# Patient Record
Sex: MALE | Race: WHITE | Employment: OTHER | URBAN - METROPOLITAN AREA
[De-identification: names, ages, dates, MRNs, and addresses within clinical notes are randomized per-mention and may not be internally consistent; named-entity substitution may affect disease eponyms.]

---

## 2017-09-29 ENCOUNTER — HOSPITAL ENCOUNTER (OUTPATIENT)
Age: 62
Discharge: HOME | End: 2017-09-29
Payer: MEDICARE

## 2017-09-29 LAB
T3 FREE: 3.29 PG/ML (ref 2.02–4.43)
THYROXINE, FREE: 1.84 NG/DL (ref 0.93–1.7)
TSH SERPL DL<=0.05 MIU/L-ACNC: 0.01 MIU/L (ref 0.3–5)

## 2017-09-29 PROCEDURE — 84481 FREE ASSAY (FT-3): CPT

## 2017-09-29 PROCEDURE — 84443 ASSAY THYROID STIM HORMONE: CPT

## 2017-09-29 PROCEDURE — 84439 ASSAY OF FREE THYROXINE: CPT

## 2017-09-29 PROCEDURE — 36415 COLL VENOUS BLD VENIPUNCTURE: CPT

## 2017-11-02 ENCOUNTER — HOSPITAL ENCOUNTER (OUTPATIENT)
Age: 62
Discharge: HOME | End: 2017-11-02
Payer: MEDICARE

## 2017-11-02 LAB
T3 FREE: 3.47 PG/ML (ref 2.02–4.43)
THYROXINE, FREE: 2.14 NG/DL (ref 0.93–1.7)
TSH SERPL DL<=0.05 MIU/L-ACNC: <0.01 MIU/L (ref 0.3–5)

## 2017-11-02 PROCEDURE — 84439 ASSAY OF FREE THYROXINE: CPT

## 2017-11-02 PROCEDURE — 36415 COLL VENOUS BLD VENIPUNCTURE: CPT

## 2017-11-02 PROCEDURE — 84443 ASSAY THYROID STIM HORMONE: CPT

## 2017-11-02 PROCEDURE — 84481 FREE ASSAY (FT-3): CPT

## 2018-02-12 ENCOUNTER — HOSPITAL ENCOUNTER (OUTPATIENT)
Age: 63
Discharge: HOME | End: 2018-02-12
Payer: MEDICARE

## 2018-02-12 LAB
T3 FREE: 5.21 PG/ML (ref 2.02–4.43)
THYROXINE, FREE: 3.19 NG/DL (ref 0.93–1.7)
TSH SERPL DL<=0.05 MIU/L-ACNC: <0.01 MIU/L (ref 0.3–5)

## 2018-02-12 PROCEDURE — 84481 FREE ASSAY (FT-3): CPT

## 2018-02-12 PROCEDURE — 36415 COLL VENOUS BLD VENIPUNCTURE: CPT

## 2018-02-12 PROCEDURE — 84443 ASSAY THYROID STIM HORMONE: CPT

## 2018-02-12 PROCEDURE — 84439 ASSAY OF FREE THYROXINE: CPT

## 2018-03-12 ENCOUNTER — HOSPITAL ENCOUNTER (OUTPATIENT)
Age: 63
Discharge: HOME | End: 2018-03-12
Payer: MEDICARE

## 2018-03-12 LAB
ANION GAP SERPL CALCULATED.3IONS-SCNC: 13 MMOL/L (ref 9–17)
BUN BLDV-MCNC: 15 MG/DL (ref 8–23)
BUN/CREAT BLD: ABNORMAL (ref 9–20)
CALCIUM SERPL-MCNC: 10.3 MG/DL (ref 8.6–10.4)
CHLORIDE BLD-SCNC: 96 MMOL/L (ref 98–107)
CO2: 27 MMOL/L (ref 20–31)
CREAT SERPL-MCNC: 0.86 MG/DL (ref 0.7–1.2)
GFR AFRICAN AMERICAN: >60 ML/MIN
GFR NON-AFRICAN AMERICAN: >60 ML/MIN
GFR SERPL CREATININE-BSD FRML MDRD: ABNORMAL ML/MIN/{1.73_M2}
GFR SERPL CREATININE-BSD FRML MDRD: ABNORMAL ML/MIN/{1.73_M2}
GLUCOSE BLD-MCNC: 147 MG/DL (ref 70–99)
MAGNESIUM: 1.7 MG/DL (ref 1.6–2.6)
POTASSIUM SERPL-SCNC: 4.3 MMOL/L (ref 3.7–5.3)
SODIUM BLD-SCNC: 136 MMOL/L (ref 135–144)
T3 FREE: 5.26 PG/ML (ref 2.02–4.43)
THYROXINE, FREE: 2.81 NG/DL (ref 0.93–1.7)
TSH SERPL DL<=0.05 MIU/L-ACNC: <0.01 MIU/L (ref 0.3–5)

## 2018-03-12 PROCEDURE — 80048 BASIC METABOLIC PNL TOTAL CA: CPT

## 2018-03-12 PROCEDURE — 84443 ASSAY THYROID STIM HORMONE: CPT

## 2018-03-12 PROCEDURE — 84439 ASSAY OF FREE THYROXINE: CPT

## 2018-03-12 PROCEDURE — 83735 ASSAY OF MAGNESIUM: CPT

## 2018-03-12 PROCEDURE — 36415 COLL VENOUS BLD VENIPUNCTURE: CPT

## 2018-03-12 PROCEDURE — 84481 FREE ASSAY (FT-3): CPT

## 2018-05-23 ENCOUNTER — HOSPITAL ENCOUNTER (OUTPATIENT)
Age: 63
Discharge: HOME | End: 2018-05-23
Payer: MEDICARE

## 2018-05-23 LAB
T3 FREE: 4.56 PG/ML (ref 2.02–4.43)
THYROXINE, FREE: 1.75 NG/DL (ref 0.93–1.7)
TSH SERPL DL<=0.05 MIU/L-ACNC: <0.01 MIU/L (ref 0.3–5)

## 2018-05-23 PROCEDURE — 36415 COLL VENOUS BLD VENIPUNCTURE: CPT

## 2018-05-23 PROCEDURE — 84443 ASSAY THYROID STIM HORMONE: CPT

## 2018-05-23 PROCEDURE — 84481 FREE ASSAY (FT-3): CPT

## 2018-05-23 PROCEDURE — 84439 ASSAY OF FREE THYROXINE: CPT

## 2018-07-02 ENCOUNTER — HOSPITAL ENCOUNTER (OUTPATIENT)
Age: 63
Discharge: HOME | End: 2018-07-02
Payer: MEDICARE

## 2018-07-02 LAB
T3 FREE: 12.49 PG/ML (ref 2.02–4.43)
THYROXINE, FREE: 3.93 NG/DL (ref 0.93–1.7)
TSH SERPL DL<=0.05 MIU/L-ACNC: <0.01 MIU/L (ref 0.3–5)

## 2018-07-02 PROCEDURE — 84443 ASSAY THYROID STIM HORMONE: CPT

## 2018-07-02 PROCEDURE — 84439 ASSAY OF FREE THYROXINE: CPT

## 2018-07-02 PROCEDURE — 36415 COLL VENOUS BLD VENIPUNCTURE: CPT

## 2018-07-02 PROCEDURE — 84481 FREE ASSAY (FT-3): CPT

## 2018-08-08 ENCOUNTER — HOSPITAL ENCOUNTER (OUTPATIENT)
Age: 63
Discharge: HOME | End: 2018-08-08
Payer: MEDICARE

## 2018-08-08 LAB
T3 FREE: 11.93 PG/ML (ref 2.02–4.43)
THYROXINE, FREE: 3.11 NG/DL (ref 0.93–1.7)
TSH SERPL DL<=0.05 MIU/L-ACNC: <0.01 MIU/L (ref 0.3–5)

## 2018-08-08 PROCEDURE — 84439 ASSAY OF FREE THYROXINE: CPT

## 2018-08-08 PROCEDURE — 36415 COLL VENOUS BLD VENIPUNCTURE: CPT

## 2018-08-08 PROCEDURE — 84443 ASSAY THYROID STIM HORMONE: CPT

## 2018-08-08 PROCEDURE — 84481 FREE ASSAY (FT-3): CPT

## 2018-12-13 ENCOUNTER — HOSPITAL ENCOUNTER (OUTPATIENT)
Age: 63
Discharge: HOME | End: 2018-12-13
Payer: MEDICARE

## 2018-12-13 LAB
THYROXINE, FREE: 1.1 NG/DL (ref 0.93–1.7)
TSH SERPL DL<=0.05 MIU/L-ACNC: 5.38 MIU/L (ref 0.3–5)

## 2018-12-13 PROCEDURE — 84439 ASSAY OF FREE THYROXINE: CPT

## 2018-12-13 PROCEDURE — 36415 COLL VENOUS BLD VENIPUNCTURE: CPT

## 2018-12-13 PROCEDURE — 84443 ASSAY THYROID STIM HORMONE: CPT

## 2018-12-17 ENCOUNTER — HOSPITAL ENCOUNTER (OUTPATIENT)
Age: 63
Discharge: HOME | End: 2018-12-17
Payer: MEDICARE

## 2018-12-17 LAB
ALBUMIN SERPL-MCNC: 4.2 G/DL (ref 3.5–5.2)
ALBUMIN/GLOBULIN RATIO: ABNORMAL (ref 1–2.5)
ALP BLD-CCNC: 106 U/L (ref 40–129)
ALT SERPL-CCNC: 16 U/L (ref 5–41)
ANION GAP SERPL CALCULATED.3IONS-SCNC: 13 MMOL/L (ref 9–17)
AST SERPL-CCNC: 15 U/L
BILIRUB SERPL-MCNC: 0.24 MG/DL (ref 0.3–1.2)
BUN BLDV-MCNC: 16 MG/DL (ref 8–23)
BUN/CREAT BLD: ABNORMAL (ref 9–20)
CALCIUM SERPL-MCNC: 9.5 MG/DL (ref 8.6–10.4)
CHLORIDE BLD-SCNC: 98 MMOL/L (ref 98–107)
CO2: 28 MMOL/L (ref 20–31)
CREAT SERPL-MCNC: 1.04 MG/DL (ref 0.7–1.2)
GFR AFRICAN AMERICAN: >60 ML/MIN
GFR NON-AFRICAN AMERICAN: >60 ML/MIN
GFR SERPL CREATININE-BSD FRML MDRD: ABNORMAL ML/MIN/{1.73_M2}
GFR SERPL CREATININE-BSD FRML MDRD: ABNORMAL ML/MIN/{1.73_M2}
GLUCOSE BLD-MCNC: 191 MG/DL (ref 70–99)
POTASSIUM SERPL-SCNC: 3.8 MMOL/L (ref 3.7–5.3)
SODIUM BLD-SCNC: 139 MMOL/L (ref 135–144)
TOTAL PROTEIN: 7.3 G/DL (ref 6.4–8.3)

## 2018-12-17 PROCEDURE — 36415 COLL VENOUS BLD VENIPUNCTURE: CPT

## 2018-12-17 PROCEDURE — 80053 COMPREHEN METABOLIC PANEL: CPT

## 2019-12-06 ENCOUNTER — HOSPITAL ENCOUNTER (INPATIENT)
Age: 64
LOS: 28 days | Discharge: NURSING, REHAB OR LONG-TERM CARE | DRG: 003 | End: 2020-01-03
Attending: EMERGENCY MEDICINE | Admitting: PSYCHIATRY & NEUROLOGY
Payer: MEDICARE

## 2019-12-06 ENCOUNTER — APPOINTMENT (OUTPATIENT)
Dept: CT IMAGING | Age: 64
End: 2019-12-06
Payer: MEDICARE

## 2019-12-06 ENCOUNTER — HOSPITAL ENCOUNTER (EMERGENCY)
Age: 64
Discharge: ANOTHER HOSPITAL | End: 2019-12-06
Attending: EMERGENCY MEDICINE
Payer: MEDICARE

## 2019-12-06 VITALS
OXYGEN SATURATION: 96 % | DIASTOLIC BLOOD PRESSURE: 97 MMHG | TEMPERATURE: 98.1 F | HEIGHT: 67 IN | RESPIRATION RATE: 16 BRPM | HEART RATE: 75 BPM | SYSTOLIC BLOOD PRESSURE: 160 MMHG | WEIGHT: 225 LBS | BODY MASS INDEX: 35.31 KG/M2

## 2019-12-06 DIAGNOSIS — I62.9 INTRACRANIAL BLEED (HCC): Primary | ICD-10-CM

## 2019-12-06 PROBLEM — I61.1 NONTRAUMATIC CORTICAL HEMORRHAGE OF LEFT CEREBRAL HEMISPHERE (HCC): Status: ACTIVE | Noted: 2019-12-06

## 2019-12-06 LAB
-: NORMAL
ABSOLUTE EOS #: 0.04 K/UL (ref 0–0.44)
ABSOLUTE EOS #: 0.1 K/UL (ref 0–0.4)
ABSOLUTE IMMATURE GRANULOCYTE: 0.09 K/UL (ref 0–0.3)
ABSOLUTE IMMATURE GRANULOCYTE: ABNORMAL K/UL (ref 0–0.3)
ABSOLUTE LYMPH #: 1.44 K/UL (ref 1.1–3.7)
ABSOLUTE LYMPH #: 1.6 K/UL (ref 1–4.8)
ABSOLUTE MONO #: 0.1 K/UL (ref 0.1–1.2)
ABSOLUTE MONO #: 0.4 K/UL (ref 0.1–1.3)
ALBUMIN SERPL-MCNC: 3.4 G/DL (ref 3.5–5.2)
ALBUMIN/GLOBULIN RATIO: 0.9 (ref 1–2.5)
ALLEN TEST: ABNORMAL
ALP BLD-CCNC: 83 U/L (ref 40–129)
ALT SERPL-CCNC: 13 U/L (ref 5–41)
ANION GAP SERPL CALCULATED.3IONS-SCNC: 12 MMOL/L (ref 9–17)
ANION GAP SERPL CALCULATED.3IONS-SCNC: 16 MMOL/L (ref 9–17)
ANION GAP: 4 MMOL/L (ref 7–16)
AST SERPL-CCNC: 13 U/L
BASOPHILS # BLD: 0 % (ref 0–2)
BASOPHILS # BLD: 1 % (ref 0–2)
BASOPHILS ABSOLUTE: 0.1 K/UL (ref 0–0.2)
BASOPHILS ABSOLUTE: <0.03 K/UL (ref 0–0.2)
BILIRUB SERPL-MCNC: 0.51 MG/DL (ref 0.3–1.2)
BILIRUBIN DIRECT: 0.19 MG/DL
BILIRUBIN, INDIRECT: 0.32 MG/DL (ref 0–1)
BUN BLDV-MCNC: 16 MG/DL (ref 8–23)
BUN BLDV-MCNC: 17 MG/DL (ref 8–23)
BUN/CREAT BLD: ABNORMAL (ref 9–20)
BUN/CREAT BLD: ABNORMAL (ref 9–20)
CALCIUM SERPL-MCNC: 10 MG/DL (ref 8.6–10.4)
CALCIUM SERPL-MCNC: 9.4 MG/DL (ref 8.6–10.4)
CHLORIDE BLD-SCNC: 101 MMOL/L (ref 98–107)
CHLORIDE BLD-SCNC: 97 MMOL/L (ref 98–107)
CO2: 20 MMOL/L (ref 20–31)
CO2: 28 MMOL/L (ref 20–31)
CREAT SERPL-MCNC: 1.01 MG/DL (ref 0.7–1.2)
CREAT SERPL-MCNC: 1.22 MG/DL (ref 0.7–1.2)
DIFFERENTIAL TYPE: ABNORMAL
DIFFERENTIAL TYPE: ABNORMAL
EOSINOPHILS RELATIVE PERCENT: 1 % (ref 1–4)
EOSINOPHILS RELATIVE PERCENT: 2 % (ref 0–4)
ESTIMATED AVERAGE GLUCOSE: 166 MG/DL
FIO2: ABNORMAL
GFR AFRICAN AMERICAN: >60 ML/MIN
GFR AFRICAN AMERICAN: >60 ML/MIN
GFR NON-AFRICAN AMERICAN: 57 ML/MIN
GFR NON-AFRICAN AMERICAN: >60 ML/MIN
GFR SERPL CREATININE-BSD FRML MDRD: >60 ML/MIN
GFR SERPL CREATININE-BSD FRML MDRD: >60 ML/MIN
GFR SERPL CREATININE-BSD FRML MDRD: ABNORMAL ML/MIN/{1.73_M2}
GFR SERPL CREATININE-BSD FRML MDRD: NORMAL ML/MIN/{1.73_M2}
GLOBULIN: ABNORMAL G/DL (ref 1.5–3.8)
GLUCOSE BLD-MCNC: 121 MG/DL (ref 75–110)
GLUCOSE BLD-MCNC: 149 MG/DL (ref 70–99)
GLUCOSE BLD-MCNC: 154 MG/DL (ref 75–110)
GLUCOSE BLD-MCNC: 205 MG/DL (ref 75–110)
GLUCOSE BLD-MCNC: 269 MG/DL (ref 74–100)
GLUCOSE BLD-MCNC: 292 MG/DL (ref 70–99)
HBA1C MFR BLD: 7.4 % (ref 4–6)
HCO3 VENOUS: 30.9 MMOL/L (ref 22–29)
HCT VFR BLD CALC: 49.1 % (ref 41–53)
HCT VFR BLD CALC: 49.9 % (ref 40.7–50.3)
HEMOGLOBIN: 16.8 G/DL (ref 13.5–17.5)
HEMOGLOBIN: 17.1 G/DL (ref 13–17)
IMMATURE GRANULOCYTES: 2 %
IMMATURE GRANULOCYTES: ABNORMAL %
INR BLD: 2
INR BLD: 2.9
LV EF: 48 %
LVEF MODALITY: NORMAL
LYMPHOCYTES # BLD: 25 % (ref 24–44)
LYMPHOCYTES # BLD: 26 % (ref 24–43)
MAGNESIUM: 1.9 MG/DL (ref 1.6–2.6)
MCH RBC QN AUTO: 31.3 PG (ref 25.2–33.5)
MCH RBC QN AUTO: 31.6 PG (ref 26–34)
MCHC RBC AUTO-ENTMCNC: 34.2 G/DL (ref 31–37)
MCHC RBC AUTO-ENTMCNC: 34.3 G/DL (ref 28.4–34.8)
MCV RBC AUTO: 91.2 FL (ref 82.6–102.9)
MCV RBC AUTO: 92.3 FL (ref 80–100)
MODE: ABNORMAL
MONOCYTES # BLD: 2 % (ref 3–12)
MONOCYTES # BLD: 7 % (ref 1–7)
MRSA, DNA, NASAL: NORMAL
NEGATIVE BASE EXCESS, VEN: ABNORMAL (ref 0–2)
NRBC AUTOMATED: 0 PER 100 WBC
NRBC AUTOMATED: ABNORMAL PER 100 WBC
O2 DEVICE/FLOW/%: ABNORMAL
O2 SAT, VEN: 88 % (ref 60–85)
PARTIAL THROMBOPLASTIN TIME: 38.1 SEC (ref 24–36)
PATIENT TEMP: ABNORMAL
PCO2, VEN: 52.2 MM HG (ref 41–51)
PDW BLD-RTO: 15.1 % (ref 11.8–14.4)
PDW BLD-RTO: 16.3 % (ref 11.5–14.9)
PH VENOUS: 7.38 (ref 7.32–7.43)
PLATELET # BLD: 146 K/UL (ref 138–453)
PLATELET # BLD: 168 K/UL (ref 150–450)
PLATELET ESTIMATE: ABNORMAL
PLATELET ESTIMATE: ABNORMAL
PMV BLD AUTO: 11.7 FL (ref 8.1–13.5)
PMV BLD AUTO: 8.1 FL (ref 6–12)
PO2, VEN: 57.5 MM HG (ref 30–50)
POC CHLORIDE: 102 MMOL/L (ref 98–107)
POC CREATININE: 1.06 MG/DL (ref 0.51–1.19)
POC CREATININE: 1.27 MG/DL (ref 0.51–1.19)
POC HEMATOCRIT: 53 % (ref 41–53)
POC HEMOGLOBIN: 17.9 G/DL (ref 13.5–17.5)
POC IONIZED CALCIUM: 1.1 MMOL/L (ref 1.15–1.33)
POC LACTIC ACID: 1.59 MMOL/L (ref 0.56–1.39)
POC PCO2 TEMP: ABNORMAL MM HG
POC PH TEMP: ABNORMAL
POC PO2 TEMP: ABNORMAL MM HG
POC POTASSIUM: 7.9 MMOL/L (ref 3.5–4.5)
POC SODIUM: 137 MMOL/L (ref 138–146)
POSITIVE BASE EXCESS, VEN: 4 (ref 0–3)
POTASSIUM SERPL-SCNC: 3.9 MMOL/L (ref 3.7–5.3)
POTASSIUM SERPL-SCNC: 4 MMOL/L (ref 3.7–5.3)
PROTHROMBIN TIME: 22.8 SEC (ref 11.8–14.6)
PROTHROMBIN TIME: 28.3 SEC (ref 9–12)
RBC # BLD: 5.33 M/UL (ref 4.5–5.9)
RBC # BLD: 5.47 M/UL (ref 4.21–5.77)
RBC # BLD: ABNORMAL 10*6/UL
RBC # BLD: ABNORMAL 10*6/UL
REASON FOR REJECTION: NORMAL
SAMPLE SITE: ABNORMAL
SEG NEUTROPHILS: 65 % (ref 36–66)
SEG NEUTROPHILS: 69 % (ref 36–65)
SEGMENTED NEUTROPHILS ABSOLUTE COUNT: 3.82 K/UL (ref 1.5–8.1)
SEGMENTED NEUTROPHILS ABSOLUTE COUNT: 4.3 K/UL (ref 1.3–9.1)
SODIUM BLD-SCNC: 137 MMOL/L (ref 135–144)
SODIUM BLD-SCNC: 137 MMOL/L (ref 135–144)
SPECIMEN DESCRIPTION: NORMAL
T3 TOTAL: 65 NG/DL (ref 80–200)
T4 TOTAL: 9.5 UG/DL (ref 4.5–12)
TOTAL CO2, VENOUS: 33 MMOL/L (ref 23–30)
TOTAL PROTEIN: 7 G/DL (ref 6.4–8.3)
TROPONIN INTERP: ABNORMAL
TROPONIN T: ABNORMAL NG/ML
TROPONIN, HIGH SENSITIVITY: 23 NG/L (ref 0–22)
TROPONIN, HIGH SENSITIVITY: 27 NG/L (ref 0–22)
TROPONIN, HIGH SENSITIVITY: 27 NG/L (ref 0–22)
TSH SERPL DL<=0.05 MIU/L-ACNC: 3.15 MIU/L (ref 0.3–5)
WBC # BLD: 5.5 K/UL (ref 3.5–11.3)
WBC # BLD: 6.5 K/UL (ref 3.5–11)
WBC # BLD: ABNORMAL 10*3/UL
WBC # BLD: ABNORMAL 10*3/UL
ZZ NTE CLEAN UP: ORDERED TEST: NORMAL
ZZ NTE WITH NAME CLEAN UP: SPECIMEN SOURCE: NORMAL

## 2019-12-06 PROCEDURE — 80048 BASIC METABOLIC PNL TOTAL CA: CPT

## 2019-12-06 PROCEDURE — 82565 ASSAY OF CREATININE: CPT

## 2019-12-06 PROCEDURE — 99223 1ST HOSP IP/OBS HIGH 75: CPT | Performed by: PSYCHIATRY & NEUROLOGY

## 2019-12-06 PROCEDURE — 83605 ASSAY OF LACTIC ACID: CPT

## 2019-12-06 PROCEDURE — 70450 CT HEAD/BRAIN W/O DYE: CPT

## 2019-12-06 PROCEDURE — 6370000000 HC RX 637 (ALT 250 FOR IP): Performed by: STUDENT IN AN ORGANIZED HEALTH CARE EDUCATION/TRAINING PROGRAM

## 2019-12-06 PROCEDURE — 6360000002 HC RX W HCPCS: Performed by: STUDENT IN AN ORGANIZED HEALTH CARE EDUCATION/TRAINING PROGRAM

## 2019-12-06 PROCEDURE — 82435 ASSAY OF BLOOD CHLORIDE: CPT

## 2019-12-06 PROCEDURE — 2500000003 HC RX 250 WO HCPCS

## 2019-12-06 PROCEDURE — 85025 COMPLETE CBC W/AUTO DIFF WBC: CPT

## 2019-12-06 PROCEDURE — 82803 BLOOD GASES ANY COMBINATION: CPT

## 2019-12-06 PROCEDURE — 93306 TTE W/DOPPLER COMPLETE: CPT

## 2019-12-06 PROCEDURE — 6360000002 HC RX W HCPCS

## 2019-12-06 PROCEDURE — 84436 ASSAY OF TOTAL THYROXINE: CPT

## 2019-12-06 PROCEDURE — 99285 EMERGENCY DEPT VISIT HI MDM: CPT

## 2019-12-06 PROCEDURE — 2580000003 HC RX 258: Performed by: PSYCHIATRY & NEUROLOGY

## 2019-12-06 PROCEDURE — 2580000003 HC RX 258: Performed by: EMERGENCY MEDICINE

## 2019-12-06 PROCEDURE — 94664 DEMO&/EVAL PT USE INHALER: CPT

## 2019-12-06 PROCEDURE — 93005 ELECTROCARDIOGRAM TRACING: CPT | Performed by: PSYCHIATRY & NEUROLOGY

## 2019-12-06 PROCEDURE — 85610 PROTHROMBIN TIME: CPT

## 2019-12-06 PROCEDURE — 84443 ASSAY THYROID STIM HORMONE: CPT

## 2019-12-06 PROCEDURE — 82947 ASSAY GLUCOSE BLOOD QUANT: CPT

## 2019-12-06 PROCEDURE — 2580000003 HC RX 258: Performed by: STUDENT IN AN ORGANIZED HEALTH CARE EDUCATION/TRAINING PROGRAM

## 2019-12-06 PROCEDURE — 36415 COLL VENOUS BLD VENIPUNCTURE: CPT

## 2019-12-06 PROCEDURE — 84484 ASSAY OF TROPONIN QUANT: CPT

## 2019-12-06 PROCEDURE — 94761 N-INVAS EAR/PLS OXIMETRY MLT: CPT

## 2019-12-06 PROCEDURE — C9248 INJ, CLEVIDIPINE BUTYRATE: HCPCS | Performed by: STUDENT IN AN ORGANIZED HEALTH CARE EDUCATION/TRAINING PROGRAM

## 2019-12-06 PROCEDURE — 84132 ASSAY OF SERUM POTASSIUM: CPT

## 2019-12-06 PROCEDURE — 93005 ELECTROCARDIOGRAM TRACING: CPT | Performed by: STUDENT IN AN ORGANIZED HEALTH CARE EDUCATION/TRAINING PROGRAM

## 2019-12-06 PROCEDURE — 83036 HEMOGLOBIN GLYCOSYLATED A1C: CPT

## 2019-12-06 PROCEDURE — 87641 MR-STAPH DNA AMP PROBE: CPT

## 2019-12-06 PROCEDURE — 85014 HEMATOCRIT: CPT

## 2019-12-06 PROCEDURE — 2000000003 HC NEURO ICU R&B

## 2019-12-06 PROCEDURE — 84480 ASSAY TRIIODOTHYRONINE (T3): CPT

## 2019-12-06 PROCEDURE — 84295 ASSAY OF SERUM SODIUM: CPT

## 2019-12-06 PROCEDURE — 83735 ASSAY OF MAGNESIUM: CPT

## 2019-12-06 PROCEDURE — 2500000003 HC RX 250 WO HCPCS: Performed by: EMERGENCY MEDICINE

## 2019-12-06 PROCEDURE — 94660 CPAP INITIATION&MGMT: CPT

## 2019-12-06 PROCEDURE — 94640 AIRWAY INHALATION TREATMENT: CPT

## 2019-12-06 PROCEDURE — 80076 HEPATIC FUNCTION PANEL: CPT

## 2019-12-06 PROCEDURE — 2700000000 HC OXYGEN THERAPY PER DAY

## 2019-12-06 PROCEDURE — 85730 THROMBOPLASTIN TIME PARTIAL: CPT

## 2019-12-06 PROCEDURE — 82330 ASSAY OF CALCIUM: CPT

## 2019-12-06 PROCEDURE — C9132 KCENTRA, PER I.U.: HCPCS | Performed by: STUDENT IN AN ORGANIZED HEALTH CARE EDUCATION/TRAINING PROGRAM

## 2019-12-06 RX ORDER — METOPROLOL TARTRATE 5 MG/5ML
5 INJECTION INTRAVENOUS EVERY 6 HOURS PRN
Status: DISCONTINUED | OUTPATIENT
Start: 2019-12-06 | End: 2019-12-23

## 2019-12-06 RX ORDER — FUROSEMIDE 10 MG/ML
20 INJECTION INTRAMUSCULAR; INTRAVENOUS DAILY
Status: DISCONTINUED | OUTPATIENT
Start: 2019-12-06 | End: 2019-12-06

## 2019-12-06 RX ORDER — SODIUM CHLORIDE 0.9 % (FLUSH) 0.9 %
10 SYRINGE (ML) INJECTION PRN
Status: DISCONTINUED | OUTPATIENT
Start: 2019-12-06 | End: 2019-12-06

## 2019-12-06 RX ORDER — LISINOPRIL 20 MG/1
40 TABLET ORAL DAILY
Status: DISCONTINUED | OUTPATIENT
Start: 2019-12-06 | End: 2019-12-06

## 2019-12-06 RX ORDER — METOPROLOL TARTRATE 50 MG/1
100 TABLET, FILM COATED ORAL 2 TIMES DAILY
Status: DISCONTINUED | OUTPATIENT
Start: 2019-12-06 | End: 2020-01-03 | Stop reason: HOSPADM

## 2019-12-06 RX ORDER — IPRATROPIUM BROMIDE AND ALBUTEROL SULFATE 2.5; .5 MG/3ML; MG/3ML
1 SOLUTION RESPIRATORY (INHALATION)
Status: DISCONTINUED | OUTPATIENT
Start: 2019-12-06 | End: 2019-12-07

## 2019-12-06 RX ORDER — LEVETIRACETAM 5 MG/ML
500 INJECTION INTRAVASCULAR EVERY 12 HOURS
Status: DISCONTINUED | OUTPATIENT
Start: 2019-12-07 | End: 2019-12-06

## 2019-12-06 RX ORDER — DEXTROSE MONOHYDRATE 25 G/50ML
12.5 INJECTION, SOLUTION INTRAVENOUS PRN
Status: DISCONTINUED | OUTPATIENT
Start: 2019-12-06 | End: 2020-01-03 | Stop reason: HOSPADM

## 2019-12-06 RX ORDER — BUMETANIDE 0.5 MG/1
0.5 TABLET ORAL DAILY
COMMUNITY

## 2019-12-06 RX ORDER — FUROSEMIDE 20 MG/1
20 TABLET ORAL DAILY
Status: DISCONTINUED | OUTPATIENT
Start: 2019-12-06 | End: 2019-12-06

## 2019-12-06 RX ORDER — LEVETIRACETAM 10 MG/ML
1000 INJECTION INTRAVASCULAR ONCE
Status: DISCONTINUED | OUTPATIENT
Start: 2019-12-06 | End: 2019-12-06

## 2019-12-06 RX ORDER — DILTIAZEM HYDROCHLORIDE 240 MG/1
240 CAPSULE, COATED, EXTENDED RELEASE ORAL DAILY
Status: DISCONTINUED | OUTPATIENT
Start: 2019-12-06 | End: 2019-12-06

## 2019-12-06 RX ORDER — ACETAMINOPHEN 650 MG/1
650 SUPPOSITORY RECTAL EVERY 4 HOURS PRN
Status: DISCONTINUED | OUTPATIENT
Start: 2019-12-06 | End: 2020-01-03 | Stop reason: HOSPADM

## 2019-12-06 RX ORDER — METHIMAZOLE 5 MG/1
10 TABLET ORAL EVERY EVENING
Status: DISCONTINUED | OUTPATIENT
Start: 2019-12-06 | End: 2020-01-03 | Stop reason: HOSPADM

## 2019-12-06 RX ORDER — SODIUM CHLORIDE 0.9 % (FLUSH) 0.9 %
10 SYRINGE (ML) INJECTION EVERY 12 HOURS SCHEDULED
Status: DISCONTINUED | OUTPATIENT
Start: 2019-12-06 | End: 2019-12-06

## 2019-12-06 RX ORDER — ACETAMINOPHEN 325 MG/1
650 TABLET ORAL EVERY 4 HOURS PRN
Status: DISCONTINUED | OUTPATIENT
Start: 2019-12-06 | End: 2019-12-08

## 2019-12-06 RX ORDER — DEXTROSE MONOHYDRATE 50 MG/ML
100 INJECTION, SOLUTION INTRAVENOUS PRN
Status: DISCONTINUED | OUTPATIENT
Start: 2019-12-06 | End: 2020-01-03 | Stop reason: HOSPADM

## 2019-12-06 RX ORDER — LEVETIRACETAM 5 MG/ML
500 INJECTION INTRAVASCULAR EVERY 12 HOURS
Status: DISCONTINUED | OUTPATIENT
Start: 2019-12-06 | End: 2019-12-06

## 2019-12-06 RX ORDER — FUROSEMIDE 10 MG/ML
20 INJECTION INTRAMUSCULAR; INTRAVENOUS 2 TIMES DAILY
Status: DISCONTINUED | OUTPATIENT
Start: 2019-12-06 | End: 2019-12-06

## 2019-12-06 RX ORDER — NICOTINE POLACRILEX 4 MG
15 LOZENGE BUCCAL PRN
Status: DISCONTINUED | OUTPATIENT
Start: 2019-12-06 | End: 2020-01-03 | Stop reason: HOSPADM

## 2019-12-06 RX ORDER — ONDANSETRON 2 MG/ML
4 INJECTION INTRAMUSCULAR; INTRAVENOUS EVERY 8 HOURS PRN
Status: DISCONTINUED | OUTPATIENT
Start: 2019-12-06 | End: 2019-12-06

## 2019-12-06 RX ORDER — DILTIAZEM HYDROCHLORIDE 60 MG/1
60 TABLET, FILM COATED ORAL EVERY 6 HOURS SCHEDULED
Status: DISCONTINUED | OUTPATIENT
Start: 2019-12-06 | End: 2019-12-16

## 2019-12-06 RX ORDER — METOPROLOL TARTRATE 50 MG/1
100 TABLET, FILM COATED ORAL 2 TIMES DAILY
Status: DISCONTINUED | OUTPATIENT
Start: 2019-12-06 | End: 2019-12-06

## 2019-12-06 RX ORDER — METHIMAZOLE 5 MG/1
20 TABLET ORAL DAILY
Status: DISCONTINUED | OUTPATIENT
Start: 2019-12-07 | End: 2020-01-03 | Stop reason: HOSPADM

## 2019-12-06 RX ORDER — SODIUM CHLORIDE 0.9 % (FLUSH) 0.9 %
10 SYRINGE (ML) INJECTION EVERY 12 HOURS SCHEDULED
Status: DISCONTINUED | OUTPATIENT
Start: 2019-12-06 | End: 2020-01-03 | Stop reason: HOSPADM

## 2019-12-06 RX ORDER — ONDANSETRON 2 MG/ML
4 INJECTION INTRAMUSCULAR; INTRAVENOUS EVERY 6 HOURS PRN
Status: DISCONTINUED | OUTPATIENT
Start: 2019-12-06 | End: 2020-01-03 | Stop reason: HOSPADM

## 2019-12-06 RX ORDER — ACETAMINOPHEN 325 MG/1
650 TABLET ORAL EVERY 4 HOURS PRN
Status: DISCONTINUED | OUTPATIENT
Start: 2019-12-06 | End: 2020-01-03 | Stop reason: HOSPADM

## 2019-12-06 RX ORDER — SODIUM CHLORIDE 0.9 % (FLUSH) 0.9 %
10 SYRINGE (ML) INJECTION PRN
Status: DISCONTINUED | OUTPATIENT
Start: 2019-12-06 | End: 2020-01-03 | Stop reason: HOSPADM

## 2019-12-06 RX ORDER — INSULIN GLARGINE 100 [IU]/ML
60 INJECTION, SOLUTION SUBCUTANEOUS NIGHTLY
Status: DISCONTINUED | OUTPATIENT
Start: 2019-12-06 | End: 2019-12-06

## 2019-12-06 RX ORDER — SODIUM CHLORIDE 9 MG/ML
INJECTION, SOLUTION INTRAVENOUS CONTINUOUS
Status: DISCONTINUED | OUTPATIENT
Start: 2019-12-06 | End: 2019-12-07

## 2019-12-06 RX ORDER — ATORVASTATIN CALCIUM 40 MG/1
40 TABLET, FILM COATED ORAL DAILY
Status: DISCONTINUED | OUTPATIENT
Start: 2019-12-06 | End: 2020-01-03 | Stop reason: HOSPADM

## 2019-12-06 RX ORDER — ONDANSETRON 2 MG/ML
4 INJECTION INTRAMUSCULAR; INTRAVENOUS EVERY 6 HOURS PRN
Status: DISCONTINUED | OUTPATIENT
Start: 2019-12-06 | End: 2019-12-06

## 2019-12-06 RX ORDER — HYDRALAZINE HYDROCHLORIDE 20 MG/ML
10 INJECTION INTRAMUSCULAR; INTRAVENOUS EVERY 6 HOURS PRN
Status: DISCONTINUED | OUTPATIENT
Start: 2019-12-06 | End: 2020-01-03 | Stop reason: HOSPADM

## 2019-12-06 RX ADMIN — SODIUM CHLORIDE, PRESERVATIVE FREE 10 ML: 5 INJECTION INTRAVENOUS at 20:55

## 2019-12-06 RX ADMIN — IPRATROPIUM BROMIDE AND ALBUTEROL SULFATE 1 AMPULE: .5; 3 SOLUTION RESPIRATORY (INHALATION) at 16:58

## 2019-12-06 RX ADMIN — SODIUM CHLORIDE 5 MG/HR: 9 INJECTION, SOLUTION INTRAVENOUS at 10:19

## 2019-12-06 RX ADMIN — SODIUM CHLORIDE: 9 INJECTION, SOLUTION INTRAVENOUS at 15:14

## 2019-12-06 RX ADMIN — INSULIN LISPRO 4 UNITS: 100 INJECTION, SOLUTION INTRAVENOUS; SUBCUTANEOUS at 14:19

## 2019-12-06 RX ADMIN — CLEVIPIDINE 12 MG/HR: 0.5 EMULSION INTRAVENOUS at 18:17

## 2019-12-06 RX ADMIN — CLEVIPIDINE 2 MG/HR: 0.5 EMULSION INTRAVENOUS at 12:41

## 2019-12-06 RX ADMIN — PHYTONADIONE 10 MG: 10 INJECTION, EMULSION INTRAMUSCULAR; INTRAVENOUS; SUBCUTANEOUS at 11:45

## 2019-12-06 RX ADMIN — IPRATROPIUM BROMIDE AND ALBUTEROL SULFATE 1 AMPULE: .5; 3 SOLUTION RESPIRATORY (INHALATION) at 19:40

## 2019-12-06 RX ADMIN — INSULIN LISPRO 1 UNITS: 100 INJECTION, SOLUTION INTRAVENOUS; SUBCUTANEOUS at 20:55

## 2019-12-06 RX ADMIN — PROTHROMBIN, COAGULATION FACTOR VII HUMAN, COAGULATION FACTOR IX HUMAN, COAGULATION FACTOR X HUMAN, PROTEIN C, PROTEIN S HUMAN, AND WATER 1500 UNITS: KIT at 11:20

## 2019-12-06 RX ADMIN — CALCIUM GLUCONATE 2 G: 98 INJECTION, SOLUTION INTRAVENOUS at 13:06

## 2019-12-06 ASSESSMENT — ENCOUNTER SYMPTOMS
COUGH: 0
CHEST TIGHTNESS: 0
TROUBLE SWALLOWING: 1
SHORTNESS OF BREATH: 0
VOMITING: 0
BACK PAIN: 0
ABDOMINAL PAIN: 0
VOICE CHANGE: 1
NAUSEA: 0
ABDOMINAL DISTENTION: 0
COLOR CHANGE: 0

## 2019-12-06 ASSESSMENT — PAIN SCALES - GENERAL: PAINLEVEL_OUTOF10: 0

## 2019-12-06 NOTE — ED PROVIDER NOTES
surgery; Lithotripsy; Cystoscopy; Ureteroscopy; pacemaker placement; eye surgery (Left); and Thyroidectomy. Social History     Socioeconomic History    Marital status:      Spouse name: Michelle Cruz    Number of children: 3    Years of education: Not on file    Highest education level: Not on file   Occupational History    Not on file   Social Needs    Financial resource strain: Not on file    Food insecurity:     Worry: Not on file     Inability: Not on file    Transportation needs:     Medical: Not on file     Non-medical: Not on file   Tobacco Use    Smoking status: Current Every Day Smoker     Packs/day: 1.00     Years: 47.00     Pack years: 47.00     Types: Cigarettes    Smokeless tobacco: Never Used   Substance and Sexual Activity    Alcohol use: No    Drug use: Not on file    Sexual activity: Not on file   Lifestyle    Physical activity:     Days per week: Not on file     Minutes per session: Not on file    Stress: Not on file   Relationships    Social connections:     Talks on phone: Not on file     Gets together: Not on file     Attends Anabaptist service: Not on file     Active member of club or organization: Not on file     Attends meetings of clubs or organizations: Not on file     Relationship status: Not on file    Intimate partner violence:     Fear of current or ex partner: Not on file     Emotionally abused: Not on file     Physically abused: Not on file     Forced sexual activity: Not on file   Other Topics Concern    Not on file   Social History Narrative    Not on file       No family history on file. Allergies:    Patient has no known allergies. Home Medications:  Prior to Admission medications    Medication Sig Start Date End Date Taking?  Authorizing Provider   bumetanide (BUMEX) 0.5 MG tablet Take 0.5 mg by mouth daily   Yes Historical Provider, MD   lisinopril (PRINIVIL;ZESTRIL) 40 MG tablet Take 1 tablet by mouth daily 7/14/15  Yes Frances Ellis MD isosorbide mononitrate (IMDUR) 120 MG CR tablet Take 1 tablet by mouth daily 7/15/15  Yes Edin Connell MD   diltiazem (CARDIZEM CD) 240 MG ER capsule Take 1 capsule by mouth daily 7/14/15  Yes Edin Connell MD   aspirin 81 MG tablet Take 81 mg by mouth daily. Yes Historical Provider, MD   atorvastatin (LIPITOR) 40 MG tablet Take 40 mg by mouth daily. Yes Historical Provider, MD   glyBURIDE (DIABETA) 5 MG tablet Take 5 mg by mouth 2 times daily (with meals). Yes Historical Provider, MD   insulin glargine (LANTUS) 100 UNIT/ML injection Inject 60 Units into the skin daily    Yes Historical Provider, MD   metformin (GLUCOPHAGE) 1000 MG tablet Take 1,000 mg by mouth 2 times daily (with meals). Yes Historical Provider, MD   metoprolol (LOPRESSOR) 100 MG tablet Take 100 mg by mouth 2 times daily. Yes Historical Provider, MD   nitroGLYCERIN (NITROSTAT) 0.4 MG SL tablet Place 0.4 mg under the tongue every 5 minutes as needed. Yes Historical Provider, MD   insulin lispro (HUMALOG) 100 UNIT/ML injection vial Inject 15 Units into the skin 3 times daily (with meals)  Patient taking differently: Inject 15 Units into the skin 3 times daily (with meals) Per wife pt takes a sliding scale dose not a fixed unit amount 7/14/15   Edin Connell MD   methimazole (TAPAZOLE) 10 MG tablet Take 10 mg by mouth every evening    Historical Provider, MD   Blood Pressure Monitor KIT CHECK BLOOD PRESSURE DAILY 4/6/15   Óscar Hsu MD   prasugrel (EFFIENT) 10 MG TABS Take 10 mg by mouth daily. Historical Provider, MD   methimazole (TAPAZOLE) 10 MG tablet   Take 20 mg by mouth daily     Historical Provider, MD       REVIEW OF SYSTEMS    (2-9 systems for level 4, 10 or more for level 5)    Review of Systems   Constitutional: Positive for activity change and fatigue. Negative for chills and fever. HENT: Positive for drooling, trouble swallowing and voice change. Negative for hearing loss.     Respiratory: Negative for cough, chest tightness and shortness of breath. Cardiovascular: Negative for chest pain and palpitations. Gastrointestinal: Negative for abdominal distention, abdominal pain, nausea and vomiting. Musculoskeletal: Negative for back pain. Skin: Negative for color change and pallor. Neurological: Positive for syncope, speech difficulty, weakness and headaches. Negative for dizziness and light-headedness. Psychiatric/Behavioral: Negative for confusion. The patient is not nervous/anxious. PHYSICAL EXAM   (up to 7 for level 4, 8 or more for level 5)    INITIAL VITALS:   ED Triage Vitals   BP Temp Temp Source Pulse Resp SpO2 Height Weight   12/06/19 1104 12/06/19 1111 12/06/19 1111 12/06/19 1104 12/06/19 1104 12/06/19 1104 12/06/19 1111 12/06/19 1111   (!) 159/85 98.1 °F (36.7 °C) Axillary 75 17 91 % 5' 10\" (1.778 m) 225 lb (102.1 kg)       Physical Exam  Vitals signs and nursing note reviewed. Constitutional:       General: He is in acute distress. Appearance: He is obese. HENT:      Head: Atraumatic. Eyes:      General: Visual field deficit present. Pupils: Pupils are equal, round, and reactive to light. Cardiovascular:      Rate and Rhythm: Normal rate and regular rhythm. Pulses:           Carotid pulses are 2+ on the right side and 2+ on the left side. Radial pulses are 2+ on the right side and 2+ on the left side. Dorsalis pedis pulses are 1+ on the right side and 2+ on the left side. Posterior tibial pulses are 2+ on the right side and 2+ on the left side. Heart sounds: Normal heart sounds. Pulmonary:      Effort: Pulmonary effort is normal. No respiratory distress. Breath sounds: Normal breath sounds. No decreased breath sounds. Abdominal:      General: Bowel sounds are normal. There is no distension. Tenderness: There is no tenderness. Musculoskeletal:      Right lower leg: No edema. Left lower leg: No edema.    Skin: General: Skin is warm and dry. Capillary Refill: Capillary refill takes less than 2 seconds. Neurological:      Mental Status: He is alert. Cranial Nerves: Dysarthria and facial asymmetry present. Sensory: Sensory deficit present. Motor: Weakness present. Coordination: Coordination abnormal.      Comments: Patient has weakness to his right upper extremity, right lower extremity. Strength in the lower extremity is 1/5. Strength to the left lower extremity is 5/5. Strength of the right upper extremity is 1/5. Strength to the left upper extremity is 5/5. Left-sided facial droop. Patient has a loss of peripheral field defect on the left. Patient is unable to move his left eye past midline. Psychiatric:         Behavior: Behavior is cooperative.          DIFFERENTIAL  DIAGNOSIS   PLAN (LABS / IMAGING / EKG):  Orders Placed This Encounter   Procedures    MRSA DNA Probe, Nasal    CT HEAD WO CONTRAST    XR CHEST PORTABLE    CTA HEAD NECK W CONTRAST    Protime-INR    Hemoglobin and hematocrit, blood    SODIUM (POC)    POTASSIUM (POC)    CHLORIDE (POC)    CALCIUM, IONIC (POC)    CBC Auto Differential    Troponin    SPECIMEN REJECTION    APTT    Basic metabolic panel    CBC Auto Differential    Hemoglobin A1c    PROTIME-INR    Troponin    Calcium, Ionized    Magnesium    PHOSPHORUS    TSH without Reflex    T4    T3    HEPATIC FUNCTION PANEL    BASIC METABOLIC PANEL    Diet NPO Effective Now    Vital signs per unit routine    Notify physician    Notify physician    HYPOGLYCEMIA TREATMENT: blood glucose less than 50 mg/dL and patient  ALERT and TOLERATING PO    HYPOGLYCEMIA TREATMENT: blood glucose less than 70 mg/dL and patient ALERT and TOLERATING PO    HYPOGLYCEMIA TREATMENT: blood glucose less than 70 mg/dL and patient NOT ALERT or NPO    Vital signs every hour    Notify physician    Strict Bedrest    NIH Stroke Scale (NIHSS)    Elevate hob    Vital signs    Telemetry monitoring    Notify Physician    Notify physician    Adv Diet as Antonieta (nurse communication)    Neuro checks    [place] coma scale    NIHSS    Elevate Head of Bed     Tobacco cessation education    Swallow screen by nursing before diet and oral medications started.  Stroke education    Place intermittent pneumatic compression device    Up as tolerated    Misc nursing order (specify)    Full Code    Inpatient consult to Case Management    Inpatient consult to Neurocritical care    Consult to Neurosurgery    Pharmacy to Dose: Other - See Comments: The pharmacist will review this patient's medication profile to evaluate IV medications and change all base solutions to 0.9% sodium chloride if possible based on compatibility and product availability. This. ..    Inpatient consult to Respiratory Care    OT eval and treat    PT evaluation and treat    Initiate Oxygen Therapy Protocol    Pulse oximetry, continuous    HHN Treatment    CPAP    Speech Language Pathology (SLP) eval and treat    Venous Blood Gas, POC    Creatinine W/GFR Point of Care    Lactic Acid, POC    POCT Glucose    Anion Gap (Calc) POC    POCT Glucose    POCT glucose    POC Glucose Fingerstick    POC Glucose Fingerstick    POC Glucose Fingerstick    EKG 12 Lead    Echocardiogram complete 2D with doppler with color    PATIENT STATUS (FROM ED OR OR/PROCEDURAL) Inpatient    PATIENT STATUS (FROM ED OR OR/PROCEDURAL) Inpatient    Seizure precautions       MEDICATIONS ORDERED:  Orders Placed This Encounter   Medications    prothrombin complex concentrate (human) (KCENTRA) infusion 1,500 Units     Please refer to package insert or embedded reference link for dosing instructions.  phytonadione (ADULT) (VITAMIN K) 10 mg in dextrose 5 % 100 mL IVPB    clevidipine (CLEVIPREX) infusion     Order Specific Question:   Please select a reason the therapeutic interchange was not accepted:      Answer:   Lack of Response to Alternative    DISCONTD: sodium chloride flush 0.9 % injection 10 mL    DISCONTD: sodium chloride flush 0.9 % injection 10 mL    DISCONTD: ondansetron (ZOFRAN) injection 4 mg    atorvastatin (LIPITOR) tablet 40 mg    DISCONTD: diltiazem (CARDIZEM CD) extended release capsule 240 mg    DISCONTD: insulin glargine (LANTUS) injection vial 60 Units    DISCONTD: metoprolol tartrate (LOPRESSOR) tablet 100 mg    DISCONTD: insulin lispro (HUMALOG) injection vial 0-6 Units    DISCONTD: insulin lispro (HUMALOG) injection vial 0-3 Units    glucose (GLUTOSE) 40 % oral gel 15 g    dextrose 50 % IV solution    glucagon (rDNA) injection 1 mg    dextrose 5 % solution    DISCONTD: sodium chloride flush 0.9 % injection 10 mL    DISCONTD: sodium chloride flush 0.9 % injection 10 mL    DISCONTD: ondansetron (ZOFRAN) injection 4 mg    OR Linked Order Group     acetaminophen (TYLENOL) tablet 650 mg     acetaminophen (TYLENOL) suppository 650 mg    calcium gluconate 2 g in dextrose 5 % 100 mL IVPB    sodium chloride flush 0.9 % injection 10 mL    sodium chloride flush 0.9 % injection 10 mL    acetaminophen (TYLENOL) tablet 650 mg    ondansetron (ZOFRAN) injection 4 mg    DISCONTD: levetiracetam (KEPPRA) 1000 mg/100 mL IVPB    DISCONTD: levetiracetam (KEPPRA) 500 mg/100 mL IVPB    insulin lispro (HUMALOG) injection vial 0-12 Units    insulin lispro (HUMALOG) injection vial 0-6 Units    ipratropium-albuterol (DUONEB) nebulizer solution 1 ampule    DISCONTD: furosemide (LASIX) tablet 20 mg    DISCONTD: lisinopril (PRINIVIL;ZESTRIL) tablet 40 mg    methIMAzole (TAPAZOLE) tablet 20 mg    methIMAzole (TAPAZOLE) tablet 10 mg    DISCONTD: levetiracetam (KEPPRA) 500 mg/100 mL IVPB    DISCONTD: levetiracetam (KEPPRA) 1000 mg/100 mL IVPB    DISCONTD: furosemide (LASIX) injection 20 mg    metoprolol (LOPRESSOR) injection 5 mg    DISCONTD: furosemide (LASIX) injection 20 mg    0.9 % sodium chloride 3.4 (*)     Albumin/Globulin Ratio 0.9 (*)     All other components within normal limits   BASIC METABOLIC PANEL - Abnormal; Notable for the following components:    Glucose 149 (*)     All other components within normal limits   VENOUS BLOOD GAS, POINT OF CARE - Abnormal; Notable for the following components:    pCO2, Zander 52.2 (*)     pO2, Zander 57.5 (*)     HCO3, Venous 30.9 (*)     Total CO2, Venous 33 (*)     Positive Base Excess, Zander 4 (*)     O2 Sat, Zander 88 (*)     All other components within normal limits   CREATININE W/GFR POINT OF CARE - Abnormal; Notable for the following components:    POC Creatinine 1.27 (*)     GFR Non- 57 (*)     All other components within normal limits   LACTIC ACID,POINT OF CARE - Abnormal; Notable for the following components:    POC Lactic Acid 1.59 (*)     All other components within normal limits   POCT GLUCOSE - Abnormal; Notable for the following components:    POC Glucose 269 (*)     All other components within normal limits   ANION GAP (CALC) POC - Abnormal; Notable for the following components:    Anion Gap 4 (*)     All other components within normal limits   POC GLUCOSE FINGERSTICK - Abnormal; Notable for the following components:    POC Glucose 205 (*)     All other components within normal limits   POC GLUCOSE FINGERSTICK - Abnormal; Notable for the following components:    POC Glucose 121 (*)     All other components within normal limits   POC GLUCOSE FINGERSTICK - Abnormal; Notable for the following components:    POC Glucose 154 (*)     All other components within normal limits   MRSA DNA PROBE, NASAL   CHLORIDE (POC)   SPECIMEN REJECTION   TSH WITHOUT REFLEX   T4   APTT   BASIC METABOLIC PANEL   CBC WITH AUTO DIFFERENTIAL   PROTIME-INR   CALCIUM, IONIZED   MAGNESIUM   PHOSPHORUS   POCT GLUCOSE   POCT GLUCOSE   POCT GLUCOSE   POCT GLUCOSE       RADIOLOGY:  Ct Head Wo Contrast    Result Date: 12/6/2019  EXAMINATION: CT OF THE HEAD WITHOUT CONTRAST, no acute change  T Waves: inversion in  III  Q Waves: I and aVl    Clinical Impression: Ventricularly paced rhythm    All EKG's are interpreted by the Emergency Department Physician whoeither signs or Co-signs this chart in the absence of a cardiologist.    Impression:  INITIAL NIH STROKE SCALE    Time Performed:  1106 AM    Administer stroke scale items in the order listed. Record performance in each category after each subscale exam. Do not go back and change scores. Follow directions provided for each exam technique. Scores should reflect what the patient does, not what the clinician thinks the patient can do. The clinician should record answers while administering the exam and work quickly. Except where indicated, the patient should not be coached (i.e., repeated requests to patient to make a special effort). 1a.  Level of consciousness:  0 - alert; keenly responsive  1b. Level of consciousness questions:  0 - answers both questions correctly  1c. Level of consciousness questions:  0 - performs both tasks correctly  2. Best Gaze:  1 - partial gaze palsy  3. Visual:  1 - partial hemianopia  4. Facial Palsy:  2 - partial paralysis (total or near total paralysis of the lower face)  5a. Motor left arm:  0 - no drift, limb holds 90 (or 45) degrees for full 10 seconds  5b. Motor right arm:  2 - some effort against gravity, limb cannot get to or maintain (if cued) 90 (or 45) degrees, drifts down to bed, but has some effort against gravity   6a. Motor left le - no drift; leg holds 30 degree position for full 5 seconds  6b. Motor right le - some effort against gravity; leg falls to bed by 5 seconds but has some effort against gravity  7. Limb Ataxia:  2 - present in two limbs  8. Sensory:  1 - mild to moderate sensory loss; patient feels pinprick is less sharp or is dull on the affected side; there is a loss of superficial pain with pinprick but patient is aware of being touched   9. Best Language:  1 - mild to moderate aphasia; some obvious loss of fluency or facility of comprehension without significant limitation on ideas expressed or form of expression. Reduction of speech and/or comprehension, however, makes conversation about provided materials difficult or impossible. For example, in conversation about provided materials, examiner can identify picture or naming card content from patient's response. 10.  Dysarthria:  2 - severe; patient speech is so slurred as to be unintelligible in the absence of or our of proportion to any dysphagia, or is mute/anarthric   11. Extinction and Inattention:  0 - no abnormality    TOTAL:  14    Patient presents as a transfer from [facility]. Evidence of intracranial hematoma. Patient is anticoagulated on Coumadin. Transported on Cardene drip. Stroke team present in room on patient's arrival.  We will plan to give Kcentra and vitamin K.  Stroke team spoke to their attending, Dr. Richie Marrero. Patient's INR is 2.0. Neuro attending states he would like the anticoagulation reversed with Kcentra and vitamin K. Would also like patient systolic blood pressure less than 140. Patient is currently on max drip, 15 mg/min of Cardene. Therefore we will switch to clevidipine. Will obtain repeat labs, CBC, BMP. Will not repeat INR at this time due to the fact that patient has not received any anticoagulation. We will also obtain EKG. Per stroke team, no need for repeat imaging at this time as original CT was obtained around 10 AM.  Will repeat CT in 6 hours past original CT accompanied by CTA of the head and neck. EMERGENCY DEPARTMENT COURSE:   Patient did well in the emergency room without any acute events. Point of care potassium read at 7.9. Initial  ms.   Due to the fact that patient does have a high point-of-care potassium, even though his BMP at [facility] runs potassium of 4.0, and low ionized calcium, we will give 2 g of calcium

## 2019-12-06 NOTE — CONSULTS
Neuroendovascular/stroke consult    Patient Name: Sj Owen  Patient : 1955  Room/Bed: [facility]/3624-86  Allergies: No Known Allergies  Problem List:   Patient Active Problem List   Diagnosis    Proteinuria    Atrial fibrillation ([facility]    Thyroid disease    Uncontrolled hypertension    CAD (coronary artery disease)    Diabetes mellitus type 2 in obese ([facility]    Hyperlipidemia    Hyperthyroidism    Other complications due to other cardiac device, implant, and graft    Renal cyst    Microalbuminuria    Diplopia    Headache    Hypertensive urgency    Intractable headache    Headache    Nontraumatic cortical hemorrhage of left cerebral hemisphere ([facility]    Acute intra-cranial hemorrhage (HCC)     CHIEF COMPLAINT     Acute Hemorrhagic CVA in the Left Basal Ganglia    HPI    History Obtained From: Patient and Electronic Medical Record    The patient is a 60 y.o. male with a past medical history of atrial fibrillation on coumadin at home and uncontrolled blood pressure presented to the ED at [facility] initially with stroke like symptoms. He had an episode of headache in the morning with weakness of the right side of the body along with syncopal episodes. He was last known fine at 6 AM. His wife called 911 this morning and he was taken to [facility]. His NIH score was initially 10 and CT head was concerning for acute intraparenchymal hematoma in the left basal ganglia. He was started on cardene gtt to lower his blood pressure into 130s. Case was discussed with physician at [facility] and decision was made to transfer him here. During our encounter in the [place], the patient had left sided facial droop with profound motor and sensory weakness of the right side of the body. He had dysarthria, decreased right lateral gaze. NIH was around 12. INR was 2. Case was discussed with Dr. Stephanie Segura. Decision was made to give patient K centra and Vitamin K. Goal of SBP < 140. Drug use: Not on file    Sexual activity: Not on file   Lifestyle    Physical activity:     Days per week: Not on file     Minutes per session: Not on file    Stress: Not on file   Relationships    Social connections:     Talks on phone: Not on file     Gets together: Not on file     Attends Gnosticism service: Not on file     Active member of club or organization: Not on file     Attends meetings of clubs or organizations: Not on file     Relationship status: Not on file    Intimate partner violence:     Fear of current or ex partner: Not on file     Emotionally abused: Not on file     Physically abused: Not on file     Forced sexual activity: Not on file   Other Topics Concern    Not on file   Social History Narrative    Not on file       Family History:   No family history on file. Allergies:    Patient has no known allergies. Medications Prior to Admission:    Medications Prior to Admission: bumetanide (BUMEX) 0.5 MG tablet, Take 0.5 mg by mouth daily  lisinopril (PRINIVIL;ZESTRIL) 40 MG tablet, Take 1 tablet by mouth daily  isosorbide mononitrate (IMDUR) 120 MG CR tablet, Take 1 tablet by mouth daily  diltiazem (CARDIZEM CD) 240 MG ER capsule, Take 1 capsule by mouth daily  aspirin 81 MG tablet, Take 81 mg by mouth daily. atorvastatin (LIPITOR) 40 MG tablet, Take 40 mg by mouth daily. glyBURIDE (DIABETA) 5 MG tablet, Take 5 mg by mouth 2 times daily (with meals). insulin glargine (LANTUS) 100 UNIT/ML injection, Inject 60 Units into the skin daily   metformin (GLUCOPHAGE) 1000 MG tablet, Take 1,000 mg by mouth 2 times daily (with meals). metoprolol (LOPRESSOR) 100 MG tablet, Take 100 mg by mouth 2 times daily. nitroGLYCERIN (NITROSTAT) 0.4 MG SL tablet, Place 0.4 mg under the tongue every 5 minutes as needed.   insulin lispro (HUMALOG) 100 UNIT/ML injection vial, Inject 15 Units into the skin 3 times daily (with meals) (Patient taking differently: Inject 15 Units into the skin 3 times daily (with day    REVIEW OF SYSTEMS     ROS difficult to obtain because of patient's clinical status. PHYSICAL EXAM:     BP (!) 144/89   Pulse 75   Temp 97.9 °F (36.6 °C) (Oral)   Resp 15   Ht 5' 7\" (1.702 m)   Wt 211 lb 13.8 oz (96.1 kg)   SpO2 93%   BMI 33.18 kg/m²     Estimated body mass index is 33.18 kg/m² as calculated from the following:    Height as of this encounter: 5' 7\" (1.702 m). Weight as of this encounter: 211 lb 13.8 oz (96.1 kg).  []<16 Severe malnutrition  []16-16.99 Moderate malnutrition  []17-18.49 Mild malnutrition  []18.5-24.9 Normal  []25-29.9 Overweight (not obese)  [x]30-34.9 Obese class 1 (Low Risk)  []35-39.9 Obese class 2 (Moderate Risk)  []?40 Obese class 3 (High Risk)    PHYSICAL EXAM:  CONSTITUTIONAL:  Awake and alert. Dysarthria. No aphasia   HEAD:  normocephalic, atraumatic    EYES:  Left eye ptosis.  Decreased right lateral gaze   ENT:  moist mucous membranes   LUNGS:  Equal air entry bilaterally   CARDIOVASCULAR:  normal s1 / s2   ABDOMEN:  Soft, no rigidity   NECK supple, symmetric, no midline tenderness to palpation    BACK without midline tenderness, step-offs or deformities    EXTREMITIES Normal ROM with no deformities   NEUROLOGIC:  Mental Status:  A & O x3,awake             Cranial Nerves:    cranial nerves II-XII are grossly intact    Motor Exam:    Drift:  absent  Tone:  abnormal - Decreased muscle tone on the right side of the body    Motor exam is 5 out of 5 left upper and left lower extremities  Motor exam is 3/5 in the right upper and lower extremities    Sensory:    Touch:    Right Upper Extremity:  abnormal   Left Upper Extremity:  normal  Right Lower Extremity:  abnormal   Left Lower Extremity:  normal    Deep Tendon Reflexes:    Right Bicep:  2+  Left Bicep:  2+  Right Knee:  2+  Left Knee:  2+    Plantar Response:  Right:  no response  Left:  downgoing    Clonus:  N/A  Monteiro's:  N/A    Coordination/Dysmetria:  Finger to Nose:   Right:  abnormal   Left:  normal Dysdiadochokinesia:  N/A    Gait: Not tested   SKIN No obvious ecchymosis, rashes, or lesions      LABS AND IMAGING:     RECENT LABS:  CBC with Differential:    Lab Results   Component Value Date    WBC 5.5 12/06/2019    RBC 5.47 12/06/2019    HGB 17.1 12/06/2019    HCT 49.9 12/06/2019     12/06/2019    MCV 91.2 12/06/2019    MCH 31.3 12/06/2019    MCHC 34.3 12/06/2019    RDW 15.1 12/06/2019    LYMPHOPCT 26 12/06/2019    MONOPCT 2 12/06/2019    BASOPCT 0 12/06/2019    MONOSABS 0.10 12/06/2019    LYMPHSABS 1.44 12/06/2019    EOSABS 0.04 12/06/2019    BASOSABS <0.03 12/06/2019    DIFFTYPE NOT REPORTED 12/06/2019     BMP:    Lab Results   Component Value Date     12/06/2019    K 4.0 12/06/2019    CL 97 12/06/2019    CO2 28 12/06/2019    BUN 17 12/06/2019    LABALBU 4.2 12/17/2018    CREATININE 1.27 12/06/2019    CREATININE 1.22 12/06/2019    CALCIUM 9.4 12/06/2019    GFRAA >60 12/06/2019    LABGLOM 57 12/06/2019    GLUCOSE 292 12/06/2019       RADIOLOGY:   Ct Head Wo Contrast    Result Date: 12/6/2019    1. Acute intraparenchymal hematoma centered in the left basal ganglia measures 2.1 x 2.3 x 2.1 cm. 2. Parenchymal volume loss and moderate chronic microvascular ischemic changes as well as old infarctions. Critical results were called by Dr. Paul Manley MD to [facility] Bobo on 12/6/2019 at 10:16.      Labs and Images reviewed with:  [] Yobani Ordaz MD      [] Domingo Yu MD  [] Renny Krishnan MD       [] Edyta Greer MD  [] Bert Selby MD  [] Shannon Roe MD  [] MD Dr. Jeff Narayanan by Dr. Vickey Cesar and Me    ASSESSMENT AND PLAN:       Principal Problem:    Nontraumatic cortical hemorrhage of left cerebral hemisphere [facility])  Active Problems:    Atrial fibrillation ([facility] [address]    Uncontrolled hypertension    CAD (coronary artery disease)    Diabetes mellitus type 2 in obese [facility])    Hyperlipidemia    Hyperthyroidism    Acute intra-cranial hemorrhage ([facility]  Resolved and evaluated the patient and images. I find the patient's history and physical exam are consistent with the Resident documentation. I agree with the care provided, treatment rendered, disposition and follow-up plan. 60year-old gentleman history of A. fib on Coumadin with elevated blood pressure evaluated at [facility] after last well 6 AM.  NIH of 10 with CT head concerning for left basal ganglia hemorrhage ICH score of 0. Patient was given Kcentra and vitamin K. He was admitted to the neuro critical care for further intervention. Neurosurgery consult. Blood pressure less than 150. Cta head, mri brain. Pt/ot, rehab eval    80 minutes with greater than 50% of time spent face to face, counseling, coordinating care, examining patient, reviewing images and labs personally, and speaking with team.      Marleen Parry MD  Office [phone].  Cell [phone]  Stroke, [facility]  [facility]

## 2019-12-06 NOTE — H&P
Worry: Not on file     Inability: Not on file    Transportation needs:     Medical: Not on file     Non-medical: Not on file   Tobacco Use    Smoking status: Current Every Day Smoker     Packs/day: 1.00   Substance and Sexual Activity    Alcohol use: No    Drug use: Not on file    Sexual activity: Not on file   Lifestyle    Physical activity:     Days per week: Not on file     Minutes per session: Not on file    Stress: Not on file   Relationships    Social connections:     Talks on phone: Not on file     Gets together: Not on file     Attends Mormon service: Not on file     Active member of club or organization: Not on file     Attends meetings of clubs or organizations: Not on file     Relationship status: Not on file    Intimate partner violence:     Fear of current or ex partner: Not on file     Emotionally abused: Not on file     Physically abused: Not on file     Forced sexual activity: Not on file   Other Topics Concern    Not on file   Social History Narrative    Not on file       Family History:   No family history on file. Allergies:    Patient has no known allergies. Medications Prior to Admission:    Not in a hospital admission.     Current Medications:  Current Facility-Administered Medications: clevidipine (CLEVIPREX) infusion, 2 mg/hr, Intravenous, Continuous  calcium gluconate 2 g in dextrose 5 % 100 mL IVPB, 2 g, Intravenous, Once    REVIEW OF SYSTEMS     CONSTITUTIONAL: negative for fatigue and malaise   EYES: negative for double vision and photophobia    HEENT: negative for tinnitus and sore throat   RESPIRATORY: negative for cough, shortness of breath   CARDIOVASCULAR: negative for chest pain, palpitations, or syncope   GASTROINTESTINAL: negative for abdominal pain, nausea, vomiting, diarrhea, or constipation    GENITOURINARY: negative for incontinence or retention    MUSCULOSKELETAL: negative for neck or back pain, negative for extremity pain   NEUROLOGICAL:  Positive for absent  Monteiro's:  absent    Coordination/Dysmetria:  Unable to assess    Gait: Unable to assess   SKIN No obvious ecchymosis, rashes, or lesions      NIH Stroke Scale Total (if not done complete detailed one below):    1a.  Level of consciousness:  0 - alert; keenly responsive  1b. Level of consciousness questions:  2 - answers neither question correctly  1c. Level of consciousness questions:  1 - performs one task correctly  2. Best Gaze:  0 - normal  3. Visual:  0 - no visual loss  4. Facial Palsy:  2 - partial paralysis (total or near total paralysis of the lower face)  5a. Motor left arm:  0 - no drift, limb holds 90 (or 45) degrees for full 10 seconds  5b. Motor right arm:  3 - no effort against gravity, limb falls  6a. Motor left le - no drift; leg holds 30 degree position for full 5 seconds  6b. Motor right leg:  3 - no effort against gravity; leg falls to bed immediately  7. Limb Ataxia:  0 - absent  8. Sensory:  0 - normal; no sensory loss  9. Best Language:  2 - severe aphasia; all communication is through fragmentary expression; great need for inference, questioning, and guessing by the listener. Range of information that can be exchanged is limited; listener carries burden of communication. Examiner cannot identify materials provided from patient response. 10.  Dysarthria:  2 - severe; patient speech is so slurred as to be unintelligible in the absence of or our of proportion to any dysphagia, or is mute/anarthric   11.   Extinction and Inattention:  0 - no abnormality    Total-15    LABS AND IMAGING:     RECENT LABS:  CBC with Differential:    Lab Results   Component Value Date    WBC 6.5 2019    RBC 5.33 2019    HGB 16.8 2019    HCT 49.1 2019     2019    MCV 92.3 2019    MCH 31.6 2019    MCHC 34.2 2019    RDW 16.3 2019    LYMPHOPCT 25 2019    MONOPCT 7 2019    BASOPCT 1 2019    MONOSABS 0.40 12/06/2019    LYMPHSABS 1.60 12/06/2019    EOSABS 0.10 12/06/2019    BASOSABS 0.10 12/06/2019    DIFFTYPE NOT REPORTED 12/06/2019     BMP:    Lab Results   Component Value Date     12/06/2019    K 4.0 12/06/2019    CL 97 12/06/2019    CO2 28 12/06/2019    BUN 17 12/06/2019    LABALBU 4.2 12/17/2018    CREATININE 1.27 12/06/2019    CREATININE 1.22 12/06/2019    CALCIUM 9.4 12/06/2019    GFRAA >60 12/06/2019    LABGLOM 57 12/06/2019    GLUCOSE 292 12/06/2019       RADIOLOGY:   Ct Head Wo Contrast    Result Date: 12/6/2019  EXAMINATION: CT OF THE HEAD WITHOUT CONTRAST,  12/6/2019 10:05 am TECHNIQUE: CT of the head was performed without the administration of intravenous contrast. Dose modulation, iterative reconstruction, and/or weight based adjustment of the mA/kV was utilized to reduce the radiation dose to as low as reasonably achievable. COMPARISON: Head CT 07/11/2015 HISTORY: ORDERING SYSTEM PROVIDED HISTORY: Right arm weakness. Right side facial droop. TECHNOLOGIST PROVIDED HISTORY: Right arm weakness. Right side facial droop. Reason for Exam: Patient stroke alert, last known well 6 am, right arm. Acuity: Unknown Type of Exam: Unknown FINDINGS: BRAIN/VENTRICLES: Acute intraparenchymal hemorrhage in the left basal ganglia measures 2.1 x 2.3 x 2.1 cm. Estimated ICH volume is 5 mL. Mild surrounding vasogenic edema. Diffuse parenchymal volume loss with enlargement of the ventricles and cerebral sulci. Periventricular and subcortical white matter low attenuation. Multiple old lacunar infarctions present within the cerebral hemispheres bilaterally. Old infarction in the right cerebellum. No mass effect or midline shift. No hydrocephalus. The basal cisterns are patent. Intracranial atherosclerotic disease is present. ORBITS: The visualized portion of the orbits demonstrate no acute abnormality. SINUSES: The visualized paranasal sinuses and mastoid air cells demonstrate no acute abnormality.  SOFT TISSUES/SKULL:  No acute abnormality of the visualized skull or soft tissues. 1. Acute intraparenchymal hematoma centered in the left basal ganglia measures 2.1 x 2.3 x 2.1 cm. 2. Parenchymal volume loss and moderate chronic microvascular ischemic changes as well as old infarctions. Critical results were called by Dr. Wendie Wong MD to [facility] on 12/6/2019 at 10:16. Labs and Images reviewed with:    [] Chente Heard MD    [x] Donald Marlow MD    --[] there are no new interval images to review. ASSESSMENT AND PLAN:       Patient Active Problem List   Diagnosis    Proteinuria    Atrial fibrillation ([facility]    Thyroid disease    Uncontrolled hypertension    CAD (coronary artery disease)    Diabetes mellitus type 2 in obese ([facility]    Hyperlipidemia    Hyperthyroidism    Other complications due to other cardiac device, implant, and graft    Renal cyst    Microalbuminuria    Diplopia    Headache    Hypertensive urgency    Intractable headache    Headache    Nontraumatic cortical hemorrhage of left cerebral hemisphere [facility])       ASSESSMENT:     This is a 60 y.o. male with past medical history of type 2 diabetes thyroid disease hypothyroidism, hypertension hyperlipidemia coronary artery disease status post stent A. fib on Pradaxa 150 mg twice daily, patulous basilar tip came as a transfer from [facility] with CT head showing left basal ganglia intraparenchymal hematoma. LWKN 0600, ICH score -0 NIHSS-15    Patient care will be discussed with attending, will reevaluate patient along with attending.      PLAN/MEDICAL DECISION MAKING:    NEUROLOGIC:  -CT head shows acute intraparenchymal hematoma in the left basal ganglia measuring 2.1x 2.3 x 2.1 cm, parenchymal volume loss and moderate chronic microvascular ischemic changes as well as old infarct  -Patient received 1500 units of Kcentra of as he is on Pradaxa for reversal of INR 2.9  -Follow-up CT head without contrast

## 2019-12-06 NOTE — H&P
Neuro ICU History & Physical    Patient Name: Ky Alcala  Patient : 1955  Room/Bed:   Allergies: No Known Allergies  Problem List:   Patient Active Problem List   Diagnosis    Proteinuria    Atrial fibrillation ([facility]    Thyroid disease    Uncontrolled hypertension    CAD (coronary artery disease)    Diabetes mellitus type 2 in obese ([address]    Hyperlipidemia    Hyperthyroidism    Other complications due to other cardiac device, implant, and graft    Renal cyst    Microalbuminuria    Diplopia    Headache    Hypertensive urgency    Intractable headache    Headache    Nontraumatic cortical hemorrhage of left cerebral hemisphere ([facility]     CHIEF COMPLAINT     Acute Hemorrhagic CVA in the Left Basal Ganglia    HPI    History Obtained From: Patient and Electronic Medical Record    The patient is a 60 y.o. male with a past medical history of atrial fibrillation on coumadin at home and uncontrolled blood pressure presented to the ED at [address] [place] [address] initially with stroke like symptoms. He had an episode of headache in the morning with weakness of the right side of the body along with syncopal episodes. He was last known fine at 6 AM. His wife called 911 this morning and he was taken to [address] [place] [address]. His NIH score was initially 10 and CT head was concerning for acute intraparenchymal hematoma in the left basal ganglia. He was started on cardene gtt to lower his blood pressure into 130s. Case was discussed with physician at [facility] and decision was made to transfer him here. During our encounter in the [place], the patient had left sided facial droop with profound motor and sensory weakness of the right side of the body. He had dysarthria, decreased right lateral gaze. NIH was around 12. INR was 2. Case was discussed with Dr. Queta Vickers. Decision was made to give patient K centra and Vitamin K. Goal of SBP < 140. The patient will be admitted to NICU.     Admitted Emotionally abused: Not on file     Physically abused: Not on file     Forced sexual activity: Not on file   Other Topics Concern    Not on file   Social History Narrative    Not on file       Family History:   No family history on file. Allergies:    Patient has no known allergies. Medications Prior to Admission:    Not in a hospital admission. Current Medications:  Current Facility-Administered Medications: prothrombin complex concentrate (human) (KCENTRA) infusion 1,500 Units, 1,500 Units, Intravenous, Once  phytonadione (ADULT) (VITAMIN K) 10 mg in dextrose 5 % 100 mL IVPB, 10 mg, Intravenous, Once  clevidipine (CLEVIPREX) infusion, 2 mg/hr, Intravenous, Continuous    REVIEW OF SYSTEMS     ROS difficult to obtain because of patient's clinical status. PHYSICAL EXAM:     BP (!) 155/92   Pulse 74   Temp 98.1 °F (36.7 °C) (Axillary)   Resp 16   Ht 5' 10\" (1.778 m)   Wt 225 lb (102.1 kg)   SpO2 96%   BMI 32.28 kg/m²     Estimated body mass index is 32.28 kg/m² as calculated from the following:    Height as of this encounter: 5' 10\" (1.778 m). Weight as of this encounter: 225 lb (102.1 kg).  []<16 Severe malnutrition  []16-16.99 Moderate malnutrition  []17-18.49 Mild malnutrition  []18.5-24.9 Normal  []25-29.9 Overweight (not obese)  [x]30-34.9 Obese class 1 (Low Risk)  []35-39.9 Obese class 2 (Moderate Risk)  []?40 Obese class 3 (High Risk)    PHYSICAL EXAM:  CONSTITUTIONAL:  Awake and alert. Dysarthria. No aphasia   HEAD:  normocephalic, atraumatic    EYES:  Left eye ptosis.  Decreased right lateral gaze   ENT:  moist mucous membranes   LUNGS:  Equal air entry bilaterally   CARDIOVASCULAR:  normal s1 / s2   ABDOMEN:  Soft, no rigidity   NECK supple, symmetric, no midline tenderness to palpation    BACK without midline tenderness, step-offs or deformities    EXTREMITIES Normal ROM with no deformities   NEUROLOGIC:  Mental Status:  A & O x3,awake             Cranial Nerves:    cranial nerves

## 2019-12-06 NOTE — PLAN OF CARE
Problem: Falls - Risk of:  Goal: Will remain free from falls  Description  Will remain free from falls  Outcome: Ongoing  Goal: Absence of physical injury  Description  Absence of physical injury  Outcome: Ongoing     Problem: Risk for Impaired Skin Integrity  Goal: Tissue integrity - skin and mucous membranes  Description  Structural intactness and normal physiological function of skin and  mucous membranes. Outcome: Ongoing     Problem: HEMODYNAMIC STATUS  Goal: Patient has stable vital signs and fluid balance  Outcome: Ongoing     Problem: ACTIVITY INTOLERANCE/IMPAIRED MOBILITY  Goal: Mobility/activity is maintained at optimum level for patient  Outcome: Ongoing     Problem: COMMUNICATION IMPAIRMENT  Goal: Ability to express needs and understand communication  Outcome: Ongoing     No falls or significant events during shift. All safety precautions followed and maintained. Wife and family updated on treatment plan and current events. SBP goal <140 on clevidipine to manage. Pt has a [employer] pacemaker/defibrillator to left chest wall. The device was checked by MRI department and IS NOT COMPATIBLE or safe for MRI scan, documented and MD informed. Pt failed BS swallow eval keep NPO at this time.

## 2019-12-06 NOTE — ED NOTES
Neurosurgery at [address] [place] [address], [address] [place] [address]  12/06/19 [id number]

## 2019-12-06 NOTE — CONSULTS
Department of Neurosurgery                                         Resident Consult Note      Reason for Consult:  L Thalamic Bleed  Requesting Physician:  Eloisa Garcia  Neurosurgeon: Verner Eke    History Obtained From:  spouse    CHIEF COMPLAINT:       Altered Mental Status    HISTORY OF PRESENT ILLNESS:       The patient is a 60 y.o. male who presents to [facility] from [facility] with imaging confirmed intraparenchymal hematoma centered in the left basal ganglia measuring 2.1 x 2.3 x 2.1 cm. Wife at bedside states that early this AM during breakfast the patient started to show signs of altered mental status. He was slurring his words as well as leaning to the side. Wife states he did see him acting normally prior to that. No traumas or falls. Wife states he has complained of a headache for several months and did have an MRI performed last week that failed to demonstrate any abnormality. Patient does have a history of hyperthyroidism requiring thyroidectomy. 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 Since that time, he has had double vision in his left eye. Patient has dysthesias to RUE/RLE. Unable to fully move right side of body.          PAST MEDICAL HISTORY :       Past Medical History:        Diagnosis Date    Atrial fibrillation ([address]     CAD (coronary artery disease)     H/O heart artery stent x 4    Hx of blood clots     8 clots removed from right leg    Hyperlipidemia     Hypertension     Hyperthyroidism     Kidney stones     MI, old     family states 3 MI history    Microalbuminuria     Other complications due to other cardiac device, implant, and graft     Proteinuria     Renal cyst     right side    Thyroid disease     Type II or unspecified type diabetes mellitus without mention of complication, not stated as uncontrolled        Past Surgical History:        Procedure Laterality Date    CYSTOSCOPY      EYE SURGERY Left     s/p Facility-Administered Medications: clevidipine (CLEVIPREX) infusion, 2 mg/hr, Intravenous, Continuous  atorvastatin (LIPITOR) tablet 40 mg, 40 mg, Oral, Daily  glucose (GLUTOSE) 40 % oral gel 15 g, 15 g, Oral, PRN  dextrose 50 % IV solution, 12.5 g, Intravenous, PRN  glucagon (rDNA) injection 1 mg, 1 mg, Intramuscular, PRN  dextrose 5 % solution, 100 mL/hr, Intravenous, PRN  acetaminophen (TYLENOL) tablet 650 mg, 650 mg, Oral, Q4H PRN **OR** acetaminophen (TYLENOL) suppository 650 mg, 650 mg, Rectal, Q4H PRN  sodium chloride flush 0.9 % injection 10 mL, 10 mL, Intravenous, 2 times per day  sodium chloride flush 0.9 % injection 10 mL, 10 mL, Intravenous, PRN  acetaminophen (TYLENOL) tablet 650 mg, 650 mg, Oral, Q4H PRN  ondansetron (ZOFRAN) injection 4 mg, 4 mg, Intravenous, Q6H PRN  insulin lispro (HUMALOG) injection vial 0-12 Units, 0-12 Units, Subcutaneous, TID WC  insulin lispro (HUMALOG) injection vial 0-6 Units, 0-6 Units, Subcutaneous, Nightly  ipratropium-albuterol (DUONEB) nebulizer solution 1 ampule, 1 ampule, Inhalation, Q4H WA  [START ON 12/7/2019] methIMAzole (TAPAZOLE) tablet 20 mg, 20 mg, Oral, Daily  methIMAzole (TAPAZOLE) tablet 10 mg, 10 mg, Oral, QPM  metoprolol (LOPRESSOR) injection 5 mg, 5 mg, Intravenous, Q6H PRN  0.9 % sodium chloride infusion, , Intravenous, Continuous  hydrALAZINE (APRESOLINE) injection 10 mg, 10 mg, Intravenous, Q6H PRN  metoprolol tartrate (LOPRESSOR) tablet 100 mg, 100 mg, Oral, BID  diltiazem (CARDIZEM) tablet 60 mg, 60 mg, Oral, 4 times per day    REVIEW OF SYSTEMS:       Difficult to obtain. Patient somnolent on exam.     PHYSICAL EXAM:       /84   Pulse 75   Temp 98.1 °F (36.7 °C) (Axillary)   Resp 14   Ht 5' 10\" (1.778 m)   Wt 225 lb (102.1 kg)   SpO2 93%   BMI 32.28 kg/m²     CONSTITUTIONAL:  Well developed, well nourished. Left sided facial droop.     HEAD:  normocephalic, atraumatic    EYES:   EOMI    ENT:  moist mucous membranes    NECK:  In C-collar, [facility])       60 y.o. male who presents with intraparenchymal hematoma centered in the left basal ganglia. Patient care discussed with attending    - CTLS recommendations: clear  - Hold all antiplatelets and anticoagulants  -Pain control/medical management per primary  -No NS intervention needed. -recommend medical management  -NS will sign off at this time. If neuro status changes please contract for future evaluation.        Nick Carlos DO     12/6/2019  2:19 PM

## 2019-12-06 NOTE — PROCEDURES
Pt has pacemaker, [employer], he cannot have an mri due to off label leads. Talked with [employer].no mri.

## 2019-12-07 ENCOUNTER — APPOINTMENT (OUTPATIENT)
Dept: GENERAL RADIOLOGY | Age: 64
DRG: 003 | End: 2019-12-07
Payer: MEDICARE

## 2019-12-07 ENCOUNTER — APPOINTMENT (OUTPATIENT)
Dept: CT IMAGING | Age: 64
DRG: 003 | End: 2019-12-07
Payer: MEDICARE

## 2019-12-07 LAB
-: ABNORMAL
-: NORMAL
ABSOLUTE EOS #: 0.08 K/UL (ref 0–0.44)
ABSOLUTE IMMATURE GRANULOCYTE: 0.05 K/UL (ref 0–0.3)
ABSOLUTE LYMPH #: 1.48 K/UL (ref 1.1–3.7)
ABSOLUTE MONO #: 0.47 K/UL (ref 0.1–1.2)
ALLEN TEST: ABNORMAL
AMORPHOUS: ABNORMAL
ANION GAP SERPL CALCULATED.3IONS-SCNC: 17 MMOL/L (ref 9–17)
ANION GAP SERPL CALCULATED.3IONS-SCNC: 20 MMOL/L (ref 9–17)
BACTERIA: ABNORMAL
BASOPHILS # BLD: 1 % (ref 0–2)
BASOPHILS ABSOLUTE: 0.05 K/UL (ref 0–0.2)
BILIRUBIN URINE: NEGATIVE
BNP INTERPRETATION: ABNORMAL
BUN BLDV-MCNC: 16 MG/DL (ref 8–23)
BUN BLDV-MCNC: 17 MG/DL (ref 8–23)
BUN/CREAT BLD: ABNORMAL (ref 9–20)
BUN/CREAT BLD: ABNORMAL (ref 9–20)
CALCIUM IONIZED: 1.21 MMOL/L (ref 1.13–1.33)
CALCIUM SERPL-MCNC: 9.1 MG/DL (ref 8.6–10.4)
CALCIUM SERPL-MCNC: 9.2 MG/DL (ref 8.6–10.4)
CASTS UA: ABNORMAL /LPF (ref 0–2)
CASTS UA: ABNORMAL /LPF (ref 0–2)
CHLORIDE BLD-SCNC: 102 MMOL/L (ref 98–107)
CHLORIDE BLD-SCNC: 104 MMOL/L (ref 98–107)
CO2: 19 MMOL/L (ref 20–31)
CO2: 19 MMOL/L (ref 20–31)
COLOR: YELLOW
COMMENT UA: ABNORMAL
CREAT SERPL-MCNC: 1.14 MG/DL (ref 0.7–1.2)
CREAT SERPL-MCNC: 1.2 MG/DL (ref 0.7–1.2)
CRYSTALS, UA: ABNORMAL /HPF
DIFFERENTIAL TYPE: ABNORMAL
EKG ATRIAL RATE: 113 BPM
EKG ATRIAL RATE: 122 BPM
EKG ATRIAL RATE: 77 BPM
EKG Q-T INTERVAL: 442 MS
EKG Q-T INTERVAL: 448 MS
EKG Q-T INTERVAL: 478 MS
EKG QRS DURATION: 134 MS
EKG QRS DURATION: 138 MS
EKG QRS DURATION: 144 MS
EKG QTC CALCULATION (BAZETT): 493 MS
EKG QTC CALCULATION (BAZETT): 500 MS
EKG QTC CALCULATION (BAZETT): 533 MS
EKG R AXIS: 151 DEGREES
EKG R AXIS: 151 DEGREES
EKG R AXIS: 175 DEGREES
EKG T AXIS: -111 DEGREES
EKG T AXIS: 54 DEGREES
EKG T AXIS: 58 DEGREES
EKG VENTRICULAR RATE: 75 BPM
EOSINOPHILS RELATIVE PERCENT: 1 % (ref 1–4)
EPITHELIAL CELLS UA: ABNORMAL /HPF (ref 0–5)
FIO2: 100
GFR AFRICAN AMERICAN: >60 ML/MIN
GFR AFRICAN AMERICAN: >60 ML/MIN
GFR NON-AFRICAN AMERICAN: >60 ML/MIN
GFR NON-AFRICAN AMERICAN: >60 ML/MIN
GFR SERPL CREATININE-BSD FRML MDRD: ABNORMAL ML/MIN/{1.73_M2}
GLUCOSE BLD-MCNC: 162 MG/DL (ref 75–110)
GLUCOSE BLD-MCNC: 175 MG/DL (ref 75–110)
GLUCOSE BLD-MCNC: 191 MG/DL (ref 75–110)
GLUCOSE BLD-MCNC: 208 MG/DL (ref 70–99)
GLUCOSE BLD-MCNC: 242 MG/DL (ref 75–110)
GLUCOSE BLD-MCNC: 273 MG/DL (ref 70–99)
GLUCOSE URINE: ABNORMAL
HCT VFR BLD CALC: 46.8 % (ref 40.7–50.3)
HCT VFR BLD CALC: 50.2 % (ref 40.7–50.3)
HEMOGLOBIN: 16 G/DL (ref 13–17)
HEMOGLOBIN: 17 G/DL (ref 13–17)
IMMATURE GRANULOCYTES: 1 %
INR BLD: 1.1
INR BLD: 1.1
KETONES, URINE: NEGATIVE
LEUKOCYTE ESTERASE, URINE: NEGATIVE
LYMPHOCYTES # BLD: 15 % (ref 24–43)
MAGNESIUM: 1.9 MG/DL (ref 1.6–2.6)
MCH RBC QN AUTO: 32.1 PG (ref 25.2–33.5)
MCH RBC QN AUTO: 32.3 PG (ref 25.2–33.5)
MCHC RBC AUTO-ENTMCNC: 33.9 G/DL (ref 28.4–34.8)
MCHC RBC AUTO-ENTMCNC: 34.2 G/DL (ref 28.4–34.8)
MCV RBC AUTO: 94 FL (ref 82.6–102.9)
MCV RBC AUTO: 95.3 FL (ref 82.6–102.9)
MODE: ABNORMAL
MONOCYTES # BLD: 5 % (ref 3–12)
MUCUS: ABNORMAL
NEGATIVE BASE EXCESS, ART: 1 (ref 0–2)
NITRITE, URINE: NEGATIVE
NRBC AUTOMATED: 0 PER 100 WBC
NRBC AUTOMATED: 0 PER 100 WBC
O2 DEVICE/FLOW/%: ABNORMAL
OTHER OBSERVATIONS UA: ABNORMAL
PARTIAL THROMBOPLASTIN TIME: 24.1 SEC (ref 20.5–30.5)
PARTIAL THROMBOPLASTIN TIME: 24.4 SEC (ref 20.5–30.5)
PATIENT TEMP: ABNORMAL
PDW BLD-RTO: 14.9 % (ref 11.8–14.4)
PDW BLD-RTO: 15 % (ref 11.8–14.4)
PH UA: 5 (ref 5–8)
PHOSPHORUS: 3.7 MG/DL (ref 2.5–4.5)
PLATELET # BLD: 180 K/UL (ref 138–453)
PLATELET # BLD: 201 K/UL (ref 138–453)
PLATELET ESTIMATE: ABNORMAL
PMV BLD AUTO: 10.5 FL (ref 8.1–13.5)
PMV BLD AUTO: 10.8 FL (ref 8.1–13.5)
POC HCO3: 28.6 MMOL/L (ref 21–28)
POC LACTIC ACID: 1.97 MMOL/L (ref 0.56–1.39)
POC O2 SATURATION: 100 % (ref 94–98)
POC PCO2 TEMP: ABNORMAL MM HG
POC PCO2: 66.6 MM HG (ref 35–48)
POC PH TEMP: ABNORMAL
POC PH: 7.24 (ref 7.35–7.45)
POC PO2 TEMP: ABNORMAL MM HG
POC PO2: 285.8 MM HG (ref 83–108)
POSITIVE BASE EXCESS, ART: ABNORMAL (ref 0–3)
POTASSIUM SERPL-SCNC: 4.4 MMOL/L (ref 3.7–5.3)
POTASSIUM SERPL-SCNC: 4.6 MMOL/L (ref 3.7–5.3)
PRO-BNP: 573 PG/ML
PROTEIN UA: ABNORMAL
PROTHROMBIN TIME: 11.1 SEC (ref 9–12)
PROTHROMBIN TIME: 11.4 SEC (ref 9–12)
RBC # BLD: 4.98 M/UL (ref 4.21–5.77)
RBC # BLD: 5.27 M/UL (ref 4.21–5.77)
RBC # BLD: ABNORMAL 10*6/UL
RBC UA: ABNORMAL /HPF (ref 0–2)
REASON FOR REJECTION: NORMAL
RENAL EPITHELIAL, UA: ABNORMAL /HPF
SAMPLE SITE: ABNORMAL
SEG NEUTROPHILS: 78 % (ref 36–65)
SEGMENTED NEUTROPHILS ABSOLUTE COUNT: 7.59 K/UL (ref 1.5–8.1)
SODIUM BLD-SCNC: 140 MMOL/L (ref 135–144)
SODIUM BLD-SCNC: 141 MMOL/L (ref 135–144)
SPECIFIC GRAVITY UA: 1.03 (ref 1–1.03)
TCO2 (CALC), ART: 31 MMOL/L (ref 22–29)
TRICHOMONAS: ABNORMAL
TROPONIN INTERP: ABNORMAL
TROPONIN INTERP: ABNORMAL
TROPONIN T: ABNORMAL NG/ML
TROPONIN T: ABNORMAL NG/ML
TROPONIN, HIGH SENSITIVITY: 34 NG/L (ref 0–22)
TROPONIN, HIGH SENSITIVITY: 35 NG/L (ref 0–22)
TURBIDITY: CLEAR
URINE HGB: ABNORMAL
UROBILINOGEN, URINE: NORMAL
WBC # BLD: 12 K/UL (ref 3.5–11.3)
WBC # BLD: 9.7 K/UL (ref 3.5–11.3)
WBC # BLD: ABNORMAL 10*3/UL
WBC UA: ABNORMAL /HPF (ref 0–5)
YEAST: ABNORMAL
ZZ NTE CLEAN UP: ORDERED TEST: NORMAL
ZZ NTE WITH NAME CLEAN UP: SPECIMEN SOURCE: NORMAL

## 2019-12-07 PROCEDURE — 6370000000 HC RX 637 (ALT 250 FOR IP): Performed by: STUDENT IN AN ORGANIZED HEALTH CARE EDUCATION/TRAINING PROGRAM

## 2019-12-07 PROCEDURE — 2000000003 HC NEURO ICU R&B

## 2019-12-07 PROCEDURE — 2580000003 HC RX 258: Performed by: PSYCHIATRY & NEUROLOGY

## 2019-12-07 PROCEDURE — 85730 THROMBOPLASTIN TIME PARTIAL: CPT

## 2019-12-07 PROCEDURE — 31500 INSERT EMERGENCY AIRWAY: CPT

## 2019-12-07 PROCEDURE — 36620 INSERTION CATHETER ARTERY: CPT

## 2019-12-07 PROCEDURE — 94660 CPAP INITIATION&MGMT: CPT

## 2019-12-07 PROCEDURE — 85610 PROTHROMBIN TIME: CPT

## 2019-12-07 PROCEDURE — 80048 BASIC METABOLIC PNL TOTAL CA: CPT

## 2019-12-07 PROCEDURE — 81001 URINALYSIS AUTO W/SCOPE: CPT

## 2019-12-07 PROCEDURE — 94770 HC ETCO2 MONITOR DAILY: CPT

## 2019-12-07 PROCEDURE — 2580000003 HC RX 258: Performed by: STUDENT IN AN ORGANIZED HEALTH CARE EDUCATION/TRAINING PROGRAM

## 2019-12-07 PROCEDURE — 6360000002 HC RX W HCPCS: Performed by: STUDENT IN AN ORGANIZED HEALTH CARE EDUCATION/TRAINING PROGRAM

## 2019-12-07 PROCEDURE — 94761 N-INVAS EAR/PLS OXIMETRY MLT: CPT

## 2019-12-07 PROCEDURE — 37799 UNLISTED PX VASCULAR SURGERY: CPT

## 2019-12-07 PROCEDURE — 74018 RADEX ABDOMEN 1 VIEW: CPT

## 2019-12-07 PROCEDURE — 71045 X-RAY EXAM CHEST 1 VIEW: CPT

## 2019-12-07 PROCEDURE — 84100 ASSAY OF PHOSPHORUS: CPT

## 2019-12-07 PROCEDURE — 93005 ELECTROCARDIOGRAM TRACING: CPT | Performed by: PSYCHIATRY & NEUROLOGY

## 2019-12-07 PROCEDURE — C9248 INJ, CLEVIDIPINE BUTYRATE: HCPCS | Performed by: STUDENT IN AN ORGANIZED HEALTH CARE EDUCATION/TRAINING PROGRAM

## 2019-12-07 PROCEDURE — 82803 BLOOD GASES ANY COMBINATION: CPT

## 2019-12-07 PROCEDURE — 94002 VENT MGMT INPAT INIT DAY: CPT

## 2019-12-07 PROCEDURE — 36415 COLL VENOUS BLD VENIPUNCTURE: CPT

## 2019-12-07 PROCEDURE — 2700000000 HC OXYGEN THERAPY PER DAY

## 2019-12-07 PROCEDURE — 70498 CT ANGIOGRAPHY NECK: CPT

## 2019-12-07 PROCEDURE — 92523 SPEECH SOUND LANG COMPREHEN: CPT

## 2019-12-07 PROCEDURE — 83880 ASSAY OF NATRIURETIC PEPTIDE: CPT

## 2019-12-07 PROCEDURE — 84484 ASSAY OF TROPONIN QUANT: CPT

## 2019-12-07 PROCEDURE — 70450 CT HEAD/BRAIN W/O DYE: CPT

## 2019-12-07 PROCEDURE — 92610 EVALUATE SWALLOWING FUNCTION: CPT

## 2019-12-07 PROCEDURE — 83735 ASSAY OF MAGNESIUM: CPT

## 2019-12-07 PROCEDURE — 94640 AIRWAY INHALATION TREATMENT: CPT

## 2019-12-07 PROCEDURE — 82947 ASSAY GLUCOSE BLOOD QUANT: CPT

## 2019-12-07 PROCEDURE — 85027 COMPLETE CBC AUTOMATED: CPT

## 2019-12-07 PROCEDURE — 85025 COMPLETE CBC W/AUTO DIFF WBC: CPT

## 2019-12-07 PROCEDURE — 6370000000 HC RX 637 (ALT 250 FOR IP): Performed by: PSYCHIATRY & NEUROLOGY

## 2019-12-07 PROCEDURE — 99223 1ST HOSP IP/OBS HIGH 75: CPT | Performed by: PSYCHIATRY & NEUROLOGY

## 2019-12-07 PROCEDURE — 82330 ASSAY OF CALCIUM: CPT

## 2019-12-07 PROCEDURE — 2500000003 HC RX 250 WO HCPCS: Performed by: STUDENT IN AN ORGANIZED HEALTH CARE EDUCATION/TRAINING PROGRAM

## 2019-12-07 PROCEDURE — 83605 ASSAY OF LACTIC ACID: CPT

## 2019-12-07 PROCEDURE — 6360000002 HC RX W HCPCS: Performed by: PSYCHIATRY & NEUROLOGY

## 2019-12-07 PROCEDURE — 5A1955Z RESPIRATORY VENTILATION, GREATER THAN 96 CONSECUTIVE HOURS: ICD-10-PCS | Performed by: PSYCHIATRY & NEUROLOGY

## 2019-12-07 PROCEDURE — C9113 INJ PANTOPRAZOLE SODIUM, VIA: HCPCS | Performed by: STUDENT IN AN ORGANIZED HEALTH CARE EDUCATION/TRAINING PROGRAM

## 2019-12-07 PROCEDURE — 6360000004 HC RX CONTRAST MEDICATION: Performed by: PSYCHIATRY & NEUROLOGY

## 2019-12-07 RX ORDER — SODIUM CHLORIDE 9 MG/ML
10 INJECTION INTRAVENOUS DAILY
Status: DISCONTINUED | OUTPATIENT
Start: 2019-12-07 | End: 2019-12-10

## 2019-12-07 RX ORDER — FENTANYL CITRATE 50 UG/ML
100 INJECTION, SOLUTION INTRAMUSCULAR; INTRAVENOUS ONCE
Status: COMPLETED | OUTPATIENT
Start: 2019-12-07 | End: 2019-12-07

## 2019-12-07 RX ORDER — PANTOPRAZOLE SODIUM 40 MG/10ML
40 INJECTION, POWDER, LYOPHILIZED, FOR SOLUTION INTRAVENOUS DAILY
Status: DISCONTINUED | OUTPATIENT
Start: 2019-12-07 | End: 2019-12-10

## 2019-12-07 RX ORDER — ISOSORBIDE DINITRATE 10 MG/1
10 TABLET ORAL 3 TIMES DAILY
Status: DISCONTINUED | OUTPATIENT
Start: 2019-12-07 | End: 2019-12-07

## 2019-12-07 RX ORDER — FUROSEMIDE 10 MG/ML
40 INJECTION INTRAMUSCULAR; INTRAVENOUS ONCE
Status: COMPLETED | OUTPATIENT
Start: 2019-12-07 | End: 2019-12-07

## 2019-12-07 RX ORDER — FUROSEMIDE 10 MG/ML
20 INJECTION INTRAMUSCULAR; INTRAVENOUS 2 TIMES DAILY
Status: DISCONTINUED | OUTPATIENT
Start: 2019-12-08 | End: 2019-12-09

## 2019-12-07 RX ORDER — LISINOPRIL 20 MG/1
40 TABLET ORAL DAILY
Status: DISCONTINUED | OUTPATIENT
Start: 2019-12-07 | End: 2020-01-03 | Stop reason: HOSPADM

## 2019-12-07 RX ORDER — FENTANYL CITRATE 50 UG/ML
100 INJECTION, SOLUTION INTRAMUSCULAR; INTRAVENOUS
Status: DISCONTINUED | OUTPATIENT
Start: 2019-12-07 | End: 2020-01-03 | Stop reason: HOSPADM

## 2019-12-07 RX ORDER — PROPOFOL 10 MG/ML
10 INJECTION, EMULSION INTRAVENOUS
Status: DISCONTINUED | OUTPATIENT
Start: 2019-12-07 | End: 2019-12-08

## 2019-12-07 RX ORDER — FUROSEMIDE 10 MG/ML
20 INJECTION INTRAMUSCULAR; INTRAVENOUS ONCE
Status: COMPLETED | OUTPATIENT
Start: 2019-12-07 | End: 2019-12-07

## 2019-12-07 RX ORDER — SIMETHICONE 20 MG/.3ML
40 EMULSION ORAL 4 TIMES DAILY
Status: DISCONTINUED | OUTPATIENT
Start: 2019-12-07 | End: 2020-01-03 | Stop reason: HOSPADM

## 2019-12-07 RX ORDER — IPRATROPIUM BROMIDE AND ALBUTEROL SULFATE 2.5; .5 MG/3ML; MG/3ML
1 SOLUTION RESPIRATORY (INHALATION) EVERY 4 HOURS PRN
Status: DISCONTINUED | OUTPATIENT
Start: 2019-12-07 | End: 2019-12-07

## 2019-12-07 RX ORDER — FUROSEMIDE 10 MG/ML
40 INJECTION INTRAMUSCULAR; INTRAVENOUS ONCE
Status: DISCONTINUED | OUTPATIENT
Start: 2019-12-07 | End: 2019-12-07

## 2019-12-07 RX ORDER — IPRATROPIUM BROMIDE AND ALBUTEROL SULFATE 2.5; .5 MG/3ML; MG/3ML
1 SOLUTION RESPIRATORY (INHALATION) 4 TIMES DAILY
Status: DISCONTINUED | OUTPATIENT
Start: 2019-12-07 | End: 2019-12-09

## 2019-12-07 RX ORDER — FUROSEMIDE 10 MG/ML
20 INJECTION INTRAMUSCULAR; INTRAVENOUS 2 TIMES DAILY
Status: DISCONTINUED | OUTPATIENT
Start: 2019-12-07 | End: 2019-12-07

## 2019-12-07 RX ORDER — ISOSORBIDE MONONITRATE 60 MG/1
120 TABLET, EXTENDED RELEASE ORAL DAILY
Status: DISCONTINUED | OUTPATIENT
Start: 2019-12-07 | End: 2019-12-07

## 2019-12-07 RX ADMIN — PANTOPRAZOLE SODIUM 40 MG: 40 INJECTION, POWDER, FOR SOLUTION INTRAVENOUS at 17:17

## 2019-12-07 RX ADMIN — DILTIAZEM HYDROCHLORIDE 60 MG: 60 TABLET, FILM COATED ORAL at 23:28

## 2019-12-07 RX ADMIN — CLEVIPIDINE 19 MG/HR: 0.5 EMULSION INTRAVENOUS at 08:05

## 2019-12-07 RX ADMIN — SIMETHICONE 40 MG: 20 SUSPENSION/ DROPS ORAL at 17:17

## 2019-12-07 RX ADMIN — CLEVIPIDINE 19 MG/HR: 0.5 EMULSION INTRAVENOUS at 10:50

## 2019-12-07 RX ADMIN — ISOSORBIDE DINITRATE 10 MG: 10 TABLET ORAL at 13:33

## 2019-12-07 RX ADMIN — SODIUM CHLORIDE, PRESERVATIVE FREE 10 ML: 5 INJECTION INTRAVENOUS at 21:09

## 2019-12-07 RX ADMIN — METHIMAZOLE 10 MG: 5 TABLET ORAL at 17:17

## 2019-12-07 RX ADMIN — PROPOFOL 40 MCG/KG/MIN: 10 INJECTION, EMULSION INTRAVENOUS at 17:17

## 2019-12-07 RX ADMIN — INSULIN LISPRO 4 UNITS: 100 INJECTION, SOLUTION INTRAVENOUS; SUBCUTANEOUS at 17:18

## 2019-12-07 RX ADMIN — SODIUM CHLORIDE, PRESERVATIVE FREE 10 ML: 5 INJECTION INTRAVENOUS at 08:11

## 2019-12-07 RX ADMIN — INSULIN LISPRO 2 UNITS: 100 INJECTION, SOLUTION INTRAVENOUS; SUBCUTANEOUS at 23:28

## 2019-12-07 RX ADMIN — IPRATROPIUM BROMIDE AND ALBUTEROL SULFATE 1 AMPULE: .5; 3 SOLUTION RESPIRATORY (INHALATION) at 08:30

## 2019-12-07 RX ADMIN — FUROSEMIDE 40 MG: 10 INJECTION, SOLUTION INTRAMUSCULAR; INTRAVENOUS at 13:16

## 2019-12-07 RX ADMIN — INSULIN LISPRO 2 UNITS: 100 INJECTION, SOLUTION INTRAVENOUS; SUBCUTANEOUS at 13:26

## 2019-12-07 RX ADMIN — PHYTONADIONE 10 MG: 10 INJECTION, EMULSION INTRAMUSCULAR; INTRAVENOUS; SUBCUTANEOUS at 08:06

## 2019-12-07 RX ADMIN — SIMETHICONE 40 MG: 20 SUSPENSION/ DROPS ORAL at 21:09

## 2019-12-07 RX ADMIN — FUROSEMIDE 20 MG: 10 INJECTION, SOLUTION INTRAMUSCULAR; INTRAVENOUS at 10:10

## 2019-12-07 RX ADMIN — IPRATROPIUM BROMIDE AND ALBUTEROL SULFATE 1 AMPULE: .5; 3 SOLUTION RESPIRATORY (INHALATION) at 20:44

## 2019-12-07 RX ADMIN — SODIUM CHLORIDE 5 MG/HR: 9 INJECTION, SOLUTION INTRAVENOUS at 14:33

## 2019-12-07 RX ADMIN — PROPOFOL 40 MCG/KG/MIN: 10 INJECTION, EMULSION INTRAVENOUS at 21:56

## 2019-12-07 RX ADMIN — INSULIN LISPRO 2 UNITS: 100 INJECTION, SOLUTION INTRAVENOUS; SUBCUTANEOUS at 08:07

## 2019-12-07 RX ADMIN — IPRATROPIUM BROMIDE AND ALBUTEROL SULFATE 1 AMPULE: .5; 3 SOLUTION RESPIRATORY (INHALATION) at 12:03

## 2019-12-07 RX ADMIN — FUROSEMIDE 40 MG: 10 INJECTION, SOLUTION INTRAMUSCULAR; INTRAVENOUS at 14:56

## 2019-12-07 RX ADMIN — FENTANYL CITRATE 100 MCG: 50 INJECTION, SOLUTION INTRAMUSCULAR; INTRAVENOUS at 14:55

## 2019-12-07 RX ADMIN — IOHEXOL 90 ML: 350 INJECTION, SOLUTION INTRAVENOUS at 11:51

## 2019-12-07 RX ADMIN — DILTIAZEM HYDROCHLORIDE 60 MG: 60 TABLET, FILM COATED ORAL at 17:17

## 2019-12-07 RX ADMIN — HYDRALAZINE HYDROCHLORIDE 10 MG: 20 INJECTION INTRAMUSCULAR; INTRAVENOUS at 23:13

## 2019-12-07 RX ADMIN — METOPROLOL TARTRATE 100 MG: 50 TABLET, FILM COATED ORAL at 21:09

## 2019-12-07 RX ADMIN — Medication 10 ML: at 17:17

## 2019-12-07 RX ADMIN — DILTIAZEM HYDROCHLORIDE 60 MG: 60 TABLET, FILM COATED ORAL at 13:33

## 2019-12-07 RX ADMIN — IPRATROPIUM BROMIDE AND ALBUTEROL SULFATE 1 AMPULE: .5; 3 SOLUTION RESPIRATORY (INHALATION) at 15:48

## 2019-12-07 RX ADMIN — PROPOFOL 20 MCG/KG/MIN: 10 INJECTION, EMULSION INTRAVENOUS at 14:00

## 2019-12-07 ASSESSMENT — PULMONARY FUNCTION TESTS
PIF_VALUE: 30
PIF_VALUE: 19
PIF_VALUE: 20
PIF_VALUE: 26
PIF_VALUE: 25
PIF_VALUE: 19
PIF_VALUE: 29
PIF_VALUE: 30
PIF_VALUE: 20
PIF_VALUE: 30
PIF_VALUE: 21
PIF_VALUE: 20
PIF_VALUE: 21
PIF_VALUE: 22
PIF_VALUE: 21
PIF_VALUE: 25
PIF_VALUE: 22
PIF_VALUE: 20
PIF_VALUE: 29
PIF_VALUE: 30
PIF_VALUE: 17
PIF_VALUE: 23
PIF_VALUE: 33
PIF_VALUE: 32
PIF_VALUE: 25
PIF_VALUE: 26
PIF_VALUE: 27
PIF_VALUE: 19
PIF_VALUE: 23
PIF_VALUE: 34
PIF_VALUE: 21

## 2019-12-07 ASSESSMENT — PAIN SCALES - GENERAL: PAINLEVEL_OUTOF10: 0

## 2019-12-07 NOTE — PROGRESS NOTES
12/07/19 1507   Vent Information   FiO2  70 %   Abg drawn post intubation. PaO2 is 285 on 100% FIO2. Decreased Fio2 to 70% PaCO2 is 66.  Will reassess a gas later this PM.

## 2019-12-07 NOTE — PROGRESS NOTES
Charting intubations and reintubations    ETT Size (e.g. 7.5) 7.5   ETT Placement (e.g. 23 cm at lips) 24  ETT secured with (commercial device name) [place]    Tube placement verified by:  Auscultation (yes/no) y  Positive color change on end tidal CO2 detector (yes/no) y  CXR (yes/no) y    Reason for intubation (jot a quick note/phrase e.g. Impending respiratory failure) Increasing distress with desaturation and increased resp rate.  Impending failure'    If difficult airway, was red tape placed (yes/no) n  If code situation, was rescue pod used? (yes/no) n    -- Notify charge therapist regarding all intubations, extubations, reintubations and self extubations    LAVERNE COTO  12:59 PM

## 2019-12-07 NOTE — SIGNIFICANT EVENT
Pt was taken for scheduled CT/CTA around 1120 AM. Pt was transported via bed with RN, monitors, and oxygen. Some wheezing noted prior to departure and RN spoke to RT about a breathing tx on return. Pt was laid flat for scans and O2 sat ranged from 89--91% but did not appear to be in any distress at that time. He was moved back to bed and HOB elevated to 45 degrees and returned to ICU for breathing tx. Around 26 RN was at [facility] and pt was restless, diaphoretic,  and slightly pari in color while on Bipap. RT was called back to [facility] along with Dr Alicia Robles. Decision was made to intubate. See MD and RT intubation notes for complete details. Pt intubated with 20 etomidate, 100 Abundio and 40 mcg Propofol. #7.5, 24 at teeth secured with tube zheng, positive color change and breath sounds heard. CXR confirmed placement. Pt then started on propofol and nicardipine gtt, labs sent, art line placed, and two new PIV started.

## 2019-12-07 NOTE — PROGRESS NOTES
Patient returned from CT scan with audible wheezing and crackles. Treatment given with little relief. RN aware.

## 2019-12-07 NOTE — PROGRESS NOTES
Called back to room for desaturation on bipap to 82%. Agitated and restless. Cool and clammy to touch. Decision made to intubate.

## 2019-12-07 NOTE — PROGRESS NOTES
Daily Progress Note  Neuro Critical Care    Patient Name: Letha Wallace  Patient : 1955  Room/Bed: 0523/0523-01  Code Status: Full  Allergies: No Known Allergies    CHIEF COMPLAINT:      Slurred speech left-sided facial droop unable to move right arm     INTERVAL HISTORY    Initial Presentation (Admitted ): The patient is a 60 y.o. male presented with past medical history of type 2 diabetes thyroid disease hypothyroidism, hypertension hyperlipidemia coronary artery disease status post stent A. fib on coumadin came as a transfer from [facility] with last well-known oh 6 AM this morning when wife was with him and the patient had coffee he then went to lie on bed and around 8 AM to take his medication sitting on the couch and the wife noticed that he was mumbling unable to move his right arm and had a left facial droop and she called 911. At [address] his blood pressure was 160/97 and blood glucose 297. His stroke scale was 10 and INR was 2 he had a CT head done which showed acute intraparenchymal hematoma in the left basal ganglia measuring 2.1x 2.3 x 2.1 cm, parenchymal volume loss and moderate chronic microvascular ischemic changes as well as old infarct. Plan will was to transfer him to [facility] and start him on [address] for reversal.  Started on Cardene drip with blood pressure goal of less than 140 and received 1500 units of Kcentra for INR reversal.     ICH score: 0    Last 24h:   TSH, T3, T4 normal, head CT showing worsening basal ganglia hemorrhage as well as some IVH involvement. CTA neg. Neurosurgery aware. Echocardiogram showing severe diastolic dysfunction as well as EF of 48%. Upon return from CT scan patient had bilateral crackles as well as pulmonary edema on x-ray. Lasix given and patient put on BiPAP. Worsening respiratory status with crackles in all lung fields. Patient begins to desaturate into the [de-identified]. Decision made to intubate. Art line placed.   Patient switch obese)  [x]30-34.9 Obese class 1 (Low Risk)  []35-39.9 Obese class 2 (Moderate Risk)  []?40 Obese class 3 (High Risk)    RECENT LABS:   Lab Results   Component Value Date    WBC 5.5 12/06/2019    HGB 17.1 (H) 12/06/2019    HCT 49.9 12/06/2019     12/06/2019    CHOL 194 07/11/2015    TRIG 363 (H) 07/11/2015    HDL 26 (L) 07/11/2015    ALT 13 12/06/2019    AST 13 12/06/2019     12/06/2019    K 3.9 12/06/2019     12/06/2019    CREATININE 1.01 12/06/2019    BUN 16 12/06/2019    CO2 20 12/06/2019    TSH 3.15 12/06/2019    INR 2.9 12/06/2019    LABA1C 7.4 (H) 12/06/2019     24 HOUR INTAKE/OUTPUT:    Intake/Output Summary (Last 24 hours) at 12/7/2019 0707  Last data filed at 12/7/2019 0551  Gross per 24 hour   Intake 1942 ml   Output 400 ml   Net 1542 ml       IMAGING:   CT HEAD WO CONTRAST    (Results Pending)   XR CHEST PORTABLE    (Results Pending)   CTA HEAD NECK W CONTRAST    (Results Pending)         Labs and Images reviewed with:  [] Dr. Mojgan Garrido. Jesus    [x] Dr. Jaret Hannah  [] Dr. Victoriano Cowart  [] There are no new interval images to review. PHYSICAL EXAM       CONSTITUTIONAL:  Well developed, well nourished, alert and oriented x 1, in no acute distress. GCS 15. Nontoxic. Severe dysarthria. HEAD:  normocephalic, atraumatic    EYES:  PERRLA, EOMI.   ENT:  moist mucous membranes   NECK:  supple, symmetric   LUNGS:  Equal air entry bilaterally   CARDIOVASCULAR:  normal s1 / s2, RRR, distal pulses intact   ABDOMEN:  Soft, no rigidity   NEUROLOGIC:  Mental Status: Alert and awake able to thumbs up on the left side, renal toe on the right lower extremity             Cranial Nerves:    cranial nerves II-XII are grossly intact except Left side facial droop     Motor Exam:    Drift:  present -right upper and lower extremity  Tone:  normal     Motor exam is 5/5 left upper and lower extremity 3/5 in right lower and upper extremity  Sensory:    Touch:     Withdraws to pain on right side  Normal on stopped  -Total of 80 mg Lasix IV given with good effect  -Monitor electrolytes and replaced as needed     GI/NUTRITION:  - Diet: N.p.o. until passed swallow evaluation  - GI Prophylaxis: Pepcid  - Bowel Regimen: MoM     ID/HEME:  - Tmax-36.7  - WBC increased from 5.5 to 12.0  - monitor For fevers  -H&H 17/50.2 platelet 602  -INR 2.9 patient received 1500 units of Kcentra x1  - Vit K 10 mg given today  -Repeat INR 1.1     ENDOCRINE:  - monitor blood glucose  -Medium dose insulin sliding scale  -Thyroid studies negative     OTHER:  - PT/OT eval      DVT PROPHYLAXIS:  - SCD sleeves - Thigh High   - ZORA stockings - Thigh High  - No chemoprophylaxis anticoagulation at this time-patient had left basal ganglia intraparenchymal hematoma    DISPOSITION:  [x] To remain ICU:   [] OK for out of ICU from Neuro Critical Care standpoint    We will continue to follow along. For any changes in exam or patient status please contact Neuro Critical Care.       Anshul Saez DO  Neuro Critical Care  Pager [phone]  12/7/2019     7:07 AM

## 2019-12-07 NOTE — PROGRESS NOTES
NONINVASIVE VENTILATION    PROVIDE OPTIMAL VENTILATION/ACCEPTABLE SPO2   IMPLEMENT NONINVASIVE VENTILATION PROTOCOL   MAINTAIN ACCEPTABLE SPO2   ASSESS SKIN INTEGRITY/BREAKDOWN SCORE   PATIENT EDUCATION AS NEEDED   BIPAP AS NEEDED    Patient wearing BIPAP, tolerating well.     Electronically signed by Adela Aceves RCP on 12/6/2019 at 11:32 PM

## 2019-12-07 NOTE — PLAN OF CARE
Problem: Risk for Impaired Skin Integrity  Goal: Tissue integrity - skin and mucous membranes  Description  Structural intactness and normal physiological function of skin and  mucous membranes. 12/7/2019 0546 by Josue Manuel RN  Outcome: Ongoing  Note:   Skin assessment complete. No breakdown noted. Interventions in place. See doc flowsheet for interventions initiated. Pt turned every two hours.  Will continue to monitor for additional needs and skin breakdown

## 2019-12-07 NOTE — PROGRESS NOTES
Speech Language Pathology  Facility/Department: [address]  Initial Speech/Language/Cognitive Assessment    NAME: Saumya Tavarez  :    MRN: [id number]     ADMISSION DATE: 2019     ADMITTING DIAGNOSIS: has Proteinuria; Atrial fibrillation ([facility] Thyroid disease; Uncontrolled hypertension; CAD (coronary artery disease); Diabetes mellitus type 2 in obese ([facility] Hyperlipidemia; Hyperthyroidism; Other complications due to other cardiac device, implant, and graft; Renal cyst; Microalbuminuria; Diplopia; Headache; Hypertensive urgency; Intractable headache; Headache; Nontraumatic cortical hemorrhage of left cerebral hemisphere [facility]); and Acute intra-cranial hemorrhage ([facility] on their problem list.     DATE ONSET: 19      Date of Eval: 2019   Evaluating Therapist: DANIEL Jiang       RECENT RESULTS  CT OF HEAD/MRI:  Initial CT of Brain  9  1. Acute intraparenchymal hematoma centered in the left basal ganglia   measures 2.1 x 2.3 x 2.1 cm.   2. Parenchymal volume loss and moderate chronic microvascular ischemic   changes as well as old infarctions [within bilateral cerebral hemispheres, right cerebellum]        Primary Complaint:  Kaela Rose is a 59 yo man admitted with stroke-like symptoms on 19.      According to medical records, pt has \"not been right for past several months\"; however on day of admission, he had a syncopal event and right sided weakness.      On admission to [facility], pt exhibited severe right lower face dropp, right UE paresis and weakness in right LE and dysarthria. IMPRESSIONS  1) Severe Dysarthria with 50% intelligibility at word level     (d/t issues with voice and articulation)    2) Aphasia characterized by:             Impairments in listening/auditory comprehension = 20% for answering simple yes/no questions; 0% for following 2 step commands            Impairments in spoken language characterized by = impaired repetition of words, anomia    3) Pt also noted to have some issues with alertness, sustained attention and active cooperation that may appear to exacerbate his communication impairments          RECOMMENDATIONS  1) ST during hospitalization and at next level of care    2) PMR Consultation               Pain:  Pain Assessment  Pain Assessment: 0-10  Pain Level: 0  Patient's Stated Pain Goal: No pain    Assessment:  Aphasia Diagnosis: +anomia/word finding impairment; + deficits in auditory comprehension; + deficits in initiation and active cooperation/responsiveness  Speech Diagnosis: Severe Dysarthria with reduced speech intelligilbity at the word level   Diagnosis: Dysarthria, Aphasia    Recommendations:  Requires SLP Intervention: Yes  Duration/Frequency of Treatment: 3-5x/week  D/C Recommendations: To be determined       Plan:   Goals:  Short-term Goals  Timeframe for Short-term Goals: 1-2 weeks  Goal 1: Improve auditory comprehension for followning 2 step directions to 60%     (baseline = 0%)  Goal 2: Improve auditory comprehension for answering simple/concrete yes/no questions to 60%     (baseline = 20%)  Goal 3: Improve verbal expressions for word finding over baseline levels     (baseline = convergent naming at 60%, responsive naming at 60%; divergent naming +2; confrontation naming at 50%)  Goal 4: Improve speech intelligibility for word production to 75%     (baseline = 50% at word level)  Long-term Goals  Timeframe for Long-term Goals: 1-2 weeks  Goal 1: Improve functional communciation for wants/needs     Patient/family involved in developing goals and treatment plan: no family seen during testing    Subjective:   Previous level of function and limitations: independent in ADLs;                     Objective:     Oral/Motor  Oral Motor: Exceptions to [facility]  Labial ROM: Reduced left; Reduced right  Labial Strength: Reduced  Lingual ROM: Reduced left  Lingual Symmetry: Abnormal symmetry left; Abnormal symmetry right  Lingual Strength: Reduced    Auditory Comprehension  Comprehension: Exceptions  Complex Questions: Severe  Two Step Basic Commands: Severe         Expression  Primary Mode of Expression: Verbal    Verbal Expression  Verbal Expression: Exceptions to functional limits  Initiation: Severe  Repetition: Severe  Automatic Speech: Severe  Confrontation: Severe  Divergent: Severe  Responsive: Severe  Conversation: Severe  Interfering Components: Attention         Motor Speech  Motor Speech: Exceptions to [facility]  Intelligibility: Word  Dysarthria : Severe         Prognosis:  Speech Therapy Prognosis  Prognosis: Fair  Prognosis Considerations: Age;Participation Level; Co-Morbidities;Previous Level of Function;Severity of Impairments  Individuals consulted  Consulted and agree with results and recommendations: Patient    Education:  Patient Education Response: Needs reinforcement;Demonstrated understanding           Therapy Time:   Individual Concurrent Group Co-treatment   Time In 0905         Time Out 0930         Minutes [address], [place]  12/7/2019 9:50 AM

## 2019-12-07 NOTE — PROGRESS NOTES
[address]  Occupational Therapy Not Seen Note     Patient not available for Occupational Therapy due to:     [] Testing:     [] Hemodialysis     [] Blood Transfusion in Progress     []Refusal by Patient:      [] Surgery/Procedure:     [x] Strict Bedrest     [] Sedation     [] Spine Precautions      [] Pt being transferred to palliative care at this time. Spoke with pt/family and OT services to be defered.     [] Pt independent with functional mobility and functional tasks.  Pt with no OT acute care needs at this time, will defer OT eval.     [x] Other- awaiting clarification of activity orders      Next Scheduled Treatment: 12/8/19     Signature: KENNA Murray/MICHELE

## 2019-12-07 NOTE — PROGRESS NOTES
Speech Language Pathology  [address]   CLINICAL BEDSIDE SWALLOW EVALUATION    NAME: Yonas Arthur  : 1955  MRN: [id number]    ADMISSION DATE: 2019     ADMITTING DIAGNOSIS: has Proteinuria; Atrial fibrillation ([facility] [address] Thyroid disease; Uncontrolled hypertension; CAD (coronary artery disease); Diabetes mellitus type 2 in obese ([address] Hyperlipidemia; Hyperthyroidism; Other complications due to other cardiac device, implant, and graft; Renal cyst; Microalbuminuria; Diplopia; Headache; Hypertensive urgency; Intractable headache; Headache; Nontraumatic cortical hemorrhage of left cerebral hemisphere [facility]); and Acute intra-cranial hemorrhage (HCC) on their problem list.       ONSET DATE: 19    Recent Chest Xray/CT of Chest:   19 XR Chest  Moderate cardiomegaly.  Moderate CHF. Date of Eval: 2019  Evaluating Therapist: Amanda Corral      Current Diet level:   NPO      Primary Complaint   Geni Webber is a 59 yo man admitted with stroke-like symptoms on 19. According to medical records, pt has \"not been right for past several months\"; however on day of admission, he had a syncopal event and right sided weakness. On admission to [facility], pt exhibited severe right lower face dropp, right UE paresis and weakness in right LE and dysarthria. Initial CT of Brain  9  1.  Acute intraparenchymal hematoma centered in the left basal ganglia   measures 2.1 x 2.3 x 2.1 cm.   2. Parenchymal volume loss and moderate chronic microvascular ischemic   changes as well as old infarctions [within bilateral cerebral hemispheres, right cerebellum]         ASSESSMENT  Pt given trial of pureed with +immediate s/s of coughing (very weak cough reflex)          IMPRESSIONS  Severe Oral-Pharyngeal Dysphagia with overt clinical s/s of aspiration    Severe Dysarthria with some noticeable communicative-cognitive impairments as well          RECOMMENDATIONS  1) NPO    2) Alternative means of nutrition/hydration and medication    3) Repeat clinical swallow assessment in 2-4 days    4) Assess speech/language/cognitioin              Pain:  Pain Assessment  Pain Assessment: 0-10  Pain Level: 0  Patient's Stated Pain Goal: No pain    Reason for Referral  Saumya Tavarez was referred for a bedside swallow evaluation to assess the efficiency of his swallow function, identify signs and symptoms of aspiration and make recommendations regarding safe dietary consistencies, effective compensatory strategies, and safe eating environment. Impression  Dysphagia Diagnosis: Suspected needs further assessment;Severe pharyngeal stage dysphagia  Dysphagia Outcome Severity Scale: Level 1: Severe dysphagia- NPO. Unable to tolerate any PO safely     Treatment Plan  Requires SLP Intervention: Yes  Duration/Frequency of Treatment: 3-5x/week          Recommended Diet and Intervention  Diet Solids Recommendation: NPO  Liquid Consistency Recommendation: NPO  Recommended Form of Meds: Via alternative means of nutrition  Recommendations: NPO  Therapeutic Interventions: Patient/Family education; Tongue base strengthening;Oral motor exercises    Treatment/Goals  Short-term Goals  Timeframe for Short-term Goals: 1 week  Goal 1: Pt will complete oral motor exercises and laryngeal strengthening exercies to improve swallow as evidenced by no clinical s/s of aspiration on SLP trials    Oral Motor Deficits  Oral/Motor  Oral Motor: Exceptions to [facility]  Labial ROM: Reduced left; Reduced right  Labial Strength: Reduced  Lingual ROM: Reduced left  Lingual Strength: Reduced    Oral Phase Dysfunction  Oral Phase  Oral Phase: Exceptions  Oral Phase Dysfunction  Suspected Premature Bolus Loss: Pudding  Lingual/Palatal Residue: Pudding  Oral Phase  Oral Phase - Comment: + immediate coughing (very weak cough) on pureed with oral stsis noted     Indicators of Pharyngeal Phase Dysfunction   Pharyngeal Phase  Pharyngeal Phase:

## 2019-12-07 NOTE — PROCEDURES
PROCEDURE NOTE - EMERGENCY INTUBATION    PATIENT NAME: Lorena Tidwell  MEDICAL RECORD NO. [id number]  DATE: 12/7/2019  ATTENDING PHYSICIAN: Dr. Justus Couch DIAGNOSIS:  Acute Respiratory Failure  POSTOPERATIVE DIAGNOSIS:  Same  PROCEDURE PERFORMED:  Emergency endotracheal intubation  PERFORMING PHYSICIAN: Shamar Garcias DO    MEDICATIONS: etomidate intravenously and rocuronium intravenously    DISCUSSION:  Lorena Tidwell is a 60y.o.-year-old male who requires intubation and ventilatory support due to Respiratory failure, impending respiratory failure, hypoxia and airway protection. The history and physical examination were reviewed and confirmed. CONSENT: Unable to be obtained due to the emergent nature of this procedure. PROCEDURE:  A timeout was initiated by the bedside nurse and was confirmed by those present. The patient was placed in the appropriate position. Cricoid pressure was not required. Intubation was performed with glidescope a 7.5 cuffed endotracheal tube. The cuff was then inflated and the tube was secured appropriately at a distance of 24 cm to the dental ridge. Initial confirmation of placement included bilateral breath sounds, an end tidal CO2 detector, absence of sounds over the stomach, tube fogging, adequate chest rise and adequate pulse oximetry reading. A chest x-ray to verify correct placement of the tube showed appropriate tube position. The patient tolerated the procedure well.      COMPLICATIONS:  None     Shamar Garicas DO  3:01 PM, 12/7/19

## 2019-12-07 NOTE — PROGRESS NOTES
Physical Therapy  DATE: 2019    NAME: Sj Owen  MRN: [id number]   : 1955    Patient not seen this date for Physical Therapy due to:  [] Blood transfusion in progress  [] Hemodialysis  []  Patient Declined  [] Spine Precautions   [x] Strict Bedrest: SBR order in place. RN notified and clarifying activity orders. PT will check back as time allows or 19. [] Surgery/ Procedure  [] Testing      [] Other        [] PT being discontinued at this time. Patient independent. No further needs. [] PT being discontinued at this time as the patient has been transferred to palliative care. No further needs.     Raeann Corey, PT

## 2019-12-08 ENCOUNTER — APPOINTMENT (OUTPATIENT)
Dept: GENERAL RADIOLOGY | Age: 64
DRG: 003 | End: 2019-12-08
Payer: MEDICARE

## 2019-12-08 ENCOUNTER — APPOINTMENT (OUTPATIENT)
Dept: CT IMAGING | Age: 64
DRG: 003 | End: 2019-12-08
Payer: MEDICARE

## 2019-12-08 LAB
-: NORMAL
ABSOLUTE EOS #: 0.08 K/UL (ref 0–0.44)
ABSOLUTE IMMATURE GRANULOCYTE: 0.03 K/UL (ref 0–0.3)
ABSOLUTE LYMPH #: 1.15 K/UL (ref 1.1–3.7)
ABSOLUTE MONO #: 0.55 K/UL (ref 0.1–1.2)
ALLEN TEST: ABNORMAL
ANION GAP SERPL CALCULATED.3IONS-SCNC: 13 MMOL/L (ref 9–17)
BASOPHILS # BLD: 0 % (ref 0–2)
BASOPHILS ABSOLUTE: 0.03 K/UL (ref 0–0.2)
BUN BLDV-MCNC: 16 MG/DL (ref 8–23)
BUN/CREAT BLD: ABNORMAL (ref 9–20)
CALCIUM IONIZED: 1.16 MMOL/L (ref 1.13–1.33)
CALCIUM SERPL-MCNC: 8.8 MG/DL (ref 8.6–10.4)
CHLORIDE BLD-SCNC: 103 MMOL/L (ref 98–107)
CO2: 23 MMOL/L (ref 20–31)
CREAT SERPL-MCNC: 1.17 MG/DL (ref 0.7–1.2)
DIFFERENTIAL TYPE: ABNORMAL
EOSINOPHILS RELATIVE PERCENT: 1 % (ref 1–4)
FIO2: 50
GFR AFRICAN AMERICAN: >60 ML/MIN
GFR NON-AFRICAN AMERICAN: >60 ML/MIN
GFR SERPL CREATININE-BSD FRML MDRD: ABNORMAL ML/MIN/{1.73_M2}
GFR SERPL CREATININE-BSD FRML MDRD: ABNORMAL ML/MIN/{1.73_M2}
GLUCOSE BLD-MCNC: 159 MG/DL (ref 75–110)
GLUCOSE BLD-MCNC: 175 MG/DL (ref 75–110)
GLUCOSE BLD-MCNC: 177 MG/DL (ref 75–110)
GLUCOSE BLD-MCNC: 187 MG/DL (ref 70–99)
GLUCOSE BLD-MCNC: 214 MG/DL (ref 75–110)
HCT VFR BLD CALC: 41.3 % (ref 40.7–50.3)
HEMOGLOBIN: 13.9 G/DL (ref 13–17)
IMMATURE GRANULOCYTES: 0 %
INR BLD: 1
LYMPHOCYTES # BLD: 15 % (ref 24–43)
MAGNESIUM: 1.8 MG/DL (ref 1.6–2.6)
MCH RBC QN AUTO: 31.4 PG (ref 25.2–33.5)
MCHC RBC AUTO-ENTMCNC: 33.7 G/DL (ref 28.4–34.8)
MCV RBC AUTO: 93.2 FL (ref 82.6–102.9)
MODE: ABNORMAL
MONOCYTES # BLD: 7 % (ref 3–12)
NEGATIVE BASE EXCESS, ART: ABNORMAL (ref 0–2)
NRBC AUTOMATED: 0 PER 100 WBC
O2 DEVICE/FLOW/%: ABNORMAL
PARTIAL THROMBOPLASTIN TIME: 24.7 SEC (ref 20.5–30.5)
PATIENT TEMP: ABNORMAL
PDW BLD-RTO: 15.1 % (ref 11.8–14.4)
PHOSPHORUS: 4 MG/DL (ref 2.5–4.5)
PLATELET # BLD: 146 K/UL (ref 138–453)
PLATELET ESTIMATE: ABNORMAL
PMV BLD AUTO: 11.9 FL (ref 8.1–13.5)
POC HCO3: 30.8 MMOL/L (ref 21–28)
POC LACTIC ACID: 0.42 MMOL/L (ref 0.56–1.39)
POC O2 SATURATION: 96 % (ref 94–98)
POC PCO2 TEMP: ABNORMAL MM HG
POC PCO2: 50.8 MM HG (ref 35–48)
POC PH TEMP: ABNORMAL
POC PH: 7.39 (ref 7.35–7.45)
POC PO2 TEMP: ABNORMAL MM HG
POC PO2: 87.2 MM HG (ref 83–108)
POSITIVE BASE EXCESS, ART: 4 (ref 0–3)
POTASSIUM SERPL-SCNC: 3.6 MMOL/L (ref 3.7–5.3)
PROTHROMBIN TIME: 10.9 SEC (ref 9–12)
RBC # BLD: 4.43 M/UL (ref 4.21–5.77)
RBC # BLD: ABNORMAL 10*6/UL
REASON FOR REJECTION: NORMAL
SAMPLE SITE: ABNORMAL
SEG NEUTROPHILS: 77 % (ref 36–65)
SEGMENTED NEUTROPHILS ABSOLUTE COUNT: 6.08 K/UL (ref 1.5–8.1)
SODIUM BLD-SCNC: 139 MMOL/L (ref 135–144)
TCO2 (CALC), ART: 32 MMOL/L (ref 22–29)
WBC # BLD: 7.9 K/UL (ref 3.5–11.3)
WBC # BLD: ABNORMAL 10*3/UL
ZZ NTE CLEAN UP: ORDERED TEST: NORMAL
ZZ NTE WITH NAME CLEAN UP: SPECIMEN SOURCE: NORMAL

## 2019-12-08 PROCEDURE — 80048 BASIC METABOLIC PNL TOTAL CA: CPT

## 2019-12-08 PROCEDURE — 2580000003 HC RX 258: Performed by: STUDENT IN AN ORGANIZED HEALTH CARE EDUCATION/TRAINING PROGRAM

## 2019-12-08 PROCEDURE — 6370000000 HC RX 637 (ALT 250 FOR IP): Performed by: STUDENT IN AN ORGANIZED HEALTH CARE EDUCATION/TRAINING PROGRAM

## 2019-12-08 PROCEDURE — 2500000003 HC RX 250 WO HCPCS: Performed by: STUDENT IN AN ORGANIZED HEALTH CARE EDUCATION/TRAINING PROGRAM

## 2019-12-08 PROCEDURE — 94640 AIRWAY INHALATION TREATMENT: CPT

## 2019-12-08 PROCEDURE — 83605 ASSAY OF LACTIC ACID: CPT

## 2019-12-08 PROCEDURE — 87070 CULTURE OTHR SPECIMN AEROBIC: CPT

## 2019-12-08 PROCEDURE — 2700000000 HC OXYGEN THERAPY PER DAY

## 2019-12-08 PROCEDURE — 85025 COMPLETE CBC W/AUTO DIFF WBC: CPT

## 2019-12-08 PROCEDURE — 94761 N-INVAS EAR/PLS OXIMETRY MLT: CPT

## 2019-12-08 PROCEDURE — 85610 PROTHROMBIN TIME: CPT

## 2019-12-08 PROCEDURE — 87077 CULTURE AEROBIC IDENTIFY: CPT

## 2019-12-08 PROCEDURE — 82947 ASSAY GLUCOSE BLOOD QUANT: CPT

## 2019-12-08 PROCEDURE — 6370000000 HC RX 637 (ALT 250 FOR IP): Performed by: PSYCHIATRY & NEUROLOGY

## 2019-12-08 PROCEDURE — 71045 X-RAY EXAM CHEST 1 VIEW: CPT

## 2019-12-08 PROCEDURE — 2580000003 HC RX 258: Performed by: PSYCHIATRY & NEUROLOGY

## 2019-12-08 PROCEDURE — 84100 ASSAY OF PHOSPHORUS: CPT

## 2019-12-08 PROCEDURE — C9113 INJ PANTOPRAZOLE SODIUM, VIA: HCPCS | Performed by: STUDENT IN AN ORGANIZED HEALTH CARE EDUCATION/TRAINING PROGRAM

## 2019-12-08 PROCEDURE — 6360000002 HC RX W HCPCS

## 2019-12-08 PROCEDURE — 94003 VENT MGMT INPAT SUBQ DAY: CPT

## 2019-12-08 PROCEDURE — 82330 ASSAY OF CALCIUM: CPT

## 2019-12-08 PROCEDURE — 83735 ASSAY OF MAGNESIUM: CPT

## 2019-12-08 PROCEDURE — 87205 SMEAR GRAM STAIN: CPT

## 2019-12-08 PROCEDURE — 87186 SC STD MICRODIL/AGAR DIL: CPT

## 2019-12-08 PROCEDURE — 85730 THROMBOPLASTIN TIME PARTIAL: CPT

## 2019-12-08 PROCEDURE — 6360000002 HC RX W HCPCS: Performed by: STUDENT IN AN ORGANIZED HEALTH CARE EDUCATION/TRAINING PROGRAM

## 2019-12-08 PROCEDURE — 94770 HC ETCO2 MONITOR DAILY: CPT

## 2019-12-08 PROCEDURE — 87185 SC STD ENZYME DETCJ PER NZM: CPT

## 2019-12-08 PROCEDURE — 2000000003 HC NEURO ICU R&B

## 2019-12-08 PROCEDURE — 6360000002 HC RX W HCPCS: Performed by: PSYCHIATRY & NEUROLOGY

## 2019-12-08 PROCEDURE — 99291 CRITICAL CARE FIRST HOUR: CPT | Performed by: PSYCHIATRY & NEUROLOGY

## 2019-12-08 PROCEDURE — 82803 BLOOD GASES ANY COMBINATION: CPT

## 2019-12-08 PROCEDURE — 37799 UNLISTED PX VASCULAR SURGERY: CPT

## 2019-12-08 PROCEDURE — 70450 CT HEAD/BRAIN W/O DYE: CPT

## 2019-12-08 RX ORDER — HEPARIN SODIUM 5000 [USP'U]/ML
5000 INJECTION, SOLUTION INTRAVENOUS; SUBCUTANEOUS EVERY 8 HOURS SCHEDULED
Status: DISCONTINUED | OUTPATIENT
Start: 2019-12-08 | End: 2019-12-10

## 2019-12-08 RX ORDER — PROPOFOL 10 MG/ML
INJECTION, EMULSION INTRAVENOUS
Status: COMPLETED
Start: 2019-12-08 | End: 2019-12-08

## 2019-12-08 RX ORDER — FUROSEMIDE 10 MG/ML
40 INJECTION INTRAMUSCULAR; INTRAVENOUS ONCE
Status: COMPLETED | OUTPATIENT
Start: 2019-12-08 | End: 2019-12-08

## 2019-12-08 RX ORDER — PROPOFOL 10 MG/ML
10 INJECTION, EMULSION INTRAVENOUS
Status: DISCONTINUED | OUTPATIENT
Start: 2019-12-08 | End: 2019-12-09

## 2019-12-08 RX ORDER — CHLORHEXIDINE GLUCONATE 0.12 MG/ML
15 RINSE ORAL 2 TIMES DAILY
Status: DISCONTINUED | OUTPATIENT
Start: 2019-12-08 | End: 2020-01-03 | Stop reason: HOSPADM

## 2019-12-08 RX ADMIN — DILTIAZEM HYDROCHLORIDE 60 MG: 60 TABLET, FILM COATED ORAL at 17:36

## 2019-12-08 RX ADMIN — HEPARIN SODIUM 5000 UNITS: 5000 INJECTION INTRAVENOUS; SUBCUTANEOUS at 20:22

## 2019-12-08 RX ADMIN — THIAMINE HYDROCHLORIDE 100 MG: 100 INJECTION, SOLUTION INTRAMUSCULAR; INTRAVENOUS at 10:54

## 2019-12-08 RX ADMIN — DEXMEDETOMIDINE HYDROCHLORIDE 0.5 MCG/KG/HR: 100 INJECTION, SOLUTION INTRAVENOUS at 20:42

## 2019-12-08 RX ADMIN — IPRATROPIUM BROMIDE AND ALBUTEROL SULFATE 1 AMPULE: .5; 3 SOLUTION RESPIRATORY (INHALATION) at 12:26

## 2019-12-08 RX ADMIN — LISINOPRIL 40 MG: 20 TABLET ORAL at 08:48

## 2019-12-08 RX ADMIN — INSULIN LISPRO 2 UNITS: 100 INJECTION, SOLUTION INTRAVENOUS; SUBCUTANEOUS at 05:43

## 2019-12-08 RX ADMIN — INSULIN LISPRO 2 UNITS: 100 INJECTION, SOLUTION INTRAVENOUS; SUBCUTANEOUS at 12:00

## 2019-12-08 RX ADMIN — SIMETHICONE 40 MG: 20 SUSPENSION/ DROPS ORAL at 08:47

## 2019-12-08 RX ADMIN — SIMETHICONE 40 MG: 20 SUSPENSION/ DROPS ORAL at 17:40

## 2019-12-08 RX ADMIN — SODIUM CHLORIDE, PRESERVATIVE FREE 10 ML: 5 INJECTION INTRAVENOUS at 20:40

## 2019-12-08 RX ADMIN — HEPARIN SODIUM 5000 UNITS: 5000 INJECTION INTRAVENOUS; SUBCUTANEOUS at 13:19

## 2019-12-08 RX ADMIN — FUROSEMIDE 40 MG: 10 INJECTION, SOLUTION INTRAMUSCULAR; INTRAVENOUS at 13:29

## 2019-12-08 RX ADMIN — IPRATROPIUM BROMIDE AND ALBUTEROL SULFATE 1 AMPULE: .5; 3 SOLUTION RESPIRATORY (INHALATION) at 16:01

## 2019-12-08 RX ADMIN — Medication 15 ML: at 10:54

## 2019-12-08 RX ADMIN — DESMOPRESSIN ACETATE 40 MG: 0.2 TABLET ORAL at 08:48

## 2019-12-08 RX ADMIN — PROPOFOL 40 MCG/KG/MIN: 10 INJECTION, EMULSION INTRAVENOUS at 10:28

## 2019-12-08 RX ADMIN — PHYTONADIONE 10 MG: 10 INJECTION, EMULSION INTRAMUSCULAR; INTRAVENOUS; SUBCUTANEOUS at 08:46

## 2019-12-08 RX ADMIN — DILTIAZEM HYDROCHLORIDE 60 MG: 60 TABLET, FILM COATED ORAL at 05:34

## 2019-12-08 RX ADMIN — INSULIN LISPRO 4 UNITS: 100 INJECTION, SOLUTION INTRAVENOUS; SUBCUTANEOUS at 17:36

## 2019-12-08 RX ADMIN — SIMETHICONE 40 MG: 20 SUSPENSION/ DROPS ORAL at 13:19

## 2019-12-08 RX ADMIN — FUROSEMIDE 20 MG: 10 INJECTION, SOLUTION INTRAMUSCULAR; INTRAVENOUS at 08:47

## 2019-12-08 RX ADMIN — DILTIAZEM HYDROCHLORIDE 60 MG: 60 TABLET, FILM COATED ORAL at 12:00

## 2019-12-08 RX ADMIN — INSULIN LISPRO 4 UNITS: 100 INJECTION, SOLUTION INTRAVENOUS; SUBCUTANEOUS at 23:58

## 2019-12-08 RX ADMIN — SODIUM CHLORIDE, PRESERVATIVE FREE 10 ML: 5 INJECTION INTRAVENOUS at 08:49

## 2019-12-08 RX ADMIN — METHIMAZOLE 10 MG: 5 TABLET ORAL at 17:36

## 2019-12-08 RX ADMIN — Medication 15 ML: at 20:40

## 2019-12-08 RX ADMIN — DEXMEDETOMIDINE HYDROCHLORIDE 0.2 MCG/KG/HR: 100 INJECTION, SOLUTION INTRAVENOUS at 10:31

## 2019-12-08 RX ADMIN — IPRATROPIUM BROMIDE AND ALBUTEROL SULFATE 1 AMPULE: .5; 3 SOLUTION RESPIRATORY (INHALATION) at 08:34

## 2019-12-08 RX ADMIN — Medication 10 ML: at 08:47

## 2019-12-08 RX ADMIN — METOPROLOL TARTRATE 100 MG: 50 TABLET, FILM COATED ORAL at 20:22

## 2019-12-08 RX ADMIN — IPRATROPIUM BROMIDE AND ALBUTEROL SULFATE 1 AMPULE: .5; 3 SOLUTION RESPIRATORY (INHALATION) at 19:57

## 2019-12-08 RX ADMIN — METHIMAZOLE 20 MG: 5 TABLET ORAL at 08:47

## 2019-12-08 RX ADMIN — PROPOFOL 40 MCG/KG/MIN: 10 INJECTION, EMULSION INTRAVENOUS at 01:46

## 2019-12-08 RX ADMIN — METOPROLOL TARTRATE 100 MG: 50 TABLET, FILM COATED ORAL at 08:48

## 2019-12-08 RX ADMIN — FUROSEMIDE 20 MG: 10 INJECTION, SOLUTION INTRAMUSCULAR; INTRAVENOUS at 17:36

## 2019-12-08 RX ADMIN — SIMETHICONE 40 MG: 20 SUSPENSION/ DROPS ORAL at 20:22

## 2019-12-08 RX ADMIN — PANTOPRAZOLE SODIUM 40 MG: 40 INJECTION, POWDER, FOR SOLUTION INTRAVENOUS at 08:47

## 2019-12-08 RX ADMIN — DILTIAZEM HYDROCHLORIDE 60 MG: 60 TABLET, FILM COATED ORAL at 23:58

## 2019-12-08 ASSESSMENT — PULMONARY FUNCTION TESTS
PIF_VALUE: 21
PIF_VALUE: 25
PIF_VALUE: 24
PIF_VALUE: 25
PIF_VALUE: 26
PIF_VALUE: 23
PIF_VALUE: 23
PIF_VALUE: 25
PIF_VALUE: 22
PIF_VALUE: 24
PIF_VALUE: 20
PIF_VALUE: 8
PIF_VALUE: 22
PIF_VALUE: 20
PIF_VALUE: 23
PIF_VALUE: 22
PIF_VALUE: 23
PIF_VALUE: 21
PIF_VALUE: 20
PIF_VALUE: 22
PIF_VALUE: 15
PIF_VALUE: 24
PIF_VALUE: 24
PIF_VALUE: 16
PIF_VALUE: 27
PIF_VALUE: 25
PIF_VALUE: 26
PIF_VALUE: 23
PIF_VALUE: 23
PIF_VALUE: 21
PIF_VALUE: 21
PIF_VALUE: 23
PIF_VALUE: 22

## 2019-12-08 NOTE — PROGRESS NOTES
Daily Progress Note  Neuro Critical Care    Patient Name: Aaliyah England  Patient : 1955  Room/Bed: 0523/0523-01  Code Status: full  Allergies: No Known Allergies    CHIEF COMPLAINT:      Slurred speech left-sided facial droop unable to move right arm     INTERVAL HISTORY    Initial Presentation  The patient is a 61 y. o. male presented with past medical history of type 2 diabetes thyroid disease hypothyroidism, hypertension hyperlipidemia coronary artery disease status post stent A. fib on coumadin came as a transfer from [facility] with last well-known oh 6 AM this morning when wife was with him and the patient had coffee he then went to lie on bed and around 8 AM to take his medication sitting on the couch and the wife noticed that he was mumbling unable to move his right arm and had a left facial droop and she called 911. At [address] his blood pressure was 160/97 and blood glucose 297.  His stroke scale was 10 and INR was 2 he had a CT head done which showed acute intraparenchymal hematoma in the left basal ganglia measuring 2.1x 2.3 x 2.1 cm, parenchymal volume loss and moderate chronic microvascular ischemic changes as well as old infarct.  Plan will was to transfer him to [facility] and start him on [address] for reversal.  Started on Cardene drip with blood pressure goal of less than 140 and received 1500 units of Kcentra for INR reversal.      ICH score: 0     12/8  TSH, T3, T4 normal, head CT showing worsening basal ganglia hemorrhage as well as some IVH involvement. CTA neg. Neurosurgery aware. Echocardiogram showing severe diastolic dysfunction as well as EF of 48%. Upon return from CT scan patient had bilateral crackles as well as pulmonary edema on x-ray. Lasix given and patient put on BiPAP. Worsening respiratory status with crackles in all lung fields. Patient begins to desaturate into the [de-identified]. Decision made to intubate. Art line placed.   Patient switch from Cleviprex to Min: 82 %  Max: 99 %  Patient Vitals for the past 12 hrs:   BP Temp Pulse Resp SpO2   12/08/19 0603 117/71 -- 75 19 98 %   12/08/19 0503 117/71 -- 75 16 98 %   12/08/19 0403 111/72 99 °F (37.2 °C) 75 17 98 %   12/08/19 0335 -- -- 75 19 98 %   12/08/19 0303 115/67 -- 75 17 97 %   12/08/19 0203 125/76 -- 75 16 96 %   12/08/19 0103 104/69 -- 75 18 98 %   12/08/19 0003 129/77 98.4 °F (36.9 °C) 75 19 97 %   12/07/19 2312 -- -- 75 16 99 %   12/07/19 2303 124/81 -- 75 17 99 %   12/07/19 2203 117/75 -- 75 17 98 %   12/07/19 2103 (!) 159/89 -- 75 19 98 %   12/07/19 2043 -- -- -- 17 99 %   12/07/19 2036 -- -- 75 19 98 %   12/07/19 2003 128/85 99.6 °F (37.6 °C) 75 19 98 %     Estimated body mass index is 33.18 kg/m² as calculated from the following:    Height as of this encounter: 5' 7\" (1.702 m). Weight as of this encounter: 211 lb 13.8 oz (96.1 kg).  []<16 Severe malnutrition  []16-16.99 Moderate malnutrition  []17-18.49 Mild malnutrition  [x]18.5-24.9 Normal  []25-29.9 Overweight (not obese)  []30-34.9 Obese class 1 (Low Risk)   []35-39.9 Obese class 2 (Moderate Risk)  []?40 Obese class 3 (High Risk)    RECENT LABS:   Lab Results   Component Value Date    WBC 7.9 12/08/2019    HGB 13.9 12/08/2019    HCT 41.3 12/08/2019     12/08/2019    CHOL 194 07/11/2015    TRIG 363 (H) 07/11/2015    HDL 26 (L) 07/11/2015    ALT 13 12/06/2019    AST 13 12/06/2019     12/07/2019    K 4.4 12/07/2019     12/07/2019    CREATININE 1.20 12/07/2019    BUN 17 12/07/2019    CO2 19 (L) 12/07/2019    TSH 3.15 12/06/2019    INR 1.0 12/08/2019    LABA1C 7.4 (H) 12/06/2019     24 HOUR INTAKE/OUTPUT:    Intake/Output Summary (Last 24 hours) at 12/8/2019 0705  Last data filed at 12/8/2019 0600  Gross per 24 hour   Intake 351 ml   Output 1745 ml   Net -1394 ml         Labs and Images reviewed with:  [] Dr. Neil Lee    [x] Dr. Tellez Holding  [] Dr. Brendon Barber  [] There are no new interval images to review.      PHYSICAL EXAM CONSTITUTIONAL:  Well developed, well nourished, intubated   HEAD:  normocephalic, atraumatic    EYES:  PERRLA, EOMI.   ENT:  moist mucous membranes   NECK:  supple, symmetric   LUNGS:  Equal air entry bilaterally   CARDIOVASCULAR:  normal s1 / s2, RRR, distal pulses intact   ABDOMEN:  Soft, no rigidity   NEUROLOGIC:  Mental Status: Intubated     Cranial Nerves:     Left side facial droop   PEERLA-     Motor Exam:    Drift:  unable to assess  Tone:  normal     Motor exam :withdraws to pain  Sensory:    Touch:    Withdraws to pain on right side  Normal on the left upper and lower extremity     Plantar Response:  Right:  downgoing  Left:  downgoing     Clonus:  absent  Monteiro's:  absent     Coordination/Dysmetria:  Unable to assess     Gait: Unable to assess            DRAINS:  [x] There are no drains for Neuro Critical Care to monitor at this time. ASSESSMENT AND PLAN:       This is a 61 y. o. male with past medical history of type 2 diabetes thyroid disease hypothyroidism, hypertension hyperlipidemia coronary artery disease status post stent A. fib on Warfarin, came as a transfer from [place] with CT head showing left basal ganglia intraparenchymal hematoma. LWKN 0600, ICH score -0 NIHSS-15     PLAN/MEDICAL DECISION MAKING:     NEUROLOGIC:  -CT head on admission shows acute intraparenchymal hematoma in the left basal ganglia measuring 2.1x 2.3 x 2.1 cm, parenchymal volume loss and moderate chronic microvascular ischemic changes as well as old infarct  -Patient received 1500 units of Kcentra of as he is on Warfarin for reversal of INR 2.9  -Repeat head CT showing basal ganglia bleed increased in size approximately 2 cm to 2.8. Some IVH involvement.   Repeat CT head today was stable one-time dose of vitamin K given today  -Keep systolic blood pressure less than 140 patient currently on Cardene drip  -On Precedex for sedation  -Patient on Keppra 500 mg twice daily for total of 7 days for seizure prevention  -Neuro checks per protocol       CARDIOVASCULAR:  - systolic blood pressure goal of less than 140-weaned off Cardene  -Patient on Lopressor 100 mg twice daily, Cardizem 60 mg every 6h, Lisinopril 40 qd   -Echocardiogram showing EF of 48% and severe diastolic dysfunction with normal valves. - Trops stable 23, 27, 27  - Continue telemetry monitoring      PULMONARY:  -Patient had worsening respiratory status and trialed on BiPAP with little effect Intubated for airway protection on 12/7  -Chest x-ray showing pulmonary edema-monitor output wanted patient to be -1 to 1.5 L, on Lasix 20 mg twice daily  -Oxygenation improved  -Follow-up ABGs daily     RENAL/FLUID/ELECTROLYTE:  - BUN 17 / Creatinine1.20  - IVF stopped  -Monitor electrolytes and replaced as needed     GI/NUTRITION:  - Diet: Tube feeds started-a diet  - GI Prophylaxis: Pepcid  - Bowel Regimen: MoM     ID/HEME:  - Tmax-36.7  - WBC increased from 5.5 to 12.0-7.9 today leukocytosis resolved  - monitor For fevers  -H&H 13.9/41. 3platelet 061 INR 1.0  -INR 2.9 patient received 1500 units of Kcentra x1  -10Milligrams vitamin K given today         ENDOCRINE:  - monitor blood glucose  -Medium dose insulin sliding scale  -Thyroid studies negative  -On thiamine 100 mg daily     OTHER:  - PT/OT eval      DVT PROPHYLAXIS:  - SCD sleeves - Thigh High   - ZORA stockings - Thigh High  -On heparin 5000 units subcu every 8      DISPOSITION:  [x] To remain ICU:  [] OK for out of ICU from Neuro Critical Care standpoint    We will continue to follow along. For any changes in exam or patient status please contact Neuro Critical Care.       Parvin Wyatt MD  Neuro Critical Care  Pager [phone]  12/8/2019     7:05 AM

## 2019-12-08 NOTE — PROGRESS NOTES
12/08/19 1300   Vent Information   PEEP/CPAP 7   Spontaneous Breathing Trial (SBT) RT Doc   SpO2 97 %   WEan Fio2 and peep as tolerated.

## 2019-12-08 NOTE — PLAN OF CARE
PROVIDE ADEQUATE OXYGENATION WITH ACCEPTABLE SP02/ABG'S    [x]  IDENTIFY APPROPRIATE OXYGEN THERAPY  [x]   MONITOR SP02/ABG'S AS NEEDED   [x]   PATIENT EDUCATION AS NEEDED    BRONCHOSPASM/BRONCHOCONSTRICTION     [x]         IMPROVE AERATION/BREATH SOUNDS  [x]   ADMINISTER BRONCHODILATOR THERAPY AS APPROPRIATE  [x]   ASSESS BREATH SOUNDS  [x]   IMPLEMENT AEROSOL/MDI PROTOCOL  [x]   PATIENT EDUCATION AS NEEDED    Ventilator Bronchodilator assessment     Breath sounds: rhonchi, cleared well with suctioning  Inspiratory Pressure: 20  Plateau Pressure: 19    Patient assessed at level 1 on vent. Home txs are 4 times day        []    Bronchodilator Assessment    BRONCHODILATOR ASSESSMENT SCORE  Score 0 (Home) 1 2 3 4   Breath Sounds   []  Chronic Ventilator: Patient at baseline [x]  Mild Wheezes/ Clear []  Intermittent wheezes with good air entry []  Bilateral/unilateral wheezing with diminished air entry []  Insp/Exp wheeze and/or poor aeration   Ventilator Pressures   []  Chronic Ventilator [x]  Insp. Pressure less than 25 cm H20 []  Insp. Pressure less than 25 cm H20 []  Insp. Pressure exceeds 25 cm H20 []  Insp.  Pressure exceeds 30 cm H20   Plateau Pressure []  NA   [x]  Plateau Pressure less than 4  []  Plateau Pressure less than or equal to 5 []  Plateau Pressure greater than or equal to 6 []  Plateau Pressure greater than or equal to [place]  8:33 AM

## 2019-12-09 ENCOUNTER — APPOINTMENT (OUTPATIENT)
Dept: GENERAL RADIOLOGY | Age: 64
DRG: 003 | End: 2019-12-09
Payer: MEDICARE

## 2019-12-09 LAB
-: ABNORMAL
ABSOLUTE EOS #: 0.07 K/UL (ref 0–0.44)
ABSOLUTE IMMATURE GRANULOCYTE: 0.05 K/UL (ref 0–0.3)
ABSOLUTE LYMPH #: 1.05 K/UL (ref 1.1–3.7)
ABSOLUTE MONO #: 0.67 K/UL (ref 0.1–1.2)
ALLEN TEST: ABNORMAL
AMORPHOUS: ABNORMAL
ANION GAP SERPL CALCULATED.3IONS-SCNC: 13 MMOL/L (ref 9–17)
BACTERIA: ABNORMAL
BASOPHILS # BLD: 1 % (ref 0–2)
BASOPHILS ABSOLUTE: 0.05 K/UL (ref 0–0.2)
BILIRUBIN URINE: NEGATIVE
BUN BLDV-MCNC: 18 MG/DL (ref 8–23)
BUN/CREAT BLD: ABNORMAL (ref 9–20)
CALCIUM IONIZED: 1.2 MMOL/L (ref 1.13–1.33)
CALCIUM SERPL-MCNC: 9.3 MG/DL (ref 8.6–10.4)
CASTS UA: ABNORMAL /LPF (ref 0–2)
CHLORIDE BLD-SCNC: 103 MMOL/L (ref 98–107)
CO2: 22 MMOL/L (ref 20–31)
COLOR: YELLOW
COMMENT UA: ABNORMAL
CREAT SERPL-MCNC: 1.27 MG/DL (ref 0.7–1.2)
CRYSTALS, UA: ABNORMAL /HPF
CRYSTALS, UA: ABNORMAL /HPF
DIFFERENTIAL TYPE: ABNORMAL
EKG ATRIAL RATE: 81 BPM
EKG Q-T INTERVAL: 452 MS
EKG QRS DURATION: 136 MS
EKG QTC CALCULATION (BAZETT): 504 MS
EKG R AXIS: 148 DEGREES
EKG T AXIS: 8 DEGREES
EKG VENTRICULAR RATE: 75 BPM
EOSINOPHILS RELATIVE PERCENT: 1 % (ref 1–4)
EPITHELIAL CELLS UA: ABNORMAL /HPF (ref 0–5)
FIO2: 40
GFR AFRICAN AMERICAN: >60 ML/MIN
GFR NON-AFRICAN AMERICAN: 57 ML/MIN
GFR SERPL CREATININE-BSD FRML MDRD: ABNORMAL ML/MIN/{1.73_M2}
GFR SERPL CREATININE-BSD FRML MDRD: ABNORMAL ML/MIN/{1.73_M2}
GLUCOSE BLD-MCNC: 209 MG/DL (ref 75–110)
GLUCOSE BLD-MCNC: 232 MG/DL (ref 70–99)
GLUCOSE BLD-MCNC: 237 MG/DL (ref 74–100)
GLUCOSE BLD-MCNC: 260 MG/DL (ref 75–110)
GLUCOSE BLD-MCNC: 279 MG/DL (ref 75–110)
GLUCOSE URINE: ABNORMAL
HCT VFR BLD CALC: 42.7 % (ref 40.7–50.3)
HEMOGLOBIN: 14.1 G/DL (ref 13–17)
IMMATURE GRANULOCYTES: 1 %
INR BLD: 0.9
KETONES, URINE: ABNORMAL
LEUKOCYTE ESTERASE, URINE: NEGATIVE
LYMPHOCYTES # BLD: 12 % (ref 24–43)
MAGNESIUM: 2 MG/DL (ref 1.6–2.6)
MCH RBC QN AUTO: 31.2 PG (ref 25.2–33.5)
MCHC RBC AUTO-ENTMCNC: 33 G/DL (ref 28.4–34.8)
MCV RBC AUTO: 94.5 FL (ref 82.6–102.9)
MODE: ABNORMAL
MONOCYTES # BLD: 7 % (ref 3–12)
MUCUS: ABNORMAL
NEGATIVE BASE EXCESS, ART: ABNORMAL (ref 0–2)
NITRITE, URINE: NEGATIVE
NRBC AUTOMATED: 0 PER 100 WBC
O2 DEVICE/FLOW/%: ABNORMAL
OTHER OBSERVATIONS UA: ABNORMAL
PARTIAL THROMBOPLASTIN TIME: 22.8 SEC (ref 20.5–30.5)
PATIENT TEMP: ABNORMAL
PDW BLD-RTO: 15.1 % (ref 11.8–14.4)
PH UA: 5.5 (ref 5–8)
PHOSPHORUS: 3.7 MG/DL (ref 2.5–4.5)
PLATELET # BLD: ABNORMAL K/UL (ref 138–453)
PLATELET ESTIMATE: ABNORMAL
PLATELET, FLUORESCENCE: 141 K/UL (ref 138–453)
PLATELET, IMMATURE FRACTION: 2.8 % (ref 1.1–10.3)
PMV BLD AUTO: ABNORMAL FL (ref 8.1–13.5)
POC HCO3: 27.5 MMOL/L (ref 21–28)
POC O2 SATURATION: 92 % (ref 94–98)
POC PCO2 TEMP: ABNORMAL MM HG
POC PCO2: 43.3 MM HG (ref 35–48)
POC PH TEMP: ABNORMAL
POC PH: 7.41 (ref 7.35–7.45)
POC PO2 TEMP: ABNORMAL MM HG
POC PO2: 62.4 MM HG (ref 83–108)
POSITIVE BASE EXCESS, ART: 2 (ref 0–3)
POTASSIUM SERPL-SCNC: 3.6 MMOL/L (ref 3.7–5.3)
PROTEIN UA: ABNORMAL
PROTHROMBIN TIME: 9.9 SEC (ref 9–12)
RBC # BLD: 4.52 M/UL (ref 4.21–5.77)
RBC # BLD: ABNORMAL 10*6/UL
RBC UA: ABNORMAL /HPF (ref 0–2)
RENAL EPITHELIAL, UA: ABNORMAL /HPF
SAMPLE SITE: ABNORMAL
SEG NEUTROPHILS: 79 % (ref 36–65)
SEGMENTED NEUTROPHILS ABSOLUTE COUNT: 7.25 K/UL (ref 1.5–8.1)
SODIUM BLD-SCNC: 138 MMOL/L (ref 135–144)
SPECIFIC GRAVITY UA: 1.03 (ref 1–1.03)
TCO2 (CALC), ART: 29 MMOL/L (ref 22–29)
TRICHOMONAS: ABNORMAL
TURBIDITY: CLEAR
URINE HGB: ABNORMAL
UROBILINOGEN, URINE: NORMAL
WBC # BLD: 9.1 K/UL (ref 3.5–11.3)
WBC # BLD: ABNORMAL 10*3/UL
WBC UA: ABNORMAL /HPF (ref 0–5)
YEAST: ABNORMAL

## 2019-12-09 PROCEDURE — 6360000002 HC RX W HCPCS: Performed by: STUDENT IN AN ORGANIZED HEALTH CARE EDUCATION/TRAINING PROGRAM

## 2019-12-09 PROCEDURE — 82947 ASSAY GLUCOSE BLOOD QUANT: CPT

## 2019-12-09 PROCEDURE — 2700000000 HC OXYGEN THERAPY PER DAY

## 2019-12-09 PROCEDURE — 6370000000 HC RX 637 (ALT 250 FOR IP): Performed by: STUDENT IN AN ORGANIZED HEALTH CARE EDUCATION/TRAINING PROGRAM

## 2019-12-09 PROCEDURE — 85025 COMPLETE CBC W/AUTO DIFF WBC: CPT

## 2019-12-09 PROCEDURE — 6370000000 HC RX 637 (ALT 250 FOR IP): Performed by: PSYCHIATRY & NEUROLOGY

## 2019-12-09 PROCEDURE — 94770 HC ETCO2 MONITOR DAILY: CPT

## 2019-12-09 PROCEDURE — 36415 COLL VENOUS BLD VENIPUNCTURE: CPT

## 2019-12-09 PROCEDURE — 99291 CRITICAL CARE FIRST HOUR: CPT | Performed by: PSYCHIATRY & NEUROLOGY

## 2019-12-09 PROCEDURE — 94640 AIRWAY INHALATION TREATMENT: CPT

## 2019-12-09 PROCEDURE — 2500000003 HC RX 250 WO HCPCS: Performed by: STUDENT IN AN ORGANIZED HEALTH CARE EDUCATION/TRAINING PROGRAM

## 2019-12-09 PROCEDURE — 93010 ELECTROCARDIOGRAM REPORT: CPT | Performed by: INTERNAL MEDICINE

## 2019-12-09 PROCEDURE — 2580000003 HC RX 258: Performed by: STUDENT IN AN ORGANIZED HEALTH CARE EDUCATION/TRAINING PROGRAM

## 2019-12-09 PROCEDURE — 2000000003 HC NEURO ICU R&B

## 2019-12-09 PROCEDURE — 85055 RETICULATED PLATELET ASSAY: CPT

## 2019-12-09 PROCEDURE — 84100 ASSAY OF PHOSPHORUS: CPT

## 2019-12-09 PROCEDURE — C9113 INJ PANTOPRAZOLE SODIUM, VIA: HCPCS | Performed by: STUDENT IN AN ORGANIZED HEALTH CARE EDUCATION/TRAINING PROGRAM

## 2019-12-09 PROCEDURE — 87040 BLOOD CULTURE FOR BACTERIA: CPT

## 2019-12-09 PROCEDURE — 82803 BLOOD GASES ANY COMBINATION: CPT

## 2019-12-09 PROCEDURE — 82330 ASSAY OF CALCIUM: CPT

## 2019-12-09 PROCEDURE — 80048 BASIC METABOLIC PNL TOTAL CA: CPT

## 2019-12-09 PROCEDURE — 94003 VENT MGMT INPAT SUBQ DAY: CPT

## 2019-12-09 PROCEDURE — 6360000002 HC RX W HCPCS: Performed by: PSYCHIATRY & NEUROLOGY

## 2019-12-09 PROCEDURE — 37799 UNLISTED PX VASCULAR SURGERY: CPT

## 2019-12-09 PROCEDURE — 71045 X-RAY EXAM CHEST 1 VIEW: CPT

## 2019-12-09 PROCEDURE — 85610 PROTHROMBIN TIME: CPT

## 2019-12-09 PROCEDURE — 81001 URINALYSIS AUTO W/SCOPE: CPT

## 2019-12-09 PROCEDURE — 85730 THROMBOPLASTIN TIME PARTIAL: CPT

## 2019-12-09 PROCEDURE — 83735 ASSAY OF MAGNESIUM: CPT

## 2019-12-09 RX ORDER — ALBUTEROL SULFATE 2.5 MG/3ML
2.5 SOLUTION RESPIRATORY (INHALATION) 4 TIMES DAILY
Status: DISCONTINUED | OUTPATIENT
Start: 2019-12-09 | End: 2019-12-14

## 2019-12-09 RX ORDER — POTASSIUM CHLORIDE 7.45 MG/ML
10 INJECTION INTRAVENOUS PRN
Status: DISCONTINUED | OUTPATIENT
Start: 2019-12-09 | End: 2020-01-03 | Stop reason: HOSPADM

## 2019-12-09 RX ORDER — ALBUTEROL SULFATE 2.5 MG/3ML
2.5 SOLUTION RESPIRATORY (INHALATION) EVERY 6 HOURS PRN
Status: DISCONTINUED | OUTPATIENT
Start: 2019-12-09 | End: 2019-12-09

## 2019-12-09 RX ORDER — AMLODIPINE BESYLATE 5 MG/1
5 TABLET ORAL DAILY
Status: DISCONTINUED | OUTPATIENT
Start: 2019-12-09 | End: 2019-12-15

## 2019-12-09 RX ORDER — BUMETANIDE 1 MG/1
0.5 TABLET ORAL DAILY
Status: DISCONTINUED | OUTPATIENT
Start: 2019-12-09 | End: 2019-12-16

## 2019-12-09 RX ORDER — THIAMINE MONONITRATE (VIT B1) 100 MG
100 TABLET ORAL DAILY
Status: DISCONTINUED | OUTPATIENT
Start: 2019-12-10 | End: 2020-01-03 | Stop reason: HOSPADM

## 2019-12-09 RX ADMIN — INSULIN LISPRO 4 UNITS: 100 INJECTION, SOLUTION INTRAVENOUS; SUBCUTANEOUS at 05:21

## 2019-12-09 RX ADMIN — HEPARIN SODIUM 5000 UNITS: 5000 INJECTION INTRAVENOUS; SUBCUTANEOUS at 20:08

## 2019-12-09 RX ADMIN — Medication 15 ML: at 20:10

## 2019-12-09 RX ADMIN — INSULIN LISPRO 6 UNITS: 100 INJECTION, SOLUTION INTRAVENOUS; SUBCUTANEOUS at 23:45

## 2019-12-09 RX ADMIN — FUROSEMIDE 20 MG: 10 INJECTION, SOLUTION INTRAMUSCULAR; INTRAVENOUS at 09:39

## 2019-12-09 RX ADMIN — INSULIN LISPRO 6 UNITS: 100 INJECTION, SOLUTION INTRAVENOUS; SUBCUTANEOUS at 12:48

## 2019-12-09 RX ADMIN — ALBUTEROL SULFATE 2.5 MG: 2.5 SOLUTION RESPIRATORY (INHALATION) at 17:43

## 2019-12-09 RX ADMIN — POTASSIUM CHLORIDE 10 MEQ: 7.46 INJECTION, SOLUTION INTRAVENOUS at 10:08

## 2019-12-09 RX ADMIN — SIMETHICONE 40 MG: 20 SUSPENSION/ DROPS ORAL at 12:48

## 2019-12-09 RX ADMIN — FENTANYL CITRATE 100 MCG: 50 INJECTION, SOLUTION INTRAMUSCULAR; INTRAVENOUS at 11:05

## 2019-12-09 RX ADMIN — DESMOPRESSIN ACETATE 40 MG: 0.2 TABLET ORAL at 09:39

## 2019-12-09 RX ADMIN — METOPROLOL TARTRATE 100 MG: 50 TABLET, FILM COATED ORAL at 09:39

## 2019-12-09 RX ADMIN — METHIMAZOLE 20 MG: 5 TABLET ORAL at 09:39

## 2019-12-09 RX ADMIN — Medication 10 ML: at 09:38

## 2019-12-09 RX ADMIN — DILTIAZEM HYDROCHLORIDE 60 MG: 60 TABLET, FILM COATED ORAL at 05:20

## 2019-12-09 RX ADMIN — HYDRALAZINE HYDROCHLORIDE 10 MG: 20 INJECTION INTRAMUSCULAR; INTRAVENOUS at 10:49

## 2019-12-09 RX ADMIN — METOPROLOL TARTRATE 100 MG: 50 TABLET, FILM COATED ORAL at 22:29

## 2019-12-09 RX ADMIN — DILTIAZEM HYDROCHLORIDE 60 MG: 60 TABLET, FILM COATED ORAL at 23:45

## 2019-12-09 RX ADMIN — Medication 15 ML: at 09:39

## 2019-12-09 RX ADMIN — DILTIAZEM HYDROCHLORIDE 60 MG: 60 TABLET, FILM COATED ORAL at 12:48

## 2019-12-09 RX ADMIN — DILTIAZEM HYDROCHLORIDE 60 MG: 60 TABLET, FILM COATED ORAL at 17:58

## 2019-12-09 RX ADMIN — ALBUTEROL SULFATE 2.5 MG: 2.5 SOLUTION RESPIRATORY (INHALATION) at 19:55

## 2019-12-09 RX ADMIN — ACETAMINOPHEN 650 MG: 325 TABLET ORAL at 20:53

## 2019-12-09 RX ADMIN — HEPARIN SODIUM 5000 UNITS: 5000 INJECTION INTRAVENOUS; SUBCUTANEOUS at 05:20

## 2019-12-09 RX ADMIN — SODIUM CHLORIDE, PRESERVATIVE FREE 10 ML: 5 INJECTION INTRAVENOUS at 20:09

## 2019-12-09 RX ADMIN — FENTANYL CITRATE 100 MCG: 50 INJECTION, SOLUTION INTRAMUSCULAR; INTRAVENOUS at 14:55

## 2019-12-09 RX ADMIN — ACETAMINOPHEN 650 MG: 325 TABLET ORAL at 16:33

## 2019-12-09 RX ADMIN — AMLODIPINE BESYLATE 5 MG: 5 TABLET ORAL at 12:48

## 2019-12-09 RX ADMIN — METHIMAZOLE 10 MG: 5 TABLET ORAL at 17:58

## 2019-12-09 RX ADMIN — SIMETHICONE 40 MG: 20 SUSPENSION/ DROPS ORAL at 17:58

## 2019-12-09 RX ADMIN — PANTOPRAZOLE SODIUM 40 MG: 40 INJECTION, POWDER, FOR SOLUTION INTRAVENOUS at 09:38

## 2019-12-09 RX ADMIN — SODIUM CHLORIDE, PRESERVATIVE FREE 10 ML: 5 INJECTION INTRAVENOUS at 09:47

## 2019-12-09 RX ADMIN — LISINOPRIL 40 MG: 20 TABLET ORAL at 09:39

## 2019-12-09 RX ADMIN — BUMETANIDE 0.5 MG: 1 TABLET ORAL at 12:48

## 2019-12-09 RX ADMIN — SIMETHICONE 40 MG: 20 SUSPENSION/ DROPS ORAL at 09:38

## 2019-12-09 RX ADMIN — DEXMEDETOMIDINE HYDROCHLORIDE 0.5 MCG/KG/HR: 100 INJECTION, SOLUTION INTRAVENOUS at 13:20

## 2019-12-09 RX ADMIN — INSULIN LISPRO 4 UNITS: 100 INJECTION, SOLUTION INTRAVENOUS; SUBCUTANEOUS at 17:59

## 2019-12-09 RX ADMIN — SIMETHICONE 40 MG: 20 SUSPENSION/ DROPS ORAL at 20:08

## 2019-12-09 RX ADMIN — THIAMINE HYDROCHLORIDE 100 MG: 100 INJECTION, SOLUTION INTRAMUSCULAR; INTRAVENOUS at 09:34

## 2019-12-09 RX ADMIN — HEPARIN SODIUM 5000 UNITS: 5000 INJECTION INTRAVENOUS; SUBCUTANEOUS at 14:55

## 2019-12-09 ASSESSMENT — PULMONARY FUNCTION TESTS
PIF_VALUE: 22
PIF_VALUE: 24
PIF_VALUE: 18
PIF_VALUE: 26
PIF_VALUE: 23
PIF_VALUE: 23
PIF_VALUE: 28
PIF_VALUE: 25
PIF_VALUE: 24
PIF_VALUE: 20
PIF_VALUE: 14
PIF_VALUE: 29
PIF_VALUE: 17
PIF_VALUE: 24
PIF_VALUE: 26
PIF_VALUE: 18
PIF_VALUE: 22
PIF_VALUE: 19
PIF_VALUE: 24

## 2019-12-09 NOTE — CARE COORDINATION
Spoke with wife, she would like referral to [facility], discussed possible higher level of care if he cannot wean off bipap, patient remains intubated.  Referral sent

## 2019-12-09 NOTE — PROGRESS NOTES
Nutrition Assessment (Enteral Nutrition)    Type and Reason for Visit: Initial    Nutrition Recommendations: Suggest modifying TF to semi elemental to better meet protein needs while at goal of 55 mL/hr. Recommendations discussed with RN. Nutrition Assessment: Pt intubated. TF started yesterday - diabetic formula running at 25 mL/hr. Tolerating current feeds per RN. Malnutrition Assessment:  · Malnutrition Status: Insufficient data  · Findings of the 6 clinical characteristics of malnutrition (Minimum of 2 out of 6 clinical characteristics is required to make the diagnosis of moderate or severe Protein Calorie Malnutrition based on AND/ASPEN Guidelines):  1. Energy Intake-Unable to assess,      2. Weight Loss-Unable to assess,    3. Fat Loss-No significant subcutaneous fat loss,    4. Muscle Loss-No significant muscle mass loss,    5. Fluid Accumulation-No significant fluid accumulation,    6.   Strength-Not measured    Nutrition Risk Level: High    Nutrition Needs:  · Estimated Daily Total Kcal: 8370-5775 kcals/day  · Estimated Daily Protein (g): 100-135 gm/day    Nutrition Diagnosis:   · Problem: Inadequate oral intake  · Etiology: related to Impaired respiratory function-inability to consume food     Signs and symptoms:  as evidenced by NPO status due to medical condition, Nutrition support - EN    Objective Information:  · Wound Type: None  · Current Nutrition Therapies:  · Oral Diet Orders: NPO   · Tube Feeding (TF) Orders:   · Feeding Route: Nasogastric  · Formula: Diabetic  · Rate (ml/hr):25 mL/hr    · Volume (ml/day): 600 mL/day  · Duration: Continuous  · Current TF & Flush Orders Provides: Glucerna (diabetic) at 25 mL/hr = 720 kcals, 36 gm protein per day  · Goal TF & Flush Orders Provides: Vital AF 1.2 (semi elemental) at 55 mL/hr = 1584 kcals, 99 gm protein  · Anthropometric Measures:  · Ht: 5' 7\" (170.2 cm)   · Current Body Wt: 211 lb 13.8 oz (96.1 kg)  · Weight Change: KELLY   · Ideal

## 2019-12-09 NOTE — PROGRESS NOTES
[address] [facility]  Speech Language Pathology    Date: 12/9/2019  Patient Name: Marlene Cohen  YOB: 1955   AGE: 60 y.o. MRN: [id number]        Patient Not Available for Speech Therapy     Due to:  [] Testing  [] Hemodialysis  [] Cancelled by RN  [] Surgery   [x] Intubation/Sedation/Pain Medication  [] Medical instability  [] Other:    Next scheduled treatment: as medically appropriate  Completed by:  Andres Fierro M.S. [id number] [facility]

## 2019-12-09 NOTE — PLAN OF CARE
Problem: Falls - Risk of:  Goal: Will remain free from falls  Description  Will remain free from falls  Outcome: Ongoing  Goal: Absence of physical injury  Description  Absence of physical injury  Outcome: Ongoing     Problem: Risk for Impaired Skin Integrity  Goal: Tissue integrity - skin and mucous membranes  Description  Structural intactness and normal physiological function of skin and  mucous membranes. Outcome: Ongoing     Problem: HEMODYNAMIC STATUS  Goal: Patient has stable vital signs and fluid balance  12/8/2019 1920 by Michel Tucker RN  Outcome: Ongoing  12/8/2019 0618 by Nikko Dutton RN  Outcome: Ongoing     Problem: ACTIVITY INTOLERANCE/IMPAIRED MOBILITY  Goal: Mobility/activity is maintained at optimum level for patient  Outcome: Ongoing     Problem: COMMUNICATION IMPAIRMENT  Goal: Ability to express needs and understand communication  Outcome: Ongoing     Problem: Neurological  Goal: Maximum potential motor/sensory/cognitive function  Outcome: Ongoing     Problem: Nutrition  Goal: Optimal nutrition therapy  Outcome: Ongoing     Problem: MECHANICAL VENTILATION  Goal: Mobility/activity is maintained at optimum level for patient  Outcome: Ongoing  Goal: Ability to express needs and understand communication  Outcome: Ongoing  Goal: Patient will maintain patent airway  Outcome: Ongoing  Goal: Oral health is maintained or improved  Outcome: Ongoing  Goal: ET tube will be managed safely  Outcome: Ongoing     Problem: Restraint Use - Nonviolent/Non-Self-Destructive Behavior:  Goal: Absence of restraint indications  Description  Absence of restraint indications  12/8/2019 1920 by Michel Tucker RN  Outcome: Ongoing  12/8/2019 0624 by Nikko Dutton RN  Outcome: Ongoing  Goal: Absence of restraint-related injury  Description  Absence of restraint-related injury  Outcome: Ongoing   No falls or events during shift. Vent settings weaned as tolerated.  Sedation weaned from propofol to precedex, pt has tolerated changes well. Family updated with changes and treatment plan for today. Continued need for left wrist restraint at this time.

## 2019-12-09 NOTE — PROGRESS NOTES
Daily Progress Note  Neuro Critical Care    Patient Name: Danny Dawson  Patient : 1955  Room/Bed: 0523/0523-01  Code Status: full  Allergies: No Known Allergies    CHIEF COMPLAINT:      Slurred speech left-sided facial droop unable to move right arm     INTERVAL HISTORY    Initial Presentation    The patient is a 61 y. o. male presented with past medical history of type 2 diabetes thyroid disease hypothyroidism, hypertension hyperlipidemia coronary artery disease status post stent A. fib on coumadin came as a transfer from [facility] with last well-known oh 6 AM this morning when wife was with him and the patient had coffee he then went to lie on bed and around 8 AM to take his medication sitting on the couch and the wife noticed that he was mumbling unable to move his right arm and had a left facial droop and she called 911. At [address] his blood pressure was 160/97 and blood glucose 297.  His stroke scale was 10 and INR was 2 he had a CT head done which showed acute intraparenchymal hematoma in the left basal ganglia measuring 2.1x 2.3 x 2.1 cm, parenchymal volume loss and moderate chronic microvascular ischemic changes as well as old infarct.  Plan will was to transfer him to [facility] and start him on [address] for reversal.  Started on Cardene drip with blood pressure goal of less than 140 and received 1500 units of Kcentra for INR reversal.      ICH score: 0     128  TSH, T3, T4 normal, head CT showing worsening basal ganglia hemorrhage as well as some IVH involvement.  CTA neg. Neurosurgery aware. Alayne Mention showing severe diastolic dysfunction as well as EF of 48%. Upon return from CT scan patient had bilateral crackles as well as pulmonary edema on x-ray.  Lasix given and patient put on BiPAP.  Worsening respiratory status with crackles in all lung fields.  Patient begins to desaturate into the 80s.   Decision made to intubate.  Art line placed.  Patient switch from Cleviprex to Cardene.  Sedation with propofol.  Fentanyl as needed pain q1 hours.  Additional 80 mg Lasix given.  Restarted on home antihypertensives.  Required elevated PEEP.  INR 1.1.  10 mg IV vitamin K given.   Repeat CT head this a.m. stable with no interval change in the left thalamic hematoma or intraventricular hemorrhage as well as degree of midline shift being unchanged. Patient received hydralazine 10 mg x1 overnight for blood pressure reading of 159/89. Patient afebrile no leukocytosis seen. Tube feeds started-diabetic diet, heparin 5000 subcu every 8 started for DVT prophylaxis              Last 24h:   Patient had T-max of 100.4, UA sent negative for infection. Sputum culture sent prior showed no growth. Patient off Cardene, on Precedex. Tolerating tube feed. Intubated, INR 0.9, no vitamin K given. Patient on Lasix 20mg twice daily with a goal of being in 1 to 1.5 L negative balance. Input 1241 L output 1465.       CURRENT MEDICATIONS:  SCHEDULED MEDICATIONS:   thiamine (VITAMIN B1) IVPB  100 mg Intravenous Q24H    heparin (porcine)  5,000 Units Subcutaneous 3 times per day    chlorhexidine  15 mL Mouth/Throat BID    lisinopril  40 mg Oral Daily    ipratropium-albuterol  1 ampule Inhalation 4x daily    simethicone  40 mg Per NG tube 4x Daily    furosemide  20 mg Intravenous BID    pantoprazole  40 mg Intravenous Daily    And    sodium chloride (PF)  10 mL Intravenous Daily    insulin lispro  0-12 Units Subcutaneous 4 times per day    atorvastatin  40 mg Oral Daily    sodium chloride flush  10 mL Intravenous 2 times per day    methIMAzole  20 mg Oral Daily    methIMAzole  10 mg Oral QPM    metoprolol tartrate  100 mg Oral BID    diltiazem  60 mg Oral 4 times per day     CONTINUOUS INFUSIONS:   dexmedetomidine (PRECEDEX) IV infusion 0.5 mcg/kg/hr (12/08/19 2042)    propofol Stopped (12/08/19 1535)    niCARdipine Stopped (12/07/19 1602)    dextrose       PRN MEDICATIONS:   Daniel 138 12/09/2019    K 3.6 (L) 12/09/2019     12/09/2019    CREATININE 1.27 (H) 12/09/2019    BUN 18 12/09/2019    CO2 22 12/09/2019    TSH 3.15 12/06/2019    INR 0.9 12/09/2019    LABA1C 7.4 (H) 12/06/2019     24 HOUR INTAKE/OUTPUT:    Intake/Output Summary (Last 24 hours) at 12/9/2019 0707  Last data filed at 12/9/2019 0600  Gross per 24 hour   Intake 1241 ml   Output 1465 ml   Net -224 ml           Labs and Images reviewed with:  [] Dr. Russell Lee    [] Dr. Mookie Teixeira  [x] Dr. Brandie Hernandez  [] There are no new interval images to review. PHYSICAL EXAM       developed, well nourished, intubated    HEAD:  normocephalic, atraumatic    EYES:  PERRLA, EOMI.   ENT:  moist mucous membranes   NECK:  supple, symmetric   LUNGS:  Equal air entry bilaterally   CARDIOVASCULAR:  normal s1 / s2, RRR, distal pulses intact   ABDOMEN:  Soft, no rigidity   NEUROLOGIC:  Mental Status: Intubated     Cranial Nerves:     Left side facial droop   PEERLA-     Motor Exam:    Drift:  unable to assess  Tone:  normal     Motor exam :withdraws to pain   Sensory:    Touch:    Slight Withdraws to pain on right side  Normal on the left upper and lower extremity     Plantar Response:  Right:  downgoing  Left:  downgoing     Clonus:  absent  Monteiro's:  absent     Coordination/Dysmetria:  Unable to assess     Gait: Unable to assess          DRAINS:  [x] There are no drains for Neuro Critical Care to monitor at this time. ASSESSMENT AND PLAN:       This is a 61 y. o. male with past medical history of type 2 diabetes thyroid disease hypothyroidism, hypertension hyperlipidemia coronary artery disease status post stent A. fib on Warfarin, came as a transfer from [facility] with CT head showing left basal ganglia intraparenchymal hematoma.   LWKN 0600, ICH score -0 NIHSS-15     PLAN/MEDICAL DECISION MAKING:     NEUROLOGIC:  -CT head on admission shows acute intraparenchymal hematoma in the left basal ganglia measuring 2.1x 2.3 x 2.1 cm, parenchymal volume loss and moderate chronic microvascular ischemic changes as well as old infarct  -Patient received 1500 units of Kcentra of as he is on Warfarin for reversal of INR 2.9  -Repeat head CT showing basal ganglia bleed increased in size approximately 2 cm to 2.8.  Some IVH involvement. Repeat CT head today was stable one-time dose of vitamin K given today  -Keep systolic blood pressure less than 160 patient currently on Cardene drip  -On Precedex/fentanyl for sedation  -Neuro checks per protocol        CARDIOVASCULAR:  - systolic blood pressure goal of less than 160-weaned off Cardene  -Patient on Lopressor 100 mg twice daily, Cardizem 60 mg every 6h, Lisinopril 40 qd , Norvasc 5 mg added today for blood pressure monitoring  -Echocardiogram showing EF of 48% and severe diastolic dysfunction with normal valves.   - Trops stable 23, 27, 27  - Continue telemetry monitoring      PULMONARY:  -Patient had worsening respiratory status and trialed on BiPAP with little effect Intubated for airway protection on 12/7  -Patient was given a weaning trial however did not tolerated will continue to monitor daily with weaning trial and assess patient   -Chest x-ray shows  -stable monitor output wanted patient to be -1 to 1.5 L,  -Lasix 20 mg twice daily d/c Bumex 0.5 mg daily started  -Oxygenation improved  -Follow-up ABGs daily     RENAL/FLUID/ELECTROLYTE:  - BUN 18 / Creatinine1.27  - IVF stopped  -Monitor electrolytes and replaced as needed     GI/NUTRITION:  - Diet: Tube feeds started-diabetic diet  - GI Prophylaxis: Pepcid  - Bowel Regimen: MoM     ID/HEME:  - Tmax-36.7  - WBC 9.1 today leukocytosis resolved  - monitor For fevers  -H&H 14.1/42.7 platelet 141 INR 0.9  -INR 2.9 on arrival patient received 1500 units of Kcentra x1  - sputum culture shows no growth           ENDOCRINE:  - monitor blood glucose  -Medium dose insulin sliding scale  -Thyroid studies negative  -On thiamine 100 mg daily     OTHER:  -

## 2019-12-09 NOTE — PLAN OF CARE
Jose Antonio Benz RCP  Outcome: Ongoing  Goal: Oral health is maintained or improved  12/9/2019 1825 by Razia Morales RN  Outcome: Ongoing  12/9/2019 1521 by Juan Diego Olson RCP  Outcome: Ongoing  Goal: ET tube will be managed safely  12/9/2019 1825 by Razia Morales RN  Outcome: Ongoing  12/9/2019 1521 by Juan Diego Olson RCP  Outcome: Ongoing     Problem: Restraint Use - Nonviolent/Non-Self-Destructive Behavior:  Goal: Absence of restraint indications  Description  Absence of restraint indications  Outcome: Ongoing  Goal: Absence of restraint-related injury  Description  Absence of restraint-related injury  12/9/2019 1825 by Razia Morales RN  Outcome: Ongoing  12/9/2019 0544 by Terrance Carlos RN  Outcome: Ongoing   No falls or acute events during shift. Pt trailed on Cpap today once for a very short period, less than an hour. Neuro exam unchanged with continued need for precedex drip. Vent settings adjusted as tolerated. Good UOP today, Dr Yefri Rose changed daily lasix to home Bumex. Continue strict I/O. Nutrition changed from glucerna to semi-elemental tube feeding with a goal rate of 55/hr. Minimal residuals noted throughout the shift.

## 2019-12-09 NOTE — PLAN OF CARE
Patients dignity and safety maintained throughout this shift. Bed in low position. Side rails up x 2. Bed alarm in place. Will continue to monitor.

## 2019-12-09 NOTE — PROGRESS NOTES
Physical Therapy  DATE: 2019    NAME: Sj Owen  MRN: [id number]   : 1955    Patient not seen this date for Physical Therapy due to:  [] Blood transfusion in progress  [] Hemodialysis  []  Patient Declined  [] Spine Precautions   [] Strict Bedrest  [] Surgery/ Procedure  [] Testing      [x] Other - intubated, restrained, PT to re-check 12/10        [] PT being discontinued at this time. Patient independent. No further needs. [] PT being discontinued at this time as the patient has been transferred to palliative care. No further needs. Sarah Deutsch, This treatment/evaluation completed by signing SPT. Signing PT agrees with treatment and documentation.

## 2019-12-09 NOTE — PLAN OF CARE
Ventilator Bronchodilator assessment    Breath sounds: diminished all lung fields  Inspiratory Pressure: 24  Plateau Pressure: 22    Patient assessed at level 1          [x]    Bronchodilator Assessment    BRONCHODILATOR ASSESSMENT SCORE  Score 0 (Home) 1 2 3 4   Breath Sounds   []  Chronic Ventilator: Patient at baseline [x]  Mild Wheezes/ Clear []  Intermittent wheezes with good air entry []  Bilateral/unilateral wheezing with diminished air entry []  Insp/Exp wheeze and/or poor aeration   Ventilator Pressures   []  Chronic Ventilator [x]  Insp. Pressure less than 25 cm H20 []  Insp. Pressure less than 25 cm H20 []  Insp. Pressure exceeds 25 cm H20 []  Insp.  Pressure exceeds 30 cm H20   Plateau Pressure []  NA   [x]  Plateau Pressure less than 4  []  Plateau Pressure less than or equal to 5 []  Plateau Pressure greater than or equal to 6 []  Plateau Pressure greater than or equal to 8       [employer]  3:22 PM

## 2019-12-10 ENCOUNTER — APPOINTMENT (OUTPATIENT)
Dept: GENERAL RADIOLOGY | Age: 64
DRG: 003 | End: 2019-12-10
Payer: MEDICARE

## 2019-12-10 LAB
ABSOLUTE EOS #: 0.07 K/UL (ref 0–0.44)
ABSOLUTE IMMATURE GRANULOCYTE: 0.05 K/UL (ref 0–0.3)
ABSOLUTE LYMPH #: 1.01 K/UL (ref 1.1–3.7)
ABSOLUTE MONO #: 0.83 K/UL (ref 0.1–1.2)
ALLEN TEST: ABNORMAL
ANION GAP SERPL CALCULATED.3IONS-SCNC: 12 MMOL/L (ref 9–17)
BASOPHILS # BLD: 0 % (ref 0–2)
BASOPHILS ABSOLUTE: 0.04 K/UL (ref 0–0.2)
BUN BLDV-MCNC: 23 MG/DL (ref 8–23)
BUN/CREAT BLD: ABNORMAL (ref 9–20)
CALCIUM IONIZED: 1.15 MMOL/L (ref 1.13–1.33)
CALCIUM SERPL-MCNC: 9.1 MG/DL (ref 8.6–10.4)
CHLORIDE BLD-SCNC: 104 MMOL/L (ref 98–107)
CO2: 24 MMOL/L (ref 20–31)
CREAT SERPL-MCNC: 1.22 MG/DL (ref 0.7–1.2)
DIFFERENTIAL TYPE: ABNORMAL
EOSINOPHILS RELATIVE PERCENT: 1 % (ref 1–4)
FIO2: 50
GFR AFRICAN AMERICAN: >60 ML/MIN
GFR NON-AFRICAN AMERICAN: >60 ML/MIN
GFR SERPL CREATININE-BSD FRML MDRD: ABNORMAL ML/MIN/{1.73_M2}
GFR SERPL CREATININE-BSD FRML MDRD: ABNORMAL ML/MIN/{1.73_M2}
GLUCOSE BLD-MCNC: 252 MG/DL (ref 75–110)
GLUCOSE BLD-MCNC: 260 MG/DL (ref 75–110)
GLUCOSE BLD-MCNC: 286 MG/DL (ref 74–100)
GLUCOSE BLD-MCNC: 291 MG/DL (ref 70–99)
GLUCOSE BLD-MCNC: 297 MG/DL (ref 75–110)
GLUCOSE BLD-MCNC: 300 MG/DL (ref 75–110)
HCT VFR BLD CALC: 40.7 % (ref 40.7–50.3)
HEMOGLOBIN: 13.7 G/DL (ref 13–17)
IMMATURE GRANULOCYTES: 1 %
INR BLD: 0.9
LYMPHOCYTES # BLD: 11 % (ref 24–43)
MAGNESIUM: 2 MG/DL (ref 1.6–2.6)
MCH RBC QN AUTO: 31.4 PG (ref 25.2–33.5)
MCHC RBC AUTO-ENTMCNC: 33.7 G/DL (ref 28.4–34.8)
MCV RBC AUTO: 93.1 FL (ref 82.6–102.9)
MODE: ABNORMAL
MONOCYTES # BLD: 9 % (ref 3–12)
NEGATIVE BASE EXCESS, ART: ABNORMAL (ref 0–2)
NRBC AUTOMATED: 0 PER 100 WBC
O2 DEVICE/FLOW/%: ABNORMAL
PARTIAL THROMBOPLASTIN TIME: 24.5 SEC (ref 20.5–30.5)
PATIENT TEMP: ABNORMAL
PDW BLD-RTO: 15 % (ref 11.8–14.4)
PHOSPHORUS: 3 MG/DL (ref 2.5–4.5)
PLATELET # BLD: ABNORMAL K/UL (ref 138–453)
PLATELET ESTIMATE: ABNORMAL
PLATELET, FLUORESCENCE: 132 K/UL (ref 138–453)
PLATELET, IMMATURE FRACTION: 3.8 % (ref 1.1–10.3)
PMV BLD AUTO: ABNORMAL FL (ref 8.1–13.5)
POC HCO3: 26.7 MMOL/L (ref 21–28)
POC O2 SATURATION: 92 % (ref 94–98)
POC PCO2 TEMP: ABNORMAL MM HG
POC PCO2: 38.3 MM HG (ref 35–48)
POC PH TEMP: ABNORMAL
POC PH: 7.45 (ref 7.35–7.45)
POC PO2 TEMP: ABNORMAL MM HG
POC PO2: 61.3 MM HG (ref 83–108)
POSITIVE BASE EXCESS, ART: 3 (ref 0–3)
POTASSIUM SERPL-SCNC: 4.1 MMOL/L (ref 3.7–5.3)
PROTHROMBIN TIME: 10.1 SEC (ref 9–12)
RBC # BLD: 4.37 M/UL (ref 4.21–5.77)
RBC # BLD: ABNORMAL 10*6/UL
SAMPLE SITE: ABNORMAL
SEG NEUTROPHILS: 78 % (ref 36–65)
SEGMENTED NEUTROPHILS ABSOLUTE COUNT: 6.9 K/UL (ref 1.5–8.1)
SODIUM BLD-SCNC: 140 MMOL/L (ref 135–144)
TCO2 (CALC), ART: 28 MMOL/L (ref 22–29)
WBC # BLD: 8.9 K/UL (ref 3.5–11.3)
WBC # BLD: ABNORMAL 10*3/UL

## 2019-12-10 PROCEDURE — 85025 COMPLETE CBC W/AUTO DIFF WBC: CPT

## 2019-12-10 PROCEDURE — 99291 CRITICAL CARE FIRST HOUR: CPT | Performed by: PSYCHIATRY & NEUROLOGY

## 2019-12-10 PROCEDURE — 85730 THROMBOPLASTIN TIME PARTIAL: CPT

## 2019-12-10 PROCEDURE — 82803 BLOOD GASES ANY COMBINATION: CPT

## 2019-12-10 PROCEDURE — 94770 HC ETCO2 MONITOR DAILY: CPT

## 2019-12-10 PROCEDURE — 6370000000 HC RX 637 (ALT 250 FOR IP): Performed by: PSYCHIATRY & NEUROLOGY

## 2019-12-10 PROCEDURE — 85610 PROTHROMBIN TIME: CPT

## 2019-12-10 PROCEDURE — 6360000002 HC RX W HCPCS: Performed by: STUDENT IN AN ORGANIZED HEALTH CARE EDUCATION/TRAINING PROGRAM

## 2019-12-10 PROCEDURE — 6370000000 HC RX 637 (ALT 250 FOR IP): Performed by: STUDENT IN AN ORGANIZED HEALTH CARE EDUCATION/TRAINING PROGRAM

## 2019-12-10 PROCEDURE — C9113 INJ PANTOPRAZOLE SODIUM, VIA: HCPCS | Performed by: STUDENT IN AN ORGANIZED HEALTH CARE EDUCATION/TRAINING PROGRAM

## 2019-12-10 PROCEDURE — 94003 VENT MGMT INPAT SUBQ DAY: CPT

## 2019-12-10 PROCEDURE — 2580000003 HC RX 258: Performed by: STUDENT IN AN ORGANIZED HEALTH CARE EDUCATION/TRAINING PROGRAM

## 2019-12-10 PROCEDURE — 94761 N-INVAS EAR/PLS OXIMETRY MLT: CPT

## 2019-12-10 PROCEDURE — 6360000002 HC RX W HCPCS: Performed by: PSYCHIATRY & NEUROLOGY

## 2019-12-10 PROCEDURE — 2000000003 HC NEURO ICU R&B

## 2019-12-10 PROCEDURE — 83735 ASSAY OF MAGNESIUM: CPT

## 2019-12-10 PROCEDURE — 36620 INSERTION CATHETER ARTERY: CPT

## 2019-12-10 PROCEDURE — 2700000000 HC OXYGEN THERAPY PER DAY

## 2019-12-10 PROCEDURE — 93970 EXTREMITY STUDY: CPT

## 2019-12-10 PROCEDURE — 94667 MNPJ CHEST WALL 1ST: CPT

## 2019-12-10 PROCEDURE — 80048 BASIC METABOLIC PNL TOTAL CA: CPT

## 2019-12-10 PROCEDURE — 94640 AIRWAY INHALATION TREATMENT: CPT

## 2019-12-10 PROCEDURE — 84100 ASSAY OF PHOSPHORUS: CPT

## 2019-12-10 PROCEDURE — 37799 UNLISTED PX VASCULAR SURGERY: CPT

## 2019-12-10 PROCEDURE — 82947 ASSAY GLUCOSE BLOOD QUANT: CPT

## 2019-12-10 PROCEDURE — 82330 ASSAY OF CALCIUM: CPT

## 2019-12-10 PROCEDURE — 71045 X-RAY EXAM CHEST 1 VIEW: CPT

## 2019-12-10 PROCEDURE — 2500000003 HC RX 250 WO HCPCS: Performed by: STUDENT IN AN ORGANIZED HEALTH CARE EDUCATION/TRAINING PROGRAM

## 2019-12-10 PROCEDURE — 85055 RETICULATED PLATELET ASSAY: CPT

## 2019-12-10 RX ORDER — GLYBURIDE 5 MG/1
5 TABLET ORAL 2 TIMES DAILY WITH MEALS
Status: DISCONTINUED | OUTPATIENT
Start: 2019-12-10 | End: 2020-01-03 | Stop reason: HOSPADM

## 2019-12-10 RX ORDER — INSULIN GLARGINE 100 [IU]/ML
60 INJECTION, SOLUTION SUBCUTANEOUS NIGHTLY
Status: DISCONTINUED | OUTPATIENT
Start: 2019-12-10 | End: 2019-12-12

## 2019-12-10 RX ORDER — MINERAL OIL, PETROLATUM 425; 568 MG/G; MG/G
OINTMENT OPHTHALMIC NIGHTLY PRN
Status: DISCONTINUED | OUTPATIENT
Start: 2019-12-10 | End: 2020-01-03 | Stop reason: HOSPADM

## 2019-12-10 RX ORDER — TETRAHYDROZOLINE HCL 0.05 %
1 DROPS OPHTHALMIC (EYE) 3 TIMES DAILY
Status: DISCONTINUED | OUTPATIENT
Start: 2019-12-10 | End: 2020-01-03 | Stop reason: HOSPADM

## 2019-12-10 RX ORDER — MODAFINIL 100 MG/1
200 TABLET ORAL DAILY
Status: DISCONTINUED | OUTPATIENT
Start: 2019-12-10 | End: 2019-12-24

## 2019-12-10 RX ADMIN — FENTANYL CITRATE 100 MCG: 50 INJECTION, SOLUTION INTRAMUSCULAR; INTRAVENOUS at 22:09

## 2019-12-10 RX ADMIN — SODIUM CHLORIDE, PRESERVATIVE FREE 10 ML: 5 INJECTION INTRAVENOUS at 08:39

## 2019-12-10 RX ADMIN — Medication 100 MG: at 08:38

## 2019-12-10 RX ADMIN — SODIUM CHLORIDE, PRESERVATIVE FREE 10 ML: 5 INJECTION INTRAVENOUS at 21:00

## 2019-12-10 RX ADMIN — GLYBURIDE 5 MG: 5 TABLET ORAL at 13:08

## 2019-12-10 RX ADMIN — ENOXAPARIN SODIUM 40 MG: 40 INJECTION SUBCUTANEOUS at 12:29

## 2019-12-10 RX ADMIN — LISINOPRIL 40 MG: 20 TABLET ORAL at 08:39

## 2019-12-10 RX ADMIN — DILTIAZEM HYDROCHLORIDE 60 MG: 60 TABLET, FILM COATED ORAL at 17:23

## 2019-12-10 RX ADMIN — MODAFINIL 200 MG: 100 TABLET ORAL at 13:12

## 2019-12-10 RX ADMIN — Medication 10 ML: at 08:44

## 2019-12-10 RX ADMIN — ALBUTEROL SULFATE 2.5 MG: 2.5 SOLUTION RESPIRATORY (INHALATION) at 15:07

## 2019-12-10 RX ADMIN — Medication 15 ML: at 08:39

## 2019-12-10 RX ADMIN — SIMETHICONE 40 MG: 20 SUSPENSION/ DROPS ORAL at 16:17

## 2019-12-10 RX ADMIN — INSULIN LISPRO 3 UNITS: 100 INJECTION, SOLUTION INTRAVENOUS; SUBCUTANEOUS at 20:13

## 2019-12-10 RX ADMIN — ALBUTEROL SULFATE 2.5 MG: 2.5 SOLUTION RESPIRATORY (INHALATION) at 12:00

## 2019-12-10 RX ADMIN — INSULIN LISPRO 8 UNITS: 100 INJECTION, SOLUTION INTRAVENOUS; SUBCUTANEOUS at 12:29

## 2019-12-10 RX ADMIN — SIMETHICONE 40 MG: 20 SUSPENSION/ DROPS ORAL at 20:13

## 2019-12-10 RX ADMIN — INSULIN LISPRO 6 UNITS: 100 INJECTION, SOLUTION INTRAVENOUS; SUBCUTANEOUS at 16:18

## 2019-12-10 RX ADMIN — AMLODIPINE BESYLATE 5 MG: 5 TABLET ORAL at 08:39

## 2019-12-10 RX ADMIN — BUMETANIDE 0.5 MG: 1 TABLET ORAL at 08:38

## 2019-12-10 RX ADMIN — ALBUTEROL SULFATE 2.5 MG: 2.5 SOLUTION RESPIRATORY (INHALATION) at 08:01

## 2019-12-10 RX ADMIN — DOCUSATE SODIUM 100 MG: 50 LIQUID ORAL at 12:29

## 2019-12-10 RX ADMIN — DILTIAZEM HYDROCHLORIDE 60 MG: 60 TABLET, FILM COATED ORAL at 05:27

## 2019-12-10 RX ADMIN — ALBUTEROL SULFATE 2.5 MG: 2.5 SOLUTION RESPIRATORY (INHALATION) at 19:59

## 2019-12-10 RX ADMIN — SIMETHICONE 40 MG: 20 SUSPENSION/ DROPS ORAL at 12:29

## 2019-12-10 RX ADMIN — METFORMIN HYDROCHLORIDE 1000 MG: 500 TABLET ORAL at 12:29

## 2019-12-10 RX ADMIN — GLYBURIDE 5 MG: 5 TABLET ORAL at 16:17

## 2019-12-10 RX ADMIN — METFORMIN HYDROCHLORIDE 1000 MG: 500 TABLET ORAL at 16:17

## 2019-12-10 RX ADMIN — METHIMAZOLE 20 MG: 5 TABLET ORAL at 08:39

## 2019-12-10 RX ADMIN — FENTANYL CITRATE 100 MCG: 50 INJECTION, SOLUTION INTRAMUSCULAR; INTRAVENOUS at 10:54

## 2019-12-10 RX ADMIN — METHIMAZOLE 10 MG: 5 TABLET ORAL at 17:23

## 2019-12-10 RX ADMIN — HYDRALAZINE HYDROCHLORIDE 10 MG: 20 INJECTION INTRAMUSCULAR; INTRAVENOUS at 04:21

## 2019-12-10 RX ADMIN — METOPROLOL TARTRATE 100 MG: 50 TABLET, FILM COATED ORAL at 08:38

## 2019-12-10 RX ADMIN — INSULIN LISPRO 6 UNITS: 100 INJECTION, SOLUTION INTRAVENOUS; SUBCUTANEOUS at 05:27

## 2019-12-10 RX ADMIN — Medication 1 DROP: at 20:13

## 2019-12-10 RX ADMIN — DEXMEDETOMIDINE HYDROCHLORIDE 0.5 MCG/KG/HR: 100 INJECTION, SOLUTION INTRAVENOUS at 13:07

## 2019-12-10 RX ADMIN — PANTOPRAZOLE SODIUM 40 MG: 40 INJECTION, POWDER, FOR SOLUTION INTRAVENOUS at 08:38

## 2019-12-10 RX ADMIN — INSULIN GLARGINE 60 UNITS: 100 INJECTION, SOLUTION SUBCUTANEOUS at 21:46

## 2019-12-10 RX ADMIN — Medication 1 DROP: at 13:08

## 2019-12-10 RX ADMIN — METOPROLOL TARTRATE 100 MG: 50 TABLET, FILM COATED ORAL at 20:13

## 2019-12-10 RX ADMIN — Medication 15 ML: at 20:13

## 2019-12-10 RX ADMIN — HEPARIN SODIUM 5000 UNITS: 5000 INJECTION INTRAVENOUS; SUBCUTANEOUS at 05:26

## 2019-12-10 RX ADMIN — SIMETHICONE 40 MG: 20 SUSPENSION/ DROPS ORAL at 08:38

## 2019-12-10 RX ADMIN — DILTIAZEM HYDROCHLORIDE 60 MG: 60 TABLET, FILM COATED ORAL at 12:28

## 2019-12-10 RX ADMIN — DESMOPRESSIN ACETATE 40 MG: 0.2 TABLET ORAL at 08:39

## 2019-12-10 RX ADMIN — DILTIAZEM HYDROCHLORIDE 60 MG: 60 TABLET, FILM COATED ORAL at 23:58

## 2019-12-10 ASSESSMENT — PULMONARY FUNCTION TESTS
PIF_VALUE: 25
PIF_VALUE: 21
PIF_VALUE: 19
PIF_VALUE: 25
PIF_VALUE: 15
PIF_VALUE: 23
PIF_VALUE: 24
PIF_VALUE: 18
PIF_VALUE: 23
PIF_VALUE: 15
PIF_VALUE: 15
PIF_VALUE: 19
PIF_VALUE: 30
PIF_VALUE: 23
PIF_VALUE: 15
PIF_VALUE: 24
PIF_VALUE: 21
PIF_VALUE: 23
PIF_VALUE: 15
PIF_VALUE: 20
PIF_VALUE: 25
PIF_VALUE: 21
PIF_VALUE: 27
PIF_VALUE: 19
PIF_VALUE: 25
PIF_VALUE: 29

## 2019-12-10 NOTE — PROGRESS NOTES
Daily Progress Note  Neuro Critical Care    Patient Name: Danny Dawson  Patient : 1955  Room/Bed: 0523/0523-01  Code Status: full  Allergies: No Known Allergies    CHIEF COMPLAINT:      Slurred speech left-sided facial droop unable to move right arm     INTERVAL HISTORY    Initial Presentation  The patient is a 61 y. o. male presented with past medical history of type 2 diabetes thyroid disease hypothyroidism, hypertension hyperlipidemia coronary artery disease status post stent A. fib on coumadin came as a transfer from [facility] with last well-known oh 6 AM this morning when wife was with him and the patient had coffee he then went to lie on bed and around 8 AM to take his medication sitting on the couch and the wife noticed that he was mumbling unable to move his right arm and had a left facial droop and she called 911. At [facility] his blood pressure was 160/97 and blood glucose 297.  His stroke scale was 10 and INR was 2 he had a CT head done which showed acute intraparenchymal hematoma in the left basal ganglia measuring 2.1x 2.3 x 2.1 cm, parenchymal volume loss and moderate chronic microvascular ischemic changes as well as old infarct.  Plan will was to transfer him to [facility] and start him on [address] for reversal.  Started on Cardene drip with blood pressure goal of less than 140 and received 1500 units of Kcentra for INR reversal.      ICH score: 0     12/8  TSH, T3, T4 normal, head CT showing worsening basal ganglia hemorrhage as well as some IVH involvement.  CTA neg. Neurosurgery aware. Alayne Mention showing severe diastolic dysfunction as well as EF of 48%. Upon return from CT scan patient had bilateral crackles as well as pulmonary edema on x-ray.  Lasix given and patient put on BiPAP.  Worsening respiratory status with crackles in all lung fields.  Patient begins to desaturate into the 80s.   Decision made to intubate.  Art line placed.  Patient switch from Cleviprex to Cardene.  Sedation with propofol.  Fentanyl as needed pain q1 hours.  Additional 80 mg Lasix given.  Restarted on home antihypertensives.  Required elevated PEEP.  INR 1.1.  10 mg IV vitamin K given.   Repeat CT head this a.m. stable with no interval change in the left thalamic hematoma or intraventricular hemorrhage as well as degree of midline shift being unchanged.  Patient received hydralazine 10 mg x1 overnight for blood pressure reading of 159/89.  Patient afebrile no leukocytosis seen.  Tube feeds started-diabetic diet, heparin 5000 subcu every 8 started for DVT prophylaxis            12/9:  Patient had T-max of 100.4, UA sent negative for infection. Sputum culture sent prior showed no growth. Patient off Cardene, on Precedex. Tolerating tube feed. Intubated, INR 0.9, no vitamin K given. Patient on Lasix 20mg twice daily with a goal of being in 1 to 1.5 L negative balance. Input 1241 L output 1465.                Last 24h:   No acute events overnight. UA negative for any growth blood cultures negative sputum cultures negative. Tolerated  only 40 minutes of CPAP, glipizide and metformin thousand milligrams started, pressure better controlled today.   No bowel movement since admission patient placed on Colace    CURRENT MEDICATIONS:  SCHEDULED MEDICATIONS:   modafinil  200 mg Per NG tube Daily    tetrahydrozoline  1 drop Left Eye TID    metFORMIN  1,000 mg Per NG tube BID     insulin lispro  0-12 Units Subcutaneous TID     insulin lispro  0-6 Units Subcutaneous Nightly    insulin glargine  60 Units Subcutaneous Nightly    enoxaparin  40 mg Subcutaneous Daily    docusate  100 mg Per NG tube Daily    glyBURIDE  5 mg Per NG tube BID     vitamin B-1  100 mg Oral Daily    amLODIPine  5 mg Per NG tube Daily    albuterol  2.5 mg Nebulization 4x daily    bumetanide  0.5 mg Oral Daily    chlorhexidine  15 mL Mouth/Throat BID    lisinopril  40 mg Oral Daily    simethicone  40 mg Per NG tube to pain on right side  Normal on the left upper and lower extremity     Plantar Response:  Right:  downgoing  Left:  downgoing     Clonus:  absent  Monteiro's:  absent     Coordination/Dysmetria:  Unable to assess     Gait: Unable to assess          Neuro Critical Care to monitor at this time. ASSESSMENT AND PLAN:       This is a 61 y. o. male with past medical history of type 2 diabetes thyroid disease hypothyroidism, hypertension hyperlipidemia coronary artery disease status post stent A. fib on Warfarin, came as a transfer from [facility] with CT head showing left basal ganglia intraparenchymal hematoma. LWKN 0600, ICH score -0 NIHSS-15     PLAN/MEDICAL DECISION MAKING:     NEUROLOGIC:  -CT head on admission shows acute intraparenchymal hematoma in the left basal ganglia measuring 2.1x 2.3 x 2.1 cm, parenchymal volume loss and moderate chronic microvascular ischemic changes as well as old infarct  -Patient received 1500 units of Kcentra of as he is on Warfarin for reversal of INR 2.9  -Repeat head CT showing basal ganglia bleed increased in size approximately 2 cm to 2.8.  Some IVH involvement.  Repeat CT head today was stable one-time dose of vitamin K given today  -Keep systolic blood pressure less than 160 -weaned offCardene drip  -On Precedex/fentanyl for sedation  -Neuro checks per protocol        CARDIOVASCULAR:  - systolic blood pressure goal of less than 160-weaned off Cardene  -Patient on Lopressor 100 mg twice daily, Cardizem 60 mg every 6h, Lisinopril 40 qd , Norvasc 5 mg added today for blood pressure monitoring  -Echocardiogram showing EF of 48% and severe diastolic dysfunction with normal valves.   - Trops stable 23, 27, 27  - Continue telemetry monitoring      PULMONARY:  -Patient had worsening respiratory status and trialed on BiPAP with little effect Intubated for airway protection on 12/7  -Patient was given a weaning trial however did not tolerated will continue to monitor daily with

## 2019-12-10 NOTE — PROGRESS NOTES
Physical Therapy  DATE: 12/10/2019    NAME: Marlene Cohen  MRN: [id number]   : 1955    Patient not seen this date for Physical Therapy due to:  [] Blood transfusion in progress  [] Hemodialysis  []  Patient Declined  [] Spine Precautions   [] Strict Bedrest  [] Surgery/ Procedure  [] Testing      [x] Other - Pt intubated , PT to check on         [] PT being discontinued at this time. Patient independent. No further needs. [] PT being discontinued at this time as the patient has been transferred to palliative care. No further needs. Yari Ashley, This treatment/evaluation completed by signing SPT. Signing PT agrees with treatment and documentation.

## 2019-12-10 NOTE — PLAN OF CARE
Problem: Falls - Risk of:  Goal: Will remain free from falls  Description  Will remain free from falls  Outcome: Ongoing  Goal: Absence of physical injury  Description  Absence of physical injury  Outcome: Ongoing     Problem: Risk for Impaired Skin Integrity  Goal: Tissue integrity - skin and mucous membranes  Description  Structural intactness and normal physiological function of skin and  mucous membranes.   Outcome: Ongoing     Problem: HEMODYNAMIC STATUS  Goal: Patient has stable vital signs and fluid balance  Outcome: Ongoing     Problem: ACTIVITY INTOLERANCE/IMPAIRED MOBILITY  Goal: Mobility/activity is maintained at optimum level for patient  12/10/2019 1859 by Marina Restrepo RN  Outcome: Ongoing  12/10/2019 0804 by Catherine Cortes RCP  Outcome: Ongoing     Problem: COMMUNICATION IMPAIRMENT  Goal: Ability to express needs and understand communication  12/10/2019 1859 by Marina Restrepo RN  Outcome: Ongoing  12/10/2019 0804 by Catherine Cortes RCP  Outcome: Ongoing     Problem: Neurological  Goal: Maximum potential motor/sensory/cognitive function  Outcome: Ongoing     Problem: Nutrition  Goal: Optimal nutrition therapy  Outcome: Ongoing     Problem: MECHANICAL VENTILATION  Goal: Mobility/activity is maintained at optimum level for patient  12/10/2019 1859 by Marina Restrepo RN  Outcome: Ongoing  12/10/2019 0804 by Catherine Cortes RCP  Outcome: Ongoing  Goal: Ability to express needs and understand communication  12/10/2019 1859 by Marina Restrepo RN  Outcome: Ongoing  12/10/2019 0804 by Catherine Cortes RCP  Outcome: Ongoing  Goal: Patient will maintain patent airway  12/10/2019 1859 by Marina Restrepo RN  Outcome: Ongoing  12/10/2019 0804 by Catherine Cortes RCP  Outcome: Ongoing  Goal: Oral health is maintained or improved  12/10/2019 1859 by Marina Restrepo RN  Outcome: Ongoing  12/10/2019 0804 by Catherine Cortes RCP  Outcome: Ongoing  Goal: ET tube will be managed safely  12/10/2019 1859 by Marina Restrepo RN  Outcome:

## 2019-12-11 ENCOUNTER — APPOINTMENT (OUTPATIENT)
Dept: CT IMAGING | Age: 64
DRG: 003 | End: 2019-12-11
Payer: MEDICARE

## 2019-12-11 ENCOUNTER — APPOINTMENT (OUTPATIENT)
Dept: GENERAL RADIOLOGY | Age: 64
DRG: 003 | End: 2019-12-11
Payer: MEDICARE

## 2019-12-11 LAB
ABSOLUTE EOS #: 0.04 K/UL (ref 0–0.44)
ABSOLUTE IMMATURE GRANULOCYTE: 0.09 K/UL (ref 0–0.3)
ABSOLUTE LYMPH #: 1.1 K/UL (ref 1.1–3.7)
ABSOLUTE MONO #: 0.88 K/UL (ref 0.1–1.2)
ALLEN TEST: ABNORMAL
ANION GAP SERPL CALCULATED.3IONS-SCNC: 13 MMOL/L (ref 9–17)
BASOPHILS # BLD: 0 % (ref 0–2)
BASOPHILS ABSOLUTE: 0.04 K/UL (ref 0–0.2)
BUN BLDV-MCNC: 32 MG/DL (ref 8–23)
BUN/CREAT BLD: ABNORMAL (ref 9–20)
CALCIUM SERPL-MCNC: 9 MG/DL (ref 8.6–10.4)
CHLORIDE BLD-SCNC: 105 MMOL/L (ref 98–107)
CO2: 23 MMOL/L (ref 20–31)
CREAT SERPL-MCNC: 1.53 MG/DL (ref 0.7–1.2)
CULTURE: ABNORMAL
DIFFERENTIAL TYPE: ABNORMAL
DIRECT EXAM: ABNORMAL
EOSINOPHILS RELATIVE PERCENT: 0 % (ref 1–4)
FIO2: 50
GFR AFRICAN AMERICAN: 56 ML/MIN
GFR NON-AFRICAN AMERICAN: 46 ML/MIN
GFR SERPL CREATININE-BSD FRML MDRD: ABNORMAL ML/MIN/{1.73_M2}
GFR SERPL CREATININE-BSD FRML MDRD: ABNORMAL ML/MIN/{1.73_M2}
GLUCOSE BLD-MCNC: 214 MG/DL (ref 75–110)
GLUCOSE BLD-MCNC: 338 MG/DL (ref 75–110)
GLUCOSE BLD-MCNC: 364 MG/DL (ref 70–99)
GLUCOSE BLD-MCNC: 371 MG/DL (ref 75–110)
HCT VFR BLD CALC: 39.8 % (ref 40.7–50.3)
HEMOGLOBIN: 13.3 G/DL (ref 13–17)
IMMATURE GRANULOCYTES: 1 %
LYMPHOCYTES # BLD: 11 % (ref 24–43)
Lab: ABNORMAL
MCH RBC QN AUTO: 32 PG (ref 25.2–33.5)
MCHC RBC AUTO-ENTMCNC: 33.4 G/DL (ref 28.4–34.8)
MCV RBC AUTO: 95.9 FL (ref 82.6–102.9)
MODE: ABNORMAL
MONOCYTES # BLD: 9 % (ref 3–12)
NEGATIVE BASE EXCESS, ART: ABNORMAL (ref 0–2)
NRBC AUTOMATED: 0 PER 100 WBC
O2 DEVICE/FLOW/%: ABNORMAL
PATIENT TEMP: 38.8
PDW BLD-RTO: 15.2 % (ref 11.8–14.4)
PLATELET # BLD: ABNORMAL K/UL (ref 138–453)
PLATELET ESTIMATE: ABNORMAL
PLATELET, FLUORESCENCE: 142 K/UL (ref 138–453)
PLATELET, IMMATURE FRACTION: 4.1 % (ref 1.1–10.3)
PMV BLD AUTO: ABNORMAL FL (ref 8.1–13.5)
POC HCO3: 27.6 MMOL/L (ref 21–28)
POC O2 SATURATION: 91 % (ref 94–98)
POC PCO2 TEMP: 42 MM HG
POC PCO2: 39.1 MM HG (ref 35–48)
POC PH TEMP: 7.43
POC PH: 7.46 (ref 7.35–7.45)
POC PO2 TEMP: 65 MM HG
POC PO2: 57.1 MM HG (ref 83–108)
POSITIVE BASE EXCESS, ART: 4 (ref 0–3)
POTASSIUM SERPL-SCNC: 3.8 MMOL/L (ref 3.7–5.3)
RBC # BLD: 4.15 M/UL (ref 4.21–5.77)
RBC # BLD: ABNORMAL 10*6/UL
SAMPLE SITE: ABNORMAL
SEG NEUTROPHILS: 79 % (ref 36–65)
SEGMENTED NEUTROPHILS ABSOLUTE COUNT: 8.24 K/UL (ref 1.5–8.1)
SODIUM BLD-SCNC: 141 MMOL/L (ref 135–144)
SPECIMEN DESCRIPTION: ABNORMAL
TCO2 (CALC), ART: 29 MMOL/L (ref 22–29)
WBC # BLD: 10.4 K/UL (ref 3.5–11.3)
WBC # BLD: ABNORMAL 10*3/UL

## 2019-12-11 PROCEDURE — 85025 COMPLETE CBC W/AUTO DIFF WBC: CPT

## 2019-12-11 PROCEDURE — 6360000002 HC RX W HCPCS: Performed by: STUDENT IN AN ORGANIZED HEALTH CARE EDUCATION/TRAINING PROGRAM

## 2019-12-11 PROCEDURE — 94668 MNPJ CHEST WALL SBSQ: CPT

## 2019-12-11 PROCEDURE — 6370000000 HC RX 637 (ALT 250 FOR IP): Performed by: PSYCHIATRY & NEUROLOGY

## 2019-12-11 PROCEDURE — 82803 BLOOD GASES ANY COMBINATION: CPT

## 2019-12-11 PROCEDURE — 80048 BASIC METABOLIC PNL TOTAL CA: CPT

## 2019-12-11 PROCEDURE — 94640 AIRWAY INHALATION TREATMENT: CPT

## 2019-12-11 PROCEDURE — 6370000000 HC RX 637 (ALT 250 FOR IP): Performed by: STUDENT IN AN ORGANIZED HEALTH CARE EDUCATION/TRAINING PROGRAM

## 2019-12-11 PROCEDURE — 2580000003 HC RX 258: Performed by: STUDENT IN AN ORGANIZED HEALTH CARE EDUCATION/TRAINING PROGRAM

## 2019-12-11 PROCEDURE — 70450 CT HEAD/BRAIN W/O DYE: CPT

## 2019-12-11 PROCEDURE — 94003 VENT MGMT INPAT SUBQ DAY: CPT

## 2019-12-11 PROCEDURE — 94770 HC ETCO2 MONITOR DAILY: CPT

## 2019-12-11 PROCEDURE — 37799 UNLISTED PX VASCULAR SURGERY: CPT

## 2019-12-11 PROCEDURE — 2500000003 HC RX 250 WO HCPCS: Performed by: STUDENT IN AN ORGANIZED HEALTH CARE EDUCATION/TRAINING PROGRAM

## 2019-12-11 PROCEDURE — 99291 CRITICAL CARE FIRST HOUR: CPT | Performed by: PSYCHIATRY & NEUROLOGY

## 2019-12-11 PROCEDURE — 2000000003 HC NEURO ICU R&B

## 2019-12-11 PROCEDURE — 85055 RETICULATED PLATELET ASSAY: CPT

## 2019-12-11 PROCEDURE — 2500000003 HC RX 250 WO HCPCS: Performed by: NURSE PRACTITIONER

## 2019-12-11 PROCEDURE — 2700000000 HC OXYGEN THERAPY PER DAY

## 2019-12-11 PROCEDURE — 94761 N-INVAS EAR/PLS OXIMETRY MLT: CPT

## 2019-12-11 PROCEDURE — 71045 X-RAY EXAM CHEST 1 VIEW: CPT

## 2019-12-11 PROCEDURE — 82947 ASSAY GLUCOSE BLOOD QUANT: CPT

## 2019-12-11 PROCEDURE — 6370000000 HC RX 637 (ALT 250 FOR IP): Performed by: NURSE PRACTITIONER

## 2019-12-11 RX ORDER — SENNA AND DOCUSATE SODIUM 50; 8.6 MG/1; MG/1
2 TABLET, FILM COATED ORAL DAILY
Status: DISCONTINUED | OUTPATIENT
Start: 2019-12-11 | End: 2020-01-03 | Stop reason: HOSPADM

## 2019-12-11 RX ADMIN — ENOXAPARIN SODIUM 40 MG: 40 INJECTION SUBCUTANEOUS at 10:31

## 2019-12-11 RX ADMIN — ALBUTEROL SULFATE 2.5 MG: 2.5 SOLUTION RESPIRATORY (INHALATION) at 19:55

## 2019-12-11 RX ADMIN — METOPROLOL TARTRATE 100 MG: 50 TABLET, FILM COATED ORAL at 20:55

## 2019-12-11 RX ADMIN — METHIMAZOLE 10 MG: 5 TABLET ORAL at 17:12

## 2019-12-11 RX ADMIN — SIMETHICONE 40 MG: 20 SUSPENSION/ DROPS ORAL at 20:55

## 2019-12-11 RX ADMIN — METHIMAZOLE 20 MG: 5 TABLET ORAL at 09:38

## 2019-12-11 RX ADMIN — AMLODIPINE BESYLATE 5 MG: 5 TABLET ORAL at 09:36

## 2019-12-11 RX ADMIN — DEXMEDETOMIDINE HYDROCHLORIDE 0.5 MCG/KG/HR: 100 INJECTION, SOLUTION INTRAVENOUS at 07:57

## 2019-12-11 RX ADMIN — Medication 500 MG: at 05:52

## 2019-12-11 RX ADMIN — SIMETHICONE 40 MG: 20 SUSPENSION/ DROPS ORAL at 17:13

## 2019-12-11 RX ADMIN — INSULIN LISPRO 10 UNITS: 100 INJECTION, SOLUTION INTRAVENOUS; SUBCUTANEOUS at 09:37

## 2019-12-11 RX ADMIN — BUMETANIDE 0.5 MG: 1 TABLET ORAL at 09:36

## 2019-12-11 RX ADMIN — MODAFINIL 200 MG: 100 TABLET ORAL at 09:40

## 2019-12-11 RX ADMIN — SODIUM CHLORIDE, PRESERVATIVE FREE 10 ML: 5 INJECTION INTRAVENOUS at 09:42

## 2019-12-11 RX ADMIN — DEXMEDETOMIDINE HYDROCHLORIDE 0.5 MCG/KG/HR: 100 INJECTION, SOLUTION INTRAVENOUS at 22:27

## 2019-12-11 RX ADMIN — Medication 1 DROP: at 22:28

## 2019-12-11 RX ADMIN — FAMOTIDINE 20 MG: 10 INJECTION, SOLUTION INTRAVENOUS at 20:55

## 2019-12-11 RX ADMIN — GLYBURIDE 5 MG: 5 TABLET ORAL at 09:37

## 2019-12-11 RX ADMIN — INSULIN GLARGINE 60 UNITS: 100 INJECTION, SOLUTION SUBCUTANEOUS at 20:55

## 2019-12-11 RX ADMIN — GLYBURIDE 5 MG: 5 TABLET ORAL at 17:12

## 2019-12-11 RX ADMIN — ALBUTEROL SULFATE 2.5 MG: 2.5 SOLUTION RESPIRATORY (INHALATION) at 11:07

## 2019-12-11 RX ADMIN — Medication 15 ML: at 09:36

## 2019-12-11 RX ADMIN — METOPROLOL TARTRATE 100 MG: 50 TABLET, FILM COATED ORAL at 09:37

## 2019-12-11 RX ADMIN — METFORMIN HYDROCHLORIDE 1000 MG: 500 TABLET ORAL at 17:12

## 2019-12-11 RX ADMIN — DILTIAZEM HYDROCHLORIDE 60 MG: 60 TABLET, FILM COATED ORAL at 17:12

## 2019-12-11 RX ADMIN — MAGNESIUM CITRATE 296 ML: 1.75 LIQUID ORAL at 09:42

## 2019-12-11 RX ADMIN — INSULIN LISPRO 12 UNITS: 100 INJECTION, SOLUTION INTRAVENOUS; SUBCUTANEOUS at 11:34

## 2019-12-11 RX ADMIN — Medication 1 DROP: at 09:40

## 2019-12-11 RX ADMIN — Medication 15 ML: at 21:03

## 2019-12-11 RX ADMIN — INSULIN LISPRO 6 UNITS: 100 INJECTION, SOLUTION INTRAVENOUS; SUBCUTANEOUS at 17:48

## 2019-12-11 RX ADMIN — CEFTRIAXONE SODIUM 1 G: 1 INJECTION, POWDER, FOR SOLUTION INTRAMUSCULAR; INTRAVENOUS at 04:43

## 2019-12-11 RX ADMIN — SIMETHICONE 40 MG: 20 SUSPENSION/ DROPS ORAL at 09:38

## 2019-12-11 RX ADMIN — ACETAMINOPHEN 650 MG: 325 TABLET ORAL at 10:06

## 2019-12-11 RX ADMIN — Medication 1 DROP: at 13:29

## 2019-12-11 RX ADMIN — Medication 100 MG: at 10:31

## 2019-12-11 RX ADMIN — DILTIAZEM HYDROCHLORIDE 60 MG: 60 TABLET, FILM COATED ORAL at 11:34

## 2019-12-11 RX ADMIN — ALBUTEROL SULFATE 2.5 MG: 2.5 SOLUTION RESPIRATORY (INHALATION) at 16:43

## 2019-12-11 RX ADMIN — ALBUTEROL SULFATE 2.5 MG: 2.5 SOLUTION RESPIRATORY (INHALATION) at 08:23

## 2019-12-11 RX ADMIN — FAMOTIDINE 20 MG: 10 INJECTION, SOLUTION INTRAVENOUS at 11:34

## 2019-12-11 RX ADMIN — SIMETHICONE 40 MG: 20 SUSPENSION/ DROPS ORAL at 13:29

## 2019-12-11 RX ADMIN — ACETAMINOPHEN 650 MG: 325 TABLET ORAL at 04:18

## 2019-12-11 RX ADMIN — DOCUSATE SODIUM 100 MG: 50 LIQUID ORAL at 09:36

## 2019-12-11 RX ADMIN — LISINOPRIL 40 MG: 20 TABLET ORAL at 09:37

## 2019-12-11 RX ADMIN — DILTIAZEM HYDROCHLORIDE 60 MG: 60 TABLET, FILM COATED ORAL at 05:52

## 2019-12-11 RX ADMIN — METFORMIN HYDROCHLORIDE 1000 MG: 500 TABLET ORAL at 09:37

## 2019-12-11 RX ADMIN — SENNOSIDES AND DOCUSATE SODIUM 2 TABLET: 8.6; 5 TABLET ORAL at 11:34

## 2019-12-11 RX ADMIN — DESMOPRESSIN ACETATE 40 MG: 0.2 TABLET ORAL at 09:36

## 2019-12-11 ASSESSMENT — PULMONARY FUNCTION TESTS
PIF_VALUE: 26
PIF_VALUE: 22
PIF_VALUE: 28
PIF_VALUE: 18
PIF_VALUE: 26
PIF_VALUE: 28
PIF_VALUE: 25
PIF_VALUE: 22
PIF_VALUE: 28
PIF_VALUE: 26
PIF_VALUE: 22
PIF_VALUE: 26
PIF_VALUE: 29
PIF_VALUE: 26
PIF_VALUE: 25
PIF_VALUE: 23
PIF_VALUE: 25

## 2019-12-11 NOTE — PLAN OF CARE
BRONCHOSPASM/BRONCHOCONSTRICTION     [x]         IMPROVE AERATION/BREATH SOUNDS  [x]   ADMINISTER BRONCHODILATOR THERAPY AS APPROPRIATE  [x]   ASSESS BREATH SOUNDS  [x]   IMPLEMENT AEROSOL/MDI PROTOCOL  [x]   PATIENT EDUCATION AS NEEDED            Problem: MECHANICAL VENTILATION  Goal: Mobility/activity is maintained at optimum level for patient  12/11/2019 0848 by Mahi Lozada RCP  Outcome: Ongoing  12/10/2019 1859 by Manuel Atwood RN  Outcome: Ongoing  Goal: Ability to express needs and understand communication  12/11/2019 0848 by Mahi Lozada RCP  Outcome: Ongoing  12/10/2019 1859 by Manuel Atwood RN  Outcome: Ongoing  Goal: Patient will maintain patent airway  12/11/2019 0848 by Mahi Lozada RCP  Outcome: Ongoing  12/10/2019 1859 by Manuel Atwood RN  Outcome: Ongoing  Goal: Oral health is maintained or improved  12/11/2019 0848 by Mahi Lozada RCP  Outcome: Ongoing  12/10/2019 1859 by Manuel Atwood RN  Outcome: Ongoing  Goal: ET tube will be managed safely  12/11/2019 0848 by Mahi Lozada RCP  Outcome: Ongoing  12/10/2019 1859 by Manuel Atwood RN  Outcome: Ongoing     Problem: OXYGENATION/RESPIRATORY FUNCTION  Goal: Patient will maintain patent airway  12/11/2019 0848 by Mahi Lozada RCP  Outcome: Ongoing  12/10/2019 1859 by Manuel Atwood RN  Outcome: Ongoing  Goal: Patient will achieve/maintain normal respiratory rate/effort  Description  Respiratory rate and effort will be within normal limits for the patient  12/11/2019 0848 by Mahi Lozada RCP  Outcome: Ongoing  12/10/2019 1859 by Manuel Atwood RN  Outcome: Ongoing

## 2019-12-11 NOTE — PROGRESS NOTES
Nutrition Assessment (Enteral Nutrition)    Type and Reason for Visit: Reassess    Nutrition Recommendations: TF recommendations: Suggest continuing semi elemental formula as it more adequately meets estimated protein needs and contains almost equivalent carbohydrates (151 gm/day in diabetic formula vs. 146 gm in semi elemental formula). Nutrition Assessment: Pt tolerating semi elemental TF at 55 mL/hr. Order changed back to diabetic formula this morning. Malnutrition Assessment:  · Malnutrition Status: Insufficient data  · Context:    · Findings of the 6 clinical characteristics of malnutrition (Minimum of 2 out of 6 clinical characteristics is required to make the diagnosis of moderate or severe Protein Calorie Malnutrition based on AND/ASPEN Guidelines):  1. Energy Intake-Unable to assess,      2. Weight Loss-Unable to assess,    3. Fat Loss-No significant subcutaneous fat loss,    4. Muscle Loss-No significant muscle mass loss,    5. Fluid Accumulation-No significant fluid accumulation,    6.   Strength-Not measured    Nutrition Risk Level: High    Nutrition Needs:  · Estimated Daily Total Kcal: 4938-2140 kcals/day  · Estimated Daily Protein (g): 100-135 gm/day    Nutrition Diagnosis:   · Problem: Inadequate oral intake  · Etiology: related to Impaired respiratory function-inability to consume food     Signs and symptoms:  as evidenced by NPO status due to medical condition, Nutrition support - EN    Objective Information:  · Wound Type: None  · Current Nutrition Therapies:  · Oral Diet Orders: NPO   · Tube Feeding (TF) Orders:   · Feeding Route: Nasogastric  · Formula: Diabetic  · Rate (ml/hr):25 mL/hr    · Volume (ml/day): 600 mL/day  · Duration: Continuous  · Current TF & Flush Orders Provides: Vital AF 1.2 (semi elemental) at 55 mL/hr = 1584 kcals, 99 gm protein (146 gm CHO per day)  · Goal TF & Flush Orders Provides: Glucerna (diabetic) at 55 mL/hr = 1584 kcals, 79 gm protein (151 gm CHO per day)  · Anthropometric Measures:  · Ht: 5' 7\" (170.2 cm)   · Current Body Wt: 211 lb 13.8 oz (96.1 kg)  · Weight Change: KELLY   · Ideal Body Wt: 148 lb (67.1 kg), % Ideal Body 143%  · BMI Classification: BMI 30.0 - 34.9 Obese Class I    Nutrition Interventions:   Continue current Tube Feeding  Continued Inpatient Monitoring, Education Not Indicated    Nutrition Evaluation:   · Evaluation: Goal achieved   · Goals: Meet % of estimated nutrient needs.    · Monitoring: TF Intake, TF Tolerance, Pertinent Labs, Weight      Electronically signed by Chris Clemente MS, RD, LD on 12/11/19 at 12:07 PM    Contact Number: [phone]

## 2019-12-11 NOTE — PROGRESS NOTES
Daily Progress Note  Neuro Critical Care    Patient Name: Antonia Nino  Patient : 1955  Room/Bed: 0523/0523-01  Code Status: full  Allergies: No Known Allergies    CHIEF COMPLAINT:      Slurred speech left-sided facial droop unable to move right arm          INTERVAL HISTORY    Initial Presentation     The patient is a 61 y. o. male presented with past medical history of type 2 diabetes thyroid disease hypothyroidism, hypertension hyperlipidemia coronary artery disease status post stent A. fib on coumadin came as a transfer from [facility] with last well-known oh 6 AM this morning when wife was with him and the patient had coffee he then went to lie on bed and around 8 AM to take his medication sitting on the couch and the wife noticed that he was mumbling unable to move his right arm and had a left facial droop and she called 911. At [facility] his blood pressure was 160/97 and blood glucose 297.  His stroke scale was 10 and INR was 2 he had a CT head done which showed acute intraparenchymal hematoma in the left basal ganglia measuring 2.1x 2.3 x 2.1 cm, parenchymal volume loss and moderate chronic microvascular ischemic changes as well as old infarct.  Plan will was to transfer him to [facility] and start him on [address] for reversal.  Started on Cardene drip with blood pressure goal of less than 140 and received 1500 units of Kcentra for INR reversal.      ICH score: 0     12/8  TSH, T3, T4 normal, head CT showing worsening basal ganglia hemorrhage as well as some IVH involvement.  CTA neg. Neurosurgery aware. [place] showing severe diastolic dysfunction as well as EF of 48%. Upon return from CT scan patient had bilateral crackles as well as pulmonary edema on x-ray.  Lasix given and patient put on BiPAP.  Worsening respiratory status with crackles in all lung fields.  Patient begins to desaturate into the 80s.   Decision made to intubate.  Art line placed.  Patient switch from Cleviprex to Cardene.  Sedation with propofol.  Fentanyl as needed pain q1 hours.  Additional 80 mg Lasix given.  Restarted on home antihypertensives.  Required elevated PEEP.  INR 1.1.  10 mg IV vitamin K given.   Repeat CT head this a.m. stable with no interval change in the left thalamic hematoma or intraventricular hemorrhage as well as degree of midline shift being unchanged.  Patient received hydralazine 10 mg x1 overnight for blood pressure reading of 159/89.  Patient afebrile no leukocytosis seen.  Tube feeds started-diabetic diet, heparin 5000 subcu every 8 started for DVT prophylaxis            12/9:  Patient had T-max of 100.4, UA sent negative for infection.  Sputum culture sent prior showed no growth.  Patient off Cardene, on Precedex.  Tolerating tube feed.  Intubated, INR 0.9, no vitamin K given.  Patient on Lasix 20mg twice daily with a goal of being in 1 to 1.5 L negative balance.  Input 1241 L output 1465.           12/10:  No acute events overnight. UA negative for any growth blood cultures negative sputum cultures negative. Tolerated  only 40 minutes of CPAP, glipizide and metformin thousand milligrams started, pressure better controlled today. No bowel movement since admission patient placed on Colace      Last 24h:   Sputum positive for strep pneumonia started on ceftriaxone and azithomycin temp of 101.7 cxr showed worsenig  of infiltrate on right lung base.mag citrate given since no bowel movement since admission.       CURRENT MEDICATIONS:  SCHEDULED MEDICATIONS:   azithromycin  500 mg Intravenous Q24H    cefTRIAXone (ROCEPHIN) IV  1 g Intravenous Q24H    magnesium citrate  296 mL Per NG tube Once    modafinil  200 mg Per NG tube Daily    tetrahydrozoline  1 drop Left Eye TID    metFORMIN  1,000 mg Per NG tube BID WC    insulin lispro  0-12 Units Subcutaneous TID WC    insulin lispro  0-6 Units Subcutaneous Nightly    insulin glargine  60 Units Subcutaneous Nightly    enoxaparin  40 mg tolerated will continue to monitor daily with weaning trial and assess patient   -Chest x-ray shows  -stable monitor output wanted patient to be -1 to 1.5 L,  -On Bumex 0.5 mg daily   -Follow-up ABGs daily     RENAL/FLUID/ELECTROLYTE:  - BUN 32 / Creatinine1.53  - IVF stopped  -Monitor electrolytes and replaced as needed     GI/NUTRITION:  - Diet: Tube feeds started-diabetic diet  - GI Prophylaxis: Pepcid  - Bowel Regimen: MoM   -no bowel movement since adm-mg citrate given  ID/HEME:  - Tmax-101.7  - TWF 75.4 UYVCF   - monitor For fevers  -H&H 13.3/39.2 platelet 142   -INR 1.4 RM arrival patient received 1500 units of Kcentra x1  - sputum culture shows strep pneumonia-on ceftriaxone and azithromycin- blood culture and UA negative           ENDOCRINE:  - monitor blood glucose  -Placed on high dose insulin sliding scale-  on glipizide 5 mg twice daily and metformin thousand mg  -Thyroid studies negative  -On thiamine 100 mg daily     OTHER:  - PT/OT eval      DVT PROPHYLAXIS:  - SCD sleeves - Thigh High   - ZORA stockings - Thigh High  -On Lovenox 40mg subq        DISPOSITION:  [x] To remain ICU:   [] OK for out of ICU from Neuro Critical Care standpoint    We will continue to follow along. For any changes in exam or patient status please contact Neuro Critical Care.       Moisés Mack MD  Neuro Critical Care  Pager [phone]  12/11/2019     9:19 AM

## 2019-12-11 NOTE — CONSULTS
General Surgery:  Consult Note        PATIENT NAME: Ramos Du   YOB: 1955    ADMISSION DATE: 12/6/2019 10:59 AM     Admitting Provider: Dr. Moose Chavez Physician: Dr. Dave Ahumada DATE: 12/11/2019    Chief Complaint:  Basal ganglia stroke  Consult Regarding:  Trach/PEG    HISTORY OF PRESENT ILLNESS:  The patient is a 60 y.o. male who was admitted on 12/6/19 for basal ganglia stroke. Patient was originally maintaining his airway, however, patient on HOD#1 started to have heavy secretions and was intubated for concern for airway protection. Patient has since developed pneumonia and has been febrile. He was started on antibiotics today and due to concerns that he will be unable to extubate, general surgery was consulted for tracheostomy and PEG tube placement. On evaluation of the patient, on sedation patient is able to follow simple commands except for right upper extremity. His oxygen requirement is decreasing. No obvious abdominal scars noted.       Past Medical History:        Diagnosis Date    Atrial fibrillation ([address]     CAD (coronary artery disease)     H/O heart artery stent x 4    Hx of blood clots     8 clots removed from right leg    Hyperlipidemia     Hypertension     Hyperthyroidism     Kidney stones     MI, old     family states 3 MI history    Microalbuminuria     Other complications due to other cardiac device, implant, and graft     Proteinuria     Renal cyst     right side    Thyroid disease     Type II or unspecified type diabetes mellitus without mention of complication, not stated as uncontrolled        Past Surgical History:        Procedure Laterality Date    CYSTOSCOPY      EYE SURGERY Left     s/p thyroidectomy double vision rec'd radiation 1yr ago    KIDNEY SURGERY      LITHOTRIPSY      PACEMAKER PLACEMENT      [id number], THIS SYSTEM IS MRI CONDITIONAL HOWEVER THE LEADS ARE FROM [facility] AND THAT MAKES THIS SYSTEM NOT MRI CONDITIONAL AND PATIENT CANNOT HAVE AN MRI.  THYROIDECTOMY      URETEROSCOPY         Medications Prior to Admission:   Medications Prior to Admission: bumetanide (BUMEX) 0.5 MG tablet, Take 0.5 mg by mouth daily  lisinopril (PRINIVIL;ZESTRIL) 40 MG tablet, Take 1 tablet by mouth daily  isosorbide mononitrate (IMDUR) 120 MG CR tablet, Take 1 tablet by mouth daily  diltiazem (CARDIZEM CD) 240 MG ER capsule, Take 1 capsule by mouth daily  aspirin 81 MG tablet, Take 81 mg by mouth daily. atorvastatin (LIPITOR) 40 MG tablet, Take 40 mg by mouth daily. glyBURIDE (DIABETA) 5 MG tablet, Take 5 mg by mouth 2 times daily (with meals). insulin glargine (LANTUS) 100 UNIT/ML injection, Inject 60 Units into the skin daily   metformin (GLUCOPHAGE) 1000 MG tablet, Take 1,000 mg by mouth 2 times daily (with meals). metoprolol (LOPRESSOR) 100 MG tablet, Take 100 mg by mouth 2 times daily. nitroGLYCERIN (NITROSTAT) 0.4 MG SL tablet, Place 0.4 mg under the tongue every 5 minutes as needed. insulin lispro (HUMALOG) 100 UNIT/ML injection vial, Inject 15 Units into the skin 3 times daily (with meals) (Patient taking differently: Inject 15 Units into the skin 3 times daily (with meals) Per wife pt takes a sliding scale dose not a fixed unit amount)  methimazole (TAPAZOLE) 10 MG tablet, Take 10 mg by mouth every evening  [DISCONTINUED] furosemide (LASIX) 20 MG tablet, Take 20 mg by mouth daily  Blood Pressure Monitor KIT, CHECK BLOOD PRESSURE DAILY  prasugrel (EFFIENT) 10 MG TABS, Take 10 mg by mouth daily. methimazole (TAPAZOLE) 10 MG tablet,  Take 20 mg by mouth daily     Allergies:  Patient has no known allergies.     Social History:   Social History     Socioeconomic History    Marital status:      Spouse name: Ollie Hennessy    Number of children: 3    Years of education: Not on file    Highest education level: Not on file   Occupational History    Not on file   Social Needs    24 hours) at 12/11/2019 1834  Last data filed at 12/11/2019 1800  Gross per 24 hour   Intake 2012 ml   Output 1290 ml   Net 722 ml       CONSTITUTIONAL: Intubated and sedated, is following simple commands. HEENT: Normocephalic/atraumatic, without obvious abnormality. NECK:  Supple, symmetrical, trachea midline   CARDIOVASCULAR: Regular rate and rhythm   LUNGS: Rhonchi heard bilaterally  ABDOMEN: Soft, nondistended, nontender to palpation. No obvious abdominal scars noted. MUSCULOSKELETAL: Muscle strength intact in all extremities bilaterally. NEUROLOGIC: CN II- XII intact. Gross motor intact without focal weakness. SKIN: No cyanosis, rashes, or edema noted.    Orientation: Alert and following simple commands      CBC with Differential:    Lab Results   Component Value Date    WBC 10.4 12/11/2019    RBC 4.15 12/11/2019    HGB 13.3 12/11/2019    HCT 39.8 12/11/2019    PLT See Reflexed IPF Result 12/11/2019    MCV 95.9 12/11/2019    MCH 32.0 12/11/2019    MCHC 33.4 12/11/2019    RDW 15.2 12/11/2019    LYMPHOPCT 11 12/11/2019    MONOPCT 9 12/11/2019    BASOPCT 0 12/11/2019    MONOSABS 0.88 12/11/2019    LYMPHSABS 1.10 12/11/2019    EOSABS 0.04 12/11/2019    BASOSABS 0.04 12/11/2019    DIFFTYPE NOT REPORTED 12/11/2019     BMP:    Lab Results   Component Value Date     12/11/2019    K 3.8 12/11/2019     12/11/2019    CO2 23 12/11/2019    BUN 32 12/11/2019    LABALBU 3.4 12/06/2019    CREATININE 1.53 12/11/2019    CALCIUM 9.0 12/11/2019    GFRAA 56 12/11/2019    LABGLOM 46 12/11/2019    GLUCOSE 364 12/11/2019       Pertinent Radiology:         ASSESSMENT:  Active Hospital Problems    Diagnosis Date Noted    Ventilator dependence ([facility] [Z99.11]     Acute on chronic combined systolic and diastolic congestive heart failure (HCC) [I50.43]     Intraparenchymal hematoma of brain ([facility] [S06.360A]     Nontraumatic cortical hemorrhage of left cerebral hemisphere ([address] [I61.1] 12/06/2019    Acute intra-cranial hemorrhage ([facility] [I62.9] 12/06/2019    Atrial fibrillation (HCC) [I48.91]     CAD (coronary artery disease) [I25.10]     Hyperlipidemia [E78.5]     Uncontrolled hypertension [I10]     Hyperthyroidism [E05.90]     Diabetes mellitus type 2 in obese ([facility] [E11.69, E66.9]          Plan:  1. Due to decreasing oxygen requirement since intubation, and recent development of pneumonia with start of antibiotics today, do not recommend tracheostomy and PEG tube at this point in time. Patient may ultimately need tracheostomy and feeding tube, however since intubations only been 5 days would recommend further pneumonia treatment and see if patient progresses. It was noted that patient tolerated CPAP for 40 minutes. If patient continues to be ventilation dependent, will discuss tracheostomy and feeding tube. 2. Recommend to aggressively treat pneumonia  3. Medical management per primary team.  Will follow along.       Electronically signed by Patience Cotto DO  on 12/11/2019 at 6:34 PM

## 2019-12-12 ENCOUNTER — ANESTHESIA EVENT (OUTPATIENT)
Dept: SURGICAL ICU | Age: 64
End: 2019-12-12

## 2019-12-12 ENCOUNTER — APPOINTMENT (OUTPATIENT)
Dept: GENERAL RADIOLOGY | Age: 64
DRG: 003 | End: 2019-12-12
Payer: MEDICARE

## 2019-12-12 LAB
ABSOLUTE EOS #: 0.1 K/UL (ref 0–0.44)
ABSOLUTE IMMATURE GRANULOCYTE: 0.03 K/UL (ref 0–0.3)
ABSOLUTE LYMPH #: 1.01 K/UL (ref 1.1–3.7)
ABSOLUTE MONO #: 0.67 K/UL (ref 0.1–1.2)
ALLEN TEST: POSITIVE
ANION GAP SERPL CALCULATED.3IONS-SCNC: 11 MMOL/L (ref 9–17)
BASOPHILS # BLD: 1 % (ref 0–2)
BASOPHILS ABSOLUTE: 0.06 K/UL (ref 0–0.2)
BUN BLDV-MCNC: 34 MG/DL (ref 8–23)
BUN/CREAT BLD: ABNORMAL (ref 9–20)
CALCIUM SERPL-MCNC: 9.2 MG/DL (ref 8.6–10.4)
CHLORIDE BLD-SCNC: 106 MMOL/L (ref 98–107)
CO2: 25 MMOL/L (ref 20–31)
CREAT SERPL-MCNC: 1.43 MG/DL (ref 0.7–1.2)
DIFFERENTIAL TYPE: ABNORMAL
EOSINOPHILS RELATIVE PERCENT: 1 % (ref 1–4)
FIO2: 45
GFR AFRICAN AMERICAN: >60 ML/MIN
GFR NON-AFRICAN AMERICAN: 50 ML/MIN
GFR SERPL CREATININE-BSD FRML MDRD: ABNORMAL ML/MIN/{1.73_M2}
GFR SERPL CREATININE-BSD FRML MDRD: ABNORMAL ML/MIN/{1.73_M2}
GLUCOSE BLD-MCNC: 189 MG/DL (ref 75–110)
GLUCOSE BLD-MCNC: 193 MG/DL (ref 75–110)
GLUCOSE BLD-MCNC: 208 MG/DL (ref 75–110)
GLUCOSE BLD-MCNC: 246 MG/DL (ref 75–110)
GLUCOSE BLD-MCNC: 269 MG/DL (ref 70–99)
HCT VFR BLD CALC: 39.1 % (ref 40.7–50.3)
HEMOGLOBIN: 12.4 G/DL (ref 13–17)
IMMATURE GRANULOCYTES: 0 %
LYMPHOCYTES # BLD: 12 % (ref 24–43)
MCH RBC QN AUTO: 31.9 PG (ref 25.2–33.5)
MCHC RBC AUTO-ENTMCNC: 31.7 G/DL (ref 28.4–34.8)
MCV RBC AUTO: 100.5 FL (ref 82.6–102.9)
MODE: ABNORMAL
MONOCYTES # BLD: 8 % (ref 3–12)
NEGATIVE BASE EXCESS, ART: ABNORMAL (ref 0–2)
NRBC AUTOMATED: 0 PER 100 WBC
O2 DEVICE/FLOW/%: ABNORMAL
PATIENT TEMP: 37.8
PDW BLD-RTO: 15.2 % (ref 11.8–14.4)
PLATELET # BLD: 140 K/UL (ref 138–453)
PLATELET ESTIMATE: ABNORMAL
PMV BLD AUTO: 11.1 FL (ref 8.1–13.5)
POC HCO3: 29.6 MMOL/L (ref 21–28)
POC O2 SATURATION: 95 % (ref 94–98)
POC PCO2 TEMP: 46 MM HG
POC PCO2: 44.3 MM HG (ref 35–48)
POC PH TEMP: 7.42
POC PH: 7.43 (ref 7.35–7.45)
POC PO2 TEMP: 76 MM HG
POC PO2: 71.8 MM HG (ref 83–108)
POSITIVE BASE EXCESS, ART: 4 (ref 0–3)
POTASSIUM SERPL-SCNC: 4.3 MMOL/L (ref 3.7–5.3)
RBC # BLD: 3.89 M/UL (ref 4.21–5.77)
RBC # BLD: ABNORMAL 10*6/UL
SAMPLE SITE: ABNORMAL
SEG NEUTROPHILS: 78 % (ref 36–65)
SEGMENTED NEUTROPHILS ABSOLUTE COUNT: 6.31 K/UL (ref 1.5–8.1)
SODIUM BLD-SCNC: 142 MMOL/L (ref 135–144)
TCO2 (CALC), ART: 31 MMOL/L (ref 22–29)
WBC # BLD: 8.2 K/UL (ref 3.5–11.3)
WBC # BLD: ABNORMAL 10*3/UL

## 2019-12-12 PROCEDURE — 82803 BLOOD GASES ANY COMBINATION: CPT

## 2019-12-12 PROCEDURE — 80048 BASIC METABOLIC PNL TOTAL CA: CPT

## 2019-12-12 PROCEDURE — 2500000003 HC RX 250 WO HCPCS: Performed by: STUDENT IN AN ORGANIZED HEALTH CARE EDUCATION/TRAINING PROGRAM

## 2019-12-12 PROCEDURE — 2000000003 HC NEURO ICU R&B

## 2019-12-12 PROCEDURE — 2500000003 HC RX 250 WO HCPCS: Performed by: NURSE PRACTITIONER

## 2019-12-12 PROCEDURE — 94003 VENT MGMT INPAT SUBQ DAY: CPT

## 2019-12-12 PROCEDURE — 85025 COMPLETE CBC W/AUTO DIFF WBC: CPT

## 2019-12-12 PROCEDURE — 6370000000 HC RX 637 (ALT 250 FOR IP): Performed by: PSYCHIATRY & NEUROLOGY

## 2019-12-12 PROCEDURE — 36415 COLL VENOUS BLD VENIPUNCTURE: CPT

## 2019-12-12 PROCEDURE — 94640 AIRWAY INHALATION TREATMENT: CPT

## 2019-12-12 PROCEDURE — 82947 ASSAY GLUCOSE BLOOD QUANT: CPT

## 2019-12-12 PROCEDURE — 99291 CRITICAL CARE FIRST HOUR: CPT | Performed by: PSYCHIATRY & NEUROLOGY

## 2019-12-12 PROCEDURE — 71045 X-RAY EXAM CHEST 1 VIEW: CPT

## 2019-12-12 PROCEDURE — 6360000002 HC RX W HCPCS: Performed by: STUDENT IN AN ORGANIZED HEALTH CARE EDUCATION/TRAINING PROGRAM

## 2019-12-12 PROCEDURE — 2580000003 HC RX 258: Performed by: STUDENT IN AN ORGANIZED HEALTH CARE EDUCATION/TRAINING PROGRAM

## 2019-12-12 PROCEDURE — 2700000000 HC OXYGEN THERAPY PER DAY

## 2019-12-12 PROCEDURE — 6370000000 HC RX 637 (ALT 250 FOR IP): Performed by: STUDENT IN AN ORGANIZED HEALTH CARE EDUCATION/TRAINING PROGRAM

## 2019-12-12 PROCEDURE — 94770 HC ETCO2 MONITOR DAILY: CPT

## 2019-12-12 PROCEDURE — 94668 MNPJ CHEST WALL SBSQ: CPT

## 2019-12-12 PROCEDURE — 36600 WITHDRAWAL OF ARTERIAL BLOOD: CPT

## 2019-12-12 PROCEDURE — 94761 N-INVAS EAR/PLS OXIMETRY MLT: CPT

## 2019-12-12 PROCEDURE — 76937 US GUIDE VASCULAR ACCESS: CPT

## 2019-12-12 RX ORDER — INSULIN GLARGINE 100 [IU]/ML
35 INJECTION, SOLUTION SUBCUTANEOUS 2 TIMES DAILY
Status: DISCONTINUED | OUTPATIENT
Start: 2019-12-12 | End: 2019-12-20

## 2019-12-12 RX ADMIN — Medication 15 ML: at 09:20

## 2019-12-12 RX ADMIN — SIMETHICONE 40 MG: 20 SUSPENSION/ DROPS ORAL at 17:35

## 2019-12-12 RX ADMIN — METHIMAZOLE 10 MG: 5 TABLET ORAL at 17:34

## 2019-12-12 RX ADMIN — ALBUTEROL SULFATE 2.5 MG: 2.5 SOLUTION RESPIRATORY (INHALATION) at 19:49

## 2019-12-12 RX ADMIN — Medication 500 MG: at 05:17

## 2019-12-12 RX ADMIN — Medication 1 DROP: at 20:54

## 2019-12-12 RX ADMIN — INSULIN LISPRO 3 UNITS: 100 INJECTION, SOLUTION INTRAVENOUS; SUBCUTANEOUS at 00:27

## 2019-12-12 RX ADMIN — DESMOPRESSIN ACETATE 40 MG: 0.2 TABLET ORAL at 09:19

## 2019-12-12 RX ADMIN — ALBUTEROL SULFATE 2.5 MG: 2.5 SOLUTION RESPIRATORY (INHALATION) at 12:00

## 2019-12-12 RX ADMIN — INSULIN LISPRO 3 UNITS: 100 INJECTION, SOLUTION INTRAVENOUS; SUBCUTANEOUS at 17:35

## 2019-12-12 RX ADMIN — DILTIAZEM HYDROCHLORIDE 60 MG: 60 TABLET, FILM COATED ORAL at 17:34

## 2019-12-12 RX ADMIN — METHIMAZOLE 20 MG: 5 TABLET ORAL at 09:20

## 2019-12-12 RX ADMIN — DILTIAZEM HYDROCHLORIDE 60 MG: 60 TABLET, FILM COATED ORAL at 00:27

## 2019-12-12 RX ADMIN — DEXMEDETOMIDINE HYDROCHLORIDE 0.5 MCG/KG/HR: 100 INJECTION, SOLUTION INTRAVENOUS at 15:33

## 2019-12-12 RX ADMIN — BUMETANIDE 0.5 MG: 1 TABLET ORAL at 09:19

## 2019-12-12 RX ADMIN — FAMOTIDINE 20 MG: 10 INJECTION, SOLUTION INTRAVENOUS at 09:19

## 2019-12-12 RX ADMIN — DILTIAZEM HYDROCHLORIDE 60 MG: 60 TABLET, FILM COATED ORAL at 12:52

## 2019-12-12 RX ADMIN — METFORMIN HYDROCHLORIDE 1000 MG: 500 TABLET ORAL at 17:34

## 2019-12-12 RX ADMIN — SIMETHICONE 40 MG: 20 SUSPENSION/ DROPS ORAL at 20:54

## 2019-12-12 RX ADMIN — METFORMIN HYDROCHLORIDE 1000 MG: 500 TABLET ORAL at 09:19

## 2019-12-12 RX ADMIN — INSULIN LISPRO 6 UNITS: 100 INJECTION, SOLUTION INTRAVENOUS; SUBCUTANEOUS at 12:52

## 2019-12-12 RX ADMIN — AMLODIPINE BESYLATE 5 MG: 5 TABLET ORAL at 09:19

## 2019-12-12 RX ADMIN — LISINOPRIL 40 MG: 20 TABLET ORAL at 09:19

## 2019-12-12 RX ADMIN — ENOXAPARIN SODIUM 40 MG: 40 INJECTION SUBCUTANEOUS at 09:19

## 2019-12-12 RX ADMIN — GLYBURIDE 5 MG: 5 TABLET ORAL at 17:34

## 2019-12-12 RX ADMIN — INSULIN GLARGINE 35 UNITS: 100 INJECTION, SOLUTION SUBCUTANEOUS at 12:52

## 2019-12-12 RX ADMIN — INSULIN GLARGINE 35 UNITS: 100 INJECTION, SOLUTION SUBCUTANEOUS at 20:53

## 2019-12-12 RX ADMIN — METOPROLOL TARTRATE 100 MG: 50 TABLET, FILM COATED ORAL at 20:53

## 2019-12-12 RX ADMIN — METOPROLOL TARTRATE 100 MG: 50 TABLET, FILM COATED ORAL at 09:20

## 2019-12-12 RX ADMIN — GLYBURIDE 5 MG: 5 TABLET ORAL at 09:19

## 2019-12-12 RX ADMIN — DILTIAZEM HYDROCHLORIDE 60 MG: 60 TABLET, FILM COATED ORAL at 05:48

## 2019-12-12 RX ADMIN — INSULIN LISPRO 6 UNITS: 100 INJECTION, SOLUTION INTRAVENOUS; SUBCUTANEOUS at 05:49

## 2019-12-12 RX ADMIN — MODAFINIL 200 MG: 100 TABLET ORAL at 09:20

## 2019-12-12 RX ADMIN — SIMETHICONE 40 MG: 20 SUSPENSION/ DROPS ORAL at 09:20

## 2019-12-12 RX ADMIN — ALBUTEROL SULFATE 2.5 MG: 2.5 SOLUTION RESPIRATORY (INHALATION) at 08:12

## 2019-12-12 RX ADMIN — FAMOTIDINE 20 MG: 10 INJECTION, SOLUTION INTRAVENOUS at 20:54

## 2019-12-12 RX ADMIN — Medication 1 DROP: at 15:00

## 2019-12-12 RX ADMIN — Medication 1 DROP: at 09:20

## 2019-12-12 RX ADMIN — CEFTRIAXONE SODIUM 1 G: 1 INJECTION, POWDER, FOR SOLUTION INTRAMUSCULAR; INTRAVENOUS at 04:37

## 2019-12-12 RX ADMIN — SIMETHICONE 40 MG: 20 SUSPENSION/ DROPS ORAL at 12:53

## 2019-12-12 RX ADMIN — Medication 15 ML: at 20:54

## 2019-12-12 RX ADMIN — Medication 100 MG: at 09:20

## 2019-12-12 ASSESSMENT — PULMONARY FUNCTION TESTS
PIF_VALUE: 24
PIF_VALUE: 25
PIF_VALUE: 15
PIF_VALUE: 23
PIF_VALUE: 22
PIF_VALUE: 24
PIF_VALUE: 21
PIF_VALUE: 26
PIF_VALUE: 24
PIF_VALUE: 19
PIF_VALUE: 23
PIF_VALUE: 22
PIF_VALUE: 25
PIF_VALUE: 24
PIF_VALUE: 22
PIF_VALUE: 23
PIF_VALUE: 22
PIF_VALUE: 26

## 2019-12-12 ASSESSMENT — PAIN SCALES - GENERAL: PAINLEVEL_OUTOF10: 0

## 2019-12-12 NOTE — PROGRESS NOTES
Physical Therapy  DATE: 2019    NAME: Zak Mayorga  MRN: [id number]   : 1955    Patient not seen this date for Physical Therapy due to:  [] Blood transfusion in progress  [] Hemodialysis  []  Patient Declined  [] Spine Precautions   [] Strict Bedrest  [] Surgery/ Procedure  [] Testing      [x] Other - Pt intubated and restrained on this date. PT to check back on         [] PT being discontinued at this time. Patient independent. No further needs. [] PT being discontinued at this time as the patient has been transferred to palliative care. No further needs. Debi Matson, Be treatment/evaluation completed by signing SPT. Signing PT agrees with treatment and documentation.

## 2019-12-12 NOTE — FLOWSHEET NOTE
Visited pt because of procedure scheduled for 12/13. Wife had just left and pt is not able to hold a conversation as he is intubated and due to medical issues.  will refer day shift  to see pt and wife in the morning, if possible. Chaplains will remain available to offer spiritual and emotional support as needed.        12/12/19 5893   Encounter Summary   Services provided to: Patient   Referral/Consult From: [facility]   (12/12/19)   Complexity of Encounter Low   Length of Encounter 15 minutes   Routine   Type Pre-procedure   Assessment Unable to respond   Intervention Sustaining presence/ Ministry of presence   Outcome Did not respond
available for further spiritual and emotional support as requested by the patient or family.        Electronically signed by Jamie Rousseau, on 12/6/2019 at 12:36 PM.  [facility]  [phone]

## 2019-12-12 NOTE — PLAN OF CARE
Problem: HEMODYNAMIC STATUS  Goal: Patient has stable vital signs and fluid balance  Outcome: Ongoing     Problem: MECHANICAL VENTILATION  Goal: Oral health is maintained or improved  Outcome: Ongoing     Problem: MECHANICAL VENTILATION  Goal: ET tube will be managed safely  Outcome: Ongoing     Problem: Restraint Use - Nonviolent/Non-Self-Destructive Behavior:  Goal: Absence of restraint indications  Description  Absence of restraint indications  Outcome: Ongoing     Pascale Head RN

## 2019-12-12 NOTE — PROGRESS NOTES
General Surgery:  Daily Progress Note                   PATIENT NAME: Marlene Cohen     TODAY'S DATE: 12/12/2019, 6:28 AM  CC: Basal ganglia infarct, pneumonia    SUBJECTIVE:     Pt seen and examined at bedside. No acute events overnight, patient not following commands, vital signs stable, patient mechanically intubated, patient febrile overnight T-max 38.4 centigrade overnight. OBJECTIVE:   VITALS:  BP (!) 146/78   Pulse 75   Temp 98.4 °F (36.9 °C) (Axillary)   Resp 21   Ht 5' 7\" (1.702 m)   Wt 211 lb 13.8 oz (96.1 kg)   SpO2 97%   BMI 33.18 kg/m²      INTAKE/OUTPUT:      Intake/Output Summary (Last 24 hours) at 12/12/2019 9095  Last data filed at 12/12/2019 0534  Gross per 24 hour   Intake 2028 ml   Output 1015 ml   Net 1013 ml       PHYSICAL EXAM:  General Appearance: Not responsive to physical stimulus, intubated, mechanically ventilated  HEENT:  Normocephalic, atraumatic, mucus membranes dry  NGT in place  Heart: Heart regular rate and rhythm  Lungs: Intubated, mechanically ventilated, coarse lung sounds bilaterally  Abdomen: Soft, obese, tympanitic  Incision: N/A  Extremities: No cyanosis, pitting edema, rashes noted. Skin: Skin color, texture, turgor normal. No rashes or lesions.     IV Access: PIV      Data:  CBC with Differential:    Lab Results   Component Value Date    WBC 8.2 12/12/2019    RBC 3.89 12/12/2019    HGB 12.4 12/12/2019    HCT 39.1 12/12/2019     12/12/2019    .5 12/12/2019    MCH 31.9 12/12/2019    MCHC 31.7 12/12/2019    RDW 15.2 12/12/2019    LYMPHOPCT 12 12/12/2019    MONOPCT 8 12/12/2019    BASOPCT 1 12/12/2019    MONOSABS 0.67 12/12/2019    LYMPHSABS 1.01 12/12/2019    EOSABS 0.10 12/12/2019    BASOSABS 0.06 12/12/2019    DIFFTYPE NOT REPORTED 12/12/2019     CMP:    Lab Results   Component Value Date     12/11/2019    K 3.8 12/11/2019     12/11/2019    CO2 23 12/11/2019    BUN 32 12/11/2019    CREATININE 1.53 12/11/2019    GFRAA 56 12/11/2019 LABGLOM 46 12/11/2019    GLUCOSE 364 12/11/2019    PROT 7.0 12/06/2019    LABALBU 3.4 12/06/2019    CALCIUM 9.0 12/11/2019    BILITOT 0.51 12/06/2019    ALKPHOS 83 12/06/2019    AST 13 12/06/2019    ALT 13 12/06/2019       Radiology Review:  Ct Head Wo Contrast    Result Date: 12/11/2019  Stable intraparenchymal hemorrhage accounting for technical differences. 0.3 cm left-right midline shift. Decrease in intraventricular blood products. Xr Chest Portable    Result Date: 12/11/2019  Mild interval worsening of infiltrate at the right lung base. Xr Chest Portable    Result Date: 12/10/2019  Support tubes and lines as above. Advancement of the endotracheal tube by 2-3 cm suggested for optimal positioning. Cardiomegaly. Improving right lower lobe airspace disease. ASSESSMENT:  Active Hospital Problems    Diagnosis Date Noted    Ventilator dependence [facility]) [Z99.11]     Acute on chronic combined systolic and diastolic congestive heart failure (HCC) [I50.43]     Intraparenchymal hematoma of brain ([facility] [S06.360A]     Nontraumatic cortical hemorrhage of left cerebral hemisphere ([facility] [I61.1] 12/06/2019    Acute intra-cranial hemorrhage (HCC) [I62.9] 12/06/2019    Atrial fibrillation (HCC) [I48.91]     CAD (coronary artery disease) [I25.10]     Hyperlipidemia [E78.5]     Uncontrolled hypertension [I10]     Hyperthyroidism [E05.90]     Diabetes mellitus type 2 in obese ([facility] [E11.69, E66.9]        60 y.o. male with basal ganglia infarct    1. Basal ganglia infarct  2. Pneumonia    Plan:  1. Patient still mechanically ventilated at this time, per RN thick secretions continue to be present in the ET tube, recommend continue antibiotics Rocephin and azithromycin  2.  Recommend continue aggressive treatment of pneumonia and optimization of the patient's comorbidities and respiratory status, should the patient continues to be ventilator dependent in the upcoming days we will discuss with family about need

## 2019-12-12 NOTE — PROGRESS NOTES
12/12/19 0824   Vent Information   Vent Type Servo i   Vent Mode CPAP   Pressure Support 6 cmH20   FiO2  40 %   PEEP/CPAP 8     Wean started pt tolerating well

## 2019-12-12 NOTE — PROGRESS NOTES
12/12/19 1035   Vent Information   Vent Type Servo i   Vent Mode PRVC   Vt Ordered 530 mL   Rate Set 14 bmp   Pressure Support 6 cmH20   FiO2  40 %   Sensitivity 5   PEEP/CPAP 8   I Time/ I Time % 0.9 s    Pt weaned from 8:24 to 10:35, placed back on rate due to decreased tidal volumes

## 2019-12-13 ENCOUNTER — APPOINTMENT (OUTPATIENT)
Dept: GENERAL RADIOLOGY | Age: 64
DRG: 003 | End: 2019-12-13
Payer: MEDICARE

## 2019-12-13 ENCOUNTER — ANESTHESIA (OUTPATIENT)
Dept: SURGICAL ICU | Age: 64
End: 2019-12-13

## 2019-12-13 LAB
ABSOLUTE EOS #: 0.19 K/UL (ref 0–0.44)
ABSOLUTE IMMATURE GRANULOCYTE: 0.05 K/UL (ref 0–0.3)
ABSOLUTE LYMPH #: 1.22 K/UL (ref 1.1–3.7)
ABSOLUTE MONO #: 0.73 K/UL (ref 0.1–1.2)
ALLEN TEST: POSITIVE
ANION GAP SERPL CALCULATED.3IONS-SCNC: 12 MMOL/L (ref 9–17)
BASOPHILS # BLD: 1 % (ref 0–2)
BASOPHILS ABSOLUTE: 0.07 K/UL (ref 0–0.2)
BUN BLDV-MCNC: 38 MG/DL (ref 8–23)
BUN/CREAT BLD: ABNORMAL (ref 9–20)
CALCIUM SERPL-MCNC: 9.5 MG/DL (ref 8.6–10.4)
CHLORIDE BLD-SCNC: 106 MMOL/L (ref 98–107)
CO2: 26 MMOL/L (ref 20–31)
CREAT SERPL-MCNC: 1.32 MG/DL (ref 0.7–1.2)
DIFFERENTIAL TYPE: ABNORMAL
EOSINOPHILS RELATIVE PERCENT: 2 % (ref 1–4)
FIO2: 40
GFR AFRICAN AMERICAN: >60 ML/MIN
GFR NON-AFRICAN AMERICAN: 55 ML/MIN
GFR SERPL CREATININE-BSD FRML MDRD: ABNORMAL ML/MIN/{1.73_M2}
GFR SERPL CREATININE-BSD FRML MDRD: ABNORMAL ML/MIN/{1.73_M2}
GLUCOSE BLD-MCNC: 119 MG/DL (ref 75–110)
GLUCOSE BLD-MCNC: 130 MG/DL (ref 75–110)
GLUCOSE BLD-MCNC: 161 MG/DL (ref 70–99)
GLUCOSE BLD-MCNC: 163 MG/DL (ref 74–100)
GLUCOSE BLD-MCNC: 184 MG/DL (ref 75–110)
GLUCOSE BLD-MCNC: 71 MG/DL (ref 75–110)
GLUCOSE BLD-MCNC: 96 MG/DL (ref 75–110)
HCT VFR BLD CALC: 39.4 % (ref 40.7–50.3)
HEMOGLOBIN: 12.4 G/DL (ref 13–17)
IMMATURE GRANULOCYTES: 1 %
LYMPHOCYTES # BLD: 15 % (ref 24–43)
MCH RBC QN AUTO: 31.4 PG (ref 25.2–33.5)
MCHC RBC AUTO-ENTMCNC: 31.5 G/DL (ref 28.4–34.8)
MCV RBC AUTO: 99.7 FL (ref 82.6–102.9)
MODE: ABNORMAL
MONOCYTES # BLD: 9 % (ref 3–12)
NEGATIVE BASE EXCESS, ART: ABNORMAL (ref 0–2)
NRBC AUTOMATED: 0 PER 100 WBC
O2 DEVICE/FLOW/%: ABNORMAL
PATIENT TEMP: ABNORMAL
PDW BLD-RTO: 15 % (ref 11.8–14.4)
PLATELET # BLD: 154 K/UL (ref 138–453)
PLATELET ESTIMATE: ABNORMAL
PMV BLD AUTO: 11.3 FL (ref 8.1–13.5)
POC HCO3: 30.5 MMOL/L (ref 21–28)
POC O2 SATURATION: 96 % (ref 94–98)
POC PCO2 TEMP: ABNORMAL MM HG
POC PCO2: 43.1 MM HG (ref 35–48)
POC PH TEMP: ABNORMAL
POC PH: 7.46 (ref 7.35–7.45)
POC PO2 TEMP: ABNORMAL MM HG
POC PO2: 75 MM HG (ref 83–108)
POSITIVE BASE EXCESS, ART: 6 (ref 0–3)
POTASSIUM SERPL-SCNC: 4.6 MMOL/L (ref 3.7–5.3)
RBC # BLD: 3.95 M/UL (ref 4.21–5.77)
RBC # BLD: ABNORMAL 10*6/UL
SAMPLE SITE: ABNORMAL
SEG NEUTROPHILS: 72 % (ref 36–65)
SEGMENTED NEUTROPHILS ABSOLUTE COUNT: 6.06 K/UL (ref 1.5–8.1)
SODIUM BLD-SCNC: 144 MMOL/L (ref 135–144)
TCO2 (CALC), ART: 32 MMOL/L (ref 22–29)
WBC # BLD: 8.3 K/UL (ref 3.5–11.3)
WBC # BLD: ABNORMAL 10*3/UL

## 2019-12-13 PROCEDURE — 6370000000 HC RX 637 (ALT 250 FOR IP): Performed by: NURSE PRACTITIONER

## 2019-12-13 PROCEDURE — 71045 X-RAY EXAM CHEST 1 VIEW: CPT

## 2019-12-13 PROCEDURE — 85025 COMPLETE CBC W/AUTO DIFF WBC: CPT

## 2019-12-13 PROCEDURE — 2500000003 HC RX 250 WO HCPCS: Performed by: NURSE PRACTITIONER

## 2019-12-13 PROCEDURE — 6370000000 HC RX 637 (ALT 250 FOR IP): Performed by: STUDENT IN AN ORGANIZED HEALTH CARE EDUCATION/TRAINING PROGRAM

## 2019-12-13 PROCEDURE — 6370000000 HC RX 637 (ALT 250 FOR IP): Performed by: PSYCHIATRY & NEUROLOGY

## 2019-12-13 PROCEDURE — 2580000003 HC RX 258: Performed by: STUDENT IN AN ORGANIZED HEALTH CARE EDUCATION/TRAINING PROGRAM

## 2019-12-13 PROCEDURE — 6360000002 HC RX W HCPCS: Performed by: STUDENT IN AN ORGANIZED HEALTH CARE EDUCATION/TRAINING PROGRAM

## 2019-12-13 PROCEDURE — 94640 AIRWAY INHALATION TREATMENT: CPT

## 2019-12-13 PROCEDURE — 82803 BLOOD GASES ANY COMBINATION: CPT

## 2019-12-13 PROCEDURE — 6360000002 HC RX W HCPCS: Performed by: PSYCHIATRY & NEUROLOGY

## 2019-12-13 PROCEDURE — 80048 BASIC METABOLIC PNL TOTAL CA: CPT

## 2019-12-13 PROCEDURE — 36415 COLL VENOUS BLD VENIPUNCTURE: CPT

## 2019-12-13 PROCEDURE — 2700000000 HC OXYGEN THERAPY PER DAY

## 2019-12-13 PROCEDURE — 82947 ASSAY GLUCOSE BLOOD QUANT: CPT

## 2019-12-13 PROCEDURE — 99291 CRITICAL CARE FIRST HOUR: CPT | Performed by: PSYCHIATRY & NEUROLOGY

## 2019-12-13 PROCEDURE — 94761 N-INVAS EAR/PLS OXIMETRY MLT: CPT

## 2019-12-13 PROCEDURE — 2000000003 HC NEURO ICU R&B

## 2019-12-13 PROCEDURE — 2500000003 HC RX 250 WO HCPCS: Performed by: STUDENT IN AN ORGANIZED HEALTH CARE EDUCATION/TRAINING PROGRAM

## 2019-12-13 PROCEDURE — 94770 HC ETCO2 MONITOR DAILY: CPT

## 2019-12-13 PROCEDURE — 94003 VENT MGMT INPAT SUBQ DAY: CPT

## 2019-12-13 PROCEDURE — 2580000003 HC RX 258: Performed by: PSYCHIATRY & NEUROLOGY

## 2019-12-13 PROCEDURE — 36600 WITHDRAWAL OF ARTERIAL BLOOD: CPT

## 2019-12-13 RX ADMIN — DEXMEDETOMIDINE HYDROCHLORIDE 0.5 MCG/KG/HR: 100 INJECTION, SOLUTION INTRAVENOUS at 00:18

## 2019-12-13 RX ADMIN — DOCUSATE SODIUM 100 MG: 50 LIQUID ORAL at 08:40

## 2019-12-13 RX ADMIN — DESMOPRESSIN ACETATE 40 MG: 0.2 TABLET ORAL at 22:13

## 2019-12-13 RX ADMIN — Medication 15 ML: at 08:43

## 2019-12-13 RX ADMIN — SIMETHICONE 40 MG: 20 SUSPENSION/ DROPS ORAL at 18:28

## 2019-12-13 RX ADMIN — INSULIN LISPRO 3 UNITS: 100 INJECTION, SOLUTION INTRAVENOUS; SUBCUTANEOUS at 05:46

## 2019-12-13 RX ADMIN — DILTIAZEM HYDROCHLORIDE 60 MG: 60 TABLET, FILM COATED ORAL at 00:06

## 2019-12-13 RX ADMIN — SIMETHICONE 40 MG: 20 SUSPENSION/ DROPS ORAL at 19:52

## 2019-12-13 RX ADMIN — Medication 500 MG: at 05:03

## 2019-12-13 RX ADMIN — SODIUM CHLORIDE, PRESERVATIVE FREE 10 ML: 5 INJECTION INTRAVENOUS at 08:41

## 2019-12-13 RX ADMIN — LISINOPRIL 40 MG: 20 TABLET ORAL at 09:11

## 2019-12-13 RX ADMIN — Medication 100 MG: at 09:11

## 2019-12-13 RX ADMIN — METOPROLOL TARTRATE 5 MG: 5 INJECTION, SOLUTION INTRAVENOUS at 22:04

## 2019-12-13 RX ADMIN — GLYBURIDE 5 MG: 5 TABLET ORAL at 18:27

## 2019-12-13 RX ADMIN — ALBUTEROL SULFATE 2.5 MG: 2.5 SOLUTION RESPIRATORY (INHALATION) at 12:06

## 2019-12-13 RX ADMIN — METOPROLOL TARTRATE 100 MG: 50 TABLET, FILM COATED ORAL at 09:11

## 2019-12-13 RX ADMIN — FAMOTIDINE 20 MG: 10 INJECTION, SOLUTION INTRAVENOUS at 10:10

## 2019-12-13 RX ADMIN — FENTANYL CITRATE 100 MCG: 50 INJECTION, SOLUTION INTRAMUSCULAR; INTRAVENOUS at 22:23

## 2019-12-13 RX ADMIN — FENTANYL CITRATE 100 MCG: 50 INJECTION, SOLUTION INTRAMUSCULAR; INTRAVENOUS at 18:19

## 2019-12-13 RX ADMIN — SENNOSIDES AND DOCUSATE SODIUM 2 TABLET: 8.6; 5 TABLET ORAL at 08:39

## 2019-12-13 RX ADMIN — AMLODIPINE BESYLATE 5 MG: 5 TABLET ORAL at 10:36

## 2019-12-13 RX ADMIN — CEFTRIAXONE SODIUM 1 G: 1 INJECTION, POWDER, FOR SOLUTION INTRAMUSCULAR; INTRAVENOUS at 04:21

## 2019-12-13 RX ADMIN — ALBUTEROL SULFATE 2.5 MG: 2.5 SOLUTION RESPIRATORY (INHALATION) at 08:20

## 2019-12-13 RX ADMIN — DILTIAZEM HYDROCHLORIDE 60 MG: 60 TABLET, FILM COATED ORAL at 14:43

## 2019-12-13 RX ADMIN — ALBUTEROL SULFATE 2.5 MG: 2.5 SOLUTION RESPIRATORY (INHALATION) at 15:42

## 2019-12-13 RX ADMIN — DILTIAZEM HYDROCHLORIDE 60 MG: 60 TABLET, FILM COATED ORAL at 05:46

## 2019-12-13 RX ADMIN — BUMETANIDE 0.5 MG: 1 TABLET ORAL at 10:10

## 2019-12-13 RX ADMIN — GLYBURIDE 5 MG: 5 TABLET ORAL at 08:40

## 2019-12-13 RX ADMIN — ENOXAPARIN SODIUM 40 MG: 40 INJECTION SUBCUTANEOUS at 08:40

## 2019-12-13 RX ADMIN — DILTIAZEM HYDROCHLORIDE 60 MG: 60 TABLET, FILM COATED ORAL at 19:53

## 2019-12-13 RX ADMIN — METHIMAZOLE 20 MG: 5 TABLET ORAL at 08:39

## 2019-12-13 RX ADMIN — METFORMIN HYDROCHLORIDE 1000 MG: 500 TABLET ORAL at 18:27

## 2019-12-13 RX ADMIN — SIMETHICONE 40 MG: 20 SUSPENSION/ DROPS ORAL at 08:39

## 2019-12-13 RX ADMIN — METFORMIN HYDROCHLORIDE 1000 MG: 500 TABLET ORAL at 10:10

## 2019-12-13 RX ADMIN — METOPROLOL TARTRATE 100 MG: 50 TABLET, FILM COATED ORAL at 19:52

## 2019-12-13 RX ADMIN — Medication 1 DROP: at 08:54

## 2019-12-13 RX ADMIN — ACETAMINOPHEN 650 MG: 325 TABLET ORAL at 08:43

## 2019-12-13 RX ADMIN — ACETAMINOPHEN 650 MG: 325 TABLET ORAL at 14:53

## 2019-12-13 RX ADMIN — METHIMAZOLE 10 MG: 5 TABLET ORAL at 19:52

## 2019-12-13 RX ADMIN — ACETAMINOPHEN 650 MG: 325 TABLET ORAL at 04:14

## 2019-12-13 RX ADMIN — SIMETHICONE 40 MG: 20 SUSPENSION/ DROPS ORAL at 14:43

## 2019-12-13 RX ADMIN — MODAFINIL 200 MG: 100 TABLET ORAL at 08:43

## 2019-12-13 RX ADMIN — Medication 1 DROP: at 20:02

## 2019-12-13 RX ADMIN — Medication 1 DROP: at 14:48

## 2019-12-13 RX ADMIN — ACETAMINOPHEN 650 MG: 325 TABLET ORAL at 18:27

## 2019-12-13 RX ADMIN — FAMOTIDINE 20 MG: 10 INJECTION, SOLUTION INTRAVENOUS at 19:53

## 2019-12-13 RX ADMIN — Medication 15 ML: at 19:53

## 2019-12-13 RX ADMIN — ALBUTEROL SULFATE 2.5 MG: 2.5 SOLUTION RESPIRATORY (INHALATION) at 19:14

## 2019-12-13 RX ADMIN — INSULIN GLARGINE 35 UNITS: 100 INJECTION, SOLUTION SUBCUTANEOUS at 08:40

## 2019-12-13 RX ADMIN — HYDRALAZINE HYDROCHLORIDE 10 MG: 20 INJECTION INTRAMUSCULAR; INTRAVENOUS at 17:31

## 2019-12-13 ASSESSMENT — PAIN SCALES - GENERAL: PAINLEVEL_OUTOF10: 8

## 2019-12-13 ASSESSMENT — PULMONARY FUNCTION TESTS
PIF_VALUE: 18
PIF_VALUE: 15
PIF_VALUE: 25
PIF_VALUE: 21
PIF_VALUE: 21
PIF_VALUE: 24
PIF_VALUE: 19
PIF_VALUE: 26
PIF_VALUE: 30
PIF_VALUE: 23
PIF_VALUE: 26
PIF_VALUE: 50
PIF_VALUE: 21
PIF_VALUE: 25
PIF_VALUE: 15
PIF_VALUE: 19
PIF_VALUE: 23
PIF_VALUE: 39
PIF_VALUE: 26
PIF_VALUE: 25

## 2019-12-13 NOTE — PROGRESS NOTES
Reviewed chart, and anesthesia staff evaluation with concerns if the patient is ready for this procedure. It appears that the patient needs further optimization and treatment of underlying PNA before OR. This is not an emergency procedure, nor will it have any impact on ventilation on nutrition for the time being - as the patient is already ventilated and oxygenated via ETT and for nutrition an enteroflex/NG tube can be utilized as far as nutrition is concerned. Once ready and free of these underlying issues, can certainly take the patient to OR. Will defer for now.

## 2019-12-13 NOTE — PROGRESS NOTES
General Surgery:  Daily Progress Note                   PATIENT NAME: Letha Wallace     TODAY'S DATE: 12/13/2019, 6:20 AM  CC: Basal ganglia infarct, pneumonia    SUBJECTIVE:     Pt seen and examined at bedside. No acute events overnight, patient intermittently following commands, difficult to arouse, vital signs currently stable, patient continues to be mechanically intubated and fed through tube feeds and OG tube. T-max overnight to 38.5°C per nursing staff ET tube secretions have diminished however he still has a significant amount of secretions requiring frequent suctioning. OBJECTIVE:   VITALS:  /64   Pulse 75   Temp 98.9 °F (37.2 °C)   Resp 18   Ht 5' 7\" (1.702 m)   Wt 211 lb 13.8 oz (96.1 kg)   SpO2 96%   BMI 33.18 kg/m²      INTAKE/OUTPUT:      Intake/Output Summary (Last 24 hours) at 12/13/2019 4782  Last data filed at 12/13/2019 0600  Gross per 24 hour   Intake 1213 ml   Output 800 ml   Net 413 ml       PHYSICAL EXAM:  General Appearance: Not responsive to physical stimulus, intubated, mechanically ventilated  HEENT:  Normocephalic, atraumatic, mucus membranes dry  NGT in place  Heart: Heart regular rate and rhythm  Lungs: Intubated, mechanically ventilated, coarse lung sounds bilaterally  Abdomen: Soft, obese, tympanitic  Incision: N/A  Extremities: No cyanosis, pitting edema, rashes noted. Skin: Skin color, texture, turgor normal. No rashes or lesions.     IV Access: PIV      Data:  CBC with Differential:    Lab Results   Component Value Date    WBC 8.3 12/13/2019    RBC 3.95 12/13/2019    HGB 12.4 12/13/2019    HCT 39.4 12/13/2019     12/13/2019    MCV 99.7 12/13/2019    MCH 31.4 12/13/2019    MCHC 31.5 12/13/2019    RDW 15.0 12/13/2019    LYMPHOPCT 15 12/13/2019    MONOPCT 9 12/13/2019    BASOPCT 1 12/13/2019    MONOSABS 0.73 12/13/2019    LYMPHSABS 1.22 12/13/2019    EOSABS 0.19 12/13/2019    BASOSABS 0.07 12/13/2019    DIFFTYPE NOT REPORTED 12/13/2019     CMP:    Lab Results   Component Value Date     12/13/2019    K 4.6 12/13/2019     12/13/2019    CO2 26 12/13/2019    BUN 38 12/13/2019    CREATININE 1.32 12/13/2019    GFRAA >60 12/13/2019    LABGLOM 55 12/13/2019    GLUCOSE 161 12/13/2019    PROT 7.0 12/06/2019    LABALBU 3.4 12/06/2019    CALCIUM 9.5 12/13/2019    BILITOT 0.51 12/06/2019    ALKPHOS 83 12/06/2019    AST 13 12/06/2019    ALT 13 12/06/2019       Radiology Review:  Ct Head Wo Contrast    Result Date: 12/11/2019  Stable intraparenchymal hemorrhage accounting for technical differences. 0.3 cm left-right midline shift. Decrease in intraventricular blood products. Xr Chest Portable    Result Date: 12/11/2019  Mild interval worsening of infiltrate at the right lung base. Xr Chest Portable    Result Date: 12/10/2019  Support tubes and lines as above. Advancement of the endotracheal tube by 2-3 cm suggested for optimal positioning. Cardiomegaly. Improving right lower lobe airspace disease. ASSESSMENT:  Active Hospital Problems    Diagnosis Date Noted    Ventilator dependence [facility]) [Z99.11]     Acute on chronic combined systolic and diastolic congestive heart failure (HCC) [I50.43]     Intraparenchymal hematoma of brain ([facility] [S06.360A]     Nontraumatic cortical hemorrhage of left cerebral hemisphere ([facility] [I61.1] 12/06/2019    Acute intra-cranial hemorrhage (HCC) [I62.9] 12/06/2019    Atrial fibrillation (HCC) [I48.91]     CAD (coronary artery disease) [I25.10]     Hyperlipidemia [E78.5]     Uncontrolled hypertension [I10]     Hyperthyroidism [E05.90]     Diabetes mellitus type 2 in obese ([facility] [E11.69, E66.9]        60 y.o. male with basal ganglia infarct    1. Basal ganglia infarct  2. Pneumonia    Plan:  1.  Tracheostomy and open gastrostomy tube placement canceled today per anesthesia, they recommend optimization of pneumonia and patient's underlying medical status as this is not an emergent procedure and can be performed within

## 2019-12-13 NOTE — PROGRESS NOTES
Nutrition Assessment (Enteral Nutrition)    Type and Reason for Visit: Reassess    Nutrition Recommendations: Continue TF at 55 mL/hr as tolerated. Obtain current weight as able. Nutrition Assessment: Pt tolerating diabetic TF at 55 mL/hr with minimal residuals. Noted plan for trach/PEG tomorrow - TF to be held at midnight. +BM yesterday. Malnutrition Assessment:  · Malnutrition Status: Insufficient data  · Context: Acute illness or injury  · Findings of the 6 clinical characteristics of malnutrition (Minimum of 2 out of 6 clinical characteristics is required to make the diagnosis of moderate or severe Protein Calorie Malnutrition based on AND/ASPEN Guidelines):  1. Energy Intake-Greater than 75% of estimated energy requirement, Greater than or equal to 5 days    2. Weight Loss-Unable to assess, in 1 week  3. Fat Loss-No significant subcutaneous fat loss,    4. Muscle Loss-No significant muscle mass loss,    5. Fluid Accumulation-Mild fluid accumulation, Extremities  6.   Strength-Not measured    Nutrition Risk Level: High    Nutrition Needs:  · Estimated Daily Total Kcal: 8925-1167 kcals/day  · Estimated Daily Protein (g): 100-135 gm/day    Nutrition Diagnosis:   · Problem: Inadequate oral intake  · Etiology: related to Impaired respiratory function-inability to consume food     Signs and symptoms:  as evidenced by NPO status due to medical condition, Nutrition support - EN    Objective Information:  · Wound Type: None  · Current Nutrition Therapies:  · Oral Diet Orders: NPO   · Tube Feeding (TF) Orders:   · Feeding Route: Nasogastric  · Formula: Diabetic  · Rate (ml/hr):55 mL/hr    · Volume (ml/day): 1320 mL/day  · Duration: Continuous  · Current TF & Flush Orders Provides: At goal  · Goal TF & Flush Orders Provides: Glucerna (diabetic) at 55 mL/hr = 1584 kcals, 79 gm protein (151 gm CHO per day)  · Anthropometric Measures:  · Ht: 5' 7\" (170.2 cm)   · Current Body Wt: 211 lb 13.8 oz (96.1 kg)  · Weight Change: KELLY   · Ideal Body Wt: 148 lb (67.1 kg), % Ideal Body 143%  · BMI Classification: BMI 30.0 - 34.9 Obese Class I    Nutrition Interventions:   Continue current Tube Feeding  Continued Inpatient Monitoring, Education Not Indicated    Nutrition Evaluation:   · Evaluation: Goal achieved   · Goals: Meet % of estimated nutrient needs.    · Monitoring: TF Intake, TF Tolerance, Pertinent Labs, Weight, Monitor Hemodynamic Status, I&O      Electronically signed by Lorena Vann MS, RD, LD on 12/13/19 at 11:42 AM    Contact Number: [phone]

## 2019-12-14 ENCOUNTER — APPOINTMENT (OUTPATIENT)
Dept: GENERAL RADIOLOGY | Age: 64
DRG: 003 | End: 2019-12-14
Payer: MEDICARE

## 2019-12-14 ENCOUNTER — ANESTHESIA (OUTPATIENT)
Dept: OPERATING ROOM | Age: 64
DRG: 003 | End: 2019-12-14
Payer: MEDICARE

## 2019-12-14 ENCOUNTER — ANESTHESIA EVENT (OUTPATIENT)
Dept: OPERATING ROOM | Age: 64
DRG: 003 | End: 2019-12-14
Payer: MEDICARE

## 2019-12-14 VITALS
DIASTOLIC BLOOD PRESSURE: 83 MMHG | OXYGEN SATURATION: 92 % | RESPIRATION RATE: 10 BRPM | TEMPERATURE: 99.6 F | SYSTOLIC BLOOD PRESSURE: 102 MMHG

## 2019-12-14 LAB
ABSOLUTE EOS #: 0.17 K/UL (ref 0–0.44)
ABSOLUTE IMMATURE GRANULOCYTE: 0.12 K/UL (ref 0–0.3)
ABSOLUTE LYMPH #: 1.37 K/UL (ref 1.1–3.7)
ABSOLUTE MONO #: 1.06 K/UL (ref 0.1–1.2)
ALLEN TEST: POSITIVE
ANION GAP SERPL CALCULATED.3IONS-SCNC: 14 MMOL/L (ref 9–17)
BASOPHILS # BLD: 1 % (ref 0–2)
BASOPHILS ABSOLUTE: 0.1 K/UL (ref 0–0.2)
BUN BLDV-MCNC: 31 MG/DL (ref 8–23)
BUN/CREAT BLD: ABNORMAL (ref 9–20)
CALCIUM SERPL-MCNC: 9.9 MG/DL (ref 8.6–10.4)
CHLORIDE BLD-SCNC: 107 MMOL/L (ref 98–107)
CO2: 26 MMOL/L (ref 20–31)
CREAT SERPL-MCNC: 1.08 MG/DL (ref 0.7–1.2)
DIFFERENTIAL TYPE: ABNORMAL
EOSINOPHILS RELATIVE PERCENT: 1 % (ref 1–4)
FIO2: 45
GFR AFRICAN AMERICAN: >60 ML/MIN
GFR NON-AFRICAN AMERICAN: >60 ML/MIN
GFR SERPL CREATININE-BSD FRML MDRD: ABNORMAL ML/MIN/{1.73_M2}
GFR SERPL CREATININE-BSD FRML MDRD: ABNORMAL ML/MIN/{1.73_M2}
GLUCOSE BLD-MCNC: 103 MG/DL (ref 75–110)
GLUCOSE BLD-MCNC: 109 MG/DL (ref 74–100)
GLUCOSE BLD-MCNC: 110 MG/DL (ref 75–110)
GLUCOSE BLD-MCNC: 145 MG/DL (ref 70–99)
GLUCOSE BLD-MCNC: 155 MG/DL (ref 75–110)
GLUCOSE BLD-MCNC: 69 MG/DL (ref 75–110)
GLUCOSE BLD-MCNC: 98 MG/DL (ref 75–110)
HCT VFR BLD CALC: 43 % (ref 40.7–50.3)
HEMOGLOBIN: 13.9 G/DL (ref 13–17)
IMMATURE GRANULOCYTES: 1 %
LYMPHOCYTES # BLD: 11 % (ref 24–43)
MCH RBC QN AUTO: 31.6 PG (ref 25.2–33.5)
MCHC RBC AUTO-ENTMCNC: 32.3 G/DL (ref 28.4–34.8)
MCV RBC AUTO: 97.7 FL (ref 82.6–102.9)
MODE: ABNORMAL
MONOCYTES # BLD: 9 % (ref 3–12)
NEGATIVE BASE EXCESS, ART: ABNORMAL (ref 0–2)
NRBC AUTOMATED: 0 PER 100 WBC
O2 DEVICE/FLOW/%: ABNORMAL
PATIENT TEMP: ABNORMAL
PDW BLD-RTO: 14.8 % (ref 11.8–14.4)
PLATELET # BLD: 190 K/UL (ref 138–453)
PLATELET ESTIMATE: ABNORMAL
PMV BLD AUTO: 11.5 FL (ref 8.1–13.5)
POC HCO3: 29.8 MMOL/L (ref 21–28)
POC O2 SATURATION: 98 % (ref 94–98)
POC PCO2 TEMP: ABNORMAL MM HG
POC PCO2: 46.4 MM HG (ref 35–48)
POC PH TEMP: ABNORMAL
POC PH: 7.42 (ref 7.35–7.45)
POC PO2 TEMP: ABNORMAL MM HG
POC PO2: 96.2 MM HG (ref 83–108)
POSITIVE BASE EXCESS, ART: 4 (ref 0–3)
POTASSIUM SERPL-SCNC: 4.4 MMOL/L (ref 3.7–5.3)
RBC # BLD: 4.4 M/UL (ref 4.21–5.77)
RBC # BLD: ABNORMAL 10*6/UL
SAMPLE SITE: ABNORMAL
SEG NEUTROPHILS: 77 % (ref 36–65)
SEGMENTED NEUTROPHILS ABSOLUTE COUNT: 9.29 K/UL (ref 1.5–8.1)
SODIUM BLD-SCNC: 147 MMOL/L (ref 135–144)
TCO2 (CALC), ART: 31 MMOL/L (ref 22–29)
WBC # BLD: 12.1 K/UL (ref 3.5–11.3)
WBC # BLD: ABNORMAL 10*3/UL

## 2019-12-14 PROCEDURE — 80048 BASIC METABOLIC PNL TOTAL CA: CPT

## 2019-12-14 PROCEDURE — 6360000002 HC RX W HCPCS: Performed by: PSYCHIATRY & NEUROLOGY

## 2019-12-14 PROCEDURE — 85025 COMPLETE CBC W/AUTO DIFF WBC: CPT

## 2019-12-14 PROCEDURE — 36415 COLL VENOUS BLD VENIPUNCTURE: CPT

## 2019-12-14 PROCEDURE — 3700000001 HC ADD 15 MINUTES (ANESTHESIA): Performed by: SURGERY

## 2019-12-14 PROCEDURE — 6370000000 HC RX 637 (ALT 250 FOR IP): Performed by: SURGERY

## 2019-12-14 PROCEDURE — 6360000002 HC RX W HCPCS: Performed by: STUDENT IN AN ORGANIZED HEALTH CARE EDUCATION/TRAINING PROGRAM

## 2019-12-14 PROCEDURE — 99291 CRITICAL CARE FIRST HOUR: CPT | Performed by: PSYCHIATRY & NEUROLOGY

## 2019-12-14 PROCEDURE — 3700000000 HC ANESTHESIA ATTENDED CARE: Performed by: SURGERY

## 2019-12-14 PROCEDURE — 2709999900 HC NON-CHARGEABLE SUPPLY: Performed by: SURGERY

## 2019-12-14 PROCEDURE — 6370000000 HC RX 637 (ALT 250 FOR IP): Performed by: STUDENT IN AN ORGANIZED HEALTH CARE EDUCATION/TRAINING PROGRAM

## 2019-12-14 PROCEDURE — 2580000003 HC RX 258: Performed by: STUDENT IN AN ORGANIZED HEALTH CARE EDUCATION/TRAINING PROGRAM

## 2019-12-14 PROCEDURE — 0B110F4 BYPASS TRACHEA TO CUTANEOUS WITH TRACHEOSTOMY DEVICE, OPEN APPROACH: ICD-10-PCS | Performed by: SURGERY

## 2019-12-14 PROCEDURE — 6360000002 HC RX W HCPCS: Performed by: NURSE ANESTHETIST, CERTIFIED REGISTERED

## 2019-12-14 PROCEDURE — 36600 WITHDRAWAL OF ARTERIAL BLOOD: CPT

## 2019-12-14 PROCEDURE — 2700000000 HC OXYGEN THERAPY PER DAY

## 2019-12-14 PROCEDURE — 94770 HC ETCO2 MONITOR DAILY: CPT

## 2019-12-14 PROCEDURE — 71045 X-RAY EXAM CHEST 1 VIEW: CPT

## 2019-12-14 PROCEDURE — 3600000004 HC SURGERY LEVEL 4 BASE: Performed by: SURGERY

## 2019-12-14 PROCEDURE — 82803 BLOOD GASES ANY COMBINATION: CPT

## 2019-12-14 PROCEDURE — 2580000003 HC RX 258: Performed by: NURSE ANESTHETIST, CERTIFIED REGISTERED

## 2019-12-14 PROCEDURE — 0DH60UZ INSERTION OF FEEDING DEVICE INTO STOMACH, OPEN APPROACH: ICD-10-PCS | Performed by: SURGERY

## 2019-12-14 PROCEDURE — 2500000003 HC RX 250 WO HCPCS: Performed by: NURSE ANESTHETIST, CERTIFIED REGISTERED

## 2019-12-14 PROCEDURE — 94761 N-INVAS EAR/PLS OXIMETRY MLT: CPT

## 2019-12-14 PROCEDURE — 2580000003 HC RX 258: Performed by: SURGERY

## 2019-12-14 PROCEDURE — 2500000003 HC RX 250 WO HCPCS: Performed by: STUDENT IN AN ORGANIZED HEALTH CARE EDUCATION/TRAINING PROGRAM

## 2019-12-14 PROCEDURE — 2000000003 HC NEURO ICU R&B

## 2019-12-14 PROCEDURE — 3600000014 HC SURGERY LEVEL 4 ADDTL 15MIN: Performed by: SURGERY

## 2019-12-14 PROCEDURE — 2580000003 HC RX 258: Performed by: PSYCHIATRY & NEUROLOGY

## 2019-12-14 PROCEDURE — 94003 VENT MGMT INPAT SUBQ DAY: CPT

## 2019-12-14 PROCEDURE — 94669 MECHANICAL CHEST WALL OSCILL: CPT

## 2019-12-14 PROCEDURE — 82947 ASSAY GLUCOSE BLOOD QUANT: CPT

## 2019-12-14 PROCEDURE — 0BH18EZ INSERTION OF ENDOTRACHEAL AIRWAY INTO TRACHEA, VIA NATURAL OR ARTIFICIAL OPENING ENDOSCOPIC: ICD-10-PCS | Performed by: SURGERY

## 2019-12-14 PROCEDURE — 94640 AIRWAY INHALATION TREATMENT: CPT

## 2019-12-14 RX ORDER — SODIUM CHLORIDE 9 MG/ML
INJECTION, SOLUTION INTRAVENOUS CONTINUOUS PRN
Status: DISCONTINUED | OUTPATIENT
Start: 2019-12-14 | End: 2019-12-14 | Stop reason: SDUPTHER

## 2019-12-14 RX ORDER — ROCURONIUM BROMIDE 10 MG/ML
INJECTION, SOLUTION INTRAVENOUS PRN
Status: DISCONTINUED | OUTPATIENT
Start: 2019-12-14 | End: 2019-12-14 | Stop reason: SDUPTHER

## 2019-12-14 RX ORDER — FENTANYL CITRATE 50 UG/ML
INJECTION, SOLUTION INTRAMUSCULAR; INTRAVENOUS PRN
Status: DISCONTINUED | OUTPATIENT
Start: 2019-12-14 | End: 2019-12-14 | Stop reason: SDUPTHER

## 2019-12-14 RX ORDER — MAGNESIUM HYDROXIDE 1200 MG/15ML
LIQUID ORAL PRN
Status: DISCONTINUED | OUTPATIENT
Start: 2019-12-14 | End: 2019-12-14 | Stop reason: ALTCHOICE

## 2019-12-14 RX ORDER — PHENYLEPHRINE HYDROCHLORIDE 10 MG/ML
INJECTION INTRAVENOUS PRN
Status: DISCONTINUED | OUTPATIENT
Start: 2019-12-14 | End: 2019-12-14 | Stop reason: SDUPTHER

## 2019-12-14 RX ORDER — CEFAZOLIN SODIUM 1 G/3ML
INJECTION, POWDER, FOR SOLUTION INTRAMUSCULAR; INTRAVENOUS PRN
Status: DISCONTINUED | OUTPATIENT
Start: 2019-12-14 | End: 2019-12-14 | Stop reason: SDUPTHER

## 2019-12-14 RX ORDER — ALBUTEROL SULFATE 2.5 MG/3ML
2.5 SOLUTION RESPIRATORY (INHALATION) EVERY 6 HOURS
Status: DISCONTINUED | OUTPATIENT
Start: 2019-12-15 | End: 2019-12-28

## 2019-12-14 RX ORDER — ULTRASOUND COUPLING MEDIUM
GEL (GRAM) TOPICAL PRN
Status: DISCONTINUED | OUTPATIENT
Start: 2019-12-14 | End: 2019-12-14 | Stop reason: ALTCHOICE

## 2019-12-14 RX ORDER — MIDAZOLAM HYDROCHLORIDE 1 MG/ML
INJECTION INTRAMUSCULAR; INTRAVENOUS PRN
Status: DISCONTINUED | OUTPATIENT
Start: 2019-12-14 | End: 2019-12-14 | Stop reason: SDUPTHER

## 2019-12-14 RX ADMIN — SODIUM CHLORIDE: 9 INJECTION, SOLUTION INTRAVENOUS at 07:32

## 2019-12-14 RX ADMIN — SIMETHICONE 40 MG: 20 SUSPENSION/ DROPS ORAL at 21:26

## 2019-12-14 RX ADMIN — Medication 500 MG: at 05:00

## 2019-12-14 RX ADMIN — ROCURONIUM BROMIDE 50 MG: 10 INJECTION INTRAVENOUS at 07:49

## 2019-12-14 RX ADMIN — ROCURONIUM BROMIDE 50 MG: 10 INJECTION INTRAVENOUS at 08:06

## 2019-12-14 RX ADMIN — DILTIAZEM HYDROCHLORIDE 60 MG: 60 TABLET, FILM COATED ORAL at 18:24

## 2019-12-14 RX ADMIN — ALBUTEROL SULFATE 2.5 MG: 2.5 SOLUTION RESPIRATORY (INHALATION) at 16:32

## 2019-12-14 RX ADMIN — ALBUTEROL SULFATE 2.5 MG: 2.5 SOLUTION RESPIRATORY (INHALATION) at 19:27

## 2019-12-14 RX ADMIN — HYDRALAZINE HYDROCHLORIDE 10 MG: 20 INJECTION INTRAMUSCULAR; INTRAVENOUS at 04:08

## 2019-12-14 RX ADMIN — Medication 15 ML: at 23:30

## 2019-12-14 RX ADMIN — FENTANYL CITRATE 250 MCG: 50 INJECTION, SOLUTION INTRAMUSCULAR; INTRAVENOUS at 09:03

## 2019-12-14 RX ADMIN — FENTANYL CITRATE 100 MCG: 50 INJECTION, SOLUTION INTRAMUSCULAR; INTRAVENOUS at 01:15

## 2019-12-14 RX ADMIN — DESMOPRESSIN ACETATE 40 MG: 0.2 TABLET ORAL at 21:26

## 2019-12-14 RX ADMIN — METHIMAZOLE 10 MG: 5 TABLET ORAL at 18:27

## 2019-12-14 RX ADMIN — METOPROLOL TARTRATE 100 MG: 50 TABLET, FILM COATED ORAL at 21:26

## 2019-12-14 RX ADMIN — DEXTROSE MONOHYDRATE 12.5 G: 500 INJECTION PARENTERAL at 00:37

## 2019-12-14 RX ADMIN — FENTANYL CITRATE 250 MCG: 50 INJECTION, SOLUTION INTRAMUSCULAR; INTRAVENOUS at 07:51

## 2019-12-14 RX ADMIN — HYDRALAZINE HYDROCHLORIDE 10 MG: 20 INJECTION INTRAMUSCULAR; INTRAVENOUS at 09:45

## 2019-12-14 RX ADMIN — CEFAZOLIN 2000 MG: 1 INJECTION, POWDER, FOR SOLUTION INTRAMUSCULAR; INTRAVENOUS at 07:54

## 2019-12-14 RX ADMIN — MIDAZOLAM HYDROCHLORIDE 2 MG: 1 INJECTION, SOLUTION INTRAMUSCULAR; INTRAVENOUS at 07:41

## 2019-12-14 RX ADMIN — FENTANYL CITRATE 100 MCG: 50 INJECTION, SOLUTION INTRAMUSCULAR; INTRAVENOUS at 15:54

## 2019-12-14 RX ADMIN — Medication 1 DROP: at 14:37

## 2019-12-14 RX ADMIN — FENTANYL CITRATE 100 MCG: 50 INJECTION, SOLUTION INTRAMUSCULAR; INTRAVENOUS at 04:08

## 2019-12-14 RX ADMIN — PHENYLEPHRINE HYDROCHLORIDE 100 MCG: 10 INJECTION INTRAVENOUS at 08:35

## 2019-12-14 RX ADMIN — CEFTRIAXONE SODIUM 1 G: 1 INJECTION, POWDER, FOR SOLUTION INTRAMUSCULAR; INTRAVENOUS at 04:11

## 2019-12-14 RX ADMIN — SIMETHICONE 40 MG: 20 SUSPENSION/ DROPS ORAL at 18:27

## 2019-12-14 RX ADMIN — FAMOTIDINE 20 MG: 10 INJECTION, SOLUTION INTRAVENOUS at 21:26

## 2019-12-14 RX ADMIN — HYDRALAZINE HYDROCHLORIDE 10 MG: 20 INJECTION INTRAMUSCULAR; INTRAVENOUS at 23:28

## 2019-12-14 RX ADMIN — FENTANYL CITRATE 100 MCG: 50 INJECTION, SOLUTION INTRAMUSCULAR; INTRAVENOUS at 13:43

## 2019-12-14 ASSESSMENT — PULMONARY FUNCTION TESTS
PIF_VALUE: 26
PIF_VALUE: 21
PIF_VALUE: 1
PIF_VALUE: 16
PIF_VALUE: 23
PIF_VALUE: 18
PIF_VALUE: 22
PIF_VALUE: 28
PIF_VALUE: 23
PIF_VALUE: 15
PIF_VALUE: 23
PIF_VALUE: 30
PIF_VALUE: 25
PIF_VALUE: 16
PIF_VALUE: 34
PIF_VALUE: 27
PIF_VALUE: 3
PIF_VALUE: 26
PIF_VALUE: 24
PIF_VALUE: 31
PIF_VALUE: 26
PIF_VALUE: 35
PIF_VALUE: 16
PIF_VALUE: 15
PIF_VALUE: 28
PIF_VALUE: 30
PIF_VALUE: 25
PIF_VALUE: 26
PIF_VALUE: 26
PIF_VALUE: 18
PIF_VALUE: 2
PIF_VALUE: 25
PIF_VALUE: 26
PIF_VALUE: 25
PIF_VALUE: 15
PIF_VALUE: 25
PIF_VALUE: 25
PIF_VALUE: 28
PIF_VALUE: 30
PIF_VALUE: 32
PIF_VALUE: 27
PIF_VALUE: 26
PIF_VALUE: 26
PIF_VALUE: 13
PIF_VALUE: 31
PIF_VALUE: 23
PIF_VALUE: 2
PIF_VALUE: 29
PIF_VALUE: 23
PIF_VALUE: 22
PIF_VALUE: 26
PIF_VALUE: 23
PIF_VALUE: 23
PIF_VALUE: 22
PIF_VALUE: 26
PIF_VALUE: 29
PIF_VALUE: 25
PIF_VALUE: 35
PIF_VALUE: 27
PIF_VALUE: 31
PIF_VALUE: 30
PIF_VALUE: 15
PIF_VALUE: 16
PIF_VALUE: 15
PIF_VALUE: 25
PIF_VALUE: 23
PIF_VALUE: 33
PIF_VALUE: 30
PIF_VALUE: 25
PIF_VALUE: 23
PIF_VALUE: 15
PIF_VALUE: 29
PIF_VALUE: 24
PIF_VALUE: 25
PIF_VALUE: 30
PIF_VALUE: 31
PIF_VALUE: 24
PIF_VALUE: 24
PIF_VALUE: 30
PIF_VALUE: 30
PIF_VALUE: 23
PIF_VALUE: 16
PIF_VALUE: 26
PIF_VALUE: 31
PIF_VALUE: 29
PIF_VALUE: 27
PIF_VALUE: 23
PIF_VALUE: 25
PIF_VALUE: 30
PIF_VALUE: 7
PIF_VALUE: 16
PIF_VALUE: 21
PIF_VALUE: 23
PIF_VALUE: 29
PIF_VALUE: 23
PIF_VALUE: 26
PIF_VALUE: 8
PIF_VALUE: 25
PIF_VALUE: 24
PIF_VALUE: 25
PIF_VALUE: 23
PIF_VALUE: 16

## 2019-12-14 ASSESSMENT — LIFESTYLE VARIABLES: SMOKING_STATUS: 1

## 2019-12-14 ASSESSMENT — PAIN SCALES - GENERAL
PAINLEVEL_OUTOF10: 0
PAINLEVEL_OUTOF10: 9
PAINLEVEL_OUTOF10: 10
PAINLEVEL_OUTOF10: 8
PAINLEVEL_OUTOF10: 7

## 2019-12-14 NOTE — BRIEF OP NOTE
Brief Postoperative Note  ______________________________________________________________    Patient: Stalin Kramer  YOB: 1955  MRN: [id number]  Date of Procedure: 12/14/2019    Pre-Op Diagnosis: BASAL GANGLIA INFARCT, RESPIRATORY FAILURE, DYSPHAGIA    Post-Op Diagnosis: Same       Procedure(s):  TRACHEOSTOMY, OPEN GASTROSTOMY TUBE PLACEMENT    Anesthesia: General    Surgeon(s):  Michael Reich DO    Assistant: Akilah Lucero    Estimated Blood Loss (mL): 10 ml    Fluids: 248 ml    Complications: None    Specimens:   * No specimens in log *    Implants:  * No implants in log *      Drains:   Gastrostomy/Enterostomy/Jejunostomy Gastrostomy LUQ 1 20 fr (Active)       External Urinary Catheter (Active)   Catheter changed  No 12/14/2019  4:00 AM   Urine Color Erinn 12/14/2019  4:00 AM   Urine Appearance Clear 12/14/2019  4:00 AM   Urine Odor Malodorous 12/13/2019  4:00 PM   Output (mL) 1200 mL 12/14/2019  6:00 AM   Placement Replaced 12/13/2019  4:00 PM   Skin Assessment No Injury 12/14/2019  4:00 AM       [REMOVED] NG/OG/NJ/NE Tube Nasogastric 18 fr Right nostril (Removed)   Surrounding Skin Dry; Intact 12/14/2019  4:00 AM   Securement device Yes 12/14/2019  4:00 AM   Status Clamped 12/14/2019  4:00 AM   Placement Verified by External Catheter Length 12/14/2019  4:00 AM   NG/OG/NJ/NE External Measurement (cm) 60 cm 12/14/2019  4:00 AM   Drainage Appearance Clear 12/14/2019  4:00 AM   Tube Feeding Diabetic 12/13/2019  8:00 PM   Tube Feeding Status Stopped 12/14/2019  4:00 AM   Rate/Schedule 55 mL/hr 12/13/2019  8:00 PM   Tube Feeding Intake (mL) 329 ml 12/14/2019  6:00 AM   Free Water Flush (mL) 40 mL 12/13/2019  8:00 PM   Free Water Rate 30 12/10/2019  9:00 AM   Residual Volume (ml) 10 ml 12/13/2019  8:00 PM       [REMOVED] Urethral Catheter Non-latex 18 fr (Removed)   Catheter Indications Need for fluid management in critically ill patients in a critical care setting not able to be managed by other means such as BSC with hat, bedpan, urinal, condom catheter, or short term intermittent urethral catherization 12/11/2019  4:00 PM   Securement Device Date Changed 12/07/19 12/8/2019  8:00 AM   Site Assessment No urethral drainage 12/11/2019  4:00 PM   Urine Color Erinn 12/11/2019  4:00 PM   Urine Appearance Cloudy 12/11/2019  4:00 PM   Output (mL) 45 mL 12/11/2019  6:00 PM       [REMOVED] External Urinary Catheter (Removed)   Output (mL) 400 mL 12/7/2019  5:51 AM       Findings: Wound class 2. 8Fr Shiley DCT. 20 Fr Gipson for G tube. Previous neck surgery with extensive scarring.      Jaspal Ellis DO  Date: 12/14/2019  Time: 8:55 AM

## 2019-12-14 NOTE — PROGRESS NOTES
Pt back from OR. Pt placed on vent and connected to monitor. VS and assessment obtained. Pt family updated.

## 2019-12-14 NOTE — PLAN OF CARE
Problem: COMMUNICATION IMPAIRMENT  Goal: Ability to express needs and understand communication  12/14/2019 0516 by Danilo Goldsmith RN  Outcome: Ongoing     Problem: Neurological  Goal: Maximum potential motor/sensory/cognitive function  12/14/2019 0516 by Danilo Goldsmith RN  Outcome: Ongoing     Problem: MECHANICAL VENTILATION  Goal: Ability to express needs and understand communication  12/14/2019 0516 by Danilo Goldsmith RN  Outcome: Ongoing     Problem: MECHANICAL VENTILATION  Goal: Patient will maintain patent airway  12/14/2019 0516 by Danilo Goldsmith RN  Outcome: Ongoing     Problem: Restraint Use - Nonviolent/Non-Self-Destructive Behavior:  Goal: Absence of restraint indications  Description  Absence of restraint indications  12/14/2019 0516 by Danilo Goldsmith RN  Outcome: Ongoing     Problem: OXYGENATION/RESPIRATORY FUNCTION  Goal: Patient will maintain patent airway  12/14/2019 0516 by Danilo Goldsmith RN  Outcome: Ongoing   Danilo Goldsmith RN

## 2019-12-14 NOTE — PROGRESS NOTES
General Surgery:  Daily Progress Note                   PATIENT NAME: Manus Libman     TODAY'S DATE: 12/14/2019, 7:07 AM  CC: Basal ganglia infarct, pneumonia    SUBJECTIVE:     Pt seen and examined at bedside. No clinical change. TF held. OBJECTIVE:   VITALS:  BP (!) 157/74   Pulse 75   Temp 100.2 °F (37.9 °C) (Oral)   Resp 19   Ht 5' 7\" (1.702 m)   Wt 211 lb 13.8 oz (96.1 kg)   SpO2 97%   BMI 33.18 kg/m²      INTAKE/OUTPUT:      Intake/Output Summary (Last 24 hours) at 12/14/2019 0822  Last data filed at 12/14/2019 0600  Gross per 24 hour   Intake 1814 ml   Output 1200 ml   Net 614 ml       PHYSICAL EXAM:  General Appearance: Not responsive to physical stimulus, intubated, mechanically ventilated  HEENT:  Normocephalic, atraumatic, mucus membranes dry  NGT in place  Heart: Heart regular rate and rhythm  Lungs: Intubated, mechanically ventilated, coarse lung sounds bilaterally  Abdomen: Soft, obese, tympanitic  Incision: N/A  Extremities: No cyanosis, pitting edema, rashes noted. Skin: Skin color, texture, turgor normal. No rashes or lesions.     IV Access: PIV      Data:  CBC with Differential:    Lab Results   Component Value Date    WBC 12.1 12/14/2019    RBC 4.40 12/14/2019    HGB 13.9 12/14/2019    HCT 43.0 12/14/2019     12/14/2019    MCV 97.7 12/14/2019    MCH 31.6 12/14/2019    MCHC 32.3 12/14/2019    RDW 14.8 12/14/2019    LYMPHOPCT 11 12/14/2019    MONOPCT 9 12/14/2019    BASOPCT 1 12/14/2019    MONOSABS 1.06 12/14/2019    LYMPHSABS 1.37 12/14/2019    EOSABS 0.17 12/14/2019    BASOSABS 0.10 12/14/2019    DIFFTYPE NOT REPORTED 12/14/2019     CMP:    Lab Results   Component Value Date     12/13/2019    K 4.6 12/13/2019     12/13/2019    CO2 26 12/13/2019    BUN 38 12/13/2019    CREATININE 1.32 12/13/2019    GFRAA >60 12/13/2019    LABGLOM 55 12/13/2019    GLUCOSE 161 12/13/2019    PROT 7.0 12/06/2019    LABALBU 3.4 12/06/2019    CALCIUM 9.5 12/13/2019    BILITOT 0.51

## 2019-12-14 NOTE — PROGRESS NOTES
Pt to OR with RT and RN. Pt placed on transport monitor. Report given to OR staff. Family placed in waiting room.

## 2019-12-14 NOTE — PROGRESS NOTES
Daily Progress Note  Neuro Critical Care    Patient Name: Brodie Carrington  Patient : 1955  Room/Bed: 0523/0523-01  Code Status: full  Allergies: No Known Allergies    CHIEF COMPLAINT:      Slurred speech left-sided facial droop unable to move right arm       INTERVAL HISTORY    Initial Presentation     The patient is a 61 y. o. male presented with past medical history of type 2 diabetes thyroid disease hypothyroidism, hypertension hyperlipidemia coronary artery disease status post stent A. fib on coumadin came as a transfer from [facility] with last well-known oh 6 AM this morning when wife was with him and the patient had coffee he then went to lie on bed and around 8 AM to take his medication sitting on the couch and the wife noticed that he was mumbling unable to move his right arm and had a left facial droop and she called 911. At [address] his blood pressure was 160/97 and blood glucose 297.  His stroke scale was 10 and INR was 2 he had a CT head done which showed acute intraparenchymal hematoma in the left basal ganglia measuring 2.1x 2.3 x 2.1 cm, parenchymal volume loss and moderate chronic microvascular ischemic changes as well as old infarct.  Plan will was to transfer him to [facility] and start him on [address] for reversal.  Started on Cardene drip with blood pressure goal of less than 140 and received 1500 units of Kcentra for INR reversal.      ICH score: 0     12/8  TSH, T3, T4 normal, head CT showing worsening basal ganglia hemorrhage as well as some IVH involvement.  CTA neg. Neurosurgery aware. Nonda Rist showing severe diastolic dysfunction as well as EF of 48%. Upon return from CT scan patient had bilateral crackles as well as pulmonary edema on x-ray.  Lasix given and patient put on BiPAP.  Worsening respiratory status with crackles in all lung fields.  Patient begins to desaturate into the 80s.   Decision made to intubate.  Art line placed.  Patient switch from Cleviprex to Cardene.  Sedation with propofol.  Fentanyl as needed pain q1 hours.  Additional 80 mg Lasix given.  Restarted on home antihypertensives.  Required elevated PEEP.  INR 1.1.  10 mg IV vitamin K given.   Repeat CT head this a.m. stable with no interval change in the left thalamic hematoma or intraventricular hemorrhage as well as degree of midline shift being unchanged.  Patient received hydralazine 10 mg x1 overnight for blood pressure reading of 159/89.  Patient afebrile no leukocytosis seen.  Tube feeds started-diabetic diet, heparin 5000 subcu every 8 started for DVT prophylaxis           12/9:  Patient had T-max of 100.4, UA sent negative for infection.  Sputum culture sent prior showed no growth.  Patient off Cardene, on Precedex.  Tolerating tube feed.  Intubated, INR 0.9, no vitamin K given.  Patient on Lasix 20mg twice daily with a goal of being in 1 to 1.5 L negative balance.  Input 1241 L output 1465.           12/10:  No acute events overnight. UA negative for any growth blood cultures negative sputum cultures negative. Tolerated  only 40 minutes of CPAP, glipizide and metformin thousand milligrams started, pressure better controlled today. No bowel movement since admission patient placed on Colace    12/11  Sputum positive for strep pneumonia started on ceftriaxone and azithomycin temp of 101.7 cxr showed worsenig  of infiltrate on right lung base.mag citrate given since no bowel movement since admission. Last 24:   Patient was seen by general surgery and alerted us that anesthesia had canceled the case due to ongoing low-grade fevers concern for stability. Case rescheduled for morning of 12/14/2019. Patient continues to have copious secretions with history of COPD making him unlikely to tolerate extubation even in light of favorable predictive ventilatory studies. Leukocytosis remains low at 8.3. Patient remains on ceftriaxone and this is azithromycin.   Eyes open at rest.  Some spontaneous hrs:   BP Temp Temp src Pulse Resp SpO2   12/13/19 2005 (!) 166/70 100.1 °F (37.8 °C) Oral 75 22 95 %   12/13/19 1915 -- -- -- 75 21 96 %   12/13/19 1903 (!) 161/76 -- -- 75 19 95 %   12/13/19 1803 (!) 182/89 -- -- 75 24 --   12/13/19 1732 (!) 167/86 -- -- 75 22 --   12/13/19 1603 (!) 169/78 101.1 °F (38.4 °C) Oral 75 24 92 %   12/13/19 1542 -- -- -- 75 24 97 %   12/13/19 1503 (!) 166/77 -- -- 75 23 92 %   12/13/19 1403 (!) 161/72 -- -- 75 22 92 %   12/13/19 1303 (!) 163/78 -- -- 75 25 95 %   12/13/19 1206 -- -- -- 75 24 96 %   12/13/19 1205 -- -- -- -- 24 97 %   12/13/19 1203 (!) 153/90 101.2 °F (38.4 °C) Oral 75 25 96 %   12/13/19 1103 (!) 145/83 -- -- 75 25 92 %   12/13/19 1003 (!) 144/83 -- -- 75 25 92 %   12/13/19 0903 (!) 147/84 -- -- 76 14 98 %   12/13/19 0840 -- -- -- 75 23 96 %     Estimated body mass index is 33.18 kg/m² as calculated from the following:    Height as of this encounter: 5' 7\" (1.702 m).     Weight as of this encounter: 211 lb 13.8 oz (96.1 kg).  []<16 Severe malnutrition  []16-16.99 Moderate malnutrition  []17-18.49 Mild malnutrition  [x]18.5-24.9 Normal  []25-29.9 Overweight (not obese)  []30-34.9 Obese class 1 (Low Risk)  []35-39.9 Obese class 2 (Moderate Risk)  []?40 Obese class 3 (High Risk)    RECENT LABS:   Lab Results   Component Value Date    WBC 8.3 12/13/2019    HGB 12.4 (L) 12/13/2019    HCT 39.4 (L) 12/13/2019     12/13/2019    CHOL 194 07/11/2015    TRIG 363 (H) 07/11/2015    HDL 26 (L) 07/11/2015    ALT 13 12/06/2019    AST 13 12/06/2019     12/13/2019    K 4.6 12/13/2019     12/13/2019    CREATININE 1.32 (H) 12/13/2019    BUN 38 (H) 12/13/2019    CO2 26 12/13/2019    TSH 3.15 12/06/2019    INR 0.9 12/10/2019    LABA1C 7.4 (H) 12/06/2019     24 HOUR INTAKE/OUTPUT:    Intake/Output Summary (Last 24 hours) at 12/13/2019 2024  Last data filed at 12/13/2019 1803  Gross per 24 hour   Intake 2399 ml   Output 300 ml   Net 2099 ml       Labs and Images reviewed 5 mg twice daily and metformin thousand mg  lantus 35 U BID from 60 qd  -Thyroid studies negative     OTHER:  - PT/OT eval      DVT PROPHYLAXIS:  - SCD sleeves - Thigh High   - ZORA stockings - Thigh High  -On Lovenox 40mg subq     DISPOSITION:  [x] To remain ICU:   [] OK for out of ICU from Neuro Critical Care standpoint    We will continue to follow along. For any changes in exam or patient status please contact Neuro Critical Care.       Devendra Walker MD  Neuro Critical Care  Pager [phone]  12/13/2019     8:24 PM

## 2019-12-14 NOTE — PROGRESS NOTES
12/14/19 1045   Vent Information   Vent Mode CPAP   Pressure Support 10 cmH20   FiO2  45 %   Vent Patient Data   Rate Measured 18 br/min   Vt Exhaled 524 mL   Minute Volume 9.9 Liters   Patient placed on CPAP/PSV by MD. Notified at 11:23. Wean as tolerated.

## 2019-12-14 NOTE — PROGRESS NOTES
Daily Progress Note  Neuro Critical Care    Patient Name: Aaliyah England  Patient : 1955  Room/Bed: 0523/0523-01  Code Status: full  Allergies: No Known Allergies    CHIEF COMPLAINT:      Slurred speech left-sided facial droop unable to move right arm       INTERVAL HISTORY    Initial Presentation     The patient is a 61 y. o. male presented with past medical history of type 2 diabetes thyroid disease hypothyroidism, hypertension hyperlipidemia coronary artery disease status post stent A. fib on coumadin came as a transfer from [facility] with last well-known oh 6 AM this morning when wife was with him and the patient had coffee he then went to lie on bed and around 8 AM to take his medication sitting on the couch and the wife noticed that he was mumbling unable to move his right arm and had a left facial droop and she called 911. At [address] his blood pressure was 160/97 and blood glucose 297.  His stroke scale was 10 and INR was 2 he had a CT head done which showed acute intraparenchymal hematoma in the left basal ganglia measuring 2.1x 2.3 x 2.1 cm, parenchymal volume loss and moderate chronic microvascular ischemic changes as well as old infarct.  Plan will was to transfer him to [facility] and start him on [address] for reversal.  Started on Cardene drip with blood pressure goal of less than 140 and received 1500 units of Kcentra for INR reversal.      ICH score: 0     12/8  TSH, T3, T4 normal, head CT showing worsening basal ganglia hemorrhage as well as some IVH involvement.  CTA neg. Neurosurgery aware. Lashay Layer showing severe diastolic dysfunction as well as EF of 48%. Upon return from CT scan patient had bilateral crackles as well as pulmonary edema on x-ray.  Lasix given and patient put on BiPAP.  Worsening respiratory status with crackles in all lung fields.  Patient begins to desaturate into the 80s.   Decision made to intubate.  Art line placed.  Patient switch from Cleviprex to Cardene.  Sedation with propofol.  Fentanyl as needed pain q1 hours.  Additional 80 mg Lasix given.  Restarted on home antihypertensives.  Required elevated PEEP.  INR 1.1.  10 mg IV vitamin K given.   Repeat CT head this a.m. stable with no interval change in the left thalamic hematoma or intraventricular hemorrhage as well as degree of midline shift being unchanged.  Patient received hydralazine 10 mg x1 overnight for blood pressure reading of 159/89.  Patient afebrile no leukocytosis seen.  Tube feeds started-diabetic diet, heparin 5000 subcu every 8 started for DVT prophylaxis           12/9:  Patient had T-max of 100.4, UA sent negative for infection.  Sputum culture sent prior showed no growth.  Patient off Cardene, on Precedex.  Tolerating tube feed.  Intubated, INR 0.9, no vitamin K given.  Patient on Lasix 20mg twice daily with a goal of being in 1 to 1.5 L negative balance.  Input 1241 L output 1465.          12/10:  No acute events overnight. UA negative for any growth blood cultures negative sputum cultures negative. Tolerated  only 40 minutes of CPAP, glipizide and metformin thousand milligrams started, pressure better controlled today. No bowel movement since admission patient placed on Colace    12/11  Sputum positive for strep pneumonia started on ceftriaxone and azithomycin temp of 101.7 cxr showed worsenig  of infiltrate on right lung base.mag citrate given since no bowel movement since admission. 12/13  Patient was seen by general surgery and alerted us that anesthesia had canceled the case due to ongoing low-grade fevers concern for stability. Case rescheduled for morning of 12/14/2019. Patient continues to have copious secretions with history of COPD making him unlikely to tolerate extubation even in light of favorable predictive ventilatory studies. Leukocytosis remains low at 8.3. Patient remains on ceftriaxone and this is azithromycin. Eyes open at rest.  Some spontaneous movement. Does not follow commands.  : post peg trach. Wbc increased to 13. Continue azithromycin and ceftriaxone for pneumonia. Will start tube feeds per surgical recommendations through G-tube.   Consider stepdown PT OT tomorrow      CURRENT MEDICATIONS:  SCHEDULED MEDICATIONS:   insulin glargine  35 Units Subcutaneous BID    azithromycin  500 mg Intravenous Q24H    cefTRIAXone (ROCEPHIN) IV  1 g Intravenous Q24H    insulin lispro  0-18 Units Subcutaneous 4 times per day    famotidine (PEPCID) injection  20 mg Intravenous BID    sennosides-docusate sodium  2 tablet Oral Daily    modafinil  200 mg Per NG tube Daily    tetrahydrozoline  1 drop Left Eye TID    metFORMIN  1,000 mg Per NG tube BID WC    enoxaparin  40 mg Subcutaneous Daily    docusate  100 mg Per NG tube Daily    glyBURIDE  5 mg Per NG tube BID WC    vitamin B-1  100 mg Oral Daily    amLODIPine  5 mg Per NG tube Daily    albuterol  2.5 mg Nebulization 4x daily    bumetanide  0.5 mg Oral Daily    chlorhexidine  15 mL Mouth/Throat BID    lisinopril  40 mg Oral Daily    simethicone  40 mg Per NG tube 4x Daily    atorvastatin  40 mg Oral Daily    sodium chloride flush  10 mL Intravenous 2 times per day    methIMAzole  20 mg Oral Daily    methIMAzole  10 mg Oral QPM    metoprolol tartrate  100 mg Oral BID    diltiazem  60 mg Oral 4 times per day     CONTINUOUS INFUSIONS:   dexmedetomidine (PRECEDEX) IV infusion Stopped (19 4294)    dextrose       PRN MEDICATIONS:   REFRESH LACRI-LUBE, potassium chloride, fentanNYL, glucose, dextrose, glucagon (rDNA), dextrose, acetaminophen **OR** acetaminophen, sodium chloride flush, ondansetron, metoprolol, hydrALAZINE    VITALS:  Temperature Range: Temp: 100.2 °F (37.9 °C) Temp  Av.4 °F (37.4 °C)  Min: 88 °F (31.1 °C)  Max: 101.2 °F (38.4 °C)  BP Range: Systolic (19SEO), XDP:378 , Min:92 , WCQ:158     Diastolic (20HUE), MHQ:01, Min:54, Max:131    Pulse Range: Pulse  Av  Min: 75 - ZORA stockings - Thigh High  -On Lovenox 40mg subq     DISPOSITION:  [x] To remain ICU:   [] OK for out of ICU from Neuro Critical Care standpoint    We will continue to follow along. For any changes in exam or patient status please contact Neuro Critical Care.       Harjinder Hawkins MD  Neuro Critical Care  Pager [phone]  12/14/2019     10:47 AM

## 2019-12-15 ENCOUNTER — APPOINTMENT (OUTPATIENT)
Dept: GENERAL RADIOLOGY | Age: 64
DRG: 003 | End: 2019-12-15
Payer: MEDICARE

## 2019-12-15 PROBLEM — J69.0 ASPIRATION PNEUMONIA (HCC): Status: ACTIVE | Noted: 2019-12-15

## 2019-12-15 PROBLEM — I50.32 CHRONIC DIASTOLIC HEART FAILURE (HCC): Status: ACTIVE | Noted: 2019-12-15

## 2019-12-15 PROBLEM — E87.0 HYPERNATREMIA: Status: ACTIVE | Noted: 2019-12-15

## 2019-12-15 LAB
ABSOLUTE EOS #: 0.11 K/UL (ref 0–0.44)
ABSOLUTE IMMATURE GRANULOCYTE: 0.21 K/UL (ref 0–0.3)
ABSOLUTE LYMPH #: 1.29 K/UL (ref 1.1–3.7)
ABSOLUTE MONO #: 0.87 K/UL (ref 0.1–1.2)
ANION GAP SERPL CALCULATED.3IONS-SCNC: 11 MMOL/L (ref 9–17)
ANION GAP SERPL CALCULATED.3IONS-SCNC: 13 MMOL/L (ref 9–17)
BASOPHILS # BLD: 1 % (ref 0–2)
BASOPHILS ABSOLUTE: 0.07 K/UL (ref 0–0.2)
BUN BLDV-MCNC: 24 MG/DL (ref 8–23)
BUN BLDV-MCNC: 27 MG/DL (ref 8–23)
BUN/CREAT BLD: ABNORMAL (ref 9–20)
BUN/CREAT BLD: ABNORMAL (ref 9–20)
CALCIUM SERPL-MCNC: 9.3 MG/DL (ref 8.6–10.4)
CALCIUM SERPL-MCNC: 9.4 MG/DL (ref 8.6–10.4)
CHLORIDE BLD-SCNC: 110 MMOL/L (ref 98–107)
CHLORIDE BLD-SCNC: 112 MMOL/L (ref 98–107)
CO2: 23 MMOL/L (ref 20–31)
CO2: 24 MMOL/L (ref 20–31)
CREAT SERPL-MCNC: 0.9 MG/DL (ref 0.7–1.2)
CREAT SERPL-MCNC: 1.04 MG/DL (ref 0.7–1.2)
CULTURE: NORMAL
CULTURE: NORMAL
DIFFERENTIAL TYPE: ABNORMAL
EOSINOPHILS RELATIVE PERCENT: 1 % (ref 1–4)
GFR AFRICAN AMERICAN: >60 ML/MIN
GFR AFRICAN AMERICAN: >60 ML/MIN
GFR NON-AFRICAN AMERICAN: >60 ML/MIN
GFR NON-AFRICAN AMERICAN: >60 ML/MIN
GFR SERPL CREATININE-BSD FRML MDRD: ABNORMAL ML/MIN/{1.73_M2}
GLUCOSE BLD-MCNC: 136 MG/DL (ref 75–110)
GLUCOSE BLD-MCNC: 139 MG/DL (ref 75–110)
GLUCOSE BLD-MCNC: 150 MG/DL (ref 75–110)
GLUCOSE BLD-MCNC: 157 MG/DL (ref 70–99)
GLUCOSE BLD-MCNC: 160 MG/DL (ref 70–99)
GLUCOSE BLD-MCNC: 175 MG/DL (ref 75–110)
HCT VFR BLD CALC: 41.3 % (ref 40.7–50.3)
HCT VFR BLD CALC: 43.8 % (ref 40.7–50.3)
HEMOGLOBIN: 13.1 G/DL (ref 13–17)
HEMOGLOBIN: 13.6 G/DL (ref 13–17)
IMMATURE GRANULOCYTES: 2 %
LYMPHOCYTES # BLD: 11 % (ref 24–43)
Lab: NORMAL
Lab: NORMAL
MCH RBC QN AUTO: 31.3 PG (ref 25.2–33.5)
MCH RBC QN AUTO: 31.3 PG (ref 25.2–33.5)
MCHC RBC AUTO-ENTMCNC: 31.1 G/DL (ref 28.4–34.8)
MCHC RBC AUTO-ENTMCNC: 31.7 G/DL (ref 28.4–34.8)
MCV RBC AUTO: 100.7 FL (ref 82.6–102.9)
MCV RBC AUTO: 98.6 FL (ref 82.6–102.9)
MONOCYTES # BLD: 8 % (ref 3–12)
NRBC AUTOMATED: 0 PER 100 WBC
NRBC AUTOMATED: 0 PER 100 WBC
PDW BLD-RTO: 14.7 % (ref 11.8–14.4)
PDW BLD-RTO: 14.9 % (ref 11.8–14.4)
PLATELET # BLD: 173 K/UL (ref 138–453)
PLATELET # BLD: 180 K/UL (ref 138–453)
PLATELET ESTIMATE: ABNORMAL
PMV BLD AUTO: 10.9 FL (ref 8.1–13.5)
PMV BLD AUTO: 11.8 FL (ref 8.1–13.5)
POTASSIUM SERPL-SCNC: 4.2 MMOL/L (ref 3.7–5.3)
POTASSIUM SERPL-SCNC: 4.6 MMOL/L (ref 3.7–5.3)
RBC # BLD: 4.19 M/UL (ref 4.21–5.77)
RBC # BLD: 4.35 M/UL (ref 4.21–5.77)
RBC # BLD: ABNORMAL 10*6/UL
SEG NEUTROPHILS: 77 % (ref 36–65)
SEGMENTED NEUTROPHILS ABSOLUTE COUNT: 8.82 K/UL (ref 1.5–8.1)
SODIUM BLD-SCNC: 146 MMOL/L (ref 135–144)
SODIUM BLD-SCNC: 147 MMOL/L (ref 135–144)
SPECIMEN DESCRIPTION: NORMAL
SPECIMEN DESCRIPTION: NORMAL
WBC # BLD: 11.4 K/UL (ref 3.5–11.3)
WBC # BLD: 9.6 K/UL (ref 3.5–11.3)
WBC # BLD: ABNORMAL 10*3/UL

## 2019-12-15 PROCEDURE — 6360000002 HC RX W HCPCS: Performed by: STUDENT IN AN ORGANIZED HEALTH CARE EDUCATION/TRAINING PROGRAM

## 2019-12-15 PROCEDURE — 99222 1ST HOSP IP/OBS MODERATE 55: CPT | Performed by: NURSE PRACTITIONER

## 2019-12-15 PROCEDURE — 94640 AIRWAY INHALATION TREATMENT: CPT

## 2019-12-15 PROCEDURE — 6370000000 HC RX 637 (ALT 250 FOR IP): Performed by: STUDENT IN AN ORGANIZED HEALTH CARE EDUCATION/TRAINING PROGRAM

## 2019-12-15 PROCEDURE — 99233 SBSQ HOSP IP/OBS HIGH 50: CPT | Performed by: PSYCHIATRY & NEUROLOGY

## 2019-12-15 PROCEDURE — 2580000003 HC RX 258: Performed by: STUDENT IN AN ORGANIZED HEALTH CARE EDUCATION/TRAINING PROGRAM

## 2019-12-15 PROCEDURE — 71045 X-RAY EXAM CHEST 1 VIEW: CPT

## 2019-12-15 PROCEDURE — 2500000003 HC RX 250 WO HCPCS: Performed by: STUDENT IN AN ORGANIZED HEALTH CARE EDUCATION/TRAINING PROGRAM

## 2019-12-15 PROCEDURE — 94770 HC ETCO2 MONITOR DAILY: CPT

## 2019-12-15 PROCEDURE — 94003 VENT MGMT INPAT SUBQ DAY: CPT

## 2019-12-15 PROCEDURE — 82947 ASSAY GLUCOSE BLOOD QUANT: CPT

## 2019-12-15 PROCEDURE — 2060000000 HC ICU INTERMEDIATE R&B

## 2019-12-15 PROCEDURE — 6360000002 HC RX W HCPCS: Performed by: PSYCHIATRY & NEUROLOGY

## 2019-12-15 PROCEDURE — 94761 N-INVAS EAR/PLS OXIMETRY MLT: CPT

## 2019-12-15 PROCEDURE — 36415 COLL VENOUS BLD VENIPUNCTURE: CPT

## 2019-12-15 PROCEDURE — 85027 COMPLETE CBC AUTOMATED: CPT

## 2019-12-15 PROCEDURE — 80048 BASIC METABOLIC PNL TOTAL CA: CPT

## 2019-12-15 PROCEDURE — 85025 COMPLETE CBC W/AUTO DIFF WBC: CPT

## 2019-12-15 RX ORDER — POLYMYXIN B SULFATE AND TRIMETHOPRIM 1; 10000 MG/ML; [USP'U]/ML
2 SOLUTION OPHTHALMIC EVERY 6 HOURS SCHEDULED
Status: DISPENSED | OUTPATIENT
Start: 2019-12-15 | End: 2019-12-20

## 2019-12-15 RX ORDER — AMLODIPINE BESYLATE 10 MG/1
10 TABLET ORAL DAILY
Status: DISCONTINUED | OUTPATIENT
Start: 2019-12-16 | End: 2019-12-24

## 2019-12-15 RX ORDER — FENTANYL CITRATE 50 UG/ML
25 INJECTION, SOLUTION INTRAMUSCULAR; INTRAVENOUS
Status: DISCONTINUED | OUTPATIENT
Start: 2019-12-15 | End: 2020-01-03 | Stop reason: HOSPADM

## 2019-12-15 RX ORDER — AMLODIPINE BESYLATE 5 MG/1
5 TABLET ORAL ONCE
Status: COMPLETED | OUTPATIENT
Start: 2019-12-15 | End: 2019-12-15

## 2019-12-15 RX ADMIN — CEFTRIAXONE SODIUM 1 G: 1 INJECTION, POWDER, FOR SOLUTION INTRAMUSCULAR; INTRAVENOUS at 03:56

## 2019-12-15 RX ADMIN — ONDANSETRON 4 MG: 2 INJECTION INTRAMUSCULAR; INTRAVENOUS at 14:35

## 2019-12-15 RX ADMIN — DILTIAZEM HYDROCHLORIDE 60 MG: 60 TABLET, FILM COATED ORAL at 00:24

## 2019-12-15 RX ADMIN — Medication 100 MG: at 08:27

## 2019-12-15 RX ADMIN — GLYBURIDE 5 MG: 5 TABLET ORAL at 16:57

## 2019-12-15 RX ADMIN — Medication 1 DROP: at 22:05

## 2019-12-15 RX ADMIN — INSULIN GLARGINE 35 UNITS: 100 INJECTION, SOLUTION SUBCUTANEOUS at 20:07

## 2019-12-15 RX ADMIN — ACETAMINOPHEN 650 MG: 325 TABLET ORAL at 00:25

## 2019-12-15 RX ADMIN — Medication 500 MG: at 04:58

## 2019-12-15 RX ADMIN — ALBUTEROL SULFATE 2.5 MG: 2.5 SOLUTION RESPIRATORY (INHALATION) at 19:47

## 2019-12-15 RX ADMIN — SIMETHICONE 40 MG: 20 SUSPENSION/ DROPS ORAL at 20:06

## 2019-12-15 RX ADMIN — Medication 15 ML: at 20:06

## 2019-12-15 RX ADMIN — SODIUM CHLORIDE, PRESERVATIVE FREE 10 ML: 5 INJECTION INTRAVENOUS at 20:06

## 2019-12-15 RX ADMIN — POLYMYXIN B SULFATE, TRIMETHOPRIM SULFATE 2 DROP: 10000; 1 SOLUTION/ DROPS OPHTHALMIC at 11:33

## 2019-12-15 RX ADMIN — HYDRALAZINE HYDROCHLORIDE 10 MG: 20 INJECTION INTRAMUSCULAR; INTRAVENOUS at 06:28

## 2019-12-15 RX ADMIN — GLYBURIDE 5 MG: 5 TABLET ORAL at 08:18

## 2019-12-15 RX ADMIN — ALBUTEROL SULFATE 2.5 MG: 2.5 SOLUTION RESPIRATORY (INHALATION) at 07:46

## 2019-12-15 RX ADMIN — SENNOSIDES AND DOCUSATE SODIUM 2 TABLET: 8.6; 5 TABLET ORAL at 08:27

## 2019-12-15 RX ADMIN — METOPROLOL TARTRATE 100 MG: 50 TABLET, FILM COATED ORAL at 20:06

## 2019-12-15 RX ADMIN — FENTANYL CITRATE 25 MCG: 50 INJECTION, SOLUTION INTRAMUSCULAR; INTRAVENOUS at 14:22

## 2019-12-15 RX ADMIN — POLYMYXIN B SULFATE, TRIMETHOPRIM SULFATE 2 DROP: 10000; 1 SOLUTION/ DROPS OPHTHALMIC at 16:57

## 2019-12-15 RX ADMIN — DILTIAZEM HYDROCHLORIDE 60 MG: 60 TABLET, FILM COATED ORAL at 16:57

## 2019-12-15 RX ADMIN — MODAFINIL 200 MG: 100 TABLET ORAL at 08:34

## 2019-12-15 RX ADMIN — METFORMIN HYDROCHLORIDE 1000 MG: 500 TABLET ORAL at 08:18

## 2019-12-15 RX ADMIN — SODIUM CHLORIDE, PRESERVATIVE FREE 10 ML: 5 INJECTION INTRAVENOUS at 08:36

## 2019-12-15 RX ADMIN — AMLODIPINE BESYLATE 5 MG: 5 TABLET ORAL at 08:19

## 2019-12-15 RX ADMIN — INSULIN LISPRO 3 UNITS: 100 INJECTION, SOLUTION INTRAVENOUS; SUBCUTANEOUS at 11:33

## 2019-12-15 RX ADMIN — DILTIAZEM HYDROCHLORIDE 60 MG: 60 TABLET, FILM COATED ORAL at 12:26

## 2019-12-15 RX ADMIN — DILTIAZEM HYDROCHLORIDE 60 MG: 60 TABLET, FILM COATED ORAL at 07:03

## 2019-12-15 RX ADMIN — METHIMAZOLE 10 MG: 5 TABLET ORAL at 16:56

## 2019-12-15 RX ADMIN — LISINOPRIL 40 MG: 20 TABLET ORAL at 08:19

## 2019-12-15 RX ADMIN — METOPROLOL TARTRATE 100 MG: 50 TABLET, FILM COATED ORAL at 08:18

## 2019-12-15 RX ADMIN — SIMETHICONE 40 MG: 20 SUSPENSION/ DROPS ORAL at 16:57

## 2019-12-15 RX ADMIN — DOCUSATE SODIUM 100 MG: 50 LIQUID ORAL at 08:22

## 2019-12-15 RX ADMIN — ACETAMINOPHEN 650 MG: 325 TABLET ORAL at 08:34

## 2019-12-15 RX ADMIN — FAMOTIDINE 20 MG: 10 INJECTION, SOLUTION INTRAVENOUS at 20:06

## 2019-12-15 RX ADMIN — Medication 15 ML: at 10:07

## 2019-12-15 RX ADMIN — AMLODIPINE BESYLATE 5 MG: 5 TABLET ORAL at 12:26

## 2019-12-15 RX ADMIN — FAMOTIDINE 20 MG: 10 INJECTION, SOLUTION INTRAVENOUS at 08:21

## 2019-12-15 RX ADMIN — ALBUTEROL SULFATE 2.5 MG: 2.5 SOLUTION RESPIRATORY (INHALATION) at 13:00

## 2019-12-15 RX ADMIN — Medication 1 DROP: at 10:30

## 2019-12-15 RX ADMIN — DESMOPRESSIN ACETATE 40 MG: 0.2 TABLET ORAL at 20:06

## 2019-12-15 RX ADMIN — BUMETANIDE 0.5 MG: 1 TABLET ORAL at 08:20

## 2019-12-15 RX ADMIN — METFORMIN HYDROCHLORIDE 1000 MG: 500 TABLET ORAL at 16:57

## 2019-12-15 RX ADMIN — SIMETHICONE 40 MG: 20 SUSPENSION/ DROPS ORAL at 08:22

## 2019-12-15 RX ADMIN — METHIMAZOLE 20 MG: 5 TABLET ORAL at 08:21

## 2019-12-15 RX ADMIN — Medication 1 DROP: at 14:42

## 2019-12-15 RX ADMIN — INSULIN GLARGINE 35 UNITS: 100 INJECTION, SOLUTION SUBCUTANEOUS at 08:29

## 2019-12-15 RX ADMIN — SIMETHICONE 40 MG: 20 SUSPENSION/ DROPS ORAL at 12:28

## 2019-12-15 RX ADMIN — ENOXAPARIN SODIUM 40 MG: 40 INJECTION SUBCUTANEOUS at 08:23

## 2019-12-15 ASSESSMENT — PAIN SCALES - GENERAL
PAINLEVEL_OUTOF10: 0
PAINLEVEL_OUTOF10: 4
PAINLEVEL_OUTOF10: 0
PAINLEVEL_OUTOF10: 0

## 2019-12-15 ASSESSMENT — PULMONARY FUNCTION TESTS
PIF_VALUE: 20
PIF_VALUE: 22
PIF_VALUE: 21
PIF_VALUE: 13
PIF_VALUE: 19
PIF_VALUE: 14
PIF_VALUE: 23
PIF_VALUE: 15
PIF_VALUE: 19

## 2019-12-15 NOTE — PROGRESS NOTES
12/15/19 0805   Vent Information   Vent Type Servo i   Vent Mode CPAP   Pressure Support 6 cmH20   FiO2  30 %   Sensitivity 5   PEEP/CPAP 8     Pt weaned from 08:05am to 08:30am, placed back on PRVC due to increased RR and WOB

## 2019-12-15 NOTE — PROGRESS NOTES
General Surgery:  Daily Progress Note                   PATIENT NAME: Carmen Quispe     TODAY'S DATE: 12/15/2019, 8:40 AM  CC: Basal ganglia infarct, pneumonia    SUBJECTIVE:     Pt seen and examined at bedside. Doing well this AM. No issues with Trach. G tube in place, incision c/d/i.     OBJECTIVE:   VITALS:  BP (!) 170/73   Pulse 75   Temp 100.8 °F (38.2 °C) (Oral)   Resp 23   Ht 5' 7\" (1.702 m)   Wt 211 lb 13.8 oz (96.1 kg)   SpO2 92%   BMI 33.18 kg/m²      INTAKE/OUTPUT:      Intake/Output Summary (Last 24 hours) at 12/15/2019 0840  Last data filed at 12/15/2019 4877  Gross per 24 hour   Intake 3075 ml   Output 1260 ml   Net 1815 ml       PHYSICAL EXAM:  General Appearance: follows commands and responds to questions  HEENT:  Normocephalic, atraumatic, mucus membranes dry  Heart: Heart regular rate and rhythm  Lungs: coarse lung sounds bilaterally  Abdomen: Soft, obese, tympanitic, G tube in place  Extremities: No cyanosis, pitting edema, rashes noted. Skin: Skin color, texture, turgor normal. No rashes or lesions.     IV Access: PIV      Data:  CBC with Differential:    Lab Results   Component Value Date    WBC 12.1 12/14/2019    RBC 4.40 12/14/2019    HGB 13.9 12/14/2019    HCT 43.0 12/14/2019     12/14/2019    MCV 97.7 12/14/2019    MCH 31.6 12/14/2019    MCHC 32.3 12/14/2019    RDW 14.8 12/14/2019    LYMPHOPCT 11 12/14/2019    MONOPCT 9 12/14/2019    BASOPCT 1 12/14/2019    MONOSABS 1.06 12/14/2019    LYMPHSABS 1.37 12/14/2019    EOSABS 0.17 12/14/2019    BASOSABS 0.10 12/14/2019    DIFFTYPE NOT REPORTED 12/14/2019     CMP:    Lab Results   Component Value Date     12/14/2019    K 4.4 12/14/2019     12/14/2019    CO2 26 12/14/2019    BUN 31 12/14/2019    CREATININE 1.08 12/14/2019    GFRAA >60 12/14/2019    LABGLOM >60 12/14/2019    GLUCOSE 145 12/14/2019    PROT 7.0 12/06/2019    LABALBU 3.4 12/06/2019    CALCIUM 9.9 12/14/2019    BILITOT 0.51 12/06/2019    ALKPHOS 83

## 2019-12-15 NOTE — PROGRESS NOTES
Daily Progress Note  Neuro Critical Care    Patient Name: Deon Kimble  Patient : 1955  Room/Bed: 0523/0523-01  Code Status: full  Allergies: No Known Allergies    CHIEF COMPLAINT:      Slurred speech left-sided facial droop unable to move right arm       INTERVAL HISTORY    Initial Presentation     The patient is a 61 y. o. male presented with past medical history of type 2 diabetes thyroid disease hypothyroidism, hypertension hyperlipidemia coronary artery disease status post stent A. fib on coumadin came as a transfer from [facility] with last well-known oh 6 AM this morning when wife was with him and the patient had coffee he then went to lie on bed and around 8 AM to take his medication sitting on the couch and the wife noticed that he was mumbling unable to move his right arm and had a left facial droop and she called 911. At [facility] his blood pressure was 160/97 and blood glucose 297.  His stroke scale was 10 and INR was 2 he had a CT head done which showed acute intraparenchymal hematoma in the left basal ganglia measuring 2.1x 2.3 x 2.1 cm, parenchymal volume loss and moderate chronic microvascular ischemic changes as well as old infarct.  Plan will was to transfer him to [facility] and start him on [address] for reversal.  Started on Cardene drip with blood pressure goal of less than 140 and received 1500 units of Kcentra for INR reversal.      ICH score: 0     12/8  TSH, T3, T4 normal, head CT showing worsening basal ganglia hemorrhage as well as some IVH involvement.  CTA neg. Neurosurgery aware. Guerda Cruz showing severe diastolic dysfunction as well as EF of 48%. Upon return from CT scan patient had bilateral crackles as well as pulmonary edema on x-ray.  Lasix given and patient put on BiPAP.  Worsening respiratory status with crackles in all lung fields.  Patient begins to desaturate into the 80s.   Decision made to intubate.  Art line placed.  Patient switch from Cleviprex to REFRESH LACRI-LUBE, potassium chloride, fentanNYL, glucose, dextrose, glucagon (rDNA), dextrose, acetaminophen **OR** acetaminophen, sodium chloride flush, ondansetron, metoprolol, hydrALAZINE    VITALS:  Temperature Range: Temp: 100.8 °F (38.2 °C) Temp  Av.7 °F (37.6 °C)  Min: 98.6 °F (37 °C)  Max: 100.8 °F (38.2 °C)  BP Range: Systolic (71VSW), SNS:452 , Min:144 , KNU:598     Diastolic (14KOF), HIO:19, Min:68, Max:100    Pulse Range: Pulse  Av  Min: 75  Max: 76  Respiration Range: Resp  Av.4  Min: 0  Max: 24  Current Pulse Ox: SpO2: 92 %  24HR Pulse Ox Range: SpO2  Av.9 %  Min: 89 %  Max: 100 %  Patient Vitals for the past 12 hrs:   BP Temp Temp src Pulse Resp SpO2   12/15/19 0804 (!) 170/73 100.8 °F (38.2 °C) Oral 75 23 92 %   12/15/19 0746 -- -- -- 75 24 94 %   12/15/19 0702 (!) 186/89 -- -- 75 24 95 %   12/15/19 0628 (!) 192/92 -- -- 75 24 95 %   12/15/19 0404 (!) 145/89 100.4 °F (38 °C) Oral 75 19 94 %   12/15/19 0316 -- -- -- 75 23 95 %   12/15/19 0302 (!) 171/84 99.5 °F (37.5 °C) -- 75 22 95 %   12/15/19 0202 (!) 169/89 -- -- 75 -- 95 %   12/15/19 0102 (!) 176/82 -- -- 75 -- 96 %   12/15/19 0002 (!) 179/82 -- -- 75 24 96 %   12/15/19 0000 (!) 176/89 100.1 °F (37.8 °C) Oral 75 23 96 %   19 2314 -- -- -- 75 23 96 %   19 2302 (!) 198/99 -- -- 75 24 100 %   19 2202 (!) 184/94 -- -- 75 21 96 %   19 210 (!) 194/100 -- -- 76 22 97 %     Estimated body mass index is 33.18 kg/m² as calculated from the following:    Height as of this encounter: 5' 7\" (1.702 m).     Weight as of this encounter: 211 lb 13.8 oz (96.1 kg).  []<16 Severe malnutrition  []16-16.99 Moderate malnutrition  []17-18.49 Mild malnutrition  [x]18.5-24.9 Normal  []25-29.9 Overweight (not obese)  []30-34.9 Obese class 1 (Low Risk)  []35-39.9 Obese class 2 (Moderate Risk)  []?40 Obese class 3 (High Risk)    RECENT LABS:   Lab Results   Component Value Date    WBC 12.1 (H) 2019    HGB 13.9 2019 hyperlipidemia coronary artery disease status post stent A. fib on Warfarin, came as a transfer from [facility] with CT head showing left basal ganglia intraparenchymal hematoma. LWKN 0600, ICH score -0 NIHSS-15    Interval history of pneumonia, being treated with azithromycin after strep pneumonia grew on sputum cultures. White cell count has trended down. S/p trach and PEG    PLAN/MEDICAL DECISION MAKING:     NEUROLOGIC:  -CT head on admission shows acute intraparenchymal hematoma in the left basal ganglia measuring 2.1x 2.3 x 2.1 cm, parenchymal volume loss and moderate chronic microvascular ischemic changes as well as old infarct  Status post [facility] for warfarin reversal  -Left basal ganglia ICH with interval enlargement  -Patient not woken up off sedation since intubation will repeat ct head today  -Keep systolic blood pressure less than 160   -Off  sedation  -Neuro checks per protocol  -improving neuro exam     CARDIOVASCULAR:  - Systolic blood pressure goal of less than 160   -Patient on Lopressor 100 mg twice daily, Cardizem 60 mg every 6h, Lisinopril 40 qd , Norvasc 5 mg added today for blood pressure monitoring  -Echocardiogram showing EF of 48% and severe diastolic dysfunction with normal valves. - Trops stable 23, 27, 27  - Continue telemetry monitoring   Atrial fibrillation previously on Coumadin--discussed need for outpatient anticoagulation---consult cardiology for watchman device?   Lovenox DVT prophylaxis     PULMONARY:  -- History of COPD  --Pneumonia being treated with ceftriaxone and azithromycin until 12/18/19  -Chest x-ray daily  -On Bumex 0.5 mg daily   -Follow-up ABGs daily  Trach collar - trial later this afternoon  Bronchodilator as needed     RENAL/FLUID/ELECTROLYTE:  -BUN Cr--awaiting morning labs  -On tube feeds decreased IV fluids  -Monitor electrolytes and replaced as needed  Low urine output 0.3 mL/kg/h     GI/NUTRITION:  - Diet: Tube feeds started-diabetic diet  - GI Prophylaxis: Pepcid  - Bowel Regimen: MoM   -no bowel movement since adm-mg citrate given  S/p PEG    ID/HEME:  - Tmax- afebrile  - WBC awaiting morning labs --PNA maintain ceftriaxone and azithromycin  - monitor For fevers  -H&H stable--awaiting morning labs  -INR 2.9 on arrival patient received 1500 units of KCentra x1  - sputum culture shows strep pneumonia-on  Azithromycin, rocephin- blood culture and UA negative     ENDOCRINE:  - monitor blood glucose  -Placed on high dose insulin sliding scale-  on glipizide 5 mg twice daily and metformin thousand mg  lantus 35 U BID from 60 qd  -Thyroid studies negative     OTHER:  - PT/OT eval      DVT PROPHYLAXIS:  - SCD sleeves - Thigh High   - ZORA stockings - Thigh High  -On Lovenox 40mg subq     DISPOSITION:  [x] To remain ICU:   [] OK for out of ICU from Neuro Critical Care standpoint    We will continue to follow along. For any changes in exam or patient status please contact Neuro Critical Care.       Pedro Pablo Garcia MD  Neuro Critical Care  Pager [phone]  12/15/2019     8:15 AM

## 2019-12-15 NOTE — PROGRESS NOTES
211 lb (95.7 kg)  · Admission Body Wt: 211 lb (95.7 kg)  · % Weight Change:  ,  KELLY  · Ideal Body Wt: 148 lb (67.1 kg), % Ideal Body 143%  · BMI Classification: BMI 30.0 - 34.9 Obese Class I    Nutrition Interventions:   Continue current diet, Start ONS, Start Tube Feeding  Continued Inpatient Monitoring, Education Not Indicated    Nutrition Evaluation:   · Evaluation: Progressing toward goals   · Goals: Meet % of estimated nutrient needs.     · Monitoring: Nutrition Progression, Meal Intake, Supplement Intake, TF Intake, Diet Tolerance, Skin Integrity, I&O, Weight, Pertinent Labs, Monitor Bowel Function      Electronically signed by Rosemary Ceja RD, LD on 12/15/19 at 10:45 AM    Contact Number: [phone]

## 2019-12-15 NOTE — PLAN OF CARE
Problem: Risk for Impaired Skin Integrity  Goal: Tissue integrity - skin and mucous membranes  Description  Structural intactness and normal physiological function of skin and  mucous membranes. Outcome: Ongoing  Note:   Skin assessment complete. Interventions in place. See doc flowsheet for interventions initiated. Pt turned every two hours to prevent skin breakdown. Will continue to monitor for additional needs and skin breakdown.

## 2019-12-15 NOTE — PROGRESS NOTES
12/15/19 1517   Surgical Airway (trach) Shiley Cuffed   Placement Date/Time: 12/14/19 0834   Timeout: Patient;Procedure;Site/Side;Appropriate Equipment; Consent Confirmed;Sterile Technique using full body drape  Placed By: In surgery  Surgical Airway Type: Tracheostomy  Brand: [place]  Style: Cuffed  Size (m. .. Status Secured   Site Assessment Dry   Site Care Dressing applied;Cleansed;Dried   Inner Cannula Care Changed/new   Ties Assessment Intact; Secure

## 2019-12-15 NOTE — OP NOTE
stomach as no tinea were present on the tissue we had grasped. We then placed a pursestring suture on the stomach using 2-0 Vicryl. We then used electrocautery to create an incision in the left upper quadrant and then used a tonsil to grasp our gastrostomy tube and pull it through the anterior abdominal wall. A 20 Frisian Gipson catheter was used as the gastrostomy tube. A gastrotomy was then made in the middle of where the string suture had been placed. We then placed our gastrostomy tube into the stomach and inflated the balloon with 7 mL of normal saline. We then tied down our pursestring suture. We then did a Witzel suture using 2-0 Vicryl at the gastrostomy site to secure the gastrostomy tube in place. We then used 2-0 Vicryl suture to suture the stomach at the gastrostomy site to the anterior abdominal wall where the tube entered the anterior abdominal wall. We then closed the fascia with 0 Prolene suture. The skin was then reapproximated with staples. This completed the gastrostomy portion of the procedure. We then turned our attention to the tracheostomy. The patient had clearly defined landmarks which were palpable before surgery. We made a vertical incision from the tracheal cartilage down to the sternum. We did notice before our neck dissection began that there was a pre-existing scar on the patient's neck which the patient or his family was not able to identify what the scar was from. Upon our dissection of the patient's neck there was extensive scarring and the tissues were difficult to dissect through. Throughout our dissection we made sure that we were at the midline at all times. We then dissected down through our platysma and strap muscles. We then encountered the thyroid isthmus and extensive scarring. We carefully dissected through these tissues and hemostasis was achieved the entire time. We were then able to identify the trachea and the tracheal rings.   We used blunt dissection to clean off the pretracheal fascia and the space between the second and third tracheal ring was exposed. We then used an 0 Prolene to place the sutures laterally at the level of the second and third tracheal rings. We then prepared our tracheostomy tube. We then used the trach hook to elevate the trachea. An 11 blade was used to make an incision in between the second and third tracheal rings. A tracheal  was placed within the trachea and the tracheotomy was spread. Anesthesia then retracted the endotracheal tube. The tracheostomy tube was then placed into the trachea. We used an 8 [place], DCT tracheostomy tube. The tracheostomy tube was then connected to the ventilatory circuit and end-tidal CO2 was confirmed as well as achieving adequate tidal volumes. The tracheostomy tube was then sutured in place using 0 nylon. The 0 nylon suture was used to reapproximate the tissues on the superior portion of the incision. After the tissues were reapproximated the suture was then passed through the superior portion of the tracheostomy tube zheng and tied down to secure the tracheostomy tube into place. This process was repeated on the inferior portion of the incision as well as securing the inferior portion of the tracheostomy tube zheng. A trach tie was then placed around the patient's neck to further secure the tracheostomy tube into place. This completed our procedure. The patient was taken back to the ICU in stable condition. Dr. Marina Thompson was present for the entire case.     [facility],   12/14/19

## 2019-12-15 NOTE — PLAN OF CARE
Problem: MECHANICAL VENTILATION  Goal: Mobility/activity is maintained at optimum level for patient  12/15/2019 0845 by Amanda Dias RCP  Outcome: Ongoing  12/15/2019 0341 by Grabiel Johnson RCP  Outcome: Ongoing  Goal: Ability to express needs and understand communication  12/15/2019 0845 by Amanda Dias RCP  Outcome: Ongoing  12/15/2019 0341 by Grabiel Johnson RCP  Outcome: Ongoing  Goal: Patient will maintain patent airway  12/15/2019 0845 by Amanda Dias RCP  Outcome: Ongoing  12/15/2019 0341 by Grabiel Johnson RCP  Outcome: Ongoing  Goal: Oral health is maintained or improved  12/15/2019 0845 by Amanda Dias RCP  Outcome: Ongoing  12/15/2019 0341 by Grabiel Johnson RCP  Outcome: Ongoing  Goal: ET tube will be managed safely  12/15/2019 0341 by Grabiel Johnson RCP  Outcome: Completed  Goal: Tracheostomy will be managed safely  12/15/2019 0845 by Amanda Dias RCP  Outcome: Ongoing  12/15/2019 0341 by Grabiel Johnson RCP  Outcome: Ongoing     Problem: OXYGENATION/RESPIRATORY FUNCTION  Goal: Patient will maintain patent airway  12/15/2019 0845 by Amanda Dias RCP  Outcome: Ongoing  12/15/2019 0341 by Grabiel Johnson RCP  Outcome: Ongoing  Goal: Patient will achieve/maintain normal respiratory rate/effort  Description  Respiratory rate and effort will be within normal limits for the patient  12/15/2019 0845 by Amanda Dias RCP  Outcome: Ongoing  12/15/2019 0341 by Grabiel Johnson RCP  Outcome: Ongoing

## 2019-12-15 NOTE — PLAN OF CARE
Problem: COMMUNICATION IMPAIRMENT  Goal: Ability to express needs and understand communication  Outcome: Ongoing     Problem: MECHANICAL VENTILATION  Goal: Mobility/activity is maintained at optimum level for patient  Outcome: Ongoing     Problem: MECHANICAL VENTILATION  Goal: Ability to express needs and understand communication  Outcome: Ongoing     Problem: MECHANICAL VENTILATION  Goal: Patient will maintain patent airway  Outcome: Ongoing     Problem: MECHANICAL VENTILATION  Goal: Oral health is maintained or improved  Outcome: Ongoing     Problem: MECHANICAL VENTILATION  Goal: Tracheostomy will be managed safely  Outcome: Ongoing     Problem: OXYGENATION/RESPIRATORY FUNCTION  Goal: Patient will maintain patent airway  Outcome: Ongoing     Problem: OXYGENATION/RESPIRATORY FUNCTION  Goal: Patient will achieve/maintain normal respiratory rate/effort  Description  Respiratory rate and effort will be within normal limits for the patient  Outcome: Ongoing

## 2019-12-15 NOTE — PROGRESS NOTES
12/15/19 1125   Vent Information   Vent Type Servo i   Vent Mode CPAP   Pressure Support 8 cmH20   FiO2  30 %   PEEP/CPAP 6   Alarm Settings   High Respiratory Rate 40 br/min     2nd attempt at weaning pt, weaned for 20 min then turned back to [facility], [facility] due to RR in 40's and increased WOB

## 2019-12-16 ENCOUNTER — APPOINTMENT (OUTPATIENT)
Dept: GENERAL RADIOLOGY | Age: 64
DRG: 003 | End: 2019-12-16
Payer: MEDICARE

## 2019-12-16 LAB
ABSOLUTE EOS #: 0.11 K/UL (ref 0–0.44)
ABSOLUTE IMMATURE GRANULOCYTE: 0.17 K/UL (ref 0–0.3)
ABSOLUTE LYMPH #: 1.32 K/UL (ref 1.1–3.7)
ABSOLUTE MONO #: 0.71 K/UL (ref 0.1–1.2)
ANION GAP SERPL CALCULATED.3IONS-SCNC: 11 MMOL/L (ref 9–17)
ANION GAP SERPL CALCULATED.3IONS-SCNC: 11 MMOL/L (ref 9–17)
BASOPHILS # BLD: 1 % (ref 0–2)
BASOPHILS ABSOLUTE: 0.09 K/UL (ref 0–0.2)
BUN BLDV-MCNC: 24 MG/DL (ref 8–23)
BUN BLDV-MCNC: 27 MG/DL (ref 8–23)
BUN/CREAT BLD: ABNORMAL (ref 9–20)
BUN/CREAT BLD: ABNORMAL (ref 9–20)
CALCIUM SERPL-MCNC: 9 MG/DL (ref 8.6–10.4)
CALCIUM SERPL-MCNC: 9.3 MG/DL (ref 8.6–10.4)
CHLORIDE BLD-SCNC: 111 MMOL/L (ref 98–107)
CHLORIDE BLD-SCNC: 112 MMOL/L (ref 98–107)
CO2: 24 MMOL/L (ref 20–31)
CO2: 24 MMOL/L (ref 20–31)
CREAT SERPL-MCNC: 0.9 MG/DL (ref 0.7–1.2)
CREAT SERPL-MCNC: 1.08 MG/DL (ref 0.7–1.2)
DIFFERENTIAL TYPE: ABNORMAL
EOSINOPHILS RELATIVE PERCENT: 1 % (ref 1–4)
GFR AFRICAN AMERICAN: >60 ML/MIN
GFR AFRICAN AMERICAN: >60 ML/MIN
GFR NON-AFRICAN AMERICAN: >60 ML/MIN
GFR NON-AFRICAN AMERICAN: >60 ML/MIN
GFR SERPL CREATININE-BSD FRML MDRD: ABNORMAL ML/MIN/{1.73_M2}
GLUCOSE BLD-MCNC: 120 MG/DL (ref 75–110)
GLUCOSE BLD-MCNC: 128 MG/DL (ref 70–99)
GLUCOSE BLD-MCNC: 157 MG/DL (ref 70–99)
GLUCOSE BLD-MCNC: 53 MG/DL (ref 75–110)
GLUCOSE BLD-MCNC: 77 MG/DL (ref 75–110)
GLUCOSE BLD-MCNC: 83 MG/DL (ref 75–110)
GLUCOSE BLD-MCNC: 85 MG/DL (ref 75–110)
GLUCOSE BLD-MCNC: 95 MG/DL (ref 75–110)
HCT VFR BLD CALC: 40.6 % (ref 40.7–50.3)
HEMOGLOBIN: 12.5 G/DL (ref 13–17)
IMMATURE GRANULOCYTES: 2 %
LYMPHOCYTES # BLD: 14 % (ref 24–43)
MCH RBC QN AUTO: 31.2 PG (ref 25.2–33.5)
MCHC RBC AUTO-ENTMCNC: 30.8 G/DL (ref 28.4–34.8)
MCV RBC AUTO: 101.2 FL (ref 82.6–102.9)
MONOCYTES # BLD: 8 % (ref 3–12)
NRBC AUTOMATED: 0 PER 100 WBC
PDW BLD-RTO: 14.8 % (ref 11.8–14.4)
PLATELET # BLD: 205 K/UL (ref 138–453)
PLATELET ESTIMATE: ABNORMAL
PMV BLD AUTO: 10.9 FL (ref 8.1–13.5)
POTASSIUM SERPL-SCNC: 4.1 MMOL/L (ref 3.7–5.3)
POTASSIUM SERPL-SCNC: 4.2 MMOL/L (ref 3.7–5.3)
RBC # BLD: 4.01 M/UL (ref 4.21–5.77)
RBC # BLD: ABNORMAL 10*6/UL
SEG NEUTROPHILS: 74 % (ref 36–65)
SEGMENTED NEUTROPHILS ABSOLUTE COUNT: 6.92 K/UL (ref 1.5–8.1)
SODIUM BLD-SCNC: 146 MMOL/L (ref 135–144)
SODIUM BLD-SCNC: 147 MMOL/L (ref 135–144)
TRIGL SERPL-MCNC: 181 MG/DL
WBC # BLD: 9.3 K/UL (ref 3.5–11.3)
WBC # BLD: ABNORMAL 10*3/UL

## 2019-12-16 PROCEDURE — 6360000002 HC RX W HCPCS: Performed by: STUDENT IN AN ORGANIZED HEALTH CARE EDUCATION/TRAINING PROGRAM

## 2019-12-16 PROCEDURE — 2700000000 HC OXYGEN THERAPY PER DAY

## 2019-12-16 PROCEDURE — 2500000003 HC RX 250 WO HCPCS: Performed by: FAMILY MEDICINE

## 2019-12-16 PROCEDURE — 85025 COMPLETE CBC W/AUTO DIFF WBC: CPT

## 2019-12-16 PROCEDURE — 6370000000 HC RX 637 (ALT 250 FOR IP): Performed by: STUDENT IN AN ORGANIZED HEALTH CARE EDUCATION/TRAINING PROGRAM

## 2019-12-16 PROCEDURE — 80048 BASIC METABOLIC PNL TOTAL CA: CPT

## 2019-12-16 PROCEDURE — 71045 X-RAY EXAM CHEST 1 VIEW: CPT

## 2019-12-16 PROCEDURE — 94761 N-INVAS EAR/PLS OXIMETRY MLT: CPT

## 2019-12-16 PROCEDURE — 6370000000 HC RX 637 (ALT 250 FOR IP): Performed by: FAMILY MEDICINE

## 2019-12-16 PROCEDURE — 84478 ASSAY OF TRIGLYCERIDES: CPT

## 2019-12-16 PROCEDURE — 94770 HC ETCO2 MONITOR DAILY: CPT

## 2019-12-16 PROCEDURE — 2060000000 HC ICU INTERMEDIATE R&B

## 2019-12-16 PROCEDURE — 82947 ASSAY GLUCOSE BLOOD QUANT: CPT

## 2019-12-16 PROCEDURE — 94640 AIRWAY INHALATION TREATMENT: CPT

## 2019-12-16 PROCEDURE — 99232 SBSQ HOSP IP/OBS MODERATE 35: CPT | Performed by: FAMILY MEDICINE

## 2019-12-16 PROCEDURE — 2580000003 HC RX 258: Performed by: STUDENT IN AN ORGANIZED HEALTH CARE EDUCATION/TRAINING PROGRAM

## 2019-12-16 PROCEDURE — 36415 COLL VENOUS BLD VENIPUNCTURE: CPT

## 2019-12-16 PROCEDURE — 94003 VENT MGMT INPAT SUBQ DAY: CPT

## 2019-12-16 PROCEDURE — 2500000003 HC RX 250 WO HCPCS: Performed by: STUDENT IN AN ORGANIZED HEALTH CARE EDUCATION/TRAINING PROGRAM

## 2019-12-16 RX ORDER — BUMETANIDE 0.25 MG/ML
1 INJECTION, SOLUTION INTRAMUSCULAR; INTRAVENOUS ONCE
Status: COMPLETED | OUTPATIENT
Start: 2019-12-16 | End: 2019-12-16

## 2019-12-16 RX ORDER — BUMETANIDE 1 MG/1
1 TABLET ORAL DAILY
Status: DISCONTINUED | OUTPATIENT
Start: 2019-12-17 | End: 2019-12-25

## 2019-12-16 RX ADMIN — ACETAMINOPHEN 650 MG: 325 TABLET ORAL at 08:08

## 2019-12-16 RX ADMIN — Medication 15 ML: at 08:08

## 2019-12-16 RX ADMIN — GLYBURIDE 5 MG: 5 TABLET ORAL at 17:10

## 2019-12-16 RX ADMIN — Medication 1 DROP: at 21:03

## 2019-12-16 RX ADMIN — POLYMYXIN B SULFATE, TRIMETHOPRIM SULFATE 2 DROP: 10000; 1 SOLUTION/ DROPS OPHTHALMIC at 17:11

## 2019-12-16 RX ADMIN — METOPROLOL TARTRATE 100 MG: 50 TABLET, FILM COATED ORAL at 08:02

## 2019-12-16 RX ADMIN — CEFTRIAXONE SODIUM 1 G: 1 INJECTION, POWDER, FOR SOLUTION INTRAMUSCULAR; INTRAVENOUS at 03:52

## 2019-12-16 RX ADMIN — DILTIAZEM HYDROCHLORIDE 90 MG: 60 TABLET, FILM COATED ORAL at 21:02

## 2019-12-16 RX ADMIN — DILTIAZEM HYDROCHLORIDE 60 MG: 60 TABLET, FILM COATED ORAL at 00:01

## 2019-12-16 RX ADMIN — DESMOPRESSIN ACETATE 40 MG: 0.2 TABLET ORAL at 21:02

## 2019-12-16 RX ADMIN — DILTIAZEM HYDROCHLORIDE 60 MG: 60 TABLET, FILM COATED ORAL at 12:14

## 2019-12-16 RX ADMIN — BUMETANIDE 0.5 MG: 1 TABLET ORAL at 08:02

## 2019-12-16 RX ADMIN — Medication 15 ML: at 21:28

## 2019-12-16 RX ADMIN — SIMETHICONE 40 MG: 20 SUSPENSION/ DROPS ORAL at 12:14

## 2019-12-16 RX ADMIN — GLYBURIDE 5 MG: 5 TABLET ORAL at 08:03

## 2019-12-16 RX ADMIN — DOCUSATE SODIUM 100 MG: 50 LIQUID ORAL at 08:03

## 2019-12-16 RX ADMIN — POLYMYXIN B SULFATE, TRIMETHOPRIM SULFATE 2 DROP: 10000; 1 SOLUTION/ DROPS OPHTHALMIC at 12:17

## 2019-12-16 RX ADMIN — Medication 1 DROP: at 12:17

## 2019-12-16 RX ADMIN — Medication 1 DROP: at 08:14

## 2019-12-16 RX ADMIN — METOPROLOL TARTRATE 100 MG: 50 TABLET, FILM COATED ORAL at 21:02

## 2019-12-16 RX ADMIN — ALBUTEROL SULFATE 2.5 MG: 2.5 SOLUTION RESPIRATORY (INHALATION) at 14:12

## 2019-12-16 RX ADMIN — SODIUM CHLORIDE, PRESERVATIVE FREE 10 ML: 5 INJECTION INTRAVENOUS at 08:15

## 2019-12-16 RX ADMIN — MODAFINIL 200 MG: 100 TABLET ORAL at 08:08

## 2019-12-16 RX ADMIN — Medication 500 MG: at 03:53

## 2019-12-16 RX ADMIN — BUMETANIDE 1 MG: 0.25 INJECTION INTRAMUSCULAR; INTRAVENOUS at 12:14

## 2019-12-16 RX ADMIN — METFORMIN HYDROCHLORIDE 1000 MG: 500 TABLET ORAL at 08:02

## 2019-12-16 RX ADMIN — POLYMYXIN B SULFATE, TRIMETHOPRIM SULFATE 2 DROP: 10000; 1 SOLUTION/ DROPS OPHTHALMIC at 00:02

## 2019-12-16 RX ADMIN — ALBUTEROL SULFATE 2.5 MG: 2.5 SOLUTION RESPIRATORY (INHALATION) at 07:51

## 2019-12-16 RX ADMIN — METHIMAZOLE 20 MG: 5 TABLET ORAL at 08:02

## 2019-12-16 RX ADMIN — DILTIAZEM HYDROCHLORIDE 60 MG: 60 TABLET, FILM COATED ORAL at 08:03

## 2019-12-16 RX ADMIN — INSULIN GLARGINE 35 UNITS: 100 INJECTION, SOLUTION SUBCUTANEOUS at 08:12

## 2019-12-16 RX ADMIN — FAMOTIDINE 20 MG: 10 INJECTION, SOLUTION INTRAVENOUS at 21:02

## 2019-12-16 RX ADMIN — METHIMAZOLE 10 MG: 5 TABLET ORAL at 17:12

## 2019-12-16 RX ADMIN — AMLODIPINE BESYLATE 10 MG: 10 TABLET ORAL at 08:03

## 2019-12-16 RX ADMIN — SIMETHICONE 40 MG: 20 SUSPENSION/ DROPS ORAL at 21:01

## 2019-12-16 RX ADMIN — SODIUM CHLORIDE, PRESERVATIVE FREE 10 ML: 5 INJECTION INTRAVENOUS at 21:03

## 2019-12-16 RX ADMIN — DEXTROSE MONOHYDRATE 12.5 G: 500 INJECTION PARENTERAL at 21:28

## 2019-12-16 RX ADMIN — ALBUTEROL SULFATE 2.5 MG: 2.5 SOLUTION RESPIRATORY (INHALATION) at 03:03

## 2019-12-16 RX ADMIN — LISINOPRIL 40 MG: 20 TABLET ORAL at 08:01

## 2019-12-16 RX ADMIN — ENOXAPARIN SODIUM 40 MG: 40 INJECTION SUBCUTANEOUS at 08:03

## 2019-12-16 RX ADMIN — FAMOTIDINE 20 MG: 10 INJECTION, SOLUTION INTRAVENOUS at 08:03

## 2019-12-16 RX ADMIN — ALBUTEROL SULFATE 2.5 MG: 2.5 SOLUTION RESPIRATORY (INHALATION) at 19:43

## 2019-12-16 RX ADMIN — METFORMIN HYDROCHLORIDE 1000 MG: 500 TABLET ORAL at 17:10

## 2019-12-16 RX ADMIN — SENNOSIDES AND DOCUSATE SODIUM 2 TABLET: 8.6; 5 TABLET ORAL at 08:03

## 2019-12-16 RX ADMIN — SIMETHICONE 40 MG: 20 SUSPENSION/ DROPS ORAL at 17:11

## 2019-12-16 RX ADMIN — Medication 100 MG: at 08:02

## 2019-12-16 RX ADMIN — SIMETHICONE 40 MG: 20 SUSPENSION/ DROPS ORAL at 08:03

## 2019-12-16 RX ADMIN — POLYMYXIN B SULFATE, TRIMETHOPRIM SULFATE 2 DROP: 10000; 1 SOLUTION/ DROPS OPHTHALMIC at 08:10

## 2019-12-16 ASSESSMENT — PULMONARY FUNCTION TESTS
PIF_VALUE: 11
PIF_VALUE: 17
PIF_VALUE: 21
PIF_VALUE: 16
PIF_VALUE: 20
PIF_VALUE: 16
PIF_VALUE: 17

## 2019-12-16 ASSESSMENT — PAIN SCALES - GENERAL
PAINLEVEL_OUTOF10: 0
PAINLEVEL_OUTOF10: 3
PAINLEVEL_OUTOF10: 0

## 2019-12-16 NOTE — PROGRESS NOTES
Physical Therapy  DATE: 2019    NAME: Naomy Mcgrath  MRN: [id number]   : 1955    Patient not seen this date for Physical Therapy due to:  [] Blood transfusion in progress  [] Hemodialysis  []  Patient Declined  [] Spine Precautions   [] Strict Bedrest  [] Surgery/ Procedure  [] Testing      [x] Other: / 90 (123) upon writer's arrival, recommended order of SBP <160. PT will check back this PM as able or 19. [] PT being discontinued at this time. Patient independent. No further needs. [] PT being discontinued at this time as the patient has been transferred to palliative care. No further needs.     Sheela Elliott, PT

## 2019-12-16 NOTE — PROGRESS NOTES
[facility]    Progress Note    12/16/2019    4:36 PM    Name:   Carmen Quispe  MRN:     [id number]     Acct:      [de-identified]   Room:   [address] [place] [address] Day:  8  Admit Date:  12/6/2019 10:59 AM    PCP:   Kel Sheldon MD  Code Status:  Full Code    Subjective:     C/C:   Chief Complaint   Patient presents with    Cerebrovascular Accident     Interval History Status: not changed. Patient seen and examined at bedside, about the same   Still running low-grade fever  With your chest x-ray film shows some pulmonary vascular congestion  L eye still red  Following some commands  Patient vitals, labs and all providers notes were reviewed,from overnight shift and morning updates were noted and discussed with the nurse    Brief History:     Per chart     Mr. Sofía Reno is a 30 yr old male (history collection collected from EHR as patient is trach/vent dependent at this time) who presents initially to OSH on 12/6 with complaints of HA, syncopal episode, right side weakness, facial droop, and aphasia. Initial imaging revealed a left basal ganglia bleed warranting immediate transfer to [facility].     Upon arrival at [facility], findings confirmed, Initial NIH:10, patient with above symptoms along with left periphreal field vision loss, maintaining airway, following commands, speech difficulty, some confusion, and right upper and lower extremity weakness. He was noted to have uncontrolled HTN requiring cardene gtt, and INR:2.7 as patient is on coumadin for AFib requiring Vit.  K and Jacqui Everett for reversal.  Admitted to NICU     12/7: Patient developed respiratory distress with hypoxia, BiPAP tolerated for a short period of time; however decompensation persisted and patient required emergent intubation.     12/8: Repeat imaging revealed extending bleed with midline shift     12/9: Patient became febrile, increased respiratory secretions with concerns for aspiration PNA day     Continuous Infusions:    dexmedetomidine (PRECEDEX) IV infusion Stopped (19 1154)    dextrose       PRN Meds: fentanNYL, REFRESH LACRI-LUBE, potassium chloride, fentanNYL, glucose, dextrose, glucagon (rDNA), dextrose, acetaminophen **OR** acetaminophen, sodium chloride flush, ondansetron, metoprolol, hydrALAZINE    Data:     Past Medical History:   has a past medical history of Atrial fibrillation ([address] CAD (coronary artery disease), H/O heart artery stent, Hx of blood clots, Hyperlipidemia, Hypertension, Hyperthyroidism, Kidney stones, MI, old, Microalbuminuria, Other complications due to other cardiac device, implant, and graft, Proteinuria, Renal cyst, Thyroid disease, and Type II or unspecified type diabetes mellitus without mention of complication, not stated as uncontrolled. Social History:   reports that he has been smoking cigarettes. He has a 47.00 pack-year smoking history. He has never used smokeless tobacco. He reports that he does not drink alcohol. Family History: No family history on file. Vitals:  BP (!) 162/74   Pulse 75   Temp 99.8 °F (37.7 °C) (Oral)   Resp 25   Ht 5' 7\" (1.702 m)   Wt 212 lb 8.4 oz (96.4 kg)   SpO2 92%   BMI 33.29 kg/m²   Temp (24hrs), Av.7 °F (37.6 °C), Min:97.5 °F (36.4 °C), Max:100.9 °F (38.3 °C)    Recent Labs     12/15/19  1106 12/15/19  1611 12/15/19  2359 19  1118   POCGLU 150* 139* 120* 95       I/O (24Hr):     Intake/Output Summary (Last 24 hours) at 2019 1636  Last data filed at 2019 0604  Gross per 24 hour   Intake 1815 ml   Output 800 ml   Net 1015 ml       Labs:  Hematology:  Recent Labs     12/15/19  0530 12/15/19  1250 19  0438   WBC 9.6 11.4* 9.3   RBC 4.35 4.19* 4.01*   HGB 13.6 13.1 12.5*   HCT 43.8 41.3 40.6*   .7 98.6 101.2   MCH 31.3 31.3 31.2   MCHC 31.1 31.7 30.8   RDW 14.7* 14.9* 14.8*    180 205   MPV 11.8 10.9 10.9     Chemistry:  Recent Labs     12/15/19  1130 12/15/19  1250 12/16/19  0438   * 147* 146*   K 4.2 4.2 4.1   * 112* 111*   CO2 24 24 24   GLUCOSE 157* 157* 128*   BUN 24* 24* 27*   CREATININE 0.90 0.90 1.08   ANIONGAP 11 11 11   LABGLOM >60 >60 >60   GFRAA >60 >60 >60   CALCIUM 9.3 9.3 9.0     Recent Labs     12/15/19  0038 12/15/19  0703 12/15/19  1106 12/15/19  1611 12/15/19  2359 12/16/19  0438 12/16/19  1118   TRIG  --   --   --   --   --  181*  --    POCGLU 136* 175* 150* 139* 120*  --  95     ABG:  Lab Results   Component Value Date    POCPH 7.416 12/14/2019    POCPCO2 46.4 12/14/2019    POCPO2 96.2 12/14/2019    POCHCO3 29.8 12/14/2019    NBEA NOT REPORTED 12/14/2019    PBEA 4 12/14/2019    AID8SOH 31 12/14/2019    HKJS6MRO 98 12/14/2019    FIO2 45.0 12/14/2019     Lab Results   Component Value Date/Time    SPECIAL  LT HAND 10ML 12/09/2019 05:46 PM     Lab Results   Component Value Date/Time    CULTURE NO GROWTH 6 DAYS 12/09/2019 05:46 PM       Radiology:  Ct Head Wo Contrast    Result Date: 12/11/2019  Stable intraparenchymal hemorrhage accounting for technical differences. 0.3 cm left-right midline shift. Decrease in intraventricular blood products. Xr Chest Portable    Result Date: 12/16/2019  1. Tracheostomy tube as detailed above. 2. Mild bilateral pulmonary vascular congestion. Mild bibasilar airspace disease, atelectasis and/or infiltrate. Trace bilateral pleural effusions. Findings favored to represent mild CHF related changes. Xr Chest Portable    Result Date: 12/15/2019  Bilateral congestion. Similar-appearing chest compared to prior. Xr Chest Portable    Result Date: 12/14/2019  Cardiomegaly and chronic pulmonary change without definite acute process. Xr Chest Portable    Result Date: 12/14/2019  Cardiomegaly and chronic pulmonary change without acute pulmonary process. Similar-appearing support tubes. Xr Chest Portable    Result Date: 12/13/2019  No significant interval changes.      Xr Chest Portable    Result Date:

## 2019-12-16 NOTE — PROGRESS NOTES
Trach care done without incident. Inner canula changed. Skin with no breakdown but mildly red at suture sites. Tolerated very well.

## 2019-12-16 NOTE — PLAN OF CARE
Pt assessed as a fall risk this shift. Remains free from falls and accidental injury at this time. Fall precautions in place, including falling star sign and fall risk band on pt. Floor free from obstacles, and bed is locked and in lowest position. Adequate lighting provided. Pt encouraged to call before getting Out Of Bed for any need. Bed alarm activated. Will continue to monitor needs during hourly rounding, and reinforce education on use of call light. Problem: Risk for Impaired Skin Integrity   Goal: Tissue integrity - skin and mucous membranes   Structural intactness and normal physiological function of skin and  mucous membranes. Outcome: Ongoing   Skin assessed for breakdown and redness, patient turned regularly, and heels elevated off of bed on pillows.  cream applied

## 2019-12-16 NOTE — PROGRESS NOTES
had T-max of 100.4, UA sent negative for infection.  Sputum culture sent prior showed no growth.  Patient off Cardene, on Precedex.  Tolerating tube feed.  Intubated, INR 0.9, no vitamin K given.  Patient on Lasix 20mg twice daily with a goal of being in 1 to 1.5 L negative balance.  Input 1241 L output 1465.           12/10:  No acute events overnight.  UA negative for any growth blood cultures negative sputum cultures negative.  Tolerated  only 40 minutes of CPAP, glipizide and metformin thousand milligrams started, pressure better controlled today.  No bowel movement since admission patient placed on Colace     12/11  Sputum positive for strep pneumonia started on ceftriaxone and azithomycin temp of 101.7 cxr showed worsenig  of infiltrate on right lung base.mag citrate given since no bowel movement since admission.     12/13  Patient was seen by general surgery and alerted us that anesthesia had canceled the case due to ongoing low-grade fevers concern for stability. Case rescheduled for morning of 12/14/2019. Patient continues to have copious secretions with history of COPD making him unlikely to tolerate extubation even in light of favorable predictive ventilatory studies. Leukocytosis remains low at 8.3. Patient remains on ceftriaxone and this is azithromycin. Eyes open at rest.  Some spontaneous movement. Does not follow commands.     12/14 : post peg trach. Wbc increased to 13. Continue azithromycin and ceftriaxone for pneumonia. Will start tube feeds per surgical recommendations through G-tube. Consider stepdown PT OT tomorrow     12/15: Postop day 1 status post trach and PEG. Has had some bloody secretions likely is postoperative. Awaiting morning labs. Afebrile overnight. Patient does have noticeable conjunctival injection and chemosis of the left eye, possibly related to operative eye trauma versus conjunctivitis versus volume overload. ophthalmic antibiotics.   Will mouth words, will warfarin for atrial fibrillation    Recommended Follow-up:     1. Anticoagulation recommendations for atrial fibrillation given spontaneous bleed on Coumadin. Follow-up with cardiology. Cardiology consulted for possible watchman device. Otherwise resume anticoagulant 1 month  2. Follow-up pneumonia-patient unable to tolerate CPAP trials with trach, history of COPD  3. Monitor G-tube tolerance  2. Monitor conjunctivitis left eye        Above mentioned assessment and plan was discussed by me with the admitting medicine resident. The medicine team assigned to the patient by medicine admitting resident will be following up the patient from now onwards on the floor.      Haider Dimas MD  Neuro Critical Care  12/16/2019, 8:08 AM

## 2019-12-16 NOTE — CONSULTS
[address] / HISTORY AND PHYSICAL EXAMINATION            Date:   12/15/2019  Patient name:  Carmen Quispe  Date of admission:  12/6/2019 10:59 AM  MRN:   [id number]  Account:  [de-identified]  YOB: 1955  PCP:    Kel Sheldon MD  Room:   228/2790-47  Code Status:    Full Code    Physician Requesting Consult: Jennifer Naranjo MD    Reason for Consult:  Management upon transfer out of NICU, Stable Hemorrhagic CVA, PNA    Chief Complaint:     Chief Complaint   Patient presents with    Cerebrovascular Accident       History Obtained From:     electronic medical record    History of Present Illness:     Mr. Sofía Reno is a 30 yr old male (history collection collected from EHR as patient is trach/vent dependent at this time) who presents initially to OSH on 12/6 with complaints of HA, syncopal episode, right side weakness, facial droop, and aphasia. Initial imaging revealed a left basal ganglia bleed warranting immediate transfer to [facility]. Upon arrival at [facility], findings confirmed, Initial NIH:10, patient with above symptoms along with left periphreal field vision loss, maintaining airway, following commands, speech difficulty, some confusion, and right upper and lower extremity weakness. He was noted to have uncontrolled HTN requiring cardene gtt, and INR:2.7 as patient is on coumadin for AFib requiring Vit. K and Jacqui Everett for reversal.  Admitted to NICU    12/7: Patient developed respiratory distress with hypoxia, BiPAP tolerated for a short period of time; however decompensation persisted and patient required emergent intubation. 12/8: Repeat imaging revealed extending bleed with midline shift    12/9: Patient became febrile, increased respiratory secretions with concerns for aspiration PNA on CXR. Started on empiric ABX, sputum cultures collected/sent    12/11: Sputum cultures positive for Strep.  Pneumoniae    Patient with underlying history of COPD and given development of PNA, patient was unable to be weaned from ventilator. 12/14: Tracheostomy and open GTube  Per Gen. Surgery    PMH includes AFib (longterm AC with coumadin s/p AV node ablation), Chronic Diastolic Dysfunction with PPM/ICD in place via Dr. Emelina Gann (most recent ECHO 12/6 with EF:48%), CAD s/p BMS to RCA, SHIRLENE to RCA, Hx: blood clots to right lower extremity s/p fem-pop thrombectomy/ embolectomy (2016), HTN, DMII, and thyroidectomy 10/31/18    On PE, patient remains in Neuro-ICU bed, he is off sedation and has not required any pain medications, #8 Shiley trach in place on full vent support with PEEP:8, tolerating TF, patient nods yes/no to questions (is slow to respond at time), he does follow commands lightly on the left (nothing on the right). Lung sounds are coarse throughout, patient still with significant secretions, VSS on monitor. Discussed with RN-- no concerns from nursing stand point at present.     Past Medical History:     Past Medical History:   Diagnosis Date    Atrial fibrillation ([address]     CAD (coronary artery disease)     H/O heart artery stent x 4    Hx of blood clots     8 clots removed from right leg    Hyperlipidemia     Hypertension     Hyperthyroidism     Kidney stones     MI, old     family states 3 MI history    Microalbuminuria     Other complications due to other cardiac device, implant, and graft     Proteinuria     Renal cyst     right side    Thyroid disease     Type II or unspecified type diabetes mellitus without mention of complication, not stated as uncontrolled         Past Surgical History:     Past Surgical History:   Procedure Laterality Date    CYSTOSCOPY      EYE SURGERY Left     s/p thyroidectomy double vision rec'd radiation 1yr ago    KIDNEY SURGERY      LITHOTRIPSY      PACEMAKER PLACEMENT      PT HAS [employer] PACEMAKER GENERATOR  MODEL #[id number], THIS SYSTEM IS MRI CONDITIONAL HOWEVER THE normal muscle tone and bulk, no abnormal sensation, normal speech, cranial nerves II through XII grossly intact  Skin: No gross lesions, rashes, bruising or bleeding on exposed skin area  Extremities:  peripheral pulses palpable, 1+ pedal edema    Investigations:      Laboratory Testing:  Recent Results (from the past 24 hour(s))   POC Glucose Fingerstick    Collection Time: 12/15/19 12:38 AM   Result Value Ref Range    POC Glucose 136 (H) 75 - 110 mg/dL   Basic Metabolic Panel    Collection Time: 12/15/19  5:30 AM   Result Value Ref Range    Glucose 160 (H) 70 - 99 mg/dL    BUN 27 (H) 8 - 23 mg/dL    CREATININE 1.04 0.70 - 1.20 mg/dL    Bun/Cre Ratio NOT REPORTED 9 - 20    Calcium 9.4 8.6 - 10.4 mg/dL    Sodium 146 (H) 135 - 144 mmol/L    Potassium 4.6 3.7 - 5.3 mmol/L    Chloride 110 (H) 98 - 107 mmol/L    CO2 23 20 - 31 mmol/L    Anion Gap 13 9 - 17 mmol/L    GFR Non-African American >60 >60 mL/min    GFR African American >60 >60 mL/min    GFR Comment          GFR Staging NOT REPORTED    CBC    Collection Time: 12/15/19  5:30 AM   Result Value Ref Range    WBC 9.6 3.5 - 11.3 k/uL    RBC 4.35 4.21 - 5.77 m/uL    Hemoglobin 13.6 13.0 - 17.0 g/dL    Hematocrit 43.8 40.7 - 50.3 %    .7 82.6 - 102.9 fL    MCH 31.3 25.2 - 33.5 pg    MCHC 31.1 28.4 - 34.8 g/dL    RDW 14.7 (H) 11.8 - 14.4 %    Platelets 555 661 - 647 k/uL    MPV 11.8 8.1 - 13.5 fL    NRBC Automated 0.0 0.0 per 100 WBC   POC Glucose Fingerstick    Collection Time: 12/15/19  7:03 AM   Result Value Ref Range    POC Glucose 175 (H) 75 - 110 mg/dL   POC Glucose Fingerstick    Collection Time: 12/15/19 11:06 AM   Result Value Ref Range    POC Glucose 150 (H) 75 - 110 mg/dL   Basic metabolic panel    Collection Time: 12/15/19 12:50 PM   Result Value Ref Range    Glucose 157 (H) 70 - 99 mg/dL    BUN 24 (H) 8 - 23 mg/dL    CREATININE 0.90 0.70 - 1.20 mg/dL    Bun/Cre Ratio NOT REPORTED 9 - 20    Calcium 9.3 8.6 - 10.4 mg/dL    Sodium 147 (H) 135 - 144 mmol/L    Potassium 4.2 3.7 - 5.3 mmol/L    Chloride 112 (H) 98 - 107 mmol/L    CO2 24 20 - 31 mmol/L    Anion Gap 11 9 - 17 mmol/L    GFR Non-African American >60 >60 mL/min    GFR African American >60 >60 mL/min    GFR Comment          GFR Staging NOT REPORTED    CBC Auto Differential    Collection Time: 12/15/19 12:50 PM   Result Value Ref Range    WBC 11.4 (H) 3.5 - 11.3 k/uL    RBC 4.19 (L) 4.21 - 5.77 m/uL    Hemoglobin 13.1 13.0 - 17.0 g/dL    Hematocrit 41.3 40.7 - 50.3 %    MCV 98.6 82.6 - 102.9 fL    MCH 31.3 25.2 - 33.5 pg    MCHC 31.7 28.4 - 34.8 g/dL    RDW 14.9 (H) 11.8 - 14.4 %    Platelets 992 761 - 766 k/uL    MPV 10.9 8.1 - 13.5 fL    NRBC Automated 0.0 0.0 per 100 WBC    Differential Type NOT REPORTED     Seg Neutrophils 77 (H) 36 - 65 %    Lymphocytes 11 (L) 24 - 43 %    Monocytes 8 3 - 12 %    Eosinophils % 1 1 - 4 %    Basophils 1 0 - 2 %    Immature Granulocytes 2 (H) 0 %    Segs Absolute 8.82 (H) 1.50 - 8.10 k/uL    Absolute Lymph # 1.29 1.10 - 3.70 k/uL    Absolute Mono # 0.87 0.10 - 1.20 k/uL    Absolute Eos # 0.11 0.00 - 0.44 k/uL    Basophils Absolute 0.07 0.00 - 0.20 k/uL    Absolute Immature Granulocyte 0.21 0.00 - 0.30 k/uL    WBC Morphology NOT REPORTED     RBC Morphology ANISOCYTOSIS PRESENT     Platelet Estimate NOT REPORTED    POC Glucose Fingerstick    Collection Time: 12/15/19  4:11 PM   Result Value Ref Range    POC Glucose 139 (H) 75 - 110 mg/dL       Imaging/Diagonstics:  Ct Head Wo Contrast    Result Date: 12/11/2019  Stable intraparenchymal hemorrhage accounting for technical differences. 0.3 cm left-right midline shift. Decrease in intraventricular blood products. Xr Chest Portable    Result Date: 12/15/2019  Bilateral congestion. Similar-appearing chest compared to prior. Xr Chest Portable    Result Date: 12/14/2019  Cardiomegaly and chronic pulmonary change without definite acute process.      Xr Chest Portable    Result Date: 12/14/2019  Cardiomegaly and chronic pulmonary change without acute pulmonary process. Similar-appearing support tubes. Xr Chest Portable    Result Date: 12/13/2019  No significant interval changes. Xr Chest Portable    Result Date: 12/12/2019  Relatively stable cardiopulmonary status     Xr Chest Portable    Result Date: 12/11/2019  Mild interval worsening of infiltrate at the right lung base. Xr Chest Portable    Result Date: 12/10/2019  Support tubes and lines as above. Advancement of the endotracheal tube by 2-3 cm suggested for optimal positioning. Cardiomegaly. Improving right lower lobe airspace disease. Xr Chest Portable    Result Date: 12/10/2019  High ET tube position projecting 7 cm from the maday. Consider advancing the tube 2 cm for better position. Confluent opacity in the right lower lung suggesting pneumonia. Attention at follow-up is advised. Xr Chest Portable    Result Date: 12/9/2019  Resolving right lower lung field airspace disease, otherwise stable appearing chest as detailed above. Assessment :      Hospital Problems           Last Modified POA    * (Principal) Nontraumatic cortical hemorrhage of left cerebral hemisphere ([address] 12/6/2019 Yes    Atrial fibrillation ([address] 12/6/2019 Yes    Uncontrolled hypertension 12/6/2019 Yes    CAD (coronary artery disease) 12/6/2019 Yes    Diabetes mellitus type 2 in obese ([address] 12/6/2019 Yes    Hyperlipidemia 12/6/2019 Yes    Hyperthyroidism 12/6/2019 Yes    Acute intra-cranial hemorrhage ([address] 12/6/2019 Yes    Intraparenchymal hematoma of brain ([address] 12/7/2019 Yes    Ventilator dependence ([address] 12/8/2019 Yes    Acute on chronic combined systolic and diastolic congestive heart failure ([address] 12/8/2019 Yes    Hypernatremia 12/15/2019 Yes    Aspiration pneumonia ([address] 12/15/2019 Yes          Plan:     1. Nontraumatic hemorrhage of left cerebral Hemisphere-- stabilized, Neuro-critical care managing  2.  Hypoxic Respiratory Failure-- vent dependent with trach placement 12/14  3. Pneumonia-- sputum cultures strep-pneumoniae positive, on Azithromycin and Ceftriaxone, WBC: 11.4 today  4. Acute on Chronic Combined Heart Failure-- daily bumex, last EF:48%, previously follows with [facility] cardiology  5. AFib-- on coumadin for long term AC and s/p AV james ablation with PPM placement. Coumadin on hold for 1 month per neuro given bleed. Plan to consult cardiology. 6. Uncontrolled HTN-- off cardene gtt, restarted on home BP meds  7. DMII-- long acting insulin and high dose sliding scale along with oral agents restarted  8. Hypernatremia-- 147    9. Plan for management upon transfer out of NICU-- awaiting placement of step down bed  10. Continue vent management and weaning trials per RT protocol-- will need pulmonary consult when out to floor for continued vent management  11. Continue Insulin as ordered for glycemic control  12. Cardiology consulted given concerns with AF and AC in light of bleed  13. Lovenox for DVT prophylaxis  14. PPI prophylaxis  15. Continue TF  16. Free water flush for NA:147 and monitor NA/daily labs  17. Consult social work as patient will need placement on discharge  18.  Continue to follow    Consultations:   IP CONSULT TO NEUROCRITICAL CARE  IP CONSULT TO CASE MANAGEMENT  IP CONSULT TO NEUROSURGERY  IP CONSULT TO GENERAL SURGERY  IP CONSULT TO DIETITIAN  IP CONSULT TO CARDIOLOGY  IP CONSULT TO HOSPITALIST      CHERIE Sabillon NP  12/15/2019  8:37 PM    Copy sent to Dr. Thomas Holder MD

## 2019-12-17 ENCOUNTER — APPOINTMENT (OUTPATIENT)
Dept: GENERAL RADIOLOGY | Age: 64
DRG: 003 | End: 2019-12-17
Payer: MEDICARE

## 2019-12-17 LAB
ABSOLUTE EOS #: 0.11 K/UL (ref 0–0.44)
ABSOLUTE IMMATURE GRANULOCYTE: 0.16 K/UL (ref 0–0.3)
ABSOLUTE LYMPH #: 1.73 K/UL (ref 1.1–3.7)
ABSOLUTE MONO #: 0.74 K/UL (ref 0.1–1.2)
ANION GAP SERPL CALCULATED.3IONS-SCNC: 13 MMOL/L (ref 9–17)
BASOPHILS # BLD: 1 % (ref 0–2)
BASOPHILS ABSOLUTE: 0.12 K/UL (ref 0–0.2)
BUN BLDV-MCNC: 25 MG/DL (ref 8–23)
BUN/CREAT BLD: ABNORMAL (ref 9–20)
CALCIUM SERPL-MCNC: 9.3 MG/DL (ref 8.6–10.4)
CHLORIDE BLD-SCNC: 112 MMOL/L (ref 98–107)
CO2: 24 MMOL/L (ref 20–31)
CREAT SERPL-MCNC: 1.19 MG/DL (ref 0.7–1.2)
DIFFERENTIAL TYPE: ABNORMAL
EOSINOPHILS RELATIVE PERCENT: 1 % (ref 1–4)
GFR AFRICAN AMERICAN: >60 ML/MIN
GFR NON-AFRICAN AMERICAN: >60 ML/MIN
GFR SERPL CREATININE-BSD FRML MDRD: ABNORMAL ML/MIN/{1.73_M2}
GFR SERPL CREATININE-BSD FRML MDRD: ABNORMAL ML/MIN/{1.73_M2}
GLUCOSE BLD-MCNC: 105 MG/DL (ref 75–110)
GLUCOSE BLD-MCNC: 130 MG/DL (ref 75–110)
GLUCOSE BLD-MCNC: 76 MG/DL (ref 70–99)
GLUCOSE BLD-MCNC: 88 MG/DL (ref 75–110)
HCT VFR BLD CALC: 42.3 % (ref 40.7–50.3)
HEMOGLOBIN: 13.2 G/DL (ref 13–17)
IMMATURE GRANULOCYTES: 2 %
LYMPHOCYTES # BLD: 18 % (ref 24–43)
MCH RBC QN AUTO: 31.4 PG (ref 25.2–33.5)
MCHC RBC AUTO-ENTMCNC: 31.2 G/DL (ref 28.4–34.8)
MCV RBC AUTO: 100.7 FL (ref 82.6–102.9)
MONOCYTES # BLD: 8 % (ref 3–12)
NRBC AUTOMATED: 0 PER 100 WBC
PDW BLD-RTO: 14.7 % (ref 11.8–14.4)
PLATELET # BLD: 185 K/UL (ref 138–453)
PLATELET ESTIMATE: ABNORMAL
PMV BLD AUTO: 11.9 FL (ref 8.1–13.5)
POTASSIUM SERPL-SCNC: 4.3 MMOL/L (ref 3.7–5.3)
RBC # BLD: 4.2 M/UL (ref 4.21–5.77)
RBC # BLD: ABNORMAL 10*6/UL
SEG NEUTROPHILS: 70 % (ref 36–65)
SEGMENTED NEUTROPHILS ABSOLUTE COUNT: 6.96 K/UL (ref 1.5–8.1)
SODIUM BLD-SCNC: 149 MMOL/L (ref 135–144)
WBC # BLD: 9.8 K/UL (ref 3.5–11.3)
WBC # BLD: ABNORMAL 10*3/UL

## 2019-12-17 PROCEDURE — 6370000000 HC RX 637 (ALT 250 FOR IP): Performed by: STUDENT IN AN ORGANIZED HEALTH CARE EDUCATION/TRAINING PROGRAM

## 2019-12-17 PROCEDURE — 94003 VENT MGMT INPAT SUBQ DAY: CPT

## 2019-12-17 PROCEDURE — 82947 ASSAY GLUCOSE BLOOD QUANT: CPT

## 2019-12-17 PROCEDURE — 85025 COMPLETE CBC W/AUTO DIFF WBC: CPT

## 2019-12-17 PROCEDURE — 6360000002 HC RX W HCPCS: Performed by: STUDENT IN AN ORGANIZED HEALTH CARE EDUCATION/TRAINING PROGRAM

## 2019-12-17 PROCEDURE — 97530 THERAPEUTIC ACTIVITIES: CPT

## 2019-12-17 PROCEDURE — 36415 COLL VENOUS BLD VENIPUNCTURE: CPT

## 2019-12-17 PROCEDURE — 2500000003 HC RX 250 WO HCPCS: Performed by: STUDENT IN AN ORGANIZED HEALTH CARE EDUCATION/TRAINING PROGRAM

## 2019-12-17 PROCEDURE — 6370000000 HC RX 637 (ALT 250 FOR IP): Performed by: FAMILY MEDICINE

## 2019-12-17 PROCEDURE — 94761 N-INVAS EAR/PLS OXIMETRY MLT: CPT

## 2019-12-17 PROCEDURE — 74018 RADEX ABDOMEN 1 VIEW: CPT

## 2019-12-17 PROCEDURE — 71045 X-RAY EXAM CHEST 1 VIEW: CPT

## 2019-12-17 PROCEDURE — 99232 SBSQ HOSP IP/OBS MODERATE 35: CPT | Performed by: FAMILY MEDICINE

## 2019-12-17 PROCEDURE — 2700000000 HC OXYGEN THERAPY PER DAY

## 2019-12-17 PROCEDURE — 2580000003 HC RX 258: Performed by: STUDENT IN AN ORGANIZED HEALTH CARE EDUCATION/TRAINING PROGRAM

## 2019-12-17 PROCEDURE — 80048 BASIC METABOLIC PNL TOTAL CA: CPT

## 2019-12-17 PROCEDURE — 97166 OT EVAL MOD COMPLEX 45 MIN: CPT

## 2019-12-17 PROCEDURE — 94640 AIRWAY INHALATION TREATMENT: CPT

## 2019-12-17 PROCEDURE — 94770 HC ETCO2 MONITOR DAILY: CPT

## 2019-12-17 PROCEDURE — APPSS60 APP SPLIT SHARED TIME 46-60 MINUTES: Performed by: NURSE PRACTITIONER

## 2019-12-17 PROCEDURE — 6370000000 HC RX 637 (ALT 250 FOR IP): Performed by: NURSE PRACTITIONER

## 2019-12-17 PROCEDURE — 2060000000 HC ICU INTERMEDIATE R&B

## 2019-12-17 PROCEDURE — 08BRXZZ EXCISION OF LEFT LOWER EYELID, EXTERNAL APPROACH: ICD-10-PCS | Performed by: OPHTHALMOLOGY

## 2019-12-17 PROCEDURE — 97162 PT EVAL MOD COMPLEX 30 MIN: CPT

## 2019-12-17 RX ORDER — HYDRALAZINE HYDROCHLORIDE 25 MG/1
25 TABLET, FILM COATED ORAL EVERY 8 HOURS SCHEDULED
Status: DISCONTINUED | OUTPATIENT
Start: 2019-12-17 | End: 2019-12-18

## 2019-12-17 RX ORDER — HYDROCHLOROTHIAZIDE 25 MG/1
25 TABLET ORAL DAILY
Status: DISCONTINUED | OUTPATIENT
Start: 2019-12-17 | End: 2019-12-25

## 2019-12-17 RX ADMIN — ALBUTEROL SULFATE 2.5 MG: 2.5 SOLUTION RESPIRATORY (INHALATION) at 03:40

## 2019-12-17 RX ADMIN — ENOXAPARIN SODIUM 40 MG: 40 INJECTION SUBCUTANEOUS at 09:30

## 2019-12-17 RX ADMIN — METHIMAZOLE 20 MG: 5 TABLET ORAL at 09:30

## 2019-12-17 RX ADMIN — SODIUM CHLORIDE, PRESERVATIVE FREE 10 ML: 5 INJECTION INTRAVENOUS at 09:00

## 2019-12-17 RX ADMIN — POLYMYXIN B SULFATE, TRIMETHOPRIM SULFATE 2 DROP: 10000; 1 SOLUTION/ DROPS OPHTHALMIC at 13:28

## 2019-12-17 RX ADMIN — METFORMIN HYDROCHLORIDE 1000 MG: 500 TABLET ORAL at 09:29

## 2019-12-17 RX ADMIN — METOPROLOL TARTRATE 100 MG: 50 TABLET, FILM COATED ORAL at 09:29

## 2019-12-17 RX ADMIN — BUMETANIDE 1 MG: 1 TABLET ORAL at 09:30

## 2019-12-17 RX ADMIN — Medication 100 MG: at 09:30

## 2019-12-17 RX ADMIN — ALBUTEROL SULFATE 2.5 MG: 2.5 SOLUTION RESPIRATORY (INHALATION) at 08:21

## 2019-12-17 RX ADMIN — SIMETHICONE 40 MG: 20 SUSPENSION/ DROPS ORAL at 22:57

## 2019-12-17 RX ADMIN — Medication 1 DROP: at 22:56

## 2019-12-17 RX ADMIN — METHIMAZOLE 10 MG: 5 TABLET ORAL at 22:56

## 2019-12-17 RX ADMIN — INSULIN GLARGINE 35 UNITS: 100 INJECTION, SOLUTION SUBCUTANEOUS at 09:31

## 2019-12-17 RX ADMIN — HYDRALAZINE HYDROCHLORIDE 25 MG: 25 TABLET, FILM COATED ORAL at 17:37

## 2019-12-17 RX ADMIN — FAMOTIDINE 20 MG: 10 INJECTION, SOLUTION INTRAVENOUS at 22:57

## 2019-12-17 RX ADMIN — SIMETHICONE 40 MG: 20 SUSPENSION/ DROPS ORAL at 09:32

## 2019-12-17 RX ADMIN — Medication 15 ML: at 22:56

## 2019-12-17 RX ADMIN — SODIUM CHLORIDE, PRESERVATIVE FREE 10 ML: 5 INJECTION INTRAVENOUS at 23:08

## 2019-12-17 RX ADMIN — ALBUTEROL SULFATE 2.5 MG: 2.5 SOLUTION RESPIRATORY (INHALATION) at 19:50

## 2019-12-17 RX ADMIN — AMLODIPINE BESYLATE 10 MG: 10 TABLET ORAL at 09:30

## 2019-12-17 RX ADMIN — LISINOPRIL 40 MG: 20 TABLET ORAL at 09:32

## 2019-12-17 RX ADMIN — DESMOPRESSIN ACETATE 40 MG: 0.2 TABLET ORAL at 22:56

## 2019-12-17 RX ADMIN — ACETAMINOPHEN 650 MG: 325 TABLET ORAL at 09:42

## 2019-12-17 RX ADMIN — DILTIAZEM HYDROCHLORIDE 90 MG: 60 TABLET, FILM COATED ORAL at 06:00

## 2019-12-17 RX ADMIN — Medication 1 DROP: at 09:33

## 2019-12-17 RX ADMIN — GLYBURIDE 5 MG: 5 TABLET ORAL at 17:37

## 2019-12-17 RX ADMIN — SIMETHICONE 40 MG: 20 SUSPENSION/ DROPS ORAL at 17:38

## 2019-12-17 RX ADMIN — POLYMYXIN B SULFATE, TRIMETHOPRIM SULFATE 2 DROP: 10000; 1 SOLUTION/ DROPS OPHTHALMIC at 22:56

## 2019-12-17 RX ADMIN — CEFTRIAXONE SODIUM 1 G: 1 INJECTION, POWDER, FOR SOLUTION INTRAMUSCULAR; INTRAVENOUS at 05:18

## 2019-12-17 RX ADMIN — Medication 1 DROP: at 13:28

## 2019-12-17 RX ADMIN — HYDRALAZINE HYDROCHLORIDE 10 MG: 20 INJECTION INTRAMUSCULAR; INTRAVENOUS at 12:10

## 2019-12-17 RX ADMIN — Medication 15 ML: at 09:31

## 2019-12-17 RX ADMIN — Medication 500 MG: at 05:17

## 2019-12-17 RX ADMIN — DOCUSATE SODIUM 100 MG: 50 LIQUID ORAL at 09:30

## 2019-12-17 RX ADMIN — MODAFINIL 200 MG: 100 TABLET ORAL at 09:42

## 2019-12-17 RX ADMIN — POLYMYXIN B SULFATE, TRIMETHOPRIM SULFATE 2 DROP: 10000; 1 SOLUTION/ DROPS OPHTHALMIC at 05:08

## 2019-12-17 RX ADMIN — ALBUTEROL SULFATE 2.5 MG: 2.5 SOLUTION RESPIRATORY (INHALATION) at 14:45

## 2019-12-17 RX ADMIN — DILTIAZEM HYDROCHLORIDE 90 MG: 60 TABLET, FILM COATED ORAL at 17:37

## 2019-12-17 RX ADMIN — GLYBURIDE 5 MG: 5 TABLET ORAL at 09:29

## 2019-12-17 RX ADMIN — SENNOSIDES AND DOCUSATE SODIUM 2 TABLET: 8.6; 5 TABLET ORAL at 09:30

## 2019-12-17 RX ADMIN — POLYMYXIN B SULFATE, TRIMETHOPRIM SULFATE 2 DROP: 10000; 1 SOLUTION/ DROPS OPHTHALMIC at 01:25

## 2019-12-17 RX ADMIN — FAMOTIDINE 20 MG: 10 INJECTION, SOLUTION INTRAVENOUS at 09:30

## 2019-12-17 ASSESSMENT — ENCOUNTER SYMPTOMS
SINUS PRESSURE: 0
SHORTNESS OF BREATH: 0
SINUS PAIN: 0
BACK PAIN: 0
ABDOMINAL DISTENTION: 1
EYE DISCHARGE: 1

## 2019-12-17 ASSESSMENT — PULMONARY FUNCTION TESTS
PIF_VALUE: 22
PIF_VALUE: 17
PIF_VALUE: 21
PIF_VALUE: 20
PIF_VALUE: 20
PIF_VALUE: 16
PIF_VALUE: 21
PIF_VALUE: 22
PIF_VALUE: 16

## 2019-12-17 ASSESSMENT — PAIN SCALES - GENERAL
PAINLEVEL_OUTOF10: 8
PAINLEVEL_OUTOF10: 0

## 2019-12-17 ASSESSMENT — PAIN SCALES - WONG BAKER: WONGBAKER_NUMERICALRESPONSE: 0

## 2019-12-17 NOTE — PROGRESS NOTES
[facility]    Progress Note    12/17/2019    10:06 AM    Name:   Patricia Simpson  MRN:     [id number]     [place]:      [de-identified]   Room:   [address] [place] [address] Day:  6  Admit Date:  12/6/2019 10:59 AM    PCP:   Lexi Sanchez MD  Code Status:  Full Code    Subjective:     C/C:   Chief Complaint   Patient presents with    Cerebrovascular Accident     Interval History Status: not changed. Patient seen and evaluated in room sitting in bed in no acute distress. Vital signs continue to be stable. No events overnight. Patient continues to remain on ventilator support. PEG tube infusing tube feed. Right-sided hemiparesis persists after recent CVA. Left eye conjunctivitis persists. ABD distended today. Awaiting placement at skilled nursing facility. Brief History:     Per Records:  Mr. Geoff Godfrey is a 30 yr old male (history collection collected from EHR as patient is trach/vent dependent at this time) who presents initially to OSH on 12/6 with complaints of HA, syncopal episode, right side weakness, facial droop, and aphasia.  Initial imaging revealed a left basal ganglia bleed warranting immediate transfer to [facility].     Upon arrival at [facility], findings confirmed, Initial NIH:10, patient with above symptoms along with left periphreal field vision loss, maintaining airway, following commands, speech difficulty, some confusion, and right upper and lower extremity weakness. He was noted to have uncontrolled HTN requiring cardene gtt, and INR:2.7 as patient is on coumadin for AFib requiring Vit.  K and Brendalyn Lithonia for reversal.  Admitted to NICU     12/7: Patient developed respiratory distress with hypoxia, BiPAP tolerated for a short period of time; however decompensation persisted and patient required emergent intubation.     12/8: Repeat imaging revealed extending bleed with midline shift     12/9: Patient became febrile, increased respiratory secretions  vitamin B-1  100 mg Oral Daily    chlorhexidine  15 mL Mouth/Throat BID    lisinopril  40 mg Oral Daily    simethicone  40 mg Per NG tube 4x Daily    atorvastatin  40 mg Oral Daily    sodium chloride flush  10 mL Intravenous 2 times per day    methIMAzole  20 mg Oral Daily    methIMAzole  10 mg Oral QPM    metoprolol tartrate  100 mg Oral BID     Continuous Infusions:    dexmedetomidine (PRECEDEX) IV infusion Stopped (19 1154)    dextrose       PRN Meds: fentanNYL, REFRESH LACRI-LUBE, potassium chloride, fentanNYL, glucose, dextrose, glucagon (rDNA), dextrose, acetaminophen **OR** acetaminophen, sodium chloride flush, ondansetron, metoprolol, hydrALAZINE    Data:     Past Medical History:   has a past medical history of Atrial fibrillation ([address] CAD (coronary artery disease), H/O heart artery stent, Hx of blood clots, Hyperlipidemia, Hypertension, Hyperthyroidism, Kidney stones, MI, old, Microalbuminuria, Other complications due to other cardiac device, implant, and graft, Proteinuria, Renal cyst, Thyroid disease, and Type II or unspecified type diabetes mellitus without mention of complication, not stated as uncontrolled. Social History:   reports that he has been smoking cigarettes. He has a 47.00 pack-year smoking history. He has never used smokeless tobacco. He reports that he does not drink alcohol. Family History: No family history on file. Vitals:  BP (!) 174/86   Pulse 75   Temp 99.2 °F (37.3 °C) (Oral)   Resp 21   Ht 5' 7\" (1.702 m)   Wt 212 lb 8.4 oz (96.4 kg)   SpO2 93%   BMI 33.29 kg/m²   Temp (24hrs), Av.5 °F (37.5 °C), Min:98.8 °F (37.1 °C), Max:99.8 °F (37.7 °C)    Recent Labs     19  0116   POCGLU 53* 85 77 88       I/O (24Hr):     Intake/Output Summary (Last 24 hours) at 2019 1006  Last data filed at 2019 0400  Gross per 24 hour   Intake 50 ml   Output 950 ml   Net -900 ml Labs:  Hematology:  Recent Labs     12/15/19  1250 12/16/19  0438 12/17/19  0352   WBC 11.4* 9.3 9.8   RBC 4.19* 4.01* 4.20*   HGB 13.1 12.5* 13.2   HCT 41.3 40.6* 42.3   MCV 98.6 101.2 100.7   MCH 31.3 31.2 31.4   MCHC 31.7 30.8 31.2   RDW 14.9* 14.8* 14.7*    205 185   MPV 10.9 10.9 11.9     Chemistry:  Recent Labs     12/15/19  1250 12/16/19  0438 12/17/19  0352   * 146* 149*   K 4.2 4.1 4.3   * 111* 112*   CO2 24 24 24   GLUCOSE 157* 128* 76   BUN 24* 27* 25*   CREATININE 0.90 1.08 1.19   ANIONGAP 11 11 13   LABGLOM >60 >60 >60   GFRAA >60 >60 >60   CALCIUM 9.3 9.0 9.3     Recent Labs     12/16/19  0438 12/16/19  1118 12/16/19  1731 12/16/19  2106 12/16/19  2125 12/16/19  2252 12/17/19  0116   TRIG 181*  --   --   --   --   --   --    POCGLU  --  95 83 53* 85 77 88     ABG:  Lab Results   Component Value Date    POCPH 7.416 12/14/2019    POCPCO2 46.4 12/14/2019    POCPO2 96.2 12/14/2019    POCHCO3 29.8 12/14/2019    NBEA NOT REPORTED 12/14/2019    PBEA 4 12/14/2019    TGS8DLL 31 12/14/2019    YFSS7MXI 98 12/14/2019    FIO2 45.0 12/14/2019     Lab Results   Component Value Date/Time    SPECIAL  LT HAND 10ML 12/09/2019 05:46 PM     Lab Results   Component Value Date/Time    CULTURE NO GROWTH 6 DAYS 12/09/2019 05:46 PM       Radiology:  Ct Head Wo Contrast    Result Date: 12/11/2019  Stable intraparenchymal hemorrhage accounting for technical differences. 0.3 cm left-right midline shift. Decrease in intraventricular blood products. Xr Chest Portable    Result Date: 12/17/2019  Stable mild cardiomegaly. Otherwise, unremarkable single upright portable AP view of the chest.  Note: Left costophrenic angle is not completely within the field of view. Xr Chest Portable    Result Date: 12/16/2019  1. Tracheostomy tube as detailed above. 2. Mild bilateral pulmonary vascular congestion. Mild bibasilar airspace disease, atelectasis and/or infiltrate. Trace bilateral pleural effusions. Neurological:      Mental Status: He is alert. Motor: Weakness (right sided hemiparesis) present. Psychiatric:         Mood and Affect: Mood normal.         Assessment:        Hospital Problems           Last Modified POA    * (Principal) Nontraumatic cortical hemorrhage of left cerebral hemisphere ([address] 12/6/2019 Yes    Atrial fibrillation ([address] 12/6/2019 Yes    Uncontrolled hypertension 12/6/2019 Yes    CAD (coronary artery disease) 12/6/2019 Yes    Diabetes mellitus type 2 in obese ([address] 12/6/2019 Yes    Hyperlipidemia 12/6/2019 Yes    Hyperthyroidism 12/6/2019 Yes    Acute intra-cranial hemorrhage ([address] 12/6/2019 Yes    Intraparenchymal hematoma of brain ([address] 12/7/2019 Yes    Ventilator dependence ([address] 12/8/2019 Yes    Acute on chronic combined systolic and diastolic congestive heart failure ([address] 12/8/2019 Yes    Hypernatremia 12/15/2019 Yes    Aspiration pneumonia ([address] 12/15/2019 Yes          Plan:        1. Left cerebral hemorrhagic CVA: Continue to monitor, right hemiparesis  persists  2. Atrial fibrillation: Cardiology has been consulted for anticoagulation needs. He is status post AV james ablation patient had spontaneous bleed while on Coumadin therapy. Per notes patient can resume anticoagulation in 1 month per neuro critical care. 3. Uncontrolled hypertension: On amlodipine, Bumex, Cardizem, lisinopril. Start ow dose hydralazine   4. Conjunctivitis: Left eye, continue eyedrops  5. Coronary artery disease: Status post BMS to RCA, SHIRLENE to RCA  6. Diabetes mellitus type 2: Continue to monitor for hyper/hypoglycemic events. On Lantus twice daily, high intensity SSI, glyburide, metformin,  7. Hypothyroidism: s/p thyroidectomy in 2018. Continue current medication  8. Hyperlipidemia: Statin  9. Acute on chronic combined systolic and diastolic congestive heart failure: With PPM/ICD per Dr. Emmy Morejon most recent echo on 12/6 showing ejection fraction 48%. Continue Bumex, beta-blocker  10.

## 2019-12-17 NOTE — PROGRESS NOTES
Maximum assistance; Increased time to complete  LE Dressing: Dependent/Total  Toileting: Dependent/Total  Tone RUE  RUE Tone: Hypotonic  RUE Modified Ruthy Scale  RUE Modified Ruthy Scale Completed: No  Tone LUE  LUE Tone: Normotonic  Coordination  Movements Are Fluid And Coordinated: No  Coordination and Movement description: Fine motor impairments;Gross motor impairments;Right UE;Left UE     Bed mobility  Rolling to Right: Maximum assistance;2 Person assistance  Supine to Sit: 2 Person assistance;Maximum assistance  Sit to Supine: 2 Person assistance;Maximum assistance  Scootin Person assistance;Maximal assistance  Transfers  Sit to stand: Unable to assess  Stand to sit: Unable to assess     Cognition  Overall Cognitive Status: Exceptions  Cognition Comment: Difficult to assess, pt able to nod head yes/no appropriately at start of session, pt became lethargic and stopped nodding head as session progresses        Sensation  Overall Sensation Status: Impaired  Light Touch: Severe deficits in the RUE;Severe deficits in the RLE      LUE AROM (degrees)  LUE AROM : Exceptions  L Shoulder Flexion 0-180: Observed to 70 degrees  L Elbow Flexion 0-145: KELLY as pt not following this command on LUE, was able to give writer a high-five  L Wrist Flexion 0-80: KELLY  L Wrist Extension 0-70: KELLY  RUE AROM (degrees)  RUE AROM : Exceptions  RUE General AROM: No active movement noted this date  LUE Strength  Gross LUE Strength: Exceptions to [facility]  L Shoulder Flex: 3-/5  L Elbow Flex: NT  L Elbow Ext: NT  L Hand General: 3+/5  RUE Strength  Gross RUE Strength: Exceptions to [facility]  R Shoulder Flex: 0/5  R Elbow Flex: 0/5  R Elbow Ext: 0/5  R Hand General: 0/5      Plan   Plan  Times per week: 3-4x  Current Treatment Recommendations: Balance Training, Functional Mobility Training, Neuromuscular Re-education, Equipment Evaluation, Education, & procurement, Home Management Training, Patient/Caregiver Education & Training, Self-Care /

## 2019-12-17 NOTE — PROGRESS NOTES
Nutrition Assessment (Enteral Nutrition)    Type and Reason for Visit: Reassess    Nutrition Recommendations: Continue diabetic formula at 55 mL/hr. Suggest water flushes to better meet pt's fluid needs as current TF only provides 1063 mL free water (1320 mL total fluid). Await MD input re: total fluid goal d/t hx of CHF (discussed with RN). Nutrition Assessment: Pt tolerating feeds, but abd currently distended per RN. Malnutrition Assessment:  · Malnutrition Status: At risk for malnutrition  · Context: Acute illness or injury  · Findings of the 6 clinical characteristics of malnutrition (Minimum of 2 out of 6 clinical characteristics is required to make the diagnosis of moderate or severe Protein Calorie Malnutrition based on AND/ASPEN Guidelines):  1. Energy Intake-Greater than 75% of estimated energy requirement, Greater than or equal to 7 days    2. Weight Loss-No significant weight loss, in 1 week  3. Fat Loss-No significant subcutaneous fat loss,    4. Muscle Loss-No significant muscle mass loss,    5. Fluid Accumulation-Mild fluid accumulation, Extremities, Generalized  6.  Strength-Not measured    Nutrition Risk Level: Moderate    Nutrition Needs:  · Estimated Daily Total Kcal: 9767-3028 kcals/day  · Estimated Daily Protein (g):  gm/day    Nutrition Diagnosis:   · Problem: Inadequate oral intake  · Etiology: related to Impaired respiratory function-inability to consume food     Signs and symptoms:  as evidenced by NPO status due to medical condition, Nutrition support - EN    Objective Information:  · Wound Type: Skin Tears(Incision)  · Current Nutrition Therapies:  · Oral Diet Orders: NPO   · Oral Diet intake: Unable to assess  · Oral Nutrition Supplement (ONS) Orders: None  · Tube Feeding (TF) Orders:   · Feeding Route: Gastrostomy  · Formula: Diabetic  · Rate (ml/hr):55 mL/hr    · Volume (ml/day): 1320 mL/day  · Duration: Continuous  · Current TF & Flush Orders Provides:  At

## 2019-12-17 NOTE — PLAN OF CARE
Problem: ACTIVITY INTOLERANCE/IMPAIRED MOBILITY  Goal: Mobility/activity is maintained at optimum level for patient  12/16/2019 2016 by Vincent Rodriguez RCP  Outcome: Ongoing     Problem: COMMUNICATION IMPAIRMENT  Goal: Ability to express needs and understand communication  12/16/2019 2016 by Vincent Rodriguez RCP  Outcome: Ongoing     Problem: MECHANICAL VENTILATION  Goal: Mobility/activity is maintained at optimum level for patient  12/16/2019 2016 by Vincent Rodriguez RCP  Outcome: Ongoing     Problem: MECHANICAL VENTILATION  Goal: Ability to express needs and understand communication  12/16/2019 2016 by Vincent Rodriguez RCP  Outcome: Ongoing     Problem: MECHANICAL VENTILATION  Goal: Patient will maintain patent airway  12/16/2019 2016 by Vincent Rodriguez RCP  Outcome: Ongoing     Problem: MECHANICAL VENTILATION  Goal: Oral health is maintained or improved  12/16/2019 2016 by Vincent Rodriguez RCP  Outcome: Ongoing     Problem: MECHANICAL VENTILATION  Goal: Tracheostomy will be managed safely  12/16/2019 2016 by Vincent Rodriguez RCP  Outcome: Ongoing     Problem: OXYGENATION/RESPIRATORY FUNCTION  Goal: Patient will maintain patent airway  12/16/2019 2016 by Vincent Rodriguez RCP  Outcome: Ongoing     Problem: OXYGENATION/RESPIRATORY FUNCTION  Goal: Patient will achieve/maintain normal respiratory rate/effort  Description  Respiratory rate and effort will be within normal limits for the patient  12/16/2019 2016 by Vincent Rodriguez RCP  Outcome: Ongoing

## 2019-12-17 NOTE — PROGRESS NOTES
12/17/19 1555   Surgical Airway (trach) Shiley Cuffed   Placement Date/Time: 12/14/19 6014   Timeout: Patient;Procedure;Site/Side;Appropriate Equipment; Consent Confirmed;Sterile Technique using full body drape  Placed By: In surgery  Surgical Airway Type: Tracheostomy  Brand: [place]  Style: Cuffed  Size (m. .. Status Secured   Site Assessment Dry;Clean   Site Care Cleansed;Dried;Dressing applied   Inner Cannula Care Changed/new   Ties Assessment Intact; Secure

## 2019-12-17 NOTE — PLAN OF CARE
Problem: MECHANICAL VENTILATION  Goal: Mobility/activity is maintained at optimum level for patient  12/17/2019 0837 by Michela Buchanan RCP  Outcome: Ongoing  12/16/2019 2016 by Mayela Rutherford RCP  Outcome: Ongoing  Goal: Ability to express needs and understand communication  12/17/2019 0837 by Michela Buchanan RCP  Outcome: Ongoing  12/16/2019 2016 by Mayela Rutherford RCP  Outcome: Ongoing  Goal: Patient will maintain patent airway  12/17/2019 0837 by Michela Buchanan RCP  Outcome: Ongoing  12/16/2019 2016 by Mayela Rutherford RCP  Outcome: Ongoing  Goal: Oral health is maintained or improved  12/17/2019 0837 by Michela Buchanan RCP  Outcome: Ongoing  12/16/2019 2016 by Mayela Rutherford RCP  Outcome: Ongoing  Goal: Tracheostomy will be managed safely  12/17/2019 0837 by Michela Buchanan RCP  Outcome: Ongoing  12/16/2019 2016 by Mayela Rutherford RCP  Outcome: Ongoing     Problem: OXYGENATION/RESPIRATORY FUNCTION  Goal: Patient will maintain patent airway  12/17/2019 0837 by Michela Buchanan RCP  Outcome: Ongoing  12/16/2019 2016 by Mayela Rutherford RCP  Outcome: Ongoing  Goal: Patient will achieve/maintain normal respiratory rate/effort  Description  Respiratory rate and effort will be within normal limits for the patient  12/17/2019 0837 by Michela Buchanan RCP  Outcome: Ongoing  12/16/2019 2016 by Mayela Rutherford RCP  Outcome: Ongoing

## 2019-12-17 NOTE — PROGRESS NOTES
12/14/2019). Restrictions  Restrictions/Precautions  Restrictions/Precautions: General Precautions, Fall Risk  Required Braces or Orthoses?: No  Position Activity Restriction  Other position/activity restrictions: trach, G-tube, Dopplers neg for DVT's, SBP goal <160  Vision/Hearing  Vision: Impaired  Vision Exceptions: Visual field cut(L gaze preference)  Hearing: Within functional limits     Subjective  General  Patient assessed for rehabilitation services?: Yes  Response To Previous Treatment: Not applicable  Family / Caregiver Present: No  Follows Commands: Impaired(Follows approx.  75% of commands with increased time, pt nods yes/ no to questions)  Pain Screening  Patient Currently in Pain: (KELLY d/t pt's cognition, no significant change in vitals throughout session)  Vital Signs  Patient Currently in Pain: (KELLY d/t pt's cognition, no significant change in vitals throughout session)       Orientation  Orientation  Overall Orientation Status: (KELLY- pt unable to verbalize, pt nods yes when asked if he is in a hospital )  Social/Functional History  Social/Functional History  Lives With: Spouse  Type of Home: House  Home Layout: (May be a split-level home)  ADL Assistance: Independent  Homemaking Assistance: Independent  Homemaking Responsibilities: Yes  Ambulation Assistance: Independent  Transfer Assistance: Independent  Active : Yes  Occupation: Full time employment  Additional Comments: Pt is a questionable historian, pt nods head yes/no  Cognition   Cognition  Overall Cognitive Status: Exceptions  Cognition Comment: Difficult to assess, pt able to nod head yes/no appropriately at start of session, pt became lethargic at end of session    Objective    Joint Mobility  Spine: WFL  ROM RLE: PROM WFL  ROM LLE: PROM WFL   ROM RUE: See OT assessment- PT/ OT coeval   ROM LUE: See OT assessment- PT/ OT coeval  Strength RLE  Strength RLE: Exception  Comment: KELLY d/t pt's difficulty following commands- no active Goals  Short term goals  Time Frame for Short term goals: 15  Short term goal 1: Pt to perform bed mobility mod A  Short term goal 2: Demonstrate static sitting balance of fair -   Short term goal 3: Actively demonstrate participation in 30 minutes of therapy to demonstrate increased endurance  Short term goal 4: Perform STS transfer in sravani parmar modAx2  Patient Goals   Patient goals : Unable to state       Therapy Time   Individual Concurrent Group Co-treatment   Time In 1046         Time Out 1108         Minutes 22         Timed Code Treatment Minutes: 9 Minutes       Juan Miguel Soares, PT

## 2019-12-17 NOTE — CONSULTS
Reasonfor consult:  I have been asked to evaluate the left eye of this 26-year-old gentleman. Redness of the inferior portion of the left eye was noted about 2 days ago. I have been asked to evaluate the left eye for possible conjunctivitis. The patient is a 61 y. o. male presented with past medical history of type 2 diabetes thyroid disease hypothyroidism, hypertension hyperlipidemia coronary artery disease status post stent A. fib on coumadin  with last well-known status 6 AM this 12/15 when wife was with him and the patient had coffee he then went to lie on bed and around 8 AM to take his medication sitting on the couch and the wife noticed that he was mumbling unable to move his right arm and had a left facial droop and she called 911. At [facility] his blood pressure was 160/97 and blood glucose 297.  His stroke scale was 10 and INR was 2 he had a CT head done which showed acute intraparenchymal hematoma in the left basal ganglia measuring 2.1x 2.3 x 2.1 cm, parenchymal volume loss and moderate chronic microvascular ischemic changes as well as old infarct. The condition is affects    OU                 PastOcular History:  Possible history of left eye surgery but no information is available. H&P Reviewed    Pt seen at bedside. The patient awake and responds to discomfort. The patient has a tracheostomy intubated       On Examination:    Physical Exam  Eyes:           Vision[de-identified]  Appears to be visually aware of objects in his environment OU  OD 20/unable to adequately assess     OS 20/   able to adequately assess    Visual fields  OD: Confrorntation -unable to assess  OS: Confrontation -able to assess    External: Facial asymmetry. Left facial droop. Lids:   OD:Normal position. No Ptosis right eye is more prominent blinking is infrequent and often incomplete on the right right side   OS:  dense 3+ ptosis.     Right: Cr N unable to adequately assess  Left:   Cr N unable to adequately assess    Right: Orbital rim intact  Left:   Orbital rim intact        Extra Ocular Movements:   OD: Left gaze preference no restrictions. No strabismus No nystagmus  OS: Left gaze preference no restrictions. No strabismus No nystagmus      Anterior Segment    Conjunctiva:   OD minimally injected inferiorly  OS: 2++ injection with mild chemosis. Single trichiasis is noted on the medial aspect of the lower lid. This may be the source of irritation of the inferior bulbar conjunctiva. This was removed. Sclera:    OD Clear                         OS: 2+ inferior bulbar conjunctival injection    Cornea:   OD Clear no exposure keratitis  OS Clear no punctate keratitis    Anterior Chamber:   OD:Deep & Clear   OS Deep & Clear    Lens:   OD  Mild nuclear cataracts  OS: Mild nuclear cataracts      Pupils:   ODSize 3mm, Round, Reacts 3+, No affarent defect  OS  Size 3mm, Round, Reacts 3+, No affarent defect    Intra Ocular Pressure:  OD:        Digital Normotensive    OS:       Digital Normotensive    Vitreous: Good red equal reflex from both eyes. OD: Clear Physiological Floaters NoVitreous Hemorrhage   OS: Clear Physiological Floaters No Vitreous Hemorrhage       Fundi: Fundus could not be clearly evaluated because of clinical difficulties associated with the patient present condition. Disc:   .    Vessels:        Macula:   OD Normal in appearance  OD Normal in appearance     Retina:   OD Fully attached in all quadrants. OS Fully attached in all quadrants    Retina Periphery:   OD:.  OS: No holes or tears or retina detachment. Impression & Recommendations:  1. Trichiasis left lower lid causing injection of the inferior bulbar conjunctiva. This was removed. Continue with the topical polymyxin drops for another 72 hours and DC. Continue lubricating the right eye with Tears Naturale 4 times daily.   Encourage the patient to blink fully and frequently    Thanks    Yefri Hernadez MD

## 2019-12-18 ENCOUNTER — APPOINTMENT (OUTPATIENT)
Dept: GENERAL RADIOLOGY | Age: 64
DRG: 003 | End: 2019-12-18
Payer: MEDICARE

## 2019-12-18 LAB
ABSOLUTE EOS #: 0.12 K/UL (ref 0–0.44)
ABSOLUTE IMMATURE GRANULOCYTE: 0.2 K/UL (ref 0–0.3)
ABSOLUTE LYMPH #: 1.64 K/UL (ref 1.1–3.7)
ABSOLUTE MONO #: 0.56 K/UL (ref 0.1–1.2)
ANION GAP SERPL CALCULATED.3IONS-SCNC: 11 MMOL/L (ref 9–17)
BASOPHILS # BLD: 1 % (ref 0–2)
BASOPHILS ABSOLUTE: 0.08 K/UL (ref 0–0.2)
BUN BLDV-MCNC: 26 MG/DL (ref 8–23)
BUN/CREAT BLD: ABNORMAL (ref 9–20)
CALCIUM SERPL-MCNC: 9.5 MG/DL (ref 8.6–10.4)
CHLORIDE BLD-SCNC: 110 MMOL/L (ref 98–107)
CO2: 27 MMOL/L (ref 20–31)
CREAT SERPL-MCNC: 1.11 MG/DL (ref 0.7–1.2)
DIFFERENTIAL TYPE: ABNORMAL
EOSINOPHILS RELATIVE PERCENT: 1 % (ref 1–4)
GFR AFRICAN AMERICAN: >60 ML/MIN
GFR NON-AFRICAN AMERICAN: >60 ML/MIN
GFR SERPL CREATININE-BSD FRML MDRD: ABNORMAL ML/MIN/{1.73_M2}
GFR SERPL CREATININE-BSD FRML MDRD: ABNORMAL ML/MIN/{1.73_M2}
GLUCOSE BLD-MCNC: 114 MG/DL (ref 75–110)
GLUCOSE BLD-MCNC: 132 MG/DL (ref 70–99)
GLUCOSE BLD-MCNC: 145 MG/DL (ref 75–110)
GLUCOSE BLD-MCNC: 148 MG/DL (ref 75–110)
GLUCOSE BLD-MCNC: 98 MG/DL (ref 75–110)
HCT VFR BLD CALC: 39.7 % (ref 40.7–50.3)
HEMOGLOBIN: 12.7 G/DL (ref 13–17)
IMMATURE GRANULOCYTES: 2 %
LYMPHOCYTES # BLD: 18 % (ref 24–43)
MCH RBC QN AUTO: 32 PG (ref 25.2–33.5)
MCHC RBC AUTO-ENTMCNC: 32 G/DL (ref 28.4–34.8)
MCV RBC AUTO: 100 FL (ref 82.6–102.9)
MONOCYTES # BLD: 6 % (ref 3–12)
NRBC AUTOMATED: 0 PER 100 WBC
PDW BLD-RTO: 14.5 % (ref 11.8–14.4)
PLATELET # BLD: ABNORMAL K/UL (ref 138–453)
PLATELET ESTIMATE: ABNORMAL
PLATELET, FLUORESCENCE: NORMAL K/UL (ref 138–453)
PLATELET, IMMATURE FRACTION: NORMAL % (ref 1.1–10.3)
PMV BLD AUTO: ABNORMAL FL (ref 8.1–13.5)
POTASSIUM SERPL-SCNC: 4.2 MMOL/L (ref 3.7–5.3)
RBC # BLD: 3.97 M/UL (ref 4.21–5.77)
RBC # BLD: ABNORMAL 10*6/UL
SEG NEUTROPHILS: 71 % (ref 36–65)
SEGMENTED NEUTROPHILS ABSOLUTE COUNT: 6.38 K/UL (ref 1.5–8.1)
SODIUM BLD-SCNC: 148 MMOL/L (ref 135–144)
WBC # BLD: 9 K/UL (ref 3.5–11.3)
WBC # BLD: ABNORMAL 10*3/UL

## 2019-12-18 PROCEDURE — 94640 AIRWAY INHALATION TREATMENT: CPT

## 2019-12-18 PROCEDURE — 80048 BASIC METABOLIC PNL TOTAL CA: CPT

## 2019-12-18 PROCEDURE — 6370000000 HC RX 637 (ALT 250 FOR IP): Performed by: NURSE PRACTITIONER

## 2019-12-18 PROCEDURE — 94770 HC ETCO2 MONITOR DAILY: CPT

## 2019-12-18 PROCEDURE — 6370000000 HC RX 637 (ALT 250 FOR IP): Performed by: STUDENT IN AN ORGANIZED HEALTH CARE EDUCATION/TRAINING PROGRAM

## 2019-12-18 PROCEDURE — 99232 SBSQ HOSP IP/OBS MODERATE 35: CPT | Performed by: NURSE PRACTITIONER

## 2019-12-18 PROCEDURE — 2700000000 HC OXYGEN THERAPY PER DAY

## 2019-12-18 PROCEDURE — 2500000003 HC RX 250 WO HCPCS: Performed by: STUDENT IN AN ORGANIZED HEALTH CARE EDUCATION/TRAINING PROGRAM

## 2019-12-18 PROCEDURE — 97110 THERAPEUTIC EXERCISES: CPT

## 2019-12-18 PROCEDURE — 71045 X-RAY EXAM CHEST 1 VIEW: CPT

## 2019-12-18 PROCEDURE — 2580000003 HC RX 258: Performed by: STUDENT IN AN ORGANIZED HEALTH CARE EDUCATION/TRAINING PROGRAM

## 2019-12-18 PROCEDURE — 2060000000 HC ICU INTERMEDIATE R&B

## 2019-12-18 PROCEDURE — 94761 N-INVAS EAR/PLS OXIMETRY MLT: CPT

## 2019-12-18 PROCEDURE — 6370000000 HC RX 637 (ALT 250 FOR IP): Performed by: FAMILY MEDICINE

## 2019-12-18 PROCEDURE — 6360000002 HC RX W HCPCS: Performed by: STUDENT IN AN ORGANIZED HEALTH CARE EDUCATION/TRAINING PROGRAM

## 2019-12-18 PROCEDURE — 99222 1ST HOSP IP/OBS MODERATE 55: CPT | Performed by: PSYCHIATRY & NEUROLOGY

## 2019-12-18 PROCEDURE — 85055 RETICULATED PLATELET ASSAY: CPT

## 2019-12-18 PROCEDURE — 97535 SELF CARE MNGMENT TRAINING: CPT

## 2019-12-18 PROCEDURE — 94003 VENT MGMT INPAT SUBQ DAY: CPT

## 2019-12-18 PROCEDURE — 36415 COLL VENOUS BLD VENIPUNCTURE: CPT

## 2019-12-18 PROCEDURE — 82947 ASSAY GLUCOSE BLOOD QUANT: CPT

## 2019-12-18 PROCEDURE — 85025 COMPLETE CBC W/AUTO DIFF WBC: CPT

## 2019-12-18 RX ORDER — HYDRALAZINE HYDROCHLORIDE 50 MG/1
50 TABLET, FILM COATED ORAL EVERY 8 HOURS SCHEDULED
Status: DISCONTINUED | OUTPATIENT
Start: 2019-12-18 | End: 2019-12-20

## 2019-12-18 RX ORDER — FLUOXETINE HYDROCHLORIDE 20 MG/1
20 CAPSULE ORAL DAILY
Status: DISCONTINUED | OUTPATIENT
Start: 2019-12-18 | End: 2020-01-03 | Stop reason: HOSPADM

## 2019-12-18 RX ADMIN — SIMETHICONE 40 MG: 20 SUSPENSION/ DROPS ORAL at 20:17

## 2019-12-18 RX ADMIN — Medication 15 ML: at 20:24

## 2019-12-18 RX ADMIN — SENNOSIDES AND DOCUSATE SODIUM 2 TABLET: 8.6; 5 TABLET ORAL at 09:40

## 2019-12-18 RX ADMIN — GLYBURIDE 5 MG: 5 TABLET ORAL at 17:41

## 2019-12-18 RX ADMIN — DILTIAZEM HYDROCHLORIDE 90 MG: 60 TABLET, FILM COATED ORAL at 23:00

## 2019-12-18 RX ADMIN — GLYBURIDE 5 MG: 5 TABLET ORAL at 09:57

## 2019-12-18 RX ADMIN — ALBUTEROL SULFATE 2.5 MG: 2.5 SOLUTION RESPIRATORY (INHALATION) at 19:43

## 2019-12-18 RX ADMIN — MAGNESIUM CITRATE 296 ML: 1.75 LIQUID ORAL at 11:09

## 2019-12-18 RX ADMIN — METFORMIN HYDROCHLORIDE 1000 MG: 500 TABLET ORAL at 09:57

## 2019-12-18 RX ADMIN — INSULIN GLARGINE 35 UNITS: 100 INJECTION, SOLUTION SUBCUTANEOUS at 20:19

## 2019-12-18 RX ADMIN — LISINOPRIL 40 MG: 20 TABLET ORAL at 09:39

## 2019-12-18 RX ADMIN — ENOXAPARIN SODIUM 40 MG: 40 INJECTION SUBCUTANEOUS at 09:38

## 2019-12-18 RX ADMIN — FLUOXETINE HYDROCHLORIDE 20 MG: 20 CAPSULE ORAL at 17:41

## 2019-12-18 RX ADMIN — Medication 100 MG: at 09:39

## 2019-12-18 RX ADMIN — INSULIN LISPRO 3 UNITS: 100 INJECTION, SOLUTION INTRAVENOUS; SUBCUTANEOUS at 13:17

## 2019-12-18 RX ADMIN — POLYMYXIN B SULFATE, TRIMETHOPRIM SULFATE 2 DROP: 10000; 1 SOLUTION/ DROPS OPHTHALMIC at 12:00

## 2019-12-18 RX ADMIN — HYDRALAZINE HYDROCHLORIDE 50 MG: 50 TABLET, FILM COATED ORAL at 23:00

## 2019-12-18 RX ADMIN — ALBUTEROL SULFATE 2.5 MG: 2.5 SOLUTION RESPIRATORY (INHALATION) at 16:27

## 2019-12-18 RX ADMIN — POLYMYXIN B SULFATE, TRIMETHOPRIM SULFATE 2 DROP: 10000; 1 SOLUTION/ DROPS OPHTHALMIC at 17:41

## 2019-12-18 RX ADMIN — SODIUM CHLORIDE, PRESERVATIVE FREE 10 ML: 5 INJECTION INTRAVENOUS at 09:40

## 2019-12-18 RX ADMIN — METHIMAZOLE 20 MG: 5 TABLET ORAL at 09:38

## 2019-12-18 RX ADMIN — POLYMYXIN B SULFATE, TRIMETHOPRIM SULFATE 2 DROP: 10000; 1 SOLUTION/ DROPS OPHTHALMIC at 05:49

## 2019-12-18 RX ADMIN — DOCUSATE SODIUM 100 MG: 50 LIQUID ORAL at 09:39

## 2019-12-18 RX ADMIN — MODAFINIL 200 MG: 100 TABLET ORAL at 09:54

## 2019-12-18 RX ADMIN — INSULIN GLARGINE 35 UNITS: 100 INJECTION, SOLUTION SUBCUTANEOUS at 09:41

## 2019-12-18 RX ADMIN — POLYMYXIN B SULFATE, TRIMETHOPRIM SULFATE 2 DROP: 10000; 1 SOLUTION/ DROPS OPHTHALMIC at 23:00

## 2019-12-18 RX ADMIN — SIMETHICONE 40 MG: 20 SUSPENSION/ DROPS ORAL at 17:41

## 2019-12-18 RX ADMIN — SIMETHICONE 40 MG: 20 SUSPENSION/ DROPS ORAL at 09:54

## 2019-12-18 RX ADMIN — ALBUTEROL SULFATE 2.5 MG: 2.5 SOLUTION RESPIRATORY (INHALATION) at 08:08

## 2019-12-18 RX ADMIN — Medication 1 DROP: at 13:22

## 2019-12-18 RX ADMIN — SIMETHICONE 40 MG: 20 SUSPENSION/ DROPS ORAL at 13:23

## 2019-12-18 RX ADMIN — HYDRALAZINE HYDROCHLORIDE 50 MG: 50 TABLET, FILM COATED ORAL at 13:23

## 2019-12-18 RX ADMIN — METOPROLOL TARTRATE 100 MG: 50 TABLET, FILM COATED ORAL at 09:39

## 2019-12-18 RX ADMIN — SODIUM CHLORIDE, PRESERVATIVE FREE 10 ML: 5 INJECTION INTRAVENOUS at 22:00

## 2019-12-18 RX ADMIN — HYDROCHLOROTHIAZIDE 25 MG: 25 TABLET ORAL at 09:39

## 2019-12-18 RX ADMIN — METOPROLOL TARTRATE 100 MG: 50 TABLET, FILM COATED ORAL at 20:17

## 2019-12-18 RX ADMIN — METFORMIN HYDROCHLORIDE 1000 MG: 500 TABLET ORAL at 17:41

## 2019-12-18 RX ADMIN — AMLODIPINE BESYLATE 10 MG: 10 TABLET ORAL at 09:39

## 2019-12-18 RX ADMIN — BUMETANIDE 1 MG: 1 TABLET ORAL at 09:39

## 2019-12-18 RX ADMIN — DESMOPRESSIN ACETATE 40 MG: 0.2 TABLET ORAL at 20:17

## 2019-12-18 RX ADMIN — DILTIAZEM HYDROCHLORIDE 90 MG: 60 TABLET, FILM COATED ORAL at 13:23

## 2019-12-18 RX ADMIN — FAMOTIDINE 20 MG: 10 INJECTION, SOLUTION INTRAVENOUS at 09:38

## 2019-12-18 RX ADMIN — FAMOTIDINE 20 MG: 10 INJECTION, SOLUTION INTRAVENOUS at 20:17

## 2019-12-18 RX ADMIN — METHIMAZOLE 10 MG: 5 TABLET ORAL at 17:41

## 2019-12-18 RX ADMIN — MINERAL OIL AND WHITE PETROLATUM: 150; 830 OINTMENT OPHTHALMIC at 17:41

## 2019-12-18 RX ADMIN — ALBUTEROL SULFATE 2.5 MG: 2.5 SOLUTION RESPIRATORY (INHALATION) at 03:55

## 2019-12-18 RX ADMIN — Medication 1 DROP: at 21:00

## 2019-12-18 ASSESSMENT — ENCOUNTER SYMPTOMS
SINUS PAIN: 0
DIARRHEA: 0
SINUS PRESSURE: 0
NAUSEA: 0
EYE DISCHARGE: 1
ABDOMINAL PAIN: 0
BACK PAIN: 0
ABDOMINAL DISTENTION: 1
CONSTIPATION: 1
VOMITING: 0
SHORTNESS OF BREATH: 0

## 2019-12-18 ASSESSMENT — PULMONARY FUNCTION TESTS
PIF_VALUE: 19
PIF_VALUE: 24
PIF_VALUE: 17
PIF_VALUE: 20
PIF_VALUE: 22
PIF_VALUE: 21

## 2019-12-18 ASSESSMENT — PAIN SCALES - GENERAL
PAINLEVEL_OUTOF10: 0

## 2019-12-18 ASSESSMENT — PAIN SCALES - WONG BAKER
WONGBAKER_NUMERICALRESPONSE: 2
WONGBAKER_NUMERICALRESPONSE: 0

## 2019-12-18 ASSESSMENT — PAIN DESCRIPTION - LOCATION: LOCATION: GENERALIZED

## 2019-12-18 NOTE — CARE COORDINATION
Spoke with Dr. Jacek Welsh with insurance company who states that patient still does not qualify for LTAC placement due to the fact that he is only been in the acute care setting for 12 days.   Perfect serve  with updates    Stafford Sicard APRN [facility]

## 2019-12-18 NOTE — PROGRESS NOTES
Occupational Therapy  Facility/Department: [facility] NEURO  Daily Treatment Note  NAME: Marlene Cohen  : 1955  MRN: [id number]    Date of Service: 2019    Discharge Recommendations:Further therapy recommended at discharge, pt unsafe to return home. Assessment   Performance deficits / Impairments: Decreased functional mobility ; Decreased cognition;Decreased high-level IADLs;Decreased ADL status; Decreased endurance;Decreased fine motor control;Decreased ROM; Decreased coordination;Decreased strength;Decreased balance;Decreased posture;Decreased safe awareness  Prognosis: Fair  Patient Education: benfits of activity   Barriers to Learning: pt jay MACDONALD understanding  REQUIRES OT FOLLOW UP: Yes  Activity Tolerance  Activity Tolerance: Patient Tolerated treatment well;Patient limited by fatigue;Treatment limited secondary to decreased cognition  Safety Devices  Safety Devices in place: Yes  Type of devices: Patient at risk for falls; Left in bed;Bed alarm in place;Call light within reach;Nurse notified         Patient Diagnosis(es): The encounter diagnosis was Intraparenchymal hematoma of brain, left, without loss of consciousness, initial encounter ([facility] [address] has a past medical history of Atrial fibrillation ([facility] [address] CAD (coronary artery disease), H/O heart artery stent, Hx of blood clots, Hyperlipidemia, Hypertension, Hyperthyroidism, Kidney stones, MI, old, Microalbuminuria, Other complications due to other cardiac device, implant, and graft, Proteinuria, Renal cyst, Thyroid disease, and Type II or unspecified type diabetes mellitus without mention of complication, not stated as uncontrolled. has a past surgical history that includes Kidney surgery; Lithotripsy; Cystoscopy; Ureteroscopy; pacemaker placement; eye surgery (Left); Thyroidectomy; and gastrostomy (N/A, 2019).     Restrictions  Restrictions/Precautions  Restrictions/Precautions: General Precautions, Fall Risk  Required Braces or Orthoses?:

## 2019-12-18 NOTE — PLAN OF CARE
Neuro assessment completed, fall precautions in place, aspirations precautions in place, assess for barriers in communication and mobility, interventions to assist in communication and mobility in place, encouraged to call for assistance, adaptive devices used as needed, assess emotional state and support offered, encouraged patient to communicate by available means, and support systems included in patient care. Pain level assessment complete. Pt pain at 0/10. Pt educated on pain scale and control interventions. Pt assessed Q4 for pain via faces pain scale. Will continue to monitor. Patient remained free from injury. Patient verbalized understanding of need for the safety precautions. Demonstrates proper use of assistive devices. Bed remains in the lowest position. Call light remains within reach. [place] [facility] in use. Patients airway maintained, Oral health maintained, patient suctioned and oral hygiene performed as needed. Tracheostomy managed safely.     Problem: Falls - Risk of:  Goal: Will remain free from falls  Description  Will remain free from falls  Outcome: Met This Shift  Goal: Absence of physical injury  Description  Absence of physical injury  Outcome: Met This Shift     Problem: HEMODYNAMIC STATUS  Goal: Patient has stable vital signs and fluid balance  Outcome: Met This Shift     Problem: MECHANICAL VENTILATION  Goal: Patient will maintain patent airway  12/18/2019 0613 by Steve Patterson RN  Outcome: Met This Shift  12/17/2019 1940 by Adonay Monroe RCP  Outcome: Ongoing  Goal: Oral health is maintained or improved  12/18/2019 0613 by Steve Patterson RN  Outcome: Met This Shift  12/17/2019 1940 by Adonay Monroe RCP  Outcome: Ongoing  Goal: Tracheostomy will be managed safely  12/18/2019 0613 by Steve Patterson RN  Outcome: Met This Shift  12/17/2019 1940 by Adonay Monroe RCP  Outcome: Ongoing     Problem: OXYGENATION/RESPIRATORY FUNCTION  Goal: Patient will maintain patent airway  12/18/2019 5626 by Dipesh Owens RN  Outcome: Met This Shift  12/17/2019 1940 by Shari Paiz RCP  Outcome: Ongoing  Goal: Patient will achieve/maintain normal respiratory rate/effort  Description  Respiratory rate and effort will be within normal limits for the patient  12/18/2019 0613 by Dipesh Owens RN  Outcome: Met This Shift  12/17/2019 1940 by Shari Paiz RCP  Outcome: Ongoing     Problem: Musculor/Skeletal Functional Status  Goal: Absence of falls  Outcome: Met This Shift     Problem: Risk for Impaired Skin Integrity  Goal: Tissue integrity - skin and mucous membranes  Description  Structural intactness and normal physiological function of skin and  mucous membranes.   Outcome: Ongoing     Problem: ACTIVITY INTOLERANCE/IMPAIRED MOBILITY  Goal: Mobility/activity is maintained at optimum level for patient  12/18/2019 3280 by Dipesh Owens RN  Outcome: Ongoing  12/17/2019 1940 by Shari Paiz RCP  Outcome: Ongoing     Problem: COMMUNICATION IMPAIRMENT  Goal: Ability to express needs and understand communication  12/18/2019 0613 by Dipesh Owens RN  Outcome: Ongoing  12/17/2019 1940 by Shari Paiz RCP  Outcome: Ongoing     Problem: Neurological  Goal: Maximum potential motor/sensory/cognitive function  Outcome: Ongoing     Problem: Nutrition  Goal: Optimal nutrition therapy  Outcome: Ongoing     Problem: MECHANICAL VENTILATION  Goal: Mobility/activity is maintained at optimum level for patient  12/18/2019 0613 by Dipesh Owens RN  Outcome: Ongoing  12/17/2019 1940 by Shari Paiz RCP  Outcome: Ongoing  Goal: Ability to express needs and understand communication  12/18/2019 0613 by Dipesh Owens RN  Outcome: Ongoing  12/17/2019 1940 by Shari Paiz RCP  Outcome: Ongoing     Problem: Pain:  Goal: Pain level will decrease  Description  Pain level will decrease  Outcome: Ongoing  Goal: Control of acute pain  Description  Control of acute pain  Outcome: Ongoing  Goal: Control of chronic pain  Description  Control of chronic pain  Outcome: Ongoing     Problem: Musculor/Skeletal Functional Status  Goal: Highest potential functional level  Outcome: Ongoing

## 2019-12-18 NOTE — PLAN OF CARE
Problem: Falls - Risk of:  Goal: Will remain free from falls  Description  Will remain free from falls  12/18/2019 1618 by Pearl Suresh RN  Outcome: Ongoing  12/18/2019 0613 by Rigo Trinidad RN  Outcome: Met This Shift  Goal: Absence of physical injury  Description  Absence of physical injury  12/18/2019 1618 by Pearl Suresh RN  Outcome: Ongoing  12/18/2019 0613 by Rigo Trinidad RN  Outcome: Met This Shift     Problem: Risk for Impaired Skin Integrity  Goal: Tissue integrity - skin and mucous membranes  Description  Structural intactness and normal physiological function of skin and  mucous membranes.   12/18/2019 1618 by Pearl Suresh RN  Outcome: Ongoing  12/18/2019 0613 by Rigo Trinidad RN  Outcome: Ongoing     Problem: HEMODYNAMIC STATUS  Goal: Patient has stable vital signs and fluid balance  12/18/2019 1618 by Pearl Suresh RN  Outcome: Ongoing  12/18/2019 0613 by Rigo Trinidad RN  Outcome: Met This Shift     Problem: ACTIVITY INTOLERANCE/IMPAIRED MOBILITY  Goal: Mobility/activity is maintained at optimum level for patient  12/18/2019 1618 by Pearl Suresh RN  Outcome: Ongoing  12/18/2019 0819 by Clover Snow RCP  Outcome: Ongoing  12/18/2019 0613 by Rigo Trinidad RN  Outcome: Ongoing     Problem: COMMUNICATION IMPAIRMENT  Goal: Ability to express needs and understand communication  12/18/2019 1618 by Pearl Suresh RN  Outcome: Ongoing  12/18/2019 0819 by Clover Snow RCP  Outcome: Ongoing  12/18/2019 0613 by Rigo Trinidad RN  Outcome: Ongoing     Problem: Neurological  Goal: Maximum potential motor/sensory/cognitive function  12/18/2019 1618 by Pearl Suresh RN  Outcome: Ongoing  12/18/2019 0613 by Rigo Trinidad RN  Outcome: Ongoing     Problem: Nutrition  Goal: Optimal nutrition therapy  12/18/2019 1618 by Pearl Suresh RN  Outcome: Ongoing  12/18/2019 0613 by Rigo Trinidad RN  Outcome: Ongoing     Problem: MECHANICAL VENTILATION  Goal: Mobility/activity is maintained at optimum level for patient  12/18/2019 1618 by Charla Sandhu RN  Outcome: Ongoing  12/18/2019 0819 by Marquis Mendoza, RCP  Outcome: Ongoing  12/18/2019 0613 by Orion Lombardo RN  Outcome: Ongoing  Goal: Ability to express needs and understand communication  12/18/2019 1618 by Charla Sandhu RN  Outcome: Ongoing  12/18/2019 0819 by Marquis Mendoza, RCP  Outcome: Ongoing  12/18/2019 0613 by Orion Lombardo RN  Outcome: Ongoing  Goal: Patient will maintain patent airway  12/18/2019 1618 by Charla Sandhu RN  Outcome: Ongoing  12/18/2019 0819 by Marquis Mendoza, RCP  Outcome: Ongoing  12/18/2019 0613 by Orion Lombardo RN  Outcome: Met This Shift  Goal: Oral health is maintained or improved  12/18/2019 1618 by Charla Sandhu RN  Outcome: Ongoing  12/18/2019 0819 by Marquis Mendoza, RCP  Outcome: Ongoing  12/18/2019 0613 by Orion Lombardo RN  Outcome: Met This Shift  Goal: Tracheostomy will be managed safely  12/18/2019 1618 by Charla Sandhu RN  Outcome: Ongoing  12/18/2019 0819 by Marquis Mendoza, RCP  Outcome: Ongoing  12/18/2019 0613 by Orion Lombardo RN  Outcome: Met This Shift     Problem: OXYGENATION/RESPIRATORY FUNCTION  Goal: Patient will maintain patent airway  12/18/2019 1618 by Charla Sandhu RN  Outcome: Ongoing  12/18/2019 0819 by Marquis Mendoza, RCP  Outcome: Ongoing  12/18/2019 0613 by Orion Lombardo RN  Outcome: Met This Shift  Goal: Patient will achieve/maintain normal respiratory rate/effort  Description  Respiratory rate and effort will be within normal limits for the patient  12/18/2019 1618 by Charla Sandhu RN  Outcome: Ongoing  12/18/2019 0819 by Marquis Mendoza, RCP  Outcome: Ongoing  12/18/2019 0613 by Orion Lombardo RN  Outcome: Met This Shift     Problem: Pain:  Goal: Pain level will decrease  Description  Pain level will decrease  12/18/2019 1618 by Charla Sandhu RN  Outcome: Ongoing  12/18/2019 0613 by Orion Lombardo RN  Outcome: Ongoing  Goal: Control of acute pain  Description  Control of acute pain  12/18/2019 1618 by Charla Sandhu

## 2019-12-18 NOTE — PROGRESS NOTES
Pt appears very sad and depressed. He does nod his head yes and no. He has a right facial droop and does not have purposeful movement of the right side. Tube feeding changed to Diabetic per order. There is again no residual in the gut when placement is checked. Pt given half a bottle of Mag Citrate as he had AM meds to promote stool. Will give remaining if no stool prior to shift ending. Pt shaved and hair washed per RN and Zelphia  PCT. Pt nods \"yes\" when asked if he feels better. Pillows under backside for support and to help prevent further breakdown. Request TV volume to be turned up and remote in hand.

## 2019-12-18 NOTE — PROGRESS NOTES
12/18/19 0833   Vent Information   Vent Type Servo i   Vent Mode CPAP   Pressure Support 8 cmH20   FiO2  50 %   PEEP/CPAP 8   Additional Respiratory  Assessments   Resp 25

## 2019-12-18 NOTE — PROGRESS NOTES
NEUROLOGY INPATIENT CONSULT NOTE- ADDENDUM    12/18/2019           Briefly, this is a  60 y.o. male admitted on 12/6/2019 with left thalamic bleed with warfarin. He is s/p Trach and PEG on 12/14/19  His exam significant for left gaze preference, nonverbal and predominant aphasia with Rt facial droop and Rt hemiplegia. Cont PT/ OT/ Speech therapy  Off of antiplatelets and anticoag; on lovenox  Will rpt CT to follow up on bleed. Will follow with you. Thank you for consultation.          Gato Dash MD 12/18/2019 9:11 AM

## 2019-12-18 NOTE — PROGRESS NOTES
respiratory distress with hypoxia, BiPAP tolerated for a short period of time; however decompensation persisted and patient required emergent intubation.     12/8: Repeat imaging revealed extending bleed with midline shift     12/9: Patient became febrile, increased respiratory secretions with concerns for aspiration PNA on CXR.  Started on empiric ABX, sputum cultures collected/sent     12/11: Sputum cultures positive for Strep. Pneumoniae     Patient with underlying history of COPD and given development of PNA, patient was unable to be weaned from ventilator.     12/14: Tracheostomy and open GTube  Per Gen. Surgery    Free water flushes added for hypernatremia with mild improvement, tolerating tube feed  ABD distension noted on PE, ABD XR reviewed  Refractory left eye conjunctivitis/ questionable abrasion at this time, ophthalamology consulted  Mag citrate added for constipation  Awaiting precert for discharge         Review of Systems:     Review of Systems   Constitutional: Negative for chills and fever. HENT: Negative for congestion, sinus pressure and sinus pain. Eyes: Positive for discharge (left). Respiratory: Negative for shortness of breath. Gastrointestinal: Positive for abdominal distention and constipation. Negative for abdominal pain, diarrhea, nausea and vomiting. Genitourinary: Negative for dysuria. Musculoskeletal: Negative for back pain and neck pain. Neurological: Positive for weakness. Negative for headaches. Psychiatric/Behavioral: Negative for confusion. Medications:      Allergies:  No Known Allergies    Current Meds:   Scheduled Meds:    hydrochlorothiazide  25 mg Oral Daily    hydrALAZINE  25 mg Oral 3 times per day    bumetanide  1 mg Oral Daily    diltiazem  90 mg Oral 3 times per day    trimethoprim-polymyxin b  2 drop Both Eyes 4 times per day    amLODIPine  10 mg Per NG tube Daily    albuterol  2.5 mg Nebulization Q6H    insulin glargine  35 Units Subcutaneous BID    insulin lispro  0-18 Units Subcutaneous 4 times per day    famotidine (PEPCID) injection  20 mg Intravenous BID    sennosides-docusate sodium  2 tablet Oral Daily    modafinil  200 mg Per NG tube Daily    tetrahydrozoline  1 drop Left Eye TID    metFORMIN  1,000 mg Per NG tube BID WC    enoxaparin  40 mg Subcutaneous Daily    docusate  100 mg Per NG tube Daily    glyBURIDE  5 mg Per NG tube BID WC    vitamin B-1  100 mg Oral Daily    chlorhexidine  15 mL Mouth/Throat BID    lisinopril  40 mg Oral Daily    simethicone  40 mg Per NG tube 4x Daily    atorvastatin  40 mg Oral Daily    sodium chloride flush  10 mL Intravenous 2 times per day    methIMAzole  20 mg Oral Daily    methIMAzole  10 mg Oral QPM    metoprolol tartrate  100 mg Oral BID     Continuous Infusions:    dextrose       PRN Meds: fentanNYL, REFRESH LACRI-LUBE, potassium chloride, fentanNYL, glucose, dextrose, glucagon (rDNA), dextrose, acetaminophen **OR** acetaminophen, sodium chloride flush, ondansetron, metoprolol, hydrALAZINE    Data:     Past Medical History:   has a past medical history of Atrial fibrillation ([facility] [address] CAD (coronary artery disease), H/O heart artery stent, Hx of blood clots, Hyperlipidemia, Hypertension, Hyperthyroidism, Kidney stones, MI, old, Microalbuminuria, Other complications due to other cardiac device, implant, and graft, Proteinuria, Renal cyst, Thyroid disease, and Type II or unspecified type diabetes mellitus without mention of complication, not stated as uncontrolled. Social History:   reports that he has been smoking cigarettes. He has a 47.00 pack-year smoking history. He has never used smokeless tobacco. He reports that he does not drink alcohol. Family History: No family history on file.     Vitals:  BP (!) 142/77   Pulse 75   Temp 99.5 °F (37.5 °C) (Oral)   Resp 25   Ht 5' 7\" (1.702 m)   Wt 212 lb 8.4 oz (96.4 kg)   SpO2 96%   BMI 33.29 kg/m²   Temp (24hrs), Av.3 °F (37.4 °C), Min:98.6 °F (37 °C), Max:100.1 °F (37.8 °C)    Recent Labs     12/17/19  1322 12/17/19 2111 12/18/19 0312 12/18/19  0614   POCGLU 130* 105 114* 145*       I/O (24Hr): Intake/Output Summary (Last 24 hours) at 12/18/2019 0841  Last data filed at 12/18/2019 0617  Gross per 24 hour   Intake 1766 ml   Output --   Net 1766 ml       Labs:  Hematology:  Recent Labs     12/16/19  0438 12/17/19  0352 12/18/19  0501   WBC 9.3 9.8 9.0   RBC 4.01* 4.20* 3.97*   HGB 12.5* 13.2 12.7*   HCT 40.6* 42.3 39.7*   .2 100.7 100.0   MCH 31.2 31.4 32.0   MCHC 30.8 31.2 32.0   RDW 14.8* 14.7* 14.5*    185 See Reflexed IPF Result   MPV 10.9 11.9 NOT REPORTED     Chemistry:  Recent Labs     12/16/19 0438 12/17/19 0352 12/18/19  0501   * 149* 148*   K 4.1 4.3 4.2   * 112* 110*   CO2 24 24 27   GLUCOSE 128* 76 132*   BUN 27* 25* 26*   CREATININE 1.08 1.19 1.11   ANIONGAP 11 13 11   LABGLOM >60 >60 >60   GFRAA >60 >60 >60   CALCIUM 9.0 9.3 9.5     Recent Labs     12/16/19  0438  12/16/19  2252 12/17/19  0116 12/17/19 1322 12/17/19 2111 12/18/19 0312 12/18/19  0614   TRIG 181*  --   --   --   --   --   --   --    POCGLU  --    < > 77 88 130* 105 114* 145*    < > = values in this interval not displayed. ABG:  Lab Results   Component Value Date    POCPH 7.416 12/14/2019    POCPCO2 46.4 12/14/2019    POCPO2 96.2 12/14/2019    POCHCO3 29.8 12/14/2019    NBEA NOT REPORTED 12/14/2019    PBEA 4 12/14/2019    XTU2FKL 31 12/14/2019    SAVV8BEM 98 12/14/2019    FIO2 45.0 12/14/2019     Lab Results   Component Value Date/Time    SPECIAL  LT HAND 10ML 12/09/2019 05:46 PM     Lab Results   Component Value Date/Time    CULTURE NO GROWTH 6 DAYS 12/09/2019 05:46 PM       Radiology:  Ct Head Wo Contrast    Result Date: 12/11/2019  Stable intraparenchymal hemorrhage accounting for technical differences. 0.3 cm left-right midline shift. Decrease in intraventricular blood products.      Xr Chest Portable    Result Normal breath sounds. Comments: Vent support  Abdominal:      General: Bowel sounds are normal. There is distension. Palpations: Abdomen is soft. Tenderness: There is no tenderness. There is no guarding. Musculoskeletal: Normal range of motion. Comments: Right sided hemiparesis   Skin:     General: Skin is warm and dry. Capillary Refill: Capillary refill takes less than 2 seconds. Neurological:      Mental Status: He is alert. Motor: Weakness (right sided hemiparesis) present. Psychiatric:         Mood and Affect: Mood normal.         Assessment:        Hospital Problems           Last Modified POA    * (Principal) Nontraumatic cortical hemorrhage of left cerebral hemisphere ([address] 12/6/2019 Yes    Atrial fibrillation ([address] 12/6/2019 Yes    Uncontrolled hypertension 12/6/2019 Yes    CAD (coronary artery disease) 12/6/2019 Yes    Diabetes mellitus type 2 in obese ([address] 12/6/2019 Yes    Hyperlipidemia 12/6/2019 Yes    Hyperthyroidism 12/6/2019 Yes    Acute intra-cranial hemorrhage ([address] 12/6/2019 Yes    Intraparenchymal hematoma of brain ([address] 12/7/2019 Yes    Ventilator dependence ([address] 12/8/2019 Yes    Acute on chronic combined systolic and diastolic congestive heart failure ([address] 12/8/2019 Yes    Hypernatremia 12/15/2019 Yes    Aspiration pneumonia ([address] 12/15/2019 Yes          Plan:        1. Left cerebral hemorrhagic CVA: Continue to monitor, right hemiparesis  persists  2. Atrial fibrillation: Cardiology has been consulted for anticoagulation needs. He is status post AV james ablation patient had spontaneous bleed while on Coumadin therapy. Per notes patient can resume anticoagulation in 1 month per neuro critical care. 3. Uncontrolled hypertension:  Showing improvement. On amlodipine, Bumex, Cardizem, lisinopril. low dose hydralazine started 12/17, increase and monitor  4. Conjunctivitis: Left eye, appreciate ophthalmology input  5.  Coronary artery disease: Status post BMS

## 2019-12-18 NOTE — PROGRESS NOTES
Physical Therapy  DATE: 2019  NAME: Tyson Lee  MRN: [id number]   : 1955    Discharge Recommendations: Continue to Assess (pending progress)     Subjective: RN agreeable to ROM only this date secondary to low BP. Pt with eyes open upon arrival, nods head appropriately ~50% of the time. Wife and son present in room. Pain: pt denies  Patient follows: Some Commands  Is patient on ventilator: Yes (trach)  Is patient on sedation: No  Precautions: trach, G-tube, Dopplers neg for DVT's, SBP goal <160    Therapeutic exercises:  PROM B LEs  all planes x 15 reps  B UE ROM performed by SALAZAR prior to this writer's arrival  Bilat gastrocnemius stretching 3 reps x 30 seconds    Goals  Short Term Goals  Short term goal 1: Pt to perform bed mobility mod A  Short term goal 2: Demonstrate static sitting balance of fair -   Short term goal 3: Actively demonstrate participation in 30 minutes of therapy to demonstrate increased endurance  Short term goal 4: Perform STS transfer in [facility] modAx2       Plan: Progress functional mobility as medically appropriate.    Time In: 8049  Time Out: 1530  Time Coded Minutes (treatment minutes):  12  Rehab Potential: fair  Treatments/week: 5-6x/wk    Rima Eavns PTA

## 2019-12-19 ENCOUNTER — APPOINTMENT (OUTPATIENT)
Dept: CT IMAGING | Age: 64
DRG: 003 | End: 2019-12-19
Payer: MEDICARE

## 2019-12-19 ENCOUNTER — APPOINTMENT (OUTPATIENT)
Dept: GENERAL RADIOLOGY | Age: 64
DRG: 003 | End: 2019-12-19
Payer: MEDICARE

## 2019-12-19 LAB
ANION GAP SERPL CALCULATED.3IONS-SCNC: 12 MMOL/L (ref 9–17)
BUN BLDV-MCNC: 27 MG/DL (ref 8–23)
BUN/CREAT BLD: ABNORMAL (ref 9–20)
CALCIUM SERPL-MCNC: 10.2 MG/DL (ref 8.6–10.4)
CHLORIDE BLD-SCNC: 107 MMOL/L (ref 98–107)
CO2: 28 MMOL/L (ref 20–31)
CREAT SERPL-MCNC: 1.12 MG/DL (ref 0.7–1.2)
GFR AFRICAN AMERICAN: >60 ML/MIN
GFR NON-AFRICAN AMERICAN: >60 ML/MIN
GFR SERPL CREATININE-BSD FRML MDRD: ABNORMAL ML/MIN/{1.73_M2}
GFR SERPL CREATININE-BSD FRML MDRD: ABNORMAL ML/MIN/{1.73_M2}
GLUCOSE BLD-MCNC: 104 MG/DL (ref 75–110)
GLUCOSE BLD-MCNC: 107 MG/DL (ref 75–110)
GLUCOSE BLD-MCNC: 123 MG/DL (ref 75–110)
GLUCOSE BLD-MCNC: 124 MG/DL (ref 75–110)
GLUCOSE BLD-MCNC: 135 MG/DL (ref 75–110)
GLUCOSE BLD-MCNC: 157 MG/DL (ref 70–99)
GLUCOSE BLD-MCNC: 43 MG/DL (ref 75–110)
GLUCOSE BLD-MCNC: 86 MG/DL (ref 75–110)
HCT VFR BLD CALC: 42.2 % (ref 40.7–50.3)
HEMOGLOBIN: 13.2 G/DL (ref 13–17)
MCH RBC QN AUTO: 31.1 PG (ref 25.2–33.5)
MCHC RBC AUTO-ENTMCNC: 31.3 G/DL (ref 28.4–34.8)
MCV RBC AUTO: 99.5 FL (ref 82.6–102.9)
NRBC AUTOMATED: 0 PER 100 WBC
PDW BLD-RTO: 14.5 % (ref 11.8–14.4)
PLATELET # BLD: 173 K/UL (ref 138–453)
PMV BLD AUTO: 13 FL (ref 8.1–13.5)
POTASSIUM SERPL-SCNC: 4.4 MMOL/L (ref 3.7–5.3)
RBC # BLD: 4.24 M/UL (ref 4.21–5.77)
SODIUM BLD-SCNC: 147 MMOL/L (ref 135–144)
WBC # BLD: 11.6 K/UL (ref 3.5–11.3)

## 2019-12-19 PROCEDURE — 6370000000 HC RX 637 (ALT 250 FOR IP): Performed by: NURSE PRACTITIONER

## 2019-12-19 PROCEDURE — 97110 THERAPEUTIC EXERCISES: CPT

## 2019-12-19 PROCEDURE — 6370000000 HC RX 637 (ALT 250 FOR IP): Performed by: STUDENT IN AN ORGANIZED HEALTH CARE EDUCATION/TRAINING PROGRAM

## 2019-12-19 PROCEDURE — 97535 SELF CARE MNGMENT TRAINING: CPT

## 2019-12-19 PROCEDURE — 2580000003 HC RX 258: Performed by: STUDENT IN AN ORGANIZED HEALTH CARE EDUCATION/TRAINING PROGRAM

## 2019-12-19 PROCEDURE — 85027 COMPLETE CBC AUTOMATED: CPT

## 2019-12-19 PROCEDURE — 80048 BASIC METABOLIC PNL TOTAL CA: CPT

## 2019-12-19 PROCEDURE — 2060000000 HC ICU INTERMEDIATE R&B

## 2019-12-19 PROCEDURE — 2500000003 HC RX 250 WO HCPCS: Performed by: STUDENT IN AN ORGANIZED HEALTH CARE EDUCATION/TRAINING PROGRAM

## 2019-12-19 PROCEDURE — 99222 1ST HOSP IP/OBS MODERATE 55: CPT | Performed by: NURSE PRACTITIONER

## 2019-12-19 PROCEDURE — 82947 ASSAY GLUCOSE BLOOD QUANT: CPT

## 2019-12-19 PROCEDURE — 94761 N-INVAS EAR/PLS OXIMETRY MLT: CPT

## 2019-12-19 PROCEDURE — 94640 AIRWAY INHALATION TREATMENT: CPT

## 2019-12-19 PROCEDURE — 6360000002 HC RX W HCPCS: Performed by: STUDENT IN AN ORGANIZED HEALTH CARE EDUCATION/TRAINING PROGRAM

## 2019-12-19 PROCEDURE — 6370000000 HC RX 637 (ALT 250 FOR IP): Performed by: FAMILY MEDICINE

## 2019-12-19 PROCEDURE — 99232 SBSQ HOSP IP/OBS MODERATE 35: CPT | Performed by: PSYCHIATRY & NEUROLOGY

## 2019-12-19 PROCEDURE — 94770 HC ETCO2 MONITOR DAILY: CPT

## 2019-12-19 PROCEDURE — 71045 X-RAY EXAM CHEST 1 VIEW: CPT

## 2019-12-19 PROCEDURE — 94003 VENT MGMT INPAT SUBQ DAY: CPT

## 2019-12-19 PROCEDURE — 2700000000 HC OXYGEN THERAPY PER DAY

## 2019-12-19 RX ADMIN — DEXTROSE MONOHYDRATE 100 ML/HR: 50 INJECTION, SOLUTION INTRAVENOUS at 02:30

## 2019-12-19 RX ADMIN — HYDRALAZINE HYDROCHLORIDE 50 MG: 50 TABLET, FILM COATED ORAL at 14:09

## 2019-12-19 RX ADMIN — ALBUTEROL SULFATE 2.5 MG: 2.5 SOLUTION RESPIRATORY (INHALATION) at 20:08

## 2019-12-19 RX ADMIN — DESMOPRESSIN ACETATE 40 MG: 0.2 TABLET ORAL at 21:27

## 2019-12-19 RX ADMIN — FAMOTIDINE 20 MG: 10 INJECTION, SOLUTION INTRAVENOUS at 21:28

## 2019-12-19 RX ADMIN — FAMOTIDINE 20 MG: 10 INJECTION, SOLUTION INTRAVENOUS at 09:19

## 2019-12-19 RX ADMIN — FLUOXETINE HYDROCHLORIDE 20 MG: 20 CAPSULE ORAL at 09:19

## 2019-12-19 RX ADMIN — SIMETHICONE 40 MG: 20 SUSPENSION/ DROPS ORAL at 09:20

## 2019-12-19 RX ADMIN — METFORMIN HYDROCHLORIDE 1000 MG: 500 TABLET ORAL at 09:18

## 2019-12-19 RX ADMIN — METHIMAZOLE 20 MG: 5 TABLET ORAL at 09:18

## 2019-12-19 RX ADMIN — Medication 1 DROP: at 14:09

## 2019-12-19 RX ADMIN — HYDRALAZINE HYDROCHLORIDE 50 MG: 50 TABLET, FILM COATED ORAL at 06:30

## 2019-12-19 RX ADMIN — POLYMYXIN B SULFATE, TRIMETHOPRIM SULFATE 2 DROP: 10000; 1 SOLUTION/ DROPS OPHTHALMIC at 09:20

## 2019-12-19 RX ADMIN — DILTIAZEM HYDROCHLORIDE 90 MG: 60 TABLET, FILM COATED ORAL at 21:27

## 2019-12-19 RX ADMIN — DILTIAZEM HYDROCHLORIDE 90 MG: 60 TABLET, FILM COATED ORAL at 14:09

## 2019-12-19 RX ADMIN — SIMETHICONE 40 MG: 20 SUSPENSION/ DROPS ORAL at 14:11

## 2019-12-19 RX ADMIN — METHIMAZOLE 10 MG: 5 TABLET ORAL at 17:55

## 2019-12-19 RX ADMIN — DILTIAZEM HYDROCHLORIDE 90 MG: 60 TABLET, FILM COATED ORAL at 06:30

## 2019-12-19 RX ADMIN — Medication 1 DROP: at 23:23

## 2019-12-19 RX ADMIN — SODIUM CHLORIDE, PRESERVATIVE FREE 10 ML: 5 INJECTION INTRAVENOUS at 23:15

## 2019-12-19 RX ADMIN — METFORMIN HYDROCHLORIDE 1000 MG: 500 TABLET ORAL at 17:05

## 2019-12-19 RX ADMIN — POLYMYXIN B SULFATE, TRIMETHOPRIM SULFATE 2 DROP: 10000; 1 SOLUTION/ DROPS OPHTHALMIC at 05:00

## 2019-12-19 RX ADMIN — DEXTROSE MONOHYDRATE 12.5 G: 500 INJECTION PARENTERAL at 02:03

## 2019-12-19 RX ADMIN — BUMETANIDE 1 MG: 1 TABLET ORAL at 09:19

## 2019-12-19 RX ADMIN — Medication 1 DROP: at 09:21

## 2019-12-19 RX ADMIN — METOPROLOL TARTRATE 100 MG: 50 TABLET, FILM COATED ORAL at 09:19

## 2019-12-19 RX ADMIN — ALBUTEROL SULFATE 2.5 MG: 2.5 SOLUTION RESPIRATORY (INHALATION) at 14:00

## 2019-12-19 RX ADMIN — Medication 15 ML: at 09:28

## 2019-12-19 RX ADMIN — MODAFINIL 200 MG: 100 TABLET ORAL at 09:28

## 2019-12-19 RX ADMIN — METOPROLOL TARTRATE 100 MG: 50 TABLET, FILM COATED ORAL at 21:28

## 2019-12-19 RX ADMIN — GLYBURIDE 5 MG: 5 TABLET ORAL at 09:19

## 2019-12-19 RX ADMIN — GLYBURIDE 5 MG: 5 TABLET ORAL at 17:05

## 2019-12-19 RX ADMIN — ALBUTEROL SULFATE 2.5 MG: 2.5 SOLUTION RESPIRATORY (INHALATION) at 03:34

## 2019-12-19 RX ADMIN — SIMETHICONE 40 MG: 20 SUSPENSION/ DROPS ORAL at 17:06

## 2019-12-19 RX ADMIN — ENOXAPARIN SODIUM 40 MG: 40 INJECTION SUBCUTANEOUS at 09:18

## 2019-12-19 RX ADMIN — LISINOPRIL 40 MG: 20 TABLET ORAL at 09:19

## 2019-12-19 RX ADMIN — HYDRALAZINE HYDROCHLORIDE 50 MG: 50 TABLET, FILM COATED ORAL at 21:27

## 2019-12-19 RX ADMIN — Medication 100 MG: at 09:19

## 2019-12-19 RX ADMIN — Medication 15 ML: at 21:28

## 2019-12-19 RX ADMIN — ALBUTEROL SULFATE 2.5 MG: 2.5 SOLUTION RESPIRATORY (INHALATION) at 08:21

## 2019-12-19 RX ADMIN — AMLODIPINE BESYLATE 10 MG: 10 TABLET ORAL at 09:19

## 2019-12-19 RX ADMIN — HYDROCHLOROTHIAZIDE 25 MG: 25 TABLET ORAL at 09:19

## 2019-12-19 RX ADMIN — SIMETHICONE 40 MG: 20 SUSPENSION/ DROPS ORAL at 21:27

## 2019-12-19 RX ADMIN — POLYMYXIN B SULFATE, TRIMETHOPRIM SULFATE 2 DROP: 10000; 1 SOLUTION/ DROPS OPHTHALMIC at 17:06

## 2019-12-19 RX ADMIN — POLYMYXIN B SULFATE, TRIMETHOPRIM SULFATE 2 DROP: 10000; 1 SOLUTION/ DROPS OPHTHALMIC at 21:27

## 2019-12-19 ASSESSMENT — ENCOUNTER SYMPTOMS
EYE DISCHARGE: 1
DIARRHEA: 0
ABDOMINAL DISTENTION: 1
ABDOMINAL PAIN: 0
SINUS PAIN: 0
VOMITING: 0
SHORTNESS OF BREATH: 0
NAUSEA: 0
BACK PAIN: 0
CONSTIPATION: 1
SINUS PRESSURE: 0

## 2019-12-19 ASSESSMENT — PAIN DESCRIPTION - LOCATION: LOCATION: GENERALIZED

## 2019-12-19 ASSESSMENT — PAIN SCALES - WONG BAKER
WONGBAKER_NUMERICALRESPONSE: 0
WONGBAKER_NUMERICALRESPONSE: 2
WONGBAKER_NUMERICALRESPONSE: 0

## 2019-12-19 ASSESSMENT — PULMONARY FUNCTION TESTS
PIF_VALUE: 27
PIF_VALUE: 16
PIF_VALUE: 17
PIF_VALUE: 19
PIF_VALUE: 16
PIF_VALUE: 20

## 2019-12-19 NOTE — PROGRESS NOTES
Occupational Therapy  Facility/Department: [facility] NEURO  Daily Treatment Note  NAME: Catalino Park  : 1955  MRN: [id number]    Date of Service: 2019    Discharge Recommendations:  Patient would benefit from continued therapy after discharge   Ed on OT services, bed mob, weight bearing through R UE, weight shifting seated on EOB, simple grooming- good return       Assessment   Performance deficits / Impairments: Decreased functional mobility ; Decreased ADL status; Decreased ROM; Decreased strength;Decreased safe awareness;Decreased cognition;Decreased endurance;Decreased balance;Decreased high-level IADLs;Decreased vision/visual deficit; Decreased fine motor control;Decreased coordination;Decreased posture  Prognosis: Poor  REQUIRES OT FOLLOW UP: Yes  Activity Tolerance  Activity Tolerance: Patient Tolerated treatment well  Activity Tolerance: Pt has G-tube/trach, R deficits  Safety Devices  Safety Devices in place: Yes  Type of devices: All fall risk precautions in place;Call light within reach; Bed alarm in place; Left in bed;Nurse notified         Patient Diagnosis(es): The encounter diagnosis was Intraparenchymal hematoma of brain, left, without loss of consciousness, initial encounter ([facility] [address] has a past medical history of Atrial fibrillation ([facility] [address] CAD (coronary artery disease), H/O heart artery stent, Hx of blood clots, Hyperlipidemia, Hypertension, Hyperthyroidism, Kidney stones, MI, old, Microalbuminuria, Other complications due to other cardiac device, implant, and graft, Proteinuria, Renal cyst, Thyroid disease, and Type II or unspecified type diabetes mellitus without mention of complication, not stated as uncontrolled. has a past surgical history that includes Kidney surgery; Lithotripsy; Cystoscopy; Ureteroscopy; pacemaker placement; eye surgery (Left); Thyroidectomy; and gastrostomy (N/A, 2019).     Restrictions  Restrictions/Precautions  Restrictions/Precautions: General Precautions, Fall Risk  Required Braces or Orthoses?: No  Position Activity Restriction  Other position/activity restrictions: trach; G tube  Subjective   General  Patient assessed for rehabilitation services?: Yes  Family / Caregiver Present: No  Diagnosis: L basal ganglia bleed, R weakness, facial droop, fever  Pain Assessment  Pain Assessment: Faces  Javier-Baker Pain Rating: Hurts a little bit  Pain Location: Generalized  Response to Pain Intervention: Patient Satisfied   Orientation  Orientation  Overall Orientation Status: Impaired  Orientation Level: Oriented to person  Objective    ADL  Feeding: NPO  Grooming: Setup;Verbal cueing; Increased time to complete;Maximum assistance- hand over hand technique to bring wash cloth to face to wash face and hands- seated on EOB   Balance  Sitting Balance: Maximum assistance  Standing Balance: Unable to assess(comment)  Bed mobility: tolerated sitting on EOB for ~ 15 min to engage in weight bearing task through R UE and tolerated cervical head rotation L<>R/ up<>down. Pt completed simple grooming task seated and engaged in weight shifting L<>R and anterior<>posterior. Rolling to Left: 2 Person assistance;Dependent/Total  Rolling to Right: 2 Person assistance;Dependent/Total  Supine to Sit: 2 Person assistance;Dependent/Total  Sit to Supine: 2 Person assistance;Dependent/Total  Scooting: Dependent/Total;2 Person assistance  Comment: HOB elevated  Pt demo poor sitting balance and poor head control ( demo increased head flexion seated).      Attendance  Participation: Active participation      Plan   Plan  Times per week: 3-4x  Current Treatment Recommendations: Balance Training, Functional Mobility Training, Neuromuscular Re-education, Equipment Evaluation, Education, & procurement, Home Management Training, Patient/Caregiver Education & Training, Self-Care / ADL, Safety Education & Training, Endurance Training       Goals  Short term goals  Time Frame for Short term goals: Pt will by discharge   Short term goal 1: demo ADL grooming activity seated with setup, mod A, and hand-over-hand PRN  Short term goal 2: demo supine<>sit transfer to EOB with mod Ax2  Short term goal 3: demo static/dynamic sitting on EOB for ~10 min with mod A   Short term goal 4: demo [address] through [address] for >5 min total during sitting activity to promote func regain  Short term goal 5: demo activity tolerance for 30 min+  Short term goal 6: notify OTR to update goals as mobility progresses to standing, etc.       Therapy Time   Individual Concurrent Group Co-treatment   Time In  9:40     PTA and respiratory   Time Out  10:00         Minutes  20, billed only 1 unit                 [facility], SALAZAR/L

## 2019-12-19 NOTE — PROGRESS NOTES
Ht 5' 7\" (1.702 m)   Wt 212 lb 8.4 oz (96.4 kg)   SpO2 96%   BMI 33.29 kg/m²   Temp (24hrs), Av.8 °F (37.1 °C), Min:98.4 °F (36.9 °C), Max:99.3 °F (37.4 °C)    Recent Labs     19  0213 19  0351 19  0628 19  0740   POCGLU 124* 86 123* 135*       I/O (24Hr): Intake/Output Summary (Last 24 hours) at 2019 0852  Last data filed at 2019 2300  Gross per 24 hour   Intake 1104 ml   Output 2300 ml   Net -1196 ml       Labs:  Hematology:  Recent Labs     19  0352 19  0501   WBC 9.8 9.0   RBC 4.20* 3.97*   HGB 13.2 12.7*   HCT 42.3 39.7*   .7 100.0   MCH 31.4 32.0   MCHC 31.2 32.0   RDW 14.7* 14.5*    See Reflexed IPF Result   MPV 11.9 NOT REPORTED     Chemistry:  Recent Labs     19  0352 19  0501   * 148*   K 4.3 4.2   * 110*   CO2 24 27   GLUCOSE 76 132*   BUN 25* 26*   CREATININE 1.19 1.11   ANIONGAP 13 11   LABGLOM >60 >60   GFRAA >60 >60   CALCIUM 9.3 9.5     Recent Labs     19  1750 19  0158 19  0213 19  0351 19  0628 19  0740   POCGLU 98 43* 124* 86 123* 135*     ABG:  Lab Results   Component Value Date    POCPH 7.416 2019    POCPCO2 46.4 2019    POCPO2 96.2 2019    POCHCO3 29.8 2019    NBEA NOT REPORTED 2019    PBEA 4 2019    EKX6JXI 31 2019    ZOTH5VLJ 98 2019    FIO2 45.0 2019     Lab Results   Component Value Date/Time    SPECIAL  LT HAND 10ML 2019 05:46 PM     Lab Results   Component Value Date/Time    CULTURE NO GROWTH 6 DAYS 2019 05:46 PM       Radiology:  Ct Head Wo Contrast    Result Date: 2019  Stable intraparenchymal hemorrhage accounting for technical differences. 0.3 cm left-right midline shift. Decrease in intraventricular blood products. Xr Chest Portable    Result Date: 2019  Stable mild cardiomegaly.  Otherwise, unremarkable single upright portable AP view of the chest.  Note: Left costophrenic angle is not completely within the field of view. Xr Chest Portable    Result Date: 12/16/2019  1. Tracheostomy tube as detailed above. 2. Mild bilateral pulmonary vascular congestion. Mild bibasilar airspace disease, atelectasis and/or infiltrate. Trace bilateral pleural effusions. Findings favored to represent mild CHF related changes. Xr Chest Portable    Result Date: 12/15/2019  Bilateral congestion. Similar-appearing chest compared to prior. Xr Chest Portable    Result Date: 12/14/2019  Cardiomegaly and chronic pulmonary change without definite acute process. Xr Chest Portable    Result Date: 12/14/2019  Cardiomegaly and chronic pulmonary change without acute pulmonary process. Similar-appearing support tubes. Xr Chest Portable    Result Date: 12/13/2019  No significant interval changes. Xr Chest Portable    Result Date: 12/12/2019  Relatively stable cardiopulmonary status     Xr Chest Portable    Result Date: 12/11/2019  Mild interval worsening of infiltrate at the right lung base. Xr Chest Portable    Result Date: 12/10/2019  Support tubes and lines as above. Advancement of the endotracheal tube by 2-3 cm suggested for optimal positioning. Cardiomegaly. Improving right lower lobe airspace disease. Physical Examination:        Physical Exam  Vitals signs and nursing note reviewed. Constitutional:       General: He is not in acute distress. Appearance: He is obese. HENT:      Head: Normocephalic and atraumatic. Eyes:      General:         Right eye: No discharge. Left eye: Discharge present. Neck:      Comments: trach  Cardiovascular:      Rate and Rhythm: Normal rate and regular rhythm. Pulses: Normal pulses. Heart sounds: No murmur. Pulmonary:      Effort: No respiratory distress. Breath sounds: Normal breath sounds. Comments: Vent support  Abdominal:      General: Bowel sounds are normal. There is distension. Palpations: Abdomen is soft. Tenderness: There is no tenderness. There is no guarding. Musculoskeletal: Normal range of motion. Comments: Right sided hemiparesis   Skin:     General: Skin is warm and dry. Capillary Refill: Capillary refill takes less than 2 seconds. Neurological:      Mental Status: He is alert. Motor: Weakness (right sided hemiparesis) present. Psychiatric:         Mood and Affect: Mood normal.         Assessment:        Hospital Problems           Last Modified POA    * (Principal) Nontraumatic cortical hemorrhage of left cerebral hemisphere ([address] 12/6/2019 Yes    Atrial fibrillation ([address] 12/6/2019 Yes    Uncontrolled hypertension 12/6/2019 Yes    CAD (coronary artery disease) 12/6/2019 Yes    Diabetes mellitus type 2 in obese ([address] 12/6/2019 Yes    Hyperlipidemia 12/6/2019 Yes    Hyperthyroidism 12/6/2019 Yes    Acute intra-cranial hemorrhage ([address] 12/6/2019 Yes    Intraparenchymal hematoma of brain ([address] 12/7/2019 Yes    Ventilator dependence ([address] 12/8/2019 Yes    Acute on chronic combined systolic and diastolic congestive heart failure ([address] 12/8/2019 Yes    Hypernatremia 12/15/2019 Yes    Aspiration pneumonia ([address] 12/15/2019 Yes          Plan:        1. Left cerebral hemorrhagic CVA: Continue to monitor, right hemiparesis  persists  2. Atrial fibrillation: Cardiology has been consulted for anticoagulation needs. He is status post AV james ablation patient had spontaneous bleed while on Coumadin therapy. Per notes patient can resume anticoagulation in 1 month per neuro critical care. 3. Uncontrolled hypertension:  Showing improvement. On amlodipine, Bumex, Cardizem, lisinopril. low dose hydralazine started 12/17, increase and monitor  4. Conjunctivitis: Left eye, appreciate ophthalmology input  5. Coronary artery disease: Status post BMS to RCA, SHIRLENE to RCA  6. Diabetes mellitus type 2: Continue to monitor for hyper/hypoglycemic events.   On Lantus twice daily,

## 2019-12-19 NOTE — PROGRESS NOTES
Progress Note       Brief history: Sj Owen is a 60 y.o. old male admitted on 12/6/2019 with  L thalamic hemorrhagic CVA with intraventricular extension on Warfarin s/p kcentra and vit k for reversal with R hemiplegia. Subjective: No new neurological events overnight. Refused to cooperate during interview this morning. VSS. Repeat CTH. Objective: /83   Pulse 75   Temp 98.9 °F (37.2 °C) (Oral)   Resp 17   Ht 5' 7\" (1.702 m)   Wt 212 lb 8.4 oz (96.4 kg)   SpO2 97%   BMI 33.29 kg/m²       Medications:    FLUoxetine  20 mg Oral Daily    hydrALAZINE  50 mg Oral 3 times per day    hydrochlorothiazide  25 mg Oral Daily    bumetanide  1 mg Oral Daily    diltiazem  90 mg Oral 3 times per day    trimethoprim-polymyxin b  2 drop Both Eyes 4 times per day    amLODIPine  10 mg Per NG tube Daily    albuterol  2.5 mg Nebulization Q6H    insulin glargine  35 Units Subcutaneous BID    insulin lispro  0-18 Units Subcutaneous 4 times per day    famotidine (PEPCID) injection  20 mg Intravenous BID    sennosides-docusate sodium  2 tablet Oral Daily    modafinil  200 mg Per NG tube Daily    tetrahydrozoline  1 drop Left Eye TID    metFORMIN  1,000 mg Per NG tube BID WC    enoxaparin  40 mg Subcutaneous Daily    docusate  100 mg Per NG tube Daily    glyBURIDE  5 mg Per NG tube BID WC    vitamin B-1  100 mg Oral Daily    chlorhexidine  15 mL Mouth/Throat BID    lisinopril  40 mg Oral Daily    simethicone  40 mg Per NG tube 4x Daily    atorvastatin  40 mg Oral Daily    sodium chloride flush  10 mL Intravenous 2 times per day    methIMAzole  20 mg Oral Daily    methIMAzole  10 mg Oral QPM    metoprolol tartrate  100 mg Oral BID                GENERAL EXAMINATION:    Physical Exam  Vitals signs and nursing note reviewed. Constitutional:       General: He is sleeping. He is not in acute distress. Appearance: He is well-developed. He is not diaphoretic.       Comments: Trach/PEG silhouette. Xr Chest Portable    Result Date: 12/18/2019  Rotated exam.  Right basilar opacity can reflect atelectasis, pneumonia, or focal edema. Xr Chest Portable    Result Date: 12/17/2019  Stable mild cardiomegaly. Otherwise, unremarkable single upright portable AP view of the chest.  Note: Left costophrenic angle is not completely within the field of view. Xr Chest Portable    Result Date: 12/16/2019  1. Tracheostomy tube as detailed above. 2. Mild bilateral pulmonary vascular congestion. Mild bibasilar airspace disease, atelectasis and/or infiltrate. Trace bilateral pleural effusions. Findings favored to represent mild CHF related changes. Xr Chest Portable    Result Date: 12/15/2019  Bilateral congestion. Similar-appearing chest compared to prior. Xr Chest Portable    Result Date: 12/14/2019  Cardiomegaly and chronic pulmonary change without definite acute process. Xr Chest Portable    Result Date: 12/14/2019  Cardiomegaly and chronic pulmonary change without acute pulmonary process. Similar-appearing support tubes. Xr Chest Portable    Result Date: 12/13/2019  No significant interval changes. Xr Chest Portable    Result Date: 12/12/2019  Relatively stable cardiopulmonary status     Xr Chest Portable    Result Date: 12/11/2019  Mild interval worsening of infiltrate at the right lung base. Xr Chest Portable    Result Date: 12/10/2019  Support tubes and lines as above. Advancement of the endotracheal tube by 2-3 cm suggested for optimal positioning. Cardiomegaly. Improving right lower lobe airspace disease. Xr Chest Portable    Result Date: 12/10/2019  High ET tube position projecting 7 cm from the maday. Consider advancing the tube 2 cm for better position. Confluent opacity in the right lower lung suggesting pneumonia. Attention at follow-up is advised.      Xr Chest Portable    Result Date: 12/9/2019  Resolving right lower lung field airspace disease, otherwise dictation software. Although every effort was made to ensure the accuracy of this automated transcription, some errors in transcription may have occurred.

## 2019-12-19 NOTE — PROGRESS NOTES
Activity Restriction  Other position/activity restrictions: trach; G tube  Subjective   General  Chart Reviewed: Yes  Response To Previous Treatment: Patient unable to report, no changes reported from family or staff  Family / Caregiver Present: No  Subjective  Subjective: Pt and RN agreeable to PT. Pt with noted trach, SALAZAR and RT present for mobility. Pt denies pain by shaking head \"no\". General Comment  Comments: Pt left in bed with call light within reach  Pain Screening  Patient Currently in Pain: Denies  Vital Signs  Patient Currently in Pain: Denies       Orientation  Orientation  Overall Orientation Status: Impaired(KELLY, pt nods head \"yes/no\" ~75% of the time)  Cognition      Objective   Bed mobility  Rolling to Left: 2 Person assistance;Dependent/Total  Rolling to Right: 2 Person assistance;Dependent/Total  Supine to Sit: 2 Person assistance;Dependent/Total  Sit to Supine: 2 Person assistance;Dependent/Total  Scooting: Dependent/Total;2 Person assistance  Transfers  Sit to Stand: Unable to assess(not safe to attempt secondary to level of assist required for bed mob)  Ambulation  Ambulation?: No     Balance  Posture: Poor(forward head posture)  Sitting - Static: Poor;+  Sitting - Dynamic: Poor  Comments: Pt required maxA to sit EOB this date.  KELLY standing balance d/t poor sitting tolerance  Exercises  Comments: A/AAROM L UE/LE x10 reps each ; PROM R UE/LE x10 reps reach(balat gastroc stretching 3x 30secs)     Goals  Short term goals  Time Frame for Short term goals: 14  Short term goal 1: Pt to perform bed mobility mod A  Short term goal 2: Demonstrate static sitting balance of fair -   Short term goal 3: Actively demonstrate participation in 30 minutes of therapy to demonstrate increased endurance  Short term goal 4: Perform STS transfer in sravani parmar modAx2  Patient Goals   Patient goals : Unable to state    Plan    Plan  Times per week: 5-6x/week   Current Treatment Recommendations: Strengthening,

## 2019-12-20 ENCOUNTER — APPOINTMENT (OUTPATIENT)
Dept: CT IMAGING | Age: 64
DRG: 003 | End: 2019-12-20
Payer: MEDICARE

## 2019-12-20 LAB
ANION GAP SERPL CALCULATED.3IONS-SCNC: 15 MMOL/L (ref 9–17)
BUN BLDV-MCNC: 32 MG/DL (ref 8–23)
BUN/CREAT BLD: ABNORMAL (ref 9–20)
CALCIUM SERPL-MCNC: 10.1 MG/DL (ref 8.6–10.4)
CHLORIDE BLD-SCNC: 104 MMOL/L (ref 98–107)
CO2: 27 MMOL/L (ref 20–31)
CREAT SERPL-MCNC: 1.29 MG/DL (ref 0.7–1.2)
GFR AFRICAN AMERICAN: >60 ML/MIN
GFR NON-AFRICAN AMERICAN: 56 ML/MIN
GFR SERPL CREATININE-BSD FRML MDRD: ABNORMAL ML/MIN/{1.73_M2}
GFR SERPL CREATININE-BSD FRML MDRD: ABNORMAL ML/MIN/{1.73_M2}
GLUCOSE BLD-MCNC: 130 MG/DL (ref 75–110)
GLUCOSE BLD-MCNC: 131 MG/DL (ref 75–110)
GLUCOSE BLD-MCNC: 183 MG/DL (ref 75–110)
GLUCOSE BLD-MCNC: 189 MG/DL (ref 75–110)
GLUCOSE BLD-MCNC: 197 MG/DL (ref 70–99)
HCT VFR BLD CALC: 42.3 % (ref 40.7–50.3)
HEMOGLOBIN: 13.7 G/DL (ref 13–17)
MCH RBC QN AUTO: 32.1 PG (ref 25.2–33.5)
MCHC RBC AUTO-ENTMCNC: 32.4 G/DL (ref 28.4–34.8)
MCV RBC AUTO: 99.1 FL (ref 82.6–102.9)
NRBC AUTOMATED: 0 PER 100 WBC
PDW BLD-RTO: 14.3 % (ref 11.8–14.4)
PLATELET # BLD: 241 K/UL (ref 138–453)
PMV BLD AUTO: 11.8 FL (ref 8.1–13.5)
POTASSIUM SERPL-SCNC: 4.9 MMOL/L (ref 3.7–5.3)
RBC # BLD: 4.27 M/UL (ref 4.21–5.77)
SODIUM BLD-SCNC: 146 MMOL/L (ref 135–144)
WBC # BLD: 11.1 K/UL (ref 3.5–11.3)

## 2019-12-20 PROCEDURE — 6370000000 HC RX 637 (ALT 250 FOR IP): Performed by: STUDENT IN AN ORGANIZED HEALTH CARE EDUCATION/TRAINING PROGRAM

## 2019-12-20 PROCEDURE — 70450 CT HEAD/BRAIN W/O DYE: CPT

## 2019-12-20 PROCEDURE — 6370000000 HC RX 637 (ALT 250 FOR IP): Performed by: FAMILY MEDICINE

## 2019-12-20 PROCEDURE — 82947 ASSAY GLUCOSE BLOOD QUANT: CPT

## 2019-12-20 PROCEDURE — 94003 VENT MGMT INPAT SUBQ DAY: CPT

## 2019-12-20 PROCEDURE — 85027 COMPLETE CBC AUTOMATED: CPT

## 2019-12-20 PROCEDURE — 99233 SBSQ HOSP IP/OBS HIGH 50: CPT | Performed by: PSYCHIATRY & NEUROLOGY

## 2019-12-20 PROCEDURE — 6360000002 HC RX W HCPCS: Performed by: STUDENT IN AN ORGANIZED HEALTH CARE EDUCATION/TRAINING PROGRAM

## 2019-12-20 PROCEDURE — 2500000003 HC RX 250 WO HCPCS: Performed by: STUDENT IN AN ORGANIZED HEALTH CARE EDUCATION/TRAINING PROGRAM

## 2019-12-20 PROCEDURE — 2580000003 HC RX 258: Performed by: STUDENT IN AN ORGANIZED HEALTH CARE EDUCATION/TRAINING PROGRAM

## 2019-12-20 PROCEDURE — 6370000000 HC RX 637 (ALT 250 FOR IP): Performed by: NURSE PRACTITIONER

## 2019-12-20 PROCEDURE — 94761 N-INVAS EAR/PLS OXIMETRY MLT: CPT

## 2019-12-20 PROCEDURE — 99233 SBSQ HOSP IP/OBS HIGH 50: CPT | Performed by: NURSE PRACTITIONER

## 2019-12-20 PROCEDURE — 2700000000 HC OXYGEN THERAPY PER DAY

## 2019-12-20 PROCEDURE — 94770 HC ETCO2 MONITOR DAILY: CPT

## 2019-12-20 PROCEDURE — 2060000000 HC ICU INTERMEDIATE R&B

## 2019-12-20 PROCEDURE — 94640 AIRWAY INHALATION TREATMENT: CPT

## 2019-12-20 PROCEDURE — 80048 BASIC METABOLIC PNL TOTAL CA: CPT

## 2019-12-20 PROCEDURE — 36415 COLL VENOUS BLD VENIPUNCTURE: CPT

## 2019-12-20 RX ORDER — INSULIN GLARGINE 100 [IU]/ML
20 INJECTION, SOLUTION SUBCUTANEOUS 2 TIMES DAILY
Status: DISCONTINUED | OUTPATIENT
Start: 2019-12-20 | End: 2019-12-22

## 2019-12-20 RX ORDER — INSULIN GLARGINE 100 [IU]/ML
40 INJECTION, SOLUTION SUBCUTANEOUS 2 TIMES DAILY
Status: DISCONTINUED | OUTPATIENT
Start: 2019-12-20 | End: 2019-12-20

## 2019-12-20 RX ADMIN — GLYBURIDE 5 MG: 5 TABLET ORAL at 17:19

## 2019-12-20 RX ADMIN — DILTIAZEM HYDROCHLORIDE 90 MG: 60 TABLET, FILM COATED ORAL at 21:21

## 2019-12-20 RX ADMIN — Medication 100 MG: at 09:11

## 2019-12-20 RX ADMIN — Medication 1 DROP: at 09:09

## 2019-12-20 RX ADMIN — Medication 1 DROP: at 14:34

## 2019-12-20 RX ADMIN — SODIUM CHLORIDE, PRESERVATIVE FREE 10 ML: 5 INJECTION INTRAVENOUS at 09:11

## 2019-12-20 RX ADMIN — Medication 15 ML: at 09:25

## 2019-12-20 RX ADMIN — HYDRALAZINE HYDROCHLORIDE 75 MG: 50 TABLET, FILM COATED ORAL at 21:20

## 2019-12-20 RX ADMIN — ALBUTEROL SULFATE 2.5 MG: 2.5 SOLUTION RESPIRATORY (INHALATION) at 19:51

## 2019-12-20 RX ADMIN — INSULIN LISPRO 3 UNITS: 100 INJECTION, SOLUTION INTRAVENOUS; SUBCUTANEOUS at 06:19

## 2019-12-20 RX ADMIN — ALBUTEROL SULFATE 2.5 MG: 2.5 SOLUTION RESPIRATORY (INHALATION) at 04:00

## 2019-12-20 RX ADMIN — ALBUTEROL SULFATE 2.5 MG: 2.5 SOLUTION RESPIRATORY (INHALATION) at 08:11

## 2019-12-20 RX ADMIN — ENOXAPARIN SODIUM 40 MG: 40 INJECTION SUBCUTANEOUS at 09:09

## 2019-12-20 RX ADMIN — SIMETHICONE 40 MG: 20 SUSPENSION/ DROPS ORAL at 09:10

## 2019-12-20 RX ADMIN — GLYBURIDE 5 MG: 5 TABLET ORAL at 09:10

## 2019-12-20 RX ADMIN — METHIMAZOLE 20 MG: 5 TABLET ORAL at 09:10

## 2019-12-20 RX ADMIN — HYDRALAZINE HYDROCHLORIDE 75 MG: 50 TABLET, FILM COATED ORAL at 14:35

## 2019-12-20 RX ADMIN — LISINOPRIL 40 MG: 20 TABLET ORAL at 09:10

## 2019-12-20 RX ADMIN — FAMOTIDINE 20 MG: 10 INJECTION, SOLUTION INTRAVENOUS at 09:09

## 2019-12-20 RX ADMIN — INSULIN GLARGINE 35 UNITS: 100 INJECTION, SOLUTION SUBCUTANEOUS at 09:15

## 2019-12-20 RX ADMIN — METOPROLOL TARTRATE 100 MG: 50 TABLET, FILM COATED ORAL at 21:21

## 2019-12-20 RX ADMIN — POLYMYXIN B SULFATE, TRIMETHOPRIM SULFATE 2 DROP: 10000; 1 SOLUTION/ DROPS OPHTHALMIC at 06:42

## 2019-12-20 RX ADMIN — SIMETHICONE 40 MG: 20 SUSPENSION/ DROPS ORAL at 17:19

## 2019-12-20 RX ADMIN — HYDRALAZINE HYDROCHLORIDE 50 MG: 50 TABLET, FILM COATED ORAL at 06:57

## 2019-12-20 RX ADMIN — METHIMAZOLE 10 MG: 5 TABLET ORAL at 17:19

## 2019-12-20 RX ADMIN — DESMOPRESSIN ACETATE 40 MG: 0.2 TABLET ORAL at 21:21

## 2019-12-20 RX ADMIN — Medication 15 ML: at 21:00

## 2019-12-20 RX ADMIN — SIMETHICONE 40 MG: 20 SUSPENSION/ DROPS ORAL at 14:34

## 2019-12-20 RX ADMIN — BUMETANIDE 1 MG: 1 TABLET ORAL at 09:10

## 2019-12-20 RX ADMIN — METOPROLOL TARTRATE 100 MG: 50 TABLET, FILM COATED ORAL at 09:10

## 2019-12-20 RX ADMIN — FLUOXETINE HYDROCHLORIDE 20 MG: 20 CAPSULE ORAL at 09:10

## 2019-12-20 RX ADMIN — MODAFINIL 200 MG: 100 TABLET ORAL at 09:15

## 2019-12-20 RX ADMIN — HYDROCHLOROTHIAZIDE 25 MG: 25 TABLET ORAL at 09:10

## 2019-12-20 RX ADMIN — Medication 1 DROP: at 21:22

## 2019-12-20 RX ADMIN — SENNOSIDES AND DOCUSATE SODIUM 2 TABLET: 8.6; 5 TABLET ORAL at 09:09

## 2019-12-20 RX ADMIN — DILTIAZEM HYDROCHLORIDE 90 MG: 60 TABLET, FILM COATED ORAL at 06:43

## 2019-12-20 RX ADMIN — DILTIAZEM HYDROCHLORIDE 90 MG: 60 TABLET, FILM COATED ORAL at 14:35

## 2019-12-20 RX ADMIN — METFORMIN HYDROCHLORIDE 1000 MG: 500 TABLET ORAL at 17:19

## 2019-12-20 RX ADMIN — FAMOTIDINE 20 MG: 10 INJECTION, SOLUTION INTRAVENOUS at 21:21

## 2019-12-20 RX ADMIN — SODIUM CHLORIDE, PRESERVATIVE FREE 10 ML: 5 INJECTION INTRAVENOUS at 21:21

## 2019-12-20 RX ADMIN — SIMETHICONE 40 MG: 20 SUSPENSION/ DROPS ORAL at 21:00

## 2019-12-20 RX ADMIN — AMLODIPINE BESYLATE 10 MG: 10 TABLET ORAL at 09:10

## 2019-12-20 RX ADMIN — INSULIN LISPRO 3 UNITS: 100 INJECTION, SOLUTION INTRAVENOUS; SUBCUTANEOUS at 14:41

## 2019-12-20 RX ADMIN — METFORMIN HYDROCHLORIDE 1000 MG: 500 TABLET ORAL at 09:10

## 2019-12-20 RX ADMIN — DOCUSATE SODIUM 100 MG: 50 LIQUID ORAL at 09:09

## 2019-12-20 RX ADMIN — ALBUTEROL SULFATE 2.5 MG: 2.5 SOLUTION RESPIRATORY (INHALATION) at 14:48

## 2019-12-20 ASSESSMENT — ENCOUNTER SYMPTOMS
SINUS PAIN: 0
ABDOMINAL PAIN: 0
EYE DISCHARGE: 1
SINUS PRESSURE: 0
SHORTNESS OF BREATH: 0
NAUSEA: 0
ABDOMINAL DISTENTION: 1
BACK PAIN: 0
VOMITING: 0
DIARRHEA: 0
CONSTIPATION: 0

## 2019-12-20 ASSESSMENT — PULMONARY FUNCTION TESTS
PIF_VALUE: 15
PIF_VALUE: 20
PIF_VALUE: 15
PIF_VALUE: 21
PIF_VALUE: 14
PIF_VALUE: 17
PIF_VALUE: 15

## 2019-12-20 ASSESSMENT — PAIN SCALES - WONG BAKER: WONGBAKER_NUMERICALRESPONSE: 0

## 2019-12-20 ASSESSMENT — PAIN SCALES - GENERAL: PAINLEVEL_OUTOF10: 0

## 2019-12-20 NOTE — PLAN OF CARE
BRONCHOSPASM/BRONCHOCONSTRICTION   [x]  IMPROVE AERATION/BREATH SOUNDS  [x]   ADMINISTER BRONCHODILATOR THERAPY AS APPROPRIATE  [x]   ASSESS BREATH SOUNDS  [x]   IMPLEMENT AEROSOL/MDI PROTOCOL  [x]   PATIENT EDUCATION AS NEEDED    Problem: OXYGENATION/RESPIRATORY FUNCTION  Goal: Patient will maintain patent airway  Outcome: Ongoing  Goal: Patient will achieve/maintain normal respiratory rate/effort  Respiratory rate and effort will be within normal limits for the patient    Outcome: Ongoing     Problem: MECHANICAL VENTILATION  Goal: Tracheostomy will be managed safely  Outcome: Ongoing  Goal: Oral health is maintained or improved  Outcome: Ongoing  Goal: Patient will maintain patent airway  Outcome: Ongoing     Problem: ASPIRATION PRECAUTIONS  Goal: Patients risk of aspiration is minimized  Outcome: Ongoing     Problem: SKIN INTEGRITY  Goal: Skin integrity is maintained or improved  Outcome: Ongoing

## 2019-12-20 NOTE — PROGRESS NOTES
Occupational Therapy    Occupational Therapy Not Seen Note    DATE: 2019  Name: Patricia Simpson  : 1955  MRN: [id number]    Patient not available for Occupational Therapy due to:    Testing Upon writer arrival, pt leaving for CT    Next Scheduled Treatment: Check back in PM if time allows, otherwise check     Electronically signed by TWAN Pruitt on 2019 at 10:28 AM

## 2019-12-20 NOTE — PROGRESS NOTES
Physical Therapy  DATE: 2019    NAME: Danny Dawson  MRN: [id number]   : 1955    Patient not seen this date for Physical Therapy due to:  [] Blood transfusion in progress  [] Hemodialysis  []  Patient Declined  [] Spine Precautions   [] Strict Bedrest  [] Surgery/ Procedure  [x] Testing; Pt leaving for CT upon arrival. Next shcdule 19      [] Other        [] PT being discontinued at this time. Patient independent. No further needs. [] PT being discontinued at this time as the patient has been transferred to palliative care. No further needs.     Sohail Lindsey, PTA

## 2019-12-20 NOTE — PLAN OF CARE
Problem: Falls - Risk of:  Goal: Will remain free from falls  Description  Will remain free from falls  Outcome: Met This Shift  Note:   Fall risk precautions in place. Bed in lowest position with wheels locked, bed alarm in place and activated, orange blanket on bed, non-skid socks on pt, fall risk id on pt, call light in reach, pt encouraged to call before getting out of bed and for any other needs or c/o.        Problem: Respiratory:  Goal: Ability to maintain a clear airway will improve  Description  Ability to maintain a clear airway will improve  Outcome: Ongoing

## 2019-12-20 NOTE — PLAN OF CARE
PROVIDE ADEQUATE OXYGENATION WITH ACCEPTABLE SP02/ABG'S    [x]  IDENTIFY APPROPRIATE OXYGEN THERAPY  [x]   MONITOR SP02/ABG'S AS NEEDED   [x]   PATIENT EDUCATION AS NEEDED  BRONCHOSPASM/BRONCHOCONSTRICTION     [x]         IMPROVE AERATION/BREATH SOUNDS  [x]   ADMINISTER BRONCHODILATOR THERAPY AS APPROPRIATE  [x]   ASSESS BREATH SOUNDS  []   IMPLEMENT AEROSOL/MDI PROTOCOL  [x]   PATIENT EDUCATION AS NEEDED      MECHANICAL VENTILATION     [x]  PROVIDE OPTIMAL VENTILATION  [x]   ASSESS FOR EXTUBATION READINESS  [x]   ASSESS FOR WEANING READINESS  [x]  EXTUBATE AS TOLERATED  [x]  IMPLEMENT ADULT MECHANICAL VENTILATION PROTOCOL  [x]  MAINTAIN ADEQUATE OXYGENATION  [x]  PERFORM SPONTANEOUS WEANING TRIAL AS TOLERATED      Assessment for weaning readiness    PRE-TRIAL PATIENT ASSESSMENT - COMPLETED AT 0810    Completed by:  Francine Scott  8:22 AM    PARAMETER CRITERIA FOR WEANING CRITERIA FOR WEANING   Diaphoresis, agitation or dyspnea None present No   Heart Rate    Pulse: 75 Pulse less than 50 or greater than 140   No   Blood Pressure  BP: (!) 240/61 Systolic Blood Pressure less than 90 mmHg No   Vaspressors Vasopressors greater than or equal to 5mcg No   SpO2: 90 %  FiO2 : 45 %     SpO2 less than 90% and or   FiO2 is greater than 50%  Peep is greater than 8   Yes   P/F ratio: 213  :No results found for: PHART, GYF2TTX, PO2ART, F8WOJNMA, DPJ8HEP, BEART   PaO2/FiO2 less than or equal to 150 No, last ABG 12/14/2019   Sedation Patient in unable to follow simple commands on a continuous infusion of Midazolam, Ativan, Diprivan, or other hypnosedatives with the exception of PCA morphine, Fentanyl and Dilaudid with a basal rate No

## 2019-12-20 NOTE — PROGRESS NOTES
Nutrition Assessment (Enteral Nutrition)    Type and Reason for Visit: Reassess    Nutrition Recommendations:   - Continue current TF of Glucerna (diabetic) at goal rate of 55 mL/hr = 1584 kcal, 79 gm protein per day. Monitor TF tolerance/adequacy of intakes - modify as needed. - Will monitor labs, bowel function, and plan of care. Nutrition Assessment: Pt tolerating tube feedings well at goal rate. RN reports pt's blood sugars have been slightly elevated (183-197 mg/dL noted) - pt already on diabetic formula. Pt continues to recieve free water flushes of 200 mL every 4 hours. Noted pt with large BM on 12/18. Malnutrition Assessment:  · Malnutrition Status: At risk for malnutrition    Nutrition Risk Level:  Moderate    Nutrition Needs:  · Estimated Daily Total Kcal: 9351-2257 kcals/day  · Estimated Daily Protein (g):  gm/day    Nutrition Diagnosis:   · Problem: Inadequate oral intake  · Etiology: related to Impaired respiratory function-inability to consume food     Signs and symptoms:  as evidenced by NPO status due to medical condition, Nutrition support - EN    Objective Information:  · Wound Type: Skin Tears  · Current Nutrition Therapies:  · Oral Diet Orders: NPO   · Tube Feeding (TF) Orders:   · Feeding Route: Gastrostomy  · Formula: Diabetic  · Rate (ml/hr):55 mL/hr    · Volume (ml/day): 1320 mL/day  · Duration: Continuous  · Current TF & Flush Orders Provides: At goal  · Goal TF & Flush Orders Provides: Glucerna (diabetic) at 55 mL/hr = 1584 kcals, 79 gm protein, 1063 mL free water daily  · Anthropometric Measures:  · Ht: 5' 7\" (170.2 cm)   · Current Body Wt: 212 lb 8.4 oz (96.4 kg)  · Admission Body Wt: 211 lb (95.7 kg)  · Weight Change: KELLY   · Ideal Body Wt: 148 lb (67.1 kg), % Ideal Body 144%  · BMI Classification: BMI 30.0 - 34.9 Obese Class I    Nutrition Interventions:   Continue current Tube Feeding  Continued Inpatient Monitoring, Education Not Indicated    Nutrition

## 2019-12-20 NOTE — PROGRESS NOTES
FLUoxetine  20 mg Oral Daily    hydrALAZINE  50 mg Oral 3 times per day    hydrochlorothiazide  25 mg Oral Daily    bumetanide  1 mg Oral Daily    diltiazem  90 mg Oral 3 times per day    trimethoprim-polymyxin b  2 drop Both Eyes 4 times per day    amLODIPine  10 mg Per NG tube Daily    albuterol  2.5 mg Nebulization Q6H    insulin glargine  35 Units Subcutaneous BID    insulin lispro  0-18 Units Subcutaneous 4 times per day    famotidine (PEPCID) injection  20 mg Intravenous BID    sennosides-docusate sodium  2 tablet Oral Daily    modafinil  200 mg Per NG tube Daily    tetrahydrozoline  1 drop Left Eye TID    metFORMIN  1,000 mg Per NG tube BID WC    enoxaparin  40 mg Subcutaneous Daily    docusate  100 mg Per NG tube Daily    glyBURIDE  5 mg Per NG tube BID WC    vitamin B-1  100 mg Oral Daily    chlorhexidine  15 mL Mouth/Throat BID    lisinopril  40 mg Oral Daily    simethicone  40 mg Per NG tube 4x Daily    atorvastatin  40 mg Oral Daily    sodium chloride flush  10 mL Intravenous 2 times per day    methIMAzole  20 mg Oral Daily    methIMAzole  10 mg Oral QPM    metoprolol tartrate  100 mg Oral BID     Continuous Infusions:    dextrose Stopped (12/19/19 0952)     PRN Meds: fentanNYL, REFRESH LACRI-LUBE, potassium chloride, fentanNYL, glucose, dextrose, glucagon (rDNA), dextrose, acetaminophen **OR** acetaminophen, sodium chloride flush, ondansetron, metoprolol, hydrALAZINE    Data:     Past Medical History:   has a past medical history of Atrial fibrillation ([facility] [address] CAD (coronary artery disease), H/O heart artery stent, Hx of blood clots, Hyperlipidemia, Hypertension, Hyperthyroidism, Kidney stones, MI, old, Microalbuminuria, Other complications due to other cardiac device, implant, and graft, Proteinuria, Renal cyst, Thyroid disease, and Type II or unspecified type diabetes mellitus without mention of complication, not stated as uncontrolled.     Social History:   reports that he has been smoking cigarettes. He has a 47.00 pack-year smoking history. He has never used smokeless tobacco. He reports that he does not drink alcohol. Family History: No family history on file. Vitals:  BP (!) 166/91   Pulse 75   Temp 97.7 °F (36.5 °C) (Axillary)   Resp 24   Ht 5' 7\" (1.702 m)   Wt 212 lb 8.4 oz (96.4 kg)   SpO2 92%   BMI 33.29 kg/m²   Temp (24hrs), Av.4 °F (36.3 °C), Min:96.5 °F (35.8 °C), Max:98 °F (36.7 °C)    Recent Labs     19  1435 19  2108 19  2342 19  0613   POCGLU 107 104 131* 183*       I/O (24Hr):     Intake/Output Summary (Last 24 hours) at 2019 0758  Last data filed at 2019 9078  Gross per 24 hour   Intake 1129 ml   Output 1700 ml   Net -571 ml       Labs:  Hematology:  Recent Labs     19  0501 19  0913 19  0517   WBC 9.0 11.6* 11.1   RBC 3.97* 4.24 4.27   HGB 12.7* 13.2 13.7   HCT 39.7* 42.2 42.3   .0 99.5 99.1   MCH 32.0 31.1 32.1   MCHC 32.0 31.3 32.4   RDW 14.5* 14.5* 14.3   PLT See Reflexed IPF Result 173 241   MPV NOT REPORTED 13.0 11.8     Chemistry:  Recent Labs     19  0501 19  0913 19  0517   * 147* 146*   K 4.2 4.4 4.9   * 107 104   CO2 27 28 27   GLUCOSE 132* 157* 197*   BUN 26* 27* 32*   CREATININE 1.11 1.12 1.29*   ANIONGAP 11 12 15   LABGLOM >60 >60 56*   GFRAA >60 >60 >60   CALCIUM 9.5 10.2 10.1     Recent Labs     19  0628 19  0740 19  1435 19  2108 19  2342 19  0613   POCGLU 123* 135* 107 104 131* 183*     ABG:  Lab Results   Component Value Date    POCPH 7.416 2019    POCPCO2 46.4 2019    POCPO2 96.2 2019    POCHCO3 29.8 2019    NBEA NOT REPORTED 2019    PBEA 4 2019    OOX2PXS 31 2019    PXNH7CNA 98 2019    FIO2 45.0 2019     Lab Results   Component Value Date/Time    SPECIAL  LT HAND 10ML 2019 05:46 PM     Lab Results   Component Value Date/Time    CULTURE NO GROWTH 6 DAYS 12/09/2019 05:46 PM       Radiology:  Ct Head Wo Contrast    Result Date: 12/11/2019  Stable intraparenchymal hemorrhage accounting for technical differences. 0.3 cm left-right midline shift. Decrease in intraventricular blood products. Xr Chest Portable    Result Date: 12/17/2019  Stable mild cardiomegaly. Otherwise, unremarkable single upright portable AP view of the chest.  Note: Left costophrenic angle is not completely within the field of view. Xr Chest Portable    Result Date: 12/16/2019  1. Tracheostomy tube as detailed above. 2. Mild bilateral pulmonary vascular congestion. Mild bibasilar airspace disease, atelectasis and/or infiltrate. Trace bilateral pleural effusions. Findings favored to represent mild CHF related changes. Xr Chest Portable    Result Date: 12/15/2019  Bilateral congestion. Similar-appearing chest compared to prior. Xr Chest Portable    Result Date: 12/14/2019  Cardiomegaly and chronic pulmonary change without definite acute process. Xr Chest Portable    Result Date: 12/14/2019  Cardiomegaly and chronic pulmonary change without acute pulmonary process. Similar-appearing support tubes. Xr Chest Portable    Result Date: 12/13/2019  No significant interval changes. Xr Chest Portable    Result Date: 12/12/2019  Relatively stable cardiopulmonary status     Xr Chest Portable    Result Date: 12/11/2019  Mild interval worsening of infiltrate at the right lung base. Xr Chest Portable    Result Date: 12/10/2019  Support tubes and lines as above. Advancement of the endotracheal tube by 2-3 cm suggested for optimal positioning. Cardiomegaly. Improving right lower lobe airspace disease. Physical Examination:        Physical Exam  Vitals signs and nursing note reviewed. Constitutional:       General: He is not in acute distress. Appearance: He is obese. HENT:      Head: Normocephalic and atraumatic.    Eyes:      General:         Right eye: No discharge. Left eye: Discharge present. Neck:      Comments: trach  Cardiovascular:      Rate and Rhythm: Normal rate and regular rhythm. Pulses: Normal pulses. Heart sounds: No murmur. Pulmonary:      Effort: No respiratory distress. Breath sounds: Normal breath sounds. Comments: Vent support  Abdominal:      General: Bowel sounds are normal. There is distension. Palpations: Abdomen is soft. Tenderness: There is no tenderness. There is no guarding. Musculoskeletal: Normal range of motion. Comments: Right sided hemiparesis   Skin:     General: Skin is warm and dry. Capillary Refill: Capillary refill takes less than 2 seconds. Neurological:      Mental Status: He is alert. Motor: Weakness (right sided hemiparesis) present. Psychiatric:         Mood and Affect: Mood normal.         Assessment:        Hospital Problems           Last Modified POA    * (Principal) Nontraumatic cortical hemorrhage of left cerebral hemisphere ([address] 12/6/2019 Yes    Atrial fibrillation ([address] 12/6/2019 Yes    Uncontrolled hypertension 12/6/2019 Yes    CAD (coronary artery disease) 12/6/2019 Yes    Diabetes mellitus type 2 in obese ([address] 12/6/2019 Yes    Hyperlipidemia 12/6/2019 Yes    Hyperthyroidism 12/6/2019 Yes    Acute intra-cranial hemorrhage ([address] 12/6/2019 Yes    Intraparenchymal hematoma of brain ([address] 12/7/2019 Yes    Ventilator dependence ([address] 12/8/2019 Yes    Acute on chronic combined systolic and diastolic congestive heart failure ([address] 12/8/2019 Yes    Hypernatremia 12/15/2019 Yes    Aspiration pneumonia ([address] 12/15/2019 Yes    Right sided weakness 12/19/2019 Yes          Plan:        1. Left cerebral hemorrhagic CVA: Continue to monitor, right hemiparesis  persists  2. Atrial fibrillation: Cardiology has been consulted for anticoagulation needs. He is status post AV james ablation patient had spontaneous bleed while on Coumadin therapy.   Per notes patient can

## 2019-12-20 NOTE — PROGRESS NOTES
The transport originated from . Pt. was transported to CT. Assisting with the transport was Rn, RT. Appropriate devices were applied to monitor the patient's condition during transport. Patient transported  via 50% O2 via ventilator. Patient tolerated well.         Yobani Manrique  12:47 PM

## 2019-12-21 LAB
ANION GAP SERPL CALCULATED.3IONS-SCNC: 12 MMOL/L (ref 9–17)
BUN BLDV-MCNC: 38 MG/DL (ref 8–23)
BUN/CREAT BLD: ABNORMAL (ref 9–20)
CALCIUM SERPL-MCNC: 10.5 MG/DL (ref 8.6–10.4)
CHLORIDE BLD-SCNC: 101 MMOL/L (ref 98–107)
CO2: 30 MMOL/L (ref 20–31)
CREAT SERPL-MCNC: 1.35 MG/DL (ref 0.7–1.2)
GFR AFRICAN AMERICAN: >60 ML/MIN
GFR NON-AFRICAN AMERICAN: 54 ML/MIN
GFR SERPL CREATININE-BSD FRML MDRD: ABNORMAL ML/MIN/{1.73_M2}
GFR SERPL CREATININE-BSD FRML MDRD: ABNORMAL ML/MIN/{1.73_M2}
GLUCOSE BLD-MCNC: 163 MG/DL (ref 75–110)
GLUCOSE BLD-MCNC: 173 MG/DL (ref 70–99)
GLUCOSE BLD-MCNC: 184 MG/DL (ref 75–110)
GLUCOSE BLD-MCNC: 185 MG/DL (ref 75–110)
GLUCOSE BLD-MCNC: 187 MG/DL (ref 75–110)
HCT VFR BLD CALC: 43.7 % (ref 40.7–50.3)
HEMOGLOBIN: 14.1 G/DL (ref 13–17)
MCH RBC QN AUTO: 31.6 PG (ref 25.2–33.5)
MCHC RBC AUTO-ENTMCNC: 32.3 G/DL (ref 28.4–34.8)
MCV RBC AUTO: 98 FL (ref 82.6–102.9)
NRBC AUTOMATED: 0 PER 100 WBC
PDW BLD-RTO: 14.5 % (ref 11.8–14.4)
PLATELET # BLD: 253 K/UL (ref 138–453)
PMV BLD AUTO: 12 FL (ref 8.1–13.5)
POTASSIUM SERPL-SCNC: 4.9 MMOL/L (ref 3.7–5.3)
RBC # BLD: 4.46 M/UL (ref 4.21–5.77)
SODIUM BLD-SCNC: 143 MMOL/L (ref 135–144)
WBC # BLD: 13.1 K/UL (ref 3.5–11.3)

## 2019-12-21 PROCEDURE — 85027 COMPLETE CBC AUTOMATED: CPT

## 2019-12-21 PROCEDURE — 6370000000 HC RX 637 (ALT 250 FOR IP): Performed by: STUDENT IN AN ORGANIZED HEALTH CARE EDUCATION/TRAINING PROGRAM

## 2019-12-21 PROCEDURE — 2500000003 HC RX 250 WO HCPCS: Performed by: STUDENT IN AN ORGANIZED HEALTH CARE EDUCATION/TRAINING PROGRAM

## 2019-12-21 PROCEDURE — 6370000000 HC RX 637 (ALT 250 FOR IP): Performed by: FAMILY MEDICINE

## 2019-12-21 PROCEDURE — 94640 AIRWAY INHALATION TREATMENT: CPT

## 2019-12-21 PROCEDURE — 82947 ASSAY GLUCOSE BLOOD QUANT: CPT

## 2019-12-21 PROCEDURE — 6370000000 HC RX 637 (ALT 250 FOR IP): Performed by: NURSE PRACTITIONER

## 2019-12-21 PROCEDURE — 94770 HC ETCO2 MONITOR DAILY: CPT

## 2019-12-21 PROCEDURE — 36415 COLL VENOUS BLD VENIPUNCTURE: CPT

## 2019-12-21 PROCEDURE — 99232 SBSQ HOSP IP/OBS MODERATE 35: CPT | Performed by: PSYCHIATRY & NEUROLOGY

## 2019-12-21 PROCEDURE — 99221 1ST HOSP IP/OBS SF/LOW 40: CPT | Performed by: INTERNAL MEDICINE

## 2019-12-21 PROCEDURE — 2060000000 HC ICU INTERMEDIATE R&B

## 2019-12-21 PROCEDURE — 94003 VENT MGMT INPAT SUBQ DAY: CPT

## 2019-12-21 PROCEDURE — 80048 BASIC METABOLIC PNL TOTAL CA: CPT

## 2019-12-21 PROCEDURE — 97110 THERAPEUTIC EXERCISES: CPT

## 2019-12-21 PROCEDURE — 94761 N-INVAS EAR/PLS OXIMETRY MLT: CPT

## 2019-12-21 PROCEDURE — 6360000002 HC RX W HCPCS: Performed by: STUDENT IN AN ORGANIZED HEALTH CARE EDUCATION/TRAINING PROGRAM

## 2019-12-21 PROCEDURE — 99233 SBSQ HOSP IP/OBS HIGH 50: CPT | Performed by: INTERNAL MEDICINE

## 2019-12-21 PROCEDURE — 2700000000 HC OXYGEN THERAPY PER DAY

## 2019-12-21 PROCEDURE — 2580000003 HC RX 258: Performed by: STUDENT IN AN ORGANIZED HEALTH CARE EDUCATION/TRAINING PROGRAM

## 2019-12-21 RX ADMIN — LISINOPRIL 40 MG: 20 TABLET ORAL at 09:43

## 2019-12-21 RX ADMIN — INSULIN LISPRO 3 UNITS: 100 INJECTION, SOLUTION INTRAVENOUS; SUBCUTANEOUS at 12:59

## 2019-12-21 RX ADMIN — FAMOTIDINE 20 MG: 10 INJECTION, SOLUTION INTRAVENOUS at 09:44

## 2019-12-21 RX ADMIN — Medication 100 MG: at 09:41

## 2019-12-21 RX ADMIN — SODIUM CHLORIDE, PRESERVATIVE FREE 10 ML: 5 INJECTION INTRAVENOUS at 09:40

## 2019-12-21 RX ADMIN — METHIMAZOLE 20 MG: 5 TABLET ORAL at 09:42

## 2019-12-21 RX ADMIN — DESMOPRESSIN ACETATE 40 MG: 0.2 TABLET ORAL at 22:16

## 2019-12-21 RX ADMIN — METOPROLOL TARTRATE 100 MG: 50 TABLET, FILM COATED ORAL at 22:16

## 2019-12-21 RX ADMIN — FAMOTIDINE 20 MG: 10 INJECTION, SOLUTION INTRAVENOUS at 22:16

## 2019-12-21 RX ADMIN — ALBUTEROL SULFATE 2.5 MG: 2.5 SOLUTION RESPIRATORY (INHALATION) at 03:51

## 2019-12-21 RX ADMIN — Medication 1 DROP: at 22:16

## 2019-12-21 RX ADMIN — DILTIAZEM HYDROCHLORIDE 90 MG: 60 TABLET, FILM COATED ORAL at 05:34

## 2019-12-21 RX ADMIN — ALBUTEROL SULFATE 2.5 MG: 2.5 SOLUTION RESPIRATORY (INHALATION) at 07:47

## 2019-12-21 RX ADMIN — HYDRALAZINE HYDROCHLORIDE 75 MG: 50 TABLET, FILM COATED ORAL at 22:15

## 2019-12-21 RX ADMIN — ALBUTEROL SULFATE 2.5 MG: 2.5 SOLUTION RESPIRATORY (INHALATION) at 19:50

## 2019-12-21 RX ADMIN — AMLODIPINE BESYLATE 10 MG: 10 TABLET ORAL at 09:44

## 2019-12-21 RX ADMIN — SENNOSIDES AND DOCUSATE SODIUM 2 TABLET: 8.6; 5 TABLET ORAL at 09:43

## 2019-12-21 RX ADMIN — METHIMAZOLE 10 MG: 5 TABLET ORAL at 18:43

## 2019-12-21 RX ADMIN — INSULIN LISPRO 3 UNITS: 100 INJECTION, SOLUTION INTRAVENOUS; SUBCUTANEOUS at 05:34

## 2019-12-21 RX ADMIN — GLYBURIDE 5 MG: 5 TABLET ORAL at 17:43

## 2019-12-21 RX ADMIN — HYDROCHLOROTHIAZIDE 25 MG: 25 TABLET ORAL at 09:43

## 2019-12-21 RX ADMIN — HYDRALAZINE HYDROCHLORIDE 75 MG: 50 TABLET, FILM COATED ORAL at 05:32

## 2019-12-21 RX ADMIN — Medication 1 DROP: at 09:45

## 2019-12-21 RX ADMIN — METOPROLOL TARTRATE 100 MG: 50 TABLET, FILM COATED ORAL at 09:41

## 2019-12-21 RX ADMIN — MINERAL OIL AND WHITE PETROLATUM: 150; 830 OINTMENT OPHTHALMIC at 09:46

## 2019-12-21 RX ADMIN — SODIUM CHLORIDE, PRESERVATIVE FREE 10 ML: 5 INJECTION INTRAVENOUS at 22:41

## 2019-12-21 RX ADMIN — DILTIAZEM HYDROCHLORIDE 90 MG: 60 TABLET, FILM COATED ORAL at 14:43

## 2019-12-21 RX ADMIN — DILTIAZEM HYDROCHLORIDE 90 MG: 60 TABLET, FILM COATED ORAL at 22:15

## 2019-12-21 RX ADMIN — GLYBURIDE 5 MG: 5 TABLET ORAL at 08:40

## 2019-12-21 RX ADMIN — SIMETHICONE 40 MG: 20 SUSPENSION/ DROPS ORAL at 13:49

## 2019-12-21 RX ADMIN — INSULIN LISPRO 3 UNITS: 100 INJECTION, SOLUTION INTRAVENOUS; SUBCUTANEOUS at 00:36

## 2019-12-21 RX ADMIN — SIMETHICONE 40 MG: 20 SUSPENSION/ DROPS ORAL at 22:16

## 2019-12-21 RX ADMIN — INSULIN LISPRO 3 UNITS: 100 INJECTION, SOLUTION INTRAVENOUS; SUBCUTANEOUS at 18:45

## 2019-12-21 RX ADMIN — Medication 1 DROP: at 14:49

## 2019-12-21 RX ADMIN — METFORMIN HYDROCHLORIDE 1000 MG: 500 TABLET ORAL at 17:48

## 2019-12-21 RX ADMIN — ENOXAPARIN SODIUM 40 MG: 40 INJECTION SUBCUTANEOUS at 08:45

## 2019-12-21 RX ADMIN — FLUOXETINE HYDROCHLORIDE 20 MG: 20 CAPSULE ORAL at 09:44

## 2019-12-21 RX ADMIN — BUMETANIDE 1 MG: 1 TABLET ORAL at 09:43

## 2019-12-21 RX ADMIN — SIMETHICONE 40 MG: 20 SUSPENSION/ DROPS ORAL at 17:49

## 2019-12-21 RX ADMIN — DOCUSATE SODIUM 100 MG: 50 LIQUID ORAL at 09:44

## 2019-12-21 RX ADMIN — SIMETHICONE 40 MG: 20 SUSPENSION/ DROPS ORAL at 09:42

## 2019-12-21 RX ADMIN — METFORMIN HYDROCHLORIDE 1000 MG: 500 TABLET ORAL at 08:43

## 2019-12-21 RX ADMIN — HYDRALAZINE HYDROCHLORIDE 75 MG: 50 TABLET, FILM COATED ORAL at 14:44

## 2019-12-21 ASSESSMENT — PULMONARY FUNCTION TESTS
PIF_VALUE: 22
PIF_VALUE: 20
PIF_VALUE: 22
PIF_VALUE: 18
PIF_VALUE: 20
PIF_VALUE: 22

## 2019-12-21 NOTE — PROGRESS NOTES
NEUROLOGY INPATIENT PROGRESS NOTE    12/20/2019         Subjective: Keegan Morgan is a  60 y.o. male admitted on 12/6/2019 with Acute cerebrovascular accident (CVA) ([facility] [address] [I63.9]  Acute intra-cranial hemorrhage ([facility] [address] [I62.9]      Briefly, this is a  59 y. o. male with hx of HTN, DM, A Fib s/p AV node ablation, CAD was admitted transfer from [facility] ED on 12/6/2019 with abnormal CT head demonstrating left thalamic CVA with intraventricular extension while on warfarin. He was noted to have uncontrolled hypertension requiring Cardene drip. His INR is elevated at 2.7. He was on Coumadin for atrial fibrillation requiring vitamin K and Kcentra for reversal.  He was admitted to neuro ICU where he developed respiratory distress with hypoxia; requiring emergent intubation on 12/7/2019. His repeat imaging study revealed extension of the bleed with midline shift. Patient was started on empiric antibiotic therapy. Due to history of COPD and given the development of PNA, he was unable to be weaned off of the ventilator. Patient has had tracheostomy and PEG tube implantation on 12/14/2019. CT head on 12/11/2019 demonstrated stable intraparenchymal hemorrhage measuring 3.5 x 2.7 cm. Blood products in both the left and right lateral ventricles left greater than right. Old cerebellar infarcts. 0.3 cm left to right midline shift noted. Had a follow-up CT head on 12/20/2019 and it demonstrated  nterval decrease in size of the left thalamic hemorrhage with a 2.6 x 2.0 cm. Persistent associated edema. Decrease in intraventricular blood in posterior horns of the lateral ventricles. Mild rightward midline shift of 5.9 mm. No current facility-administered medications on file prior to encounter.       Current Outpatient Medications on File Prior to Encounter   Medication Sig Dispense Refill    bumetanide (BUMEX) 0.5 MG tablet Take 0.5 mg by mouth daily      lisinopril (PRINIVIL;ZESTRIL) 40 MG tablet Take 1 tablet by mouth daily 30 tablet 3    isosorbide mononitrate (IMDUR) 120 MG CR tablet Take 1 tablet by mouth daily 30 tablet 3    diltiazem (CARDIZEM CD) 240 MG ER capsule Take 1 capsule by mouth daily 30 capsule 3    aspirin 81 MG tablet Take 81 mg by mouth daily.  atorvastatin (LIPITOR) 40 MG tablet Take 40 mg by mouth daily.  glyBURIDE (DIABETA) 5 MG tablet Take 5 mg by mouth 2 times daily (with meals).  insulin glargine (LANTUS) 100 UNIT/ML injection Inject 60 Units into the skin daily       metformin (GLUCOPHAGE) 1000 MG tablet Take 1,000 mg by mouth 2 times daily (with meals).  metoprolol (LOPRESSOR) 100 MG tablet Take 100 mg by mouth 2 times daily.  nitroGLYCERIN (NITROSTAT) 0.4 MG SL tablet Place 0.4 mg under the tongue every 5 minutes as needed.  insulin lispro (HUMALOG) 100 UNIT/ML injection vial Inject 15 Units into the skin 3 times daily (with meals) (Patient taking differently: Inject 15 Units into the skin 3 times daily (with meals) Per wife pt takes a sliding scale dose not a fixed unit amount) 1 vial 3    methimazole (TAPAZOLE) 10 MG tablet Take 10 mg by mouth every evening      Blood Pressure Monitor KIT CHECK BLOOD PRESSURE DAILY 1 kit 0    prasugrel (EFFIENT) 10 MG TABS Take 10 mg by mouth daily.  methimazole (TAPAZOLE) 10 MG tablet   Take 20 mg by mouth daily          Allergies: Judah Mata has No Known Allergies.     Past Medical History:   Diagnosis Date    Atrial fibrillation ([facility] [address]     CAD (coronary artery disease)     H/O heart artery stent x 4    Hx of blood clots     8 clots removed from right leg    Hyperlipidemia     Hypertension     Hyperthyroidism     Kidney stones     MI, old     family states 3 MI history    Microalbuminuria     Other complications due to other cardiac device, implant, and graft     Proteinuria     Renal cyst     right side    Thyroid disease     Type II or unspecified type diabetes mellitus without mention of

## 2019-12-21 NOTE — PROGRESS NOTES
sided gaze preference   Neck:      Musculoskeletal: Normal range of motion. Pulmonary:      Effort: Pulmonary effort is normal.      Comments: Trach collar  Abdominal:      General: There is no distension. Palpations: There is no mass. Skin:     General: Skin is warm. Neurological:      Mental Status: He is lethargic. GCS: GCS eye subscore is 3. GCS verbal subscore is 1. GCS motor subscore is 5. Cranial Nerves: Cranial nerve deficit (right facial droop) present. Sensory: Sensory deficit (withdraws to pain on right side) present. Motor: Weakness (right hemiplegia) present. No abnormal muscle tone or seizure activity. Deep Tendon Reflexes: Babinski sign absent on the right side. Babinski sign absent on the left side. Reflex Scores:       Bicep reflexes are 3+ on the right side and 2+ on the left side. Patellar reflexes are 3+ on the right side and 2+ on the left side. Achilles reflexes are 3+ on the right side and 2+ on the left side. Comments: General Examination  Level of consciousness: 9      motor responses  Movements.  Localizes to pain on left side  No posturing  Decreased tone on the right side  Negative gracia  Absent clonus   No involuntary movements                  DATA  Recent Results (from the past 24 hour(s))   POC Glucose Fingerstick    Collection Time: 12/20/19  6:03 PM   Result Value Ref Range    POC Glucose 130 (H) 75 - 110 mg/dL   POC Glucose Fingerstick    Collection Time: 12/21/19 12:34 AM   Result Value Ref Range    POC Glucose 163 (H) 75 - 110 mg/dL   POC Glucose Fingerstick    Collection Time: 12/21/19  5:21 AM   Result Value Ref Range    POC Glucose 184 (H) 75 - 110 mg/dL   CBC    Collection Time: 12/21/19  6:35 AM   Result Value Ref Range    WBC 13.1 (H) 3.5 - 11.3 k/uL    RBC 4.46 4.21 - 5.77 m/uL    Hemoglobin 14.1 13.0 - 17.0 g/dL    Hematocrit 43.7 40.7 - 50.3 %    MCV 98.0 82.6 - 102.9 fL    MCH 31.6 25.2 - 33.5 pg    MCHC 32.3 28.4 - 34.8 g/dL    RDW 14.5 (H) 11.8 - 14.4 %    Platelets 426 740 - 711 k/uL    MPV 12.0 8.1 - 13.5 fL    NRBC Automated 0.0 0.0 per 100 WBC   Basic Metabolic Panel w/ Reflex to MG    Collection Time: 12/21/19  6:35 AM   Result Value Ref Range    Glucose 173 (H) 70 - 99 mg/dL    BUN 38 (H) 8 - 23 mg/dL    CREATININE 1.35 (H) 0.70 - 1.20 mg/dL    Bun/Cre Ratio NOT REPORTED 9 - 20    Calcium 10.5 (H) 8.6 - 10.4 mg/dL    Sodium 143 135 - 144 mmol/L    Potassium 4.9 3.7 - 5.3 mmol/L    Chloride 101 98 - 107 mmol/L    CO2 30 20 - 31 mmol/L    Anion Gap 12 9 - 17 mmol/L    GFR Non-African American 54 (L) >60 mL/min    GFR African American >60 >60 mL/min    GFR Comment          GFR Staging NOT REPORTED    POC Glucose Fingerstick    Collection Time: 12/21/19 12:58 PM   Result Value Ref Range    POC Glucose 185 (H) 75 - 110 mg/dL       IMAGING  12/6 12/7 12/11 12/20    1. Interval decrease in size of left thalamic hemorrhage. Persistent associated edema. 2.  Decrease in intraventricular blood posterior horns of the lateral ventricles. 3.  Mild rightward midline shift of 5.9 mm. IMPRESSION  Left thalamic ICH with intraventricular extension secondary to Coumadin anticoagulation s/p post reversal  Atrial fibrillation. CHANI VASC score : 3 (stroke risk 3.2% / year) / HAS-BLED score : 2 (risk of 4.1%)  Right hemiplegia due to above  Decreased alertness due to above   MARILYN     RECOMMENDATIONS:   Continue Prozac 20 mg daily and Modafinil 200 mg daily  Please ensure oral/ IV hydration   Continue PT/OT  Follow up with neurology as outpatient in 3 and 6 months   BP control (goal SBP less than 130)    Thank you for the consultation.  Case consulted with Dr. Katie Holden MD  Neurology Resident PGY-2  12/21/2019 at 1:09 PM

## 2019-12-21 NOTE — PROGRESS NOTES
NEUROLOGY INPATIENT PROGRESS NOTE    12/21/2019         Subjective: Brodie Carrington is a  60 y.o. male admitted on 12/6/2019 with Acute cerebrovascular accident (CVA) ([facility] [address] [I63.9]  Acute intra-cranial hemorrhage ([facility] [address] [I62.9]  Chart reviewed. Discussed with caregivers. No acute issues overnight. Remained nonverbal but follows simple commands on left side. On PEG tube feeds. Briefly, this is a  59 y. o. male with hx of HTN, DM, A Fib s/p AV node ablation, CAD was admitted transfer from [facility] ED on 12/6/2019 with abnormal CT head demonstrating left thalamic CVA with intraventricular extension while on warfarin. He was noted to have uncontrolled hypertension requiring Cardene drip. His INR is elevated at 2.7. He was on Coumadin for atrial fibrillation requiring vitamin K and Kcentra for reversal.  He was admitted to neuro ICU where he developed respiratory distress with hypoxia; requiring emergent intubation on 12/7/2019. His repeat imaging study revealed extension of the bleed with midline shift. Patient was started on empiric antibiotic therapy. Due to history of COPD and given the development of PNA, he was unable to be weaned off of the ventilator. Patient has had tracheostomy and PEG tube implantation on 12/14/2019. CT head on 12/11/2019 demonstrated stable intraparenchymal hemorrhage measuring 3.5 x 2.7 cm. Blood products in both the left and right lateral ventricles left greater than right. Old cerebellar infarcts. 0.3 cm left to right midline shift noted. Had a follow-up CT head on 12/20/2019 and it demonstrated  nterval decrease in size of the left thalamic hemorrhage with a 2.6 x 2.0 cm. Persistent associated edema. Decrease in intraventricular blood in posterior horns of the lateral ventricles. Mild rightward midline shift of 5.9 mm. No current facility-administered medications on file prior to encounter.       Current Outpatient Medications on File Prior to Encounter implant, and graft     Proteinuria     Renal cyst     right side    Thyroid disease     Type II or unspecified type diabetes mellitus without mention of complication, not stated as uncontrolled        Past Surgical History:   Procedure Laterality Date    CYSTOSCOPY      EYE SURGERY Left     s/p thyroidectomy double vision rec'd radiation 1yr ago    GASTROSTOMY N/A 12/14/2019    TRACHEOSTOMY, OPEN GASTROSTOMY TUBE PLACEMENT performed by Hyun Rivas DO at [address] LITHOTRIPSY      PACEMAKER PLACEMENT      PT [id number], THIS SYSTEM IS MRI CONDITIONAL HOWEVER THE LEADS ARE FROM [facility] AND THAT MAKES THIS SYSTEM NOT MRI CONDITIONAL AND PATIENT CANNOT HAVE AN MRI.      THYROIDECTOMY      URETEROSCOPY             Medications:     hydrALAZINE  75 mg Oral 3 times per day    [Held by provider] insulin glargine  20 Units Subcutaneous BID    FLUoxetine  20 mg Oral Daily    hydrochlorothiazide  25 mg Oral Daily    bumetanide  1 mg Oral Daily    diltiazem  90 mg Oral 3 times per day    amLODIPine  10 mg Per NG tube Daily    albuterol  2.5 mg Nebulization Q6H    insulin lispro  0-18 Units Subcutaneous 4 times per day    famotidine (PEPCID) injection  20 mg Intravenous BID    sennosides-docusate sodium  2 tablet Oral Daily    modafinil  200 mg Per NG tube Daily    tetrahydrozoline  1 drop Left Eye TID    metFORMIN  1,000 mg Per NG tube BID WC    enoxaparin  40 mg Subcutaneous Daily    docusate  100 mg Per NG tube Daily    glyBURIDE  5 mg Per NG tube BID WC    vitamin B-1  100 mg Oral Daily    chlorhexidine  15 mL Mouth/Throat BID    lisinopril  40 mg Oral Daily    simethicone  40 mg Per NG tube 4x Daily    atorvastatin  40 mg Oral Daily    sodium chloride flush  10 mL Intravenous 2 times per day    methIMAzole  20 mg Oral Daily    methIMAzole  10 mg Oral QPM    metoprolol tartrate  100 mg Oral BID     PRN Meds include:

## 2019-12-21 NOTE — PROGRESS NOTES
12/21/19 1620   Surgical Airway (trach) Shiley Cuffed   Placement Date/Time: 12/14/19 9421   Timeout: Patient;Procedure;Site/Side;Appropriate Equipment; Consent Confirmed;Sterile Technique using full body drape  Placed By: In surgery  Surgical Airway Type: Tracheostomy  Brand: [place]  Style: Cuffed  Size (m. ..    Status Secured   Site Assessment Clean;Dry   Site Care Cleansed;Dressing applied   Inner Cannula Care Changed/new   Ties Assessment Secure

## 2019-12-21 NOTE — PROGRESS NOTES
[facility]    Progress Note    12/21/2019    2:10 PM    Name:   Catalino Park  MRN:     [id number]     Acct:      [de-identified]   Room:   [address] [place] [address] Day:  13  Admit Date:  12/6/2019 10:59 AM    PCP:   Laura Rubalcava MD  Code Status:  Full Code    Subjective:     C/C:   Chief Complaint   Patient presents with    Cerebrovascular Accident     Interval History Status: not changed. No acute issues overnight  Nonverbal, follows some commands  No distress noted  Tube feeds infusing  Having BM per RN      Brief History:     Mr. Ale Jose is a 30 yr old male with hx of A fib on coumadin, CHF s/p AICD, CAD, DVT, HTN, DM, thyroidectomy who presented initially to OSH on 12/6 with complaints of HA, syncopal episode, right side weakness, facial droop, and aphasia.  Initial imaging revealed a left basal ganglia bleed warranting immediate transfer to [facility].     Upon arrival at [facility], findings confirmed, Initial NIH:10, patient with above symptoms along with left periphreal field vision loss, maintaining airway, following commands, speech difficulty, some confusion, and right upper and lower extremity weakness. He was noted to have uncontrolled HTN requiring cardene gtt, and INR:2.7 as patient is on coumadin for AFib requiring Vit. K and Wes Bake for reversal.  Admitted to NICU     12/7: Patient developed respiratory distress with hypoxia, BiPAP tolerated for a short period of time; however decompensation persisted and patient required emergent intubation.     12/8: Repeat imaging revealed extending bleed with midline shift     12/9: Patient became febrile, increased respiratory secretions with concerns for aspiration PNA on CXR.  Started on empiric ABX, sputum cultures collected/sent     12/11: Sputum cultures positive for Strep.  Pneumoniae, completed rocephin     Patient with underlying history of COPD and given development of PNA, patient was unable to be acetaminophen **OR** acetaminophen, sodium chloride flush, ondansetron, metoprolol, hydrALAZINE    Data:     Past Medical History:   has a past medical history of Atrial fibrillation ([address] CAD (coronary artery disease), H/O heart artery stent, Hx of blood clots, Hyperlipidemia, Hypertension, Hyperthyroidism, Kidney stones, MI, old, Microalbuminuria, Other complications due to other cardiac device, implant, and graft, Proteinuria, Renal cyst, Thyroid disease, and Type II or unspecified type diabetes mellitus without mention of complication, not stated as uncontrolled. Social History:   reports that he has been smoking cigarettes. He has a 47.00 pack-year smoking history. He has never used smokeless tobacco. He reports that he does not drink alcohol. Family History: No family history on file. Vitals:  BP (!) 154/95   Pulse 75   Temp 96.4 °F (35.8 °C) (Axillary)   Resp 25   Ht 5' 7\" (1.702 m)   Wt 212 lb 8.4 oz (96.4 kg)   SpO2 90%   BMI 33.29 kg/m²   Temp (24hrs), Av.2 °F (36.8 °C), Min:96.4 °F (35.8 °C), Max:99.4 °F (37.4 °C)    Recent Labs     19  1803 19  0034 19  0521 19  1258   POCGLU 130* 163* 184* 185*       I/O (24Hr):     Intake/Output Summary (Last 24 hours) at 2019 1410  Last data filed at 2019 0391  Gross per 24 hour   Intake 1210 ml   Output 900 ml   Net 310 ml       Labs:  Hematology:  Recent Labs     19  0913 19  0517 19  0635   WBC 11.6* 11.1 13.1*   RBC 4.24 4.27 4.46   HGB 13.2 13.7 14.1   HCT 42.2 42.3 43.7   MCV 99.5 99.1 98.0   MCH 31.1 32.1 31.6   MCHC 31.3 32.4 32.3   RDW 14.5* 14.3 14.5*    241 253   MPV 13.0 11.8 12.0     Chemistry:  Recent Labs     19  0913 19  0517 19  0635   * 146* 143   K 4.4 4.9 4.9    104 101   CO2 28 27 30   GLUCOSE 157* 197* 173*   BUN 27* 32* 38*   CREATININE 1.12 1.29* 1.35*   ANIONGAP 12 15 12   LABGLOM >60 56* 54*   GFRAA >60 >60 >60   CALCIUM 10.2 10.1 10.5*     Recent Labs     12/20/19  0613 12/20/19  1226 12/20/19  1803 12/21/19  0034 12/21/19  0521 12/21/19  1258   POCGLU 183* 189* 130* 163* 184* 185*     ABG:  Lab Results   Component Value Date    POCPH 7.416 12/14/2019    POCPCO2 46.4 12/14/2019    POCPO2 96.2 12/14/2019    POCHCO3 29.8 12/14/2019    NBEA NOT REPORTED 12/14/2019    PBEA 4 12/14/2019    DOP4LFQ 31 12/14/2019    QGOS0VXT 98 12/14/2019    FIO2 45.0 12/14/2019     Lab Results   Component Value Date/Time    SPECIAL  LT HAND 10ML 12/09/2019 05:46 PM     Lab Results   Component Value Date/Time    CULTURE NO GROWTH 6 DAYS 12/09/2019 05:46 PM       Radiology:  Myesha Fuel Abdomen (kub) (single Ap View)    Result Date: 12/17/2019  Nonspecific bowel gas pattern without evidence for obstruction. Ct Head Wo Contrast    Result Date: 12/20/2019  1. Interval decrease in size of left thalamic hemorrhage. Persistent associated edema. 2.  Decrease in intraventricular blood posterior horns of the lateral ventricles. 3.  Mild rightward midline shift of 5.9 mm. Xr Chest Portable    Result Date: 12/19/2019  1. Support devices appear unchanged in position. 2. Improvement in the right basilar opacification. 3. Persistent enlargement of the cardiac silhouette. Xr Chest Portable    Result Date: 12/18/2019  Rotated exam.  Right basilar opacity can reflect atelectasis, pneumonia, or focal edema. Xr Chest Portable    Result Date: 12/17/2019  Stable mild cardiomegaly. Otherwise, unremarkable single upright portable AP view of the chest.  Note: Left costophrenic angle is not completely within the field of view. Xr Chest Portable    Result Date: 12/16/2019  1. Tracheostomy tube as detailed above. 2. Mild bilateral pulmonary vascular congestion. Mild bibasilar airspace disease, atelectasis and/or infiltrate. Trace bilateral pleural effusions. Findings favored to represent mild CHF related changes.      Xr Chest Portable    Result Date: 12/15/2019  Bilateral

## 2019-12-22 LAB
-: NORMAL
AMORPHOUS: NORMAL
ANION GAP SERPL CALCULATED.3IONS-SCNC: 12 MMOL/L (ref 9–17)
BACTERIA: NORMAL
BILIRUBIN URINE: NEGATIVE
BUN BLDV-MCNC: 43 MG/DL (ref 8–23)
BUN/CREAT BLD: ABNORMAL (ref 9–20)
CALCIUM SERPL-MCNC: 10.3 MG/DL (ref 8.6–10.4)
CASTS UA: NORMAL /LPF (ref 0–8)
CHLORIDE BLD-SCNC: 102 MMOL/L (ref 98–107)
CO2: 29 MMOL/L (ref 20–31)
COLOR: YELLOW
COMMENT UA: ABNORMAL
CREAT SERPL-MCNC: 1.42 MG/DL (ref 0.7–1.2)
CRYSTALS, UA: NORMAL /HPF
EPITHELIAL CELLS UA: NORMAL /HPF (ref 0–5)
GFR AFRICAN AMERICAN: >60 ML/MIN
GFR NON-AFRICAN AMERICAN: 51 ML/MIN
GFR SERPL CREATININE-BSD FRML MDRD: ABNORMAL ML/MIN/{1.73_M2}
GFR SERPL CREATININE-BSD FRML MDRD: ABNORMAL ML/MIN/{1.73_M2}
GLUCOSE BLD-MCNC: 126 MG/DL (ref 75–110)
GLUCOSE BLD-MCNC: 145 MG/DL (ref 75–110)
GLUCOSE BLD-MCNC: 159 MG/DL (ref 75–110)
GLUCOSE BLD-MCNC: 174 MG/DL (ref 75–110)
GLUCOSE BLD-MCNC: 176 MG/DL (ref 75–110)
GLUCOSE BLD-MCNC: 200 MG/DL (ref 70–99)
GLUCOSE URINE: NEGATIVE
HCT VFR BLD CALC: 44.7 % (ref 40.7–50.3)
HEMOGLOBIN: 14.2 G/DL (ref 13–17)
KETONES, URINE: NEGATIVE
LEUKOCYTE ESTERASE, URINE: NEGATIVE
MCH RBC QN AUTO: 31.4 PG (ref 25.2–33.5)
MCHC RBC AUTO-ENTMCNC: 31.8 G/DL (ref 28.4–34.8)
MCV RBC AUTO: 98.9 FL (ref 82.6–102.9)
MUCUS: NORMAL
NITRITE, URINE: NEGATIVE
NRBC AUTOMATED: 0 PER 100 WBC
OTHER OBSERVATIONS UA: NORMAL
PDW BLD-RTO: 14.6 % (ref 11.8–14.4)
PH UA: 5.5 (ref 5–8)
PLATELET # BLD: 209 K/UL (ref 138–453)
PMV BLD AUTO: 12.1 FL (ref 8.1–13.5)
POTASSIUM SERPL-SCNC: 4.6 MMOL/L (ref 3.7–5.3)
PROTEIN UA: ABNORMAL
RBC # BLD: 4.52 M/UL (ref 4.21–5.77)
RBC UA: NORMAL /HPF (ref 0–4)
RENAL EPITHELIAL, UA: NORMAL /HPF
SODIUM BLD-SCNC: 143 MMOL/L (ref 135–144)
SPECIFIC GRAVITY UA: 1.02 (ref 1–1.03)
TRICHOMONAS: NORMAL
TURBIDITY: ABNORMAL
URINE HGB: NEGATIVE
UROBILINOGEN, URINE: NORMAL
WBC # BLD: 11.3 K/UL (ref 3.5–11.3)
WBC UA: NORMAL /HPF (ref 0–5)
YEAST: NORMAL

## 2019-12-22 PROCEDURE — 6370000000 HC RX 637 (ALT 250 FOR IP): Performed by: STUDENT IN AN ORGANIZED HEALTH CARE EDUCATION/TRAINING PROGRAM

## 2019-12-22 PROCEDURE — 94640 AIRWAY INHALATION TREATMENT: CPT

## 2019-12-22 PROCEDURE — 6370000000 HC RX 637 (ALT 250 FOR IP): Performed by: FAMILY MEDICINE

## 2019-12-22 PROCEDURE — 2580000003 HC RX 258: Performed by: STUDENT IN AN ORGANIZED HEALTH CARE EDUCATION/TRAINING PROGRAM

## 2019-12-22 PROCEDURE — 2500000003 HC RX 250 WO HCPCS: Performed by: STUDENT IN AN ORGANIZED HEALTH CARE EDUCATION/TRAINING PROGRAM

## 2019-12-22 PROCEDURE — 94003 VENT MGMT INPAT SUBQ DAY: CPT

## 2019-12-22 PROCEDURE — 80048 BASIC METABOLIC PNL TOTAL CA: CPT

## 2019-12-22 PROCEDURE — 2060000000 HC ICU INTERMEDIATE R&B

## 2019-12-22 PROCEDURE — 81001 URINALYSIS AUTO W/SCOPE: CPT

## 2019-12-22 PROCEDURE — 85027 COMPLETE CBC AUTOMATED: CPT

## 2019-12-22 PROCEDURE — 6370000000 HC RX 637 (ALT 250 FOR IP): Performed by: NURSE PRACTITIONER

## 2019-12-22 PROCEDURE — 36415 COLL VENOUS BLD VENIPUNCTURE: CPT

## 2019-12-22 PROCEDURE — 6360000002 HC RX W HCPCS: Performed by: STUDENT IN AN ORGANIZED HEALTH CARE EDUCATION/TRAINING PROGRAM

## 2019-12-22 PROCEDURE — 2700000000 HC OXYGEN THERAPY PER DAY

## 2019-12-22 PROCEDURE — 94761 N-INVAS EAR/PLS OXIMETRY MLT: CPT

## 2019-12-22 PROCEDURE — 94770 HC ETCO2 MONITOR DAILY: CPT

## 2019-12-22 PROCEDURE — 99233 SBSQ HOSP IP/OBS HIGH 50: CPT | Performed by: INTERNAL MEDICINE

## 2019-12-22 PROCEDURE — 6370000000 HC RX 637 (ALT 250 FOR IP): Performed by: INTERNAL MEDICINE

## 2019-12-22 PROCEDURE — 82947 ASSAY GLUCOSE BLOOD QUANT: CPT

## 2019-12-22 PROCEDURE — 99233 SBSQ HOSP IP/OBS HIGH 50: CPT | Performed by: PSYCHIATRY & NEUROLOGY

## 2019-12-22 PROCEDURE — 99232 SBSQ HOSP IP/OBS MODERATE 35: CPT | Performed by: INTERNAL MEDICINE

## 2019-12-22 RX ORDER — INSULIN GLARGINE 100 [IU]/ML
10 INJECTION, SOLUTION SUBCUTANEOUS DAILY
Status: DISCONTINUED | OUTPATIENT
Start: 2019-12-22 | End: 2020-01-03 | Stop reason: HOSPADM

## 2019-12-22 RX ADMIN — METHIMAZOLE 10 MG: 5 TABLET ORAL at 17:33

## 2019-12-22 RX ADMIN — Medication 1 DROP: at 08:28

## 2019-12-22 RX ADMIN — ALBUTEROL SULFATE 2.5 MG: 2.5 SOLUTION RESPIRATORY (INHALATION) at 19:29

## 2019-12-22 RX ADMIN — METHIMAZOLE 20 MG: 5 TABLET ORAL at 08:30

## 2019-12-22 RX ADMIN — Medication 1 DROP: at 14:19

## 2019-12-22 RX ADMIN — SIMETHICONE 40 MG: 20 SUSPENSION/ DROPS ORAL at 14:22

## 2019-12-22 RX ADMIN — LISINOPRIL 40 MG: 20 TABLET ORAL at 08:28

## 2019-12-22 RX ADMIN — DILTIAZEM HYDROCHLORIDE 90 MG: 60 TABLET, FILM COATED ORAL at 06:15

## 2019-12-22 RX ADMIN — HYDRALAZINE HYDROCHLORIDE 75 MG: 50 TABLET, FILM COATED ORAL at 06:15

## 2019-12-22 RX ADMIN — DILTIAZEM HYDROCHLORIDE 90 MG: 60 TABLET, FILM COATED ORAL at 21:50

## 2019-12-22 RX ADMIN — ACETAMINOPHEN 650 MG: 650 SUPPOSITORY RECTAL at 08:41

## 2019-12-22 RX ADMIN — METFORMIN HYDROCHLORIDE 1000 MG: 500 TABLET ORAL at 17:33

## 2019-12-22 RX ADMIN — SIMETHICONE 40 MG: 20 SUSPENSION/ DROPS ORAL at 20:49

## 2019-12-22 RX ADMIN — FLUOXETINE HYDROCHLORIDE 20 MG: 20 CAPSULE ORAL at 08:28

## 2019-12-22 RX ADMIN — Medication 1 DROP: at 20:56

## 2019-12-22 RX ADMIN — GLYBURIDE 5 MG: 5 TABLET ORAL at 08:28

## 2019-12-22 RX ADMIN — INSULIN LISPRO 3 UNITS: 100 INJECTION, SOLUTION INTRAVENOUS; SUBCUTANEOUS at 13:49

## 2019-12-22 RX ADMIN — Medication 15 ML: at 08:30

## 2019-12-22 RX ADMIN — HYDROCHLOROTHIAZIDE 25 MG: 25 TABLET ORAL at 08:28

## 2019-12-22 RX ADMIN — SENNOSIDES AND DOCUSATE SODIUM 2 TABLET: 8.6; 5 TABLET ORAL at 08:28

## 2019-12-22 RX ADMIN — SIMETHICONE 40 MG: 20 SUSPENSION/ DROPS ORAL at 08:28

## 2019-12-22 RX ADMIN — SODIUM CHLORIDE, PRESERVATIVE FREE 10 ML: 5 INJECTION INTRAVENOUS at 11:11

## 2019-12-22 RX ADMIN — INSULIN LISPRO 3 UNITS: 100 INJECTION, SOLUTION INTRAVENOUS; SUBCUTANEOUS at 23:57

## 2019-12-22 RX ADMIN — INSULIN LISPRO 3 UNITS: 100 INJECTION, SOLUTION INTRAVENOUS; SUBCUTANEOUS at 17:34

## 2019-12-22 RX ADMIN — INSULIN GLARGINE 10 UNITS: 100 INJECTION, SOLUTION SUBCUTANEOUS at 20:49

## 2019-12-22 RX ADMIN — HYDRALAZINE HYDROCHLORIDE 75 MG: 50 TABLET, FILM COATED ORAL at 21:50

## 2019-12-22 RX ADMIN — ALBUTEROL SULFATE 2.5 MG: 2.5 SOLUTION RESPIRATORY (INHALATION) at 01:26

## 2019-12-22 RX ADMIN — FAMOTIDINE 20 MG: 10 INJECTION, SOLUTION INTRAVENOUS at 20:49

## 2019-12-22 RX ADMIN — SIMETHICONE 40 MG: 20 SUSPENSION/ DROPS ORAL at 17:33

## 2019-12-22 RX ADMIN — METOPROLOL TARTRATE 100 MG: 50 TABLET, FILM COATED ORAL at 20:49

## 2019-12-22 RX ADMIN — DILTIAZEM HYDROCHLORIDE 90 MG: 60 TABLET, FILM COATED ORAL at 14:19

## 2019-12-22 RX ADMIN — GLYBURIDE 5 MG: 5 TABLET ORAL at 17:33

## 2019-12-22 RX ADMIN — Medication 100 MG: at 08:28

## 2019-12-22 RX ADMIN — ALBUTEROL SULFATE 2.5 MG: 2.5 SOLUTION RESPIRATORY (INHALATION) at 07:52

## 2019-12-22 RX ADMIN — DOCUSATE SODIUM 100 MG: 50 LIQUID ORAL at 08:29

## 2019-12-22 RX ADMIN — METFORMIN HYDROCHLORIDE 1000 MG: 500 TABLET ORAL at 08:28

## 2019-12-22 RX ADMIN — FAMOTIDINE 20 MG: 10 INJECTION, SOLUTION INTRAVENOUS at 08:29

## 2019-12-22 RX ADMIN — ALBUTEROL SULFATE 2.5 MG: 2.5 SOLUTION RESPIRATORY (INHALATION) at 14:34

## 2019-12-22 RX ADMIN — SODIUM CHLORIDE, PRESERVATIVE FREE 10 ML: 5 INJECTION INTRAVENOUS at 20:56

## 2019-12-22 RX ADMIN — BUMETANIDE 1 MG: 1 TABLET ORAL at 08:28

## 2019-12-22 RX ADMIN — Medication 15 ML: at 20:49

## 2019-12-22 RX ADMIN — ENOXAPARIN SODIUM 40 MG: 40 INJECTION SUBCUTANEOUS at 08:29

## 2019-12-22 RX ADMIN — HYDRALAZINE HYDROCHLORIDE 75 MG: 50 TABLET, FILM COATED ORAL at 14:19

## 2019-12-22 RX ADMIN — METOPROLOL TARTRATE 5 MG: 5 INJECTION, SOLUTION INTRAVENOUS at 03:53

## 2019-12-22 RX ADMIN — INSULIN LISPRO 3 UNITS: 100 INJECTION, SOLUTION INTRAVENOUS; SUBCUTANEOUS at 06:15

## 2019-12-22 RX ADMIN — MODAFINIL 200 MG: 100 TABLET ORAL at 08:38

## 2019-12-22 RX ADMIN — DESMOPRESSIN ACETATE 40 MG: 0.2 TABLET ORAL at 20:49

## 2019-12-22 RX ADMIN — AMLODIPINE BESYLATE 10 MG: 10 TABLET ORAL at 08:30

## 2019-12-22 RX ADMIN — METOPROLOL TARTRATE 100 MG: 50 TABLET, FILM COATED ORAL at 08:30

## 2019-12-22 ASSESSMENT — PULMONARY FUNCTION TESTS
PIF_VALUE: 15
PIF_VALUE: 11
PIF_VALUE: 19
PIF_VALUE: 15
PIF_VALUE: 22

## 2019-12-22 ASSESSMENT — PAIN SCALES - WONG BAKER
WONGBAKER_NUMERICALRESPONSE: 0

## 2019-12-22 ASSESSMENT — PAIN SCALES - GENERAL: PAINLEVEL_OUTOF10: 0

## 2019-12-22 NOTE — PROGRESS NOTES
PULMONARY PROGRESS NOTE      Patient:  Claudia Gallego  YOB: 1955    MRN: [id number]     Acct: [de-identified]     Admit date: 12/6/2019    REASON FOR CONSULT:-Vent management    Pt seen and Chart reviewed. Subjective:   No acute events overnight  Patient opening eyes on calling, not following commands  Spiked a fever 101.8 today, blood pressure stable  Creatinine trending up 1.35 yesterday, 1.42 today  Copious white secretions from tracheostomy  Weaning this morning      Review of Systems -   Unable to obtain due to mental status      Physical Exam:  Vitals: /80   Pulse 75   Temp 100.7 °F (38.2 °C) (Oral)   Resp 27   Ht 5' 7\" (1.702 m)   Wt 212 lb 8.4 oz (96.4 kg)   SpO2 92%   BMI 33.29 kg/m²   24 hour intake/output:    Intake/Output Summary (Last 24 hours) at 12/22/2019 1421  Last data filed at 12/22/2019 0606  Gross per 24 hour   Intake 1131 ml   Output 550 ml   Net 581 ml     Last 3 weights:   Wt Readings from Last 3 Encounters:   12/16/19 212 lb 8.4 oz (96.4 kg)   12/06/19 225 lb (102.1 kg)   04/06/15 194 lb 9.6 oz (88.3 kg)       General appearance: Physical Examination:   General appearance -opening eyes on calling, not following commands  Mental status -opening eyes on calling, not following commands  Eyes - pupils equal and reactive  Mouth - mucous membranes moist, pharynx normal without lesions  Neck - supple, no significant adenopathy, trach+  Chest - clear to auscultation, no wheezes, rales or rhonchi, symmetric air entry  Heart - normal rate, regular rhythm, normal S1, S2, no murmurs, rubs, clicks or gallops  Abdomen - soft, nontender, nondistended, no masses or organomegaly, PEG+  Neurological -opening eyes upon calling, not following commands   extremities - peripheral pulses normal, no pedal edema, no clubbing or cyanosis      Diet:  DIET TUBE FEED CONTINUOUS/CYCLIC NPO; Diabetic; Gastrostomy; Continuous; 20; 55; 24    Medications:Current Inpatient    Scheduled Meds:   insulin glargine  10 Units Subcutaneous Daily    hydrALAZINE  75 mg Oral 3 times per day    FLUoxetine  20 mg Oral Daily    hydrochlorothiazide  25 mg Oral Daily    bumetanide  1 mg Oral Daily    diltiazem  90 mg Oral 3 times per day    amLODIPine  10 mg Per NG tube Daily    albuterol  2.5 mg Nebulization Q6H    insulin lispro  0-18 Units Subcutaneous 4 times per day    famotidine (PEPCID) injection  20 mg Intravenous BID    sennosides-docusate sodium  2 tablet Oral Daily    modafinil  200 mg Per NG tube Daily    tetrahydrozoline  1 drop Left Eye TID    metFORMIN  1,000 mg Per NG tube BID WC    enoxaparin  40 mg Subcutaneous Daily    docusate  100 mg Per NG tube Daily    glyBURIDE  5 mg Per NG tube BID WC    vitamin B-1  100 mg Oral Daily    chlorhexidine  15 mL Mouth/Throat BID    lisinopril  40 mg Oral Daily    simethicone  40 mg Per NG tube 4x Daily    atorvastatin  40 mg Oral Daily    sodium chloride flush  10 mL Intravenous 2 times per day    methIMAzole  20 mg Oral Daily    methIMAzole  10 mg Oral QPM    metoprolol tartrate  100 mg Oral BID     Continuous Infusions:   dextrose Stopped (12/19/19 0952)     PRN Meds:fentanNYL, REFRESH LACRI-LUBE, potassium chloride, fentanNYL, glucose, dextrose, glucagon (rDNA), dextrose, acetaminophen **OR** acetaminophen, sodium chloride flush, ondansetron, metoprolol, hydrALAZINE    Objective:    CBC:   Recent Labs     12/20/19  0517 12/21/19  0635 12/22/19  0448   WBC 11.1 13.1* 11.3   HGB 13.7 14.1 14.2    253 209     BMP:    Recent Labs     12/20/19  0517 12/21/19  0635 12/22/19  0448   * 143 143   K 4.9 4.9 4.6    101 102   CO2 27 30 29   BUN 32* 38* 43*   CREATININE 1.29* 1.35* 1.42*   GLUCOSE 197* 173* 200*     Calcium:  Recent Labs     12/22/19  0448   CALCIUM 10.3     Glucose:  Recent Labs     12/22/19  0005 12/22/19  0510 12/22/19  1140   POCGLU 126* 176* 159*     Thyroid:   Lab Results   Component Value Date    TSH 3.15 12/06/2019 Urinalysis:   Recent Labs     12/22/19  0953   BACTERIA NOT REPORTED   COLORU YELLOW   PHUR 5.5   PROTEINU 3+*   RBCUA 5 TO 10   SPECGRAV 1.021   BILIRUBINUR NEGATIVE   NITRU NEGATIVE   WBCUA 0 TO 2   LEUKOCYTESUR NEGATIVE   GLUCOSEU NEGATIVE     Xr Abdomen (kub) (single Ap View)    Result Date: 12/17/2019  Nonspecific bowel gas pattern without evidence for obstruction. Ct Head Wo Contrast    Result Date: 12/20/2019  1. Interval decrease in size of left thalamic hemorrhage. Persistent associated edema. 2.  Decrease in intraventricular blood posterior horns of the lateral ventricles. 3.  Mild rightward midline shift of 5.9 mm. Xr Chest Portable    Result Date: 12/19/2019  1. Support devices appear unchanged in position. 2. Improvement in the right basilar opacification. 3. Persistent enlargement of the cardiac silhouette. Xr Chest Portable    Result Date: 12/18/2019  Rotated exam.  Right basilar opacity can reflect atelectasis, pneumonia, or focal edema. Xr Chest Portable    Result Date: 12/17/2019  Stable mild cardiomegaly. Otherwise, unremarkable single upright portable AP view of the chest.  Note: Left costophrenic angle is not completely within the field of view. Xr Chest Portable    Result Date: 12/16/2019  1. Tracheostomy tube as detailed above. 2. Mild bilateral pulmonary vascular congestion. Mild bibasilar airspace disease, atelectasis and/or infiltrate. Trace bilateral pleural effusions. Findings favored to represent mild CHF related changes.          Assessment and Plan:  Principal Problem:    Nontraumatic cortical hemorrhage of left cerebral hemisphere [facility])  Active Problems:    Atrial fibrillation (HCC)    Uncontrolled hypertension    CAD (coronary artery disease)    Diabetes mellitus type 2 in obese (HCC)    Hyperlipidemia    Hyperthyroidism    Acute intra-cranial hemorrhage (HCC)    Intraparenchymal hematoma of brain (HCC)    Ventilator dependence ([address]    Acute on chronic combined systolic and diastolic congestive heart failure (HCC)    Hypernatremia    Aspiration pneumonia (HCC)    Right sided weakness  Resolved Problems:    * No resolved hospital problems.  *    Plan   -Continue daily spontaneous breathing trials, weaning this morning   -Mobilize up to chair  - PT/OT/speech therapy  - will continue to follow         Evita Obrien MD      12/22/2019, 2:21 PM  Pulmonary & Critical Care

## 2019-12-22 NOTE — PLAN OF CARE
Problem: Falls - Risk of:  Goal: Will remain free from falls  Description  Will remain free from falls  Outcome: Met This Shift  Goal: Absence of physical injury  Description  Absence of physical injury  Outcome: Met This Shift     Problem: Risk for Impaired Skin Integrity  Goal: Tissue integrity - skin and mucous membranes  Description  Structural intactness and normal physiological function of skin and  mucous membranes.   Outcome: Met This Shift     Problem: HEMODYNAMIC STATUS  Goal: Patient has stable vital signs and fluid balance  Outcome: Met This Shift     Problem: ACTIVITY INTOLERANCE/IMPAIRED MOBILITY  Goal: Mobility/activity is maintained at optimum level for patient  12/22/2019 1847 by Aleks Abrams RN  Outcome: Met This Shift  12/22/2019 0755 by Sharri Rosales RCP  Outcome: Ongoing     Problem: COMMUNICATION IMPAIRMENT  Goal: Ability to express needs and understand communication  12/22/2019 1847 by Aleks Abrams RN  Outcome: Met This Shift  12/22/2019 0755 by Sharri Rosales RCP  Outcome: Ongoing     Problem: Neurological  Goal: Maximum potential motor/sensory/cognitive function  Outcome: Met This Shift     Problem: Nutrition  Goal: Optimal nutrition therapy  Outcome: Met This Shift     Problem: MECHANICAL VENTILATION  Goal: Mobility/activity is maintained at optimum level for patient  12/22/2019 1847 by Aleks Abrams RN  Outcome: Met This Shift  12/22/2019 0755 by Sharri Rosales RCP  Outcome: Ongoing  Goal: Ability to express needs and understand communication  12/22/2019 1847 by Aleks Abrams RN  Outcome: Met This Shift  12/22/2019 0755 by Sharri Rosales RCP  Outcome: Ongoing  Goal: Patient will maintain patent airway  12/22/2019 1847 by Aleks Abrams RN  Outcome: Met This Shift  12/22/2019 0755 by Sharri Rosales RCP  Outcome: Ongoing  Goal: Oral health is maintained or improved  12/22/2019 1847 by Aleks Abrams RN  Outcome: Met This Shift  12/22/2019 0755 by Sharri Rosales RCP  Outcome: Ongoing  Goal: Respiratory:  Goal: Ability to maintain a clear airway will improve  Description  Ability to maintain a clear airway will improve  Outcome: Met This Shift  Goal: Ability to maintain adequate ventilation will improve  Description  Ability to maintain adequate ventilation will improve  Outcome: Met This Shift  Goal: Complications related to the disease process, condition or treatment will be avoided or minimized  Description  Complications related to the disease process, condition or treatment will be avoided or minimized  Outcome: Met This Shift     Problem: Skin Integrity:  Goal: Risk for impaired skin integrity will decrease  Description  Risk for impaired skin integrity will decrease  Outcome: Met This Shift

## 2019-12-22 NOTE — PROGRESS NOTES
Giul[facility] Overton [facility]    Progress Note    12/22/2019    8:57 AM    Name:   Ly Rivas  MRN:     [id number]     Acct:      [de-identified]   Room:   [address] [place] [address] Day:  12  Admit Date:  12/6/2019 10:59 AM    PCP:   Tiana Morris MD  Code Status:  Full Code    Subjective:     C/C:   Chief Complaint   Patient presents with    Cerebrovascular Accident     Interval History Status: not changed. No acute issues overnight  Episode of fever this AM  Possible central cause, urine clear  Nonverbal, follows commands intermittently  Alert and awake  Continued on tube feeds  Having BM      Brief History:     Mr. Nestor Slade is a 30 yr old male with hx of A fib on coumadin, CHF s/p AICD, CAD, DVT, HTN, DM, thyroidectomy who presented initially to outside hospital on 12/6 with complaints of headache, syncope, right side weakness, facial droop, and aphasia.  Initial imaging revealed a left basal ganglia bleed, transferred to [facility].     Upon arrival at [facility], findings confirmed, Initial NIH:10, patient with above symptoms along with left periphreal field vision loss, maintaining airway, following commands, speech difficulty, some confusion, and right upper and lower extremity weakness. He was noted to have uncontrolled HTN requiring cardene gtt, and INR:2.7 as patient is on coumadin for AFib requiring Vit. K and Jodelle Glenn for reversal.  Admitted to NICU. In neuro ICU has worsening respiratory distress requiring intubation. Repeat imaging demonstrating midline shift. Had worsening secretions concerning for pneumonia, started on antibiotics. Unable to wean off ventilator, underwent eventual trach/PEG per surgery on 12/14. Started and tolerated tube feeds. DC planning to LTAC in progress. Review of Systems:     Unable to obtain - tracheostomy, non verbal     Medications:      Allergies:  No Known Allergies    Current Meds:   Scheduled Meds:    hydrALAZINE  75 mg Oral He has never used smokeless tobacco. He reports that he does not drink alcohol. Family History: No family history on file. Vitals:  BP (!) 169/95   Pulse 75   Temp 101.8 °F (38.8 °C) (Oral)   Resp 21   Ht 5' 7\" (1.702 m)   Wt 212 lb 8.4 oz (96.4 kg)   SpO2 94%   BMI 33.29 kg/m²   Temp (24hrs), Av.6 °F (37.6 °C), Min:98 °F (36.7 °C), Max:101.8 °F (38.8 °C)    Recent Labs     19  1258 19  1621 19  0005 19  0510   POCGLU 185* 187* 126* 176*       I/O (24Hr):     Intake/Output Summary (Last 24 hours) at 2019 0857  Last data filed at 2019 0606  Gross per 24 hour   Intake 1131 ml   Output 550 ml   Net 581 ml       Labs:  Hematology:  Recent Labs     19  0517 19  0635 19  0448   WBC 11.1 13.1* 11.3   RBC 4.27 4.46 4.52   HGB 13.7 14.1 14.2   HCT 42.3 43.7 44.7   MCV 99.1 98.0 98.9   MCH 32.1 31.6 31.4   MCHC 32.4 32.3 31.8   RDW 14.3 14.5* 14.6*    253 209   MPV 11.8 12.0 12.1     Chemistry:  Recent Labs     19  0517 19  0635 19  0448   * 143 143   K 4.9 4.9 4.6    101 102   CO2 27 30 29   GLUCOSE 197* 173* 200*   BUN 32* 38* 43*   CREATININE 1.29* 1.35* 1.42*   ANIONGAP 15 12 12   LABGLOM 56* 54* 51*   GFRAA >60 >60 >60   CALCIUM 10.1 10.5* 10.3     Recent Labs     19  0034 19  0521 19  1258 19  1621 19  0005 19  0510   POCGLU 163* 184* 185* 187* 126* 176*     ABG:  Lab Results   Component Value Date    POCPH 7.416 2019    POCPCO2 46.4 2019    POCPO2 96.2 2019    POCHCO3 29.8 2019    NBEA NOT REPORTED 2019    PBEA 4 2019    PYB2TVJ 31 2019    HVJA2UEZ 98 2019    FIO2 45.0 2019     Lab Results   Component Value Date/Time    SPECIAL  LT HAND 10ML 2019 05:46 PM     Lab Results   Component Value Date/Time    CULTURE NO GROWTH 6 DAYS 2019 05:46 PM       Radiology:  Murel Tammy Abdomen (kub) (single Ap View)    Result Date: 12/17/2019  Nonspecific bowel gas pattern without evidence for obstruction. Ct Head Wo Contrast    Result Date: 12/20/2019  1. Interval decrease in size of left thalamic hemorrhage. Persistent associated edema. 2.  Decrease in intraventricular blood posterior horns of the lateral ventricles. 3.  Mild rightward midline shift of 5.9 mm. Xr Chest Portable    Result Date: 12/19/2019  1. Support devices appear unchanged in position. 2. Improvement in the right basilar opacification. 3. Persistent enlargement of the cardiac silhouette. Xr Chest Portable    Result Date: 12/18/2019  Rotated exam.  Right basilar opacity can reflect atelectasis, pneumonia, or focal edema. Xr Chest Portable    Result Date: 12/17/2019  Stable mild cardiomegaly. Otherwise, unremarkable single upright portable AP view of the chest.  Note: Left costophrenic angle is not completely within the field of view. Xr Chest Portable    Result Date: 12/16/2019  1. Tracheostomy tube as detailed above. 2. Mild bilateral pulmonary vascular congestion. Mild bibasilar airspace disease, atelectasis and/or infiltrate. Trace bilateral pleural effusions. Findings favored to represent mild CHF related changes. Xr Chest Portable    Result Date: 12/15/2019  Bilateral congestion. Similar-appearing chest compared to prior.        Physical Examination:        General appearance:  alert, no acute distress, tracheostomy on ventilator   Mental Status:  Alert, follows some commands intermittently   Lungs:  clear to auscultation bilaterally, normal effort  Heart:  regular rate and rhythm  Abdomen:  soft, nontender, distended, normal bowel sounds, Peg in place   Extremities:  no edema, redness, tenderness in the calves  Skin:  no gross lesions, rashes, induration    Assessment:        Hospital Problems           Last Modified POA    * (Principal) Nontraumatic cortical hemorrhage of left cerebral hemisphere ([facility] [address] 12/6/2019 Yes    Atrial fibrillation ([facility] [address] 12/6/2019 Yes    Uncontrolled hypertension 12/6/2019 Yes    CAD (coronary artery disease) 12/6/2019 Yes    Diabetes mellitus type 2 in obese ([address] 12/6/2019 Yes    Hyperlipidemia 12/6/2019 Yes    Hyperthyroidism 12/6/2019 Yes    Acute intra-cranial hemorrhage ([address] 12/6/2019 Yes    Intraparenchymal hematoma of brain ([address] 12/7/2019 Yes    Ventilator dependence ([address] 12/8/2019 Yes    Acute on chronic combined systolic and diastolic congestive heart failure ([address] 12/8/2019 Yes    Hypernatremia 12/15/2019 Yes    Aspiration pneumonia ([address] 12/15/2019 Yes    Right sided weakness 12/19/2019 Yes          Plan:        - Neurology following - hold antiplatelets and AC  - NS consulted - no need for intervention   - Ophthalmology consulted - trichiasis - s/p polymycin, artifical tears 4 x daily  - Surgery consulted - s/p trach/PEG (12/14)  - Pulmonology following for vent management   - Labs and medications reviewed  - Continue tube feeds, dietary following  - AC for A fib on hold, resume when cleared by neurology   - Continue selected home medications  - Last A1c 7.4, continue correction scale, continue home PO DM meds  - Change lantus to 10 daily given decreased insulin requirement   - Continue current BP medications   - PT/OT/ST  - DC planning - working on LTAC  - Check UA given fever this AM      Beatriz Major MD  12/22/2019  8:57 AM

## 2019-12-22 NOTE — PROGRESS NOTES
Medication Sig Dispense Refill    bumetanide (BUMEX) 0.5 MG tablet Take 0.5 mg by mouth daily      lisinopril (PRINIVIL;ZESTRIL) 40 MG tablet Take 1 tablet by mouth daily 30 tablet 3    isosorbide mononitrate (IMDUR) 120 MG CR tablet Take 1 tablet by mouth daily 30 tablet 3    diltiazem (CARDIZEM CD) 240 MG ER capsule Take 1 capsule by mouth daily 30 capsule 3    aspirin 81 MG tablet Take 81 mg by mouth daily.  atorvastatin (LIPITOR) 40 MG tablet Take 40 mg by mouth daily.  glyBURIDE (DIABETA) 5 MG tablet Take 5 mg by mouth 2 times daily (with meals).  insulin glargine (LANTUS) 100 UNIT/ML injection Inject 60 Units into the skin daily       metformin (GLUCOPHAGE) 1000 MG tablet Take 1,000 mg by mouth 2 times daily (with meals).  metoprolol (LOPRESSOR) 100 MG tablet Take 100 mg by mouth 2 times daily.  nitroGLYCERIN (NITROSTAT) 0.4 MG SL tablet Place 0.4 mg under the tongue every 5 minutes as needed.  insulin lispro (HUMALOG) 100 UNIT/ML injection vial Inject 15 Units into the skin 3 times daily (with meals) (Patient taking differently: Inject 15 Units into the skin 3 times daily (with meals) Per wife pt takes a sliding scale dose not a fixed unit amount) 1 vial 3    methimazole (TAPAZOLE) 10 MG tablet Take 10 mg by mouth every evening      Blood Pressure Monitor KIT CHECK BLOOD PRESSURE DAILY 1 kit 0    prasugrel (EFFIENT) 10 MG TABS Take 10 mg by mouth daily.  methimazole (TAPAZOLE) 10 MG tablet   Take 20 mg by mouth daily          Allergies: Otilio Pak has No Known Allergies.     Past Medical History:   Diagnosis Date    Atrial fibrillation ([facility] [address]     CAD (coronary artery disease)     H/O heart artery stent x 4    Hx of blood clots     8 clots removed from right leg    Hyperlipidemia     Hypertension     Hyperthyroidism     Kidney stones     MI, old     family states 3 MI history    Microalbuminuria     Other complications due to other cardiac device, implant, and graft     Proteinuria     Renal cyst     right side    Thyroid disease     Type II or unspecified type diabetes mellitus without mention of complication, not stated as uncontrolled        Past Surgical History:   Procedure Laterality Date    CYSTOSCOPY      EYE SURGERY Left     s/p thyroidectomy double vision rec'd radiation 1yr ago    GASTROSTOMY N/A 12/14/2019    TRACHEOSTOMY, OPEN GASTROSTOMY TUBE PLACEMENT performed by Daniel Linton DO at [address] LITHOTRIPSY      PACEMAKER PLACEMENT      PT [id number], THIS SYSTEM IS MRI CONDITIONAL HOWEVER THE LEADS ARE FROM [facility] [address] AND THAT MAKES THIS SYSTEM NOT MRI CONDITIONAL AND PATIENT CANNOT HAVE AN MRI.      THYROIDECTOMY      URETEROSCOPY             Medications:     hydrALAZINE  75 mg Oral 3 times per day    [Held by provider] insulin glargine  20 Units Subcutaneous BID    FLUoxetine  20 mg Oral Daily    hydrochlorothiazide  25 mg Oral Daily    bumetanide  1 mg Oral Daily    diltiazem  90 mg Oral 3 times per day    amLODIPine  10 mg Per NG tube Daily    albuterol  2.5 mg Nebulization Q6H    insulin lispro  0-18 Units Subcutaneous 4 times per day    famotidine (PEPCID) injection  20 mg Intravenous BID    sennosides-docusate sodium  2 tablet Oral Daily    modafinil  200 mg Per NG tube Daily    tetrahydrozoline  1 drop Left Eye TID    metFORMIN  1,000 mg Per NG tube BID WC    enoxaparin  40 mg Subcutaneous Daily    docusate  100 mg Per NG tube Daily    glyBURIDE  5 mg Per NG tube BID WC    vitamin B-1  100 mg Oral Daily    chlorhexidine  15 mL Mouth/Throat BID    lisinopril  40 mg Oral Daily    simethicone  40 mg Per NG tube 4x Daily    atorvastatin  40 mg Oral Daily    sodium chloride flush  10 mL Intravenous 2 times per day    methIMAzole  20 mg Oral Daily    methIMAzole  10 mg Oral QPM    metoprolol tartrate  100 mg Oral BID     PRN Meds include: fentanNYL, REFRESH LACRI-LUBE, potassium chloride, fentanNYL, glucose, dextrose, glucagon (rDNA), dextrose, acetaminophen **OR** acetaminophen, sodium chloride flush, ondansetron, metoprolol, hydrALAZINE    Objective:   /80   Pulse 75   Temp 100.7 °F (38.2 °C) (Oral)   Resp 27   Ht 5' 7\" (1.702 m)   Wt 212 lb 8.4 oz (96.4 kg)   SpO2 92%   BMI 33.29 kg/m²     Blood pressure range: Systolic (40QTB), KWN:477 , Min:134 , OVF:147   ; Diastolic (93FYF), OOK:28, Min:80, Max:95        NEUROLOGIC EXAMINATION  GENERAL  Appears comfortable and in no distress   HEENT  NC/ AT   cardiovascular  S1 and S2 heard; palpation of pulses: radial pulse    NECK  status post trach    MENTAL STATUS: , Awake and nonverbal.  Remained aphasic. Does follow simple commands on left side. No hallucinations or delusions noted. CRANIAL NERVES: II     -      PERRLA, optic discs; clear; threat on right side and blinks to threat on left side   III,IV,VI -left gaze preference, rt ptosis  V     -     diminished right facial sensation    VII    -     right facial droop   VIII   -     Intact hearing   IX,X -     Symmetrical palate  XI    -     Symmetrical shoulder shrug  XII   -     Midline tongue, no atrophy    MOTOR FUNCTION:  significant for dense right hemiplegia with no abnormal involuntary movements and no tremors.      SENSORY FUNCTION:  Diminished pinprick and temperature in right upper and right lower extremities    CEREBELLAR FUNCTION:  Could not be assessed   REFLEX FUNCTION:  Hyperactive DTRs on right side with extensor plantar response on right side    STATION and GAIT  Not tested         Data:    Lab Results:   CBC:   Recent Labs     12/20/19  0517 12/21/19  0635 12/22/19  0448   WBC 11.1 13.1* 11.3   HGB 13.7 14.1 14.2    253 209     BMP:    Recent Labs     12/20/19  0517 12/21/19  0635 12/22/19  0448   * 143 143   K 4.9 4.9 4.6    101 102   CO2 27 30 29   BUN 32* 38* 43*   CREATININE 1.29* 1.35* 1.42* GLUCOSE 197* 173* 200*         Lab Results   Component Value Date    CHOL 194 07/11/2015    LDLCHOLESTEROL 95 07/11/2015    HDL 26 (L) 07/11/2015    TRIG 181 (H) 12/16/2019    ALT 13 12/06/2019    AST 13 12/06/2019    TSH 3.15 12/06/2019    INR 0.9 12/10/2019    LABA1C 7.4 (H) 12/06/2019     CTA Head and neck 12/7/2019: No high-grade stenosis or focal occlusion. No evidence of acute dissection. No evidence of aneurysm. No evidence of vascular malformation. CT head on 12/11/2019 demonstrated stable intraparenchymal hemorrhage measuring 3.5 x 2.7 cm. Blood products in both the left and right lateral ventricles left greater than right. Old cerebellar infarcts. 0.3 cm left to right midline shift noted. Follow-up CT head on 12/20/2019 and it demonstrated  nterval decrease in size of the left thalamic hemorrhage with a 2.6 x 2.0 cm. Persistent associated edema. Decrease in intraventricular blood in posterior horns of the lateral ventricles. Mild right midline shift of 5.9 mm  . Impression and Plan: Mr. Jamarcus Marino is a 60 y.o. male with   Left thalamic hemorrhagic CVA with warfarin  Aphasia and right hemiplegia secondary to above  status post trach and PEG on 12/14/2019    CT head reports and films most recent on comparison to prior studies are reviewed with patient's wife at bedside. She understood about the interval decrease in size of left thalamic bleed. She was explained about delayed motor recovery in patients with aphasia. She was strongly advised about passive range of motion exercises while she was at bedside to prevent spasticity and contractures. On methylphenidate for ? Stroke recovery because of its dopamine and noradrenaline enhancing properties to lessen lethargy and to improve mood to avoid depression related to stroke.    Continue PT/OT/speech therapy    Off of antiplatelets and anticoagulants; on Lovenox  Repeat CT head showed interval decrease in size of left

## 2019-12-22 NOTE — CONSULTS
INTENSIVE CARE UNIT   Physician Progress Note    Patient - Emmit Round  Date of Admission -  12/6/2019 10:59 AM  Date of Evaluation -  12/21/2019  Room and Bed Number -  0406/0406-01   Hospital Day - 15      SUBJECTIVE:     OVERNIGHT EVENTS:       Still per Dr. Norma Ferrer for ventilator management. Patient is obtunded cannot provide any details of his illness. History is obtained exclusively from the chart and also discussion with Dr. Norma Ferrer. this is a  59 y. o. male with hx of HTN, DM, A Fib s/p AV node ablation, CAD was admitted transfer from [facility] ED on 12/6/2019 with abnormal CT head demonstrating left thalamic CVA with intraventricular extension while on warfarin. He was noted to have uncontrolled hypertension requiring Cardene drip. His INR is elevated at 2.7. He was on Coumadin for atrial fibrillation requiring vitamin K and Kcentra for reversal.  He was admitted to neuro ICU where he developed respiratory distress with hypoxia; requiring emergent intubation on 12/7/2019. His repeat imaging study revealed extension of the bleed with midline shift. Patient was started on empiric antibiotic therapy. Due to history of COPD and given the development of PNA, he was unable to be weaned off of the ventilator. Patient has had tracheostomy and PEG tube implantation on 12/14/2019. CT head on 12/11/2019 demonstrated stable intraparenchymal hemorrhage measuring 3.5 x 2.7 cm. Blood products in both the left and right lateral ventricles left greater than right. Old cerebellar infarcts. 0.3 cm left to right midline shift noted. Had a follow-up CT head on 12/20/2019 and it demonstrated  nterval decrease in size of the left thalamic hemorrhage with a 2.6 x 2.0 cm. Persistent associated edema. Decrease in intraventricular blood in posterior horns of the lateral ventricles. Mild rightward midline shift of 5.9 mm. The respiratory therapist reports that he is intolerant of spontaneous breathing trials. Copious oral and and and trach tube secretions. Pressure flow waveform suggests severe expiratory airflow obstruction. Cortically, chronic smoker. 47 pack years. AWAKE & FOLLOWING COMMANDS:  [x] No   [] Yes    SECRETIONS Amount:  [] Small [x] Moderate  [] Large  [] None  Color:     [x] White [] Colored  [] Bloody    SEDATION:  RAAS Score:  [] Propofol gtt  [] Versed gtt  [] Ativan gtt   [x] No Sedation    PARALYZED:  [x] No    [] Yes    VASOPRESSORS:  [x] No    [] Yes  [] Levophed [] Dopamine [] Vasopressin  [] Dobutamine [] Phenylephrine [] Epinephrine      OBJECTIVE:     VITAL SIGNS:  BP (!) 142/87   Pulse 75   Temp 98 °F (36.7 °C) (Axillary)   Resp 21   Ht 5' 7\" (1.702 m)   Wt 212 lb 8.4 oz (96.4 kg)   SpO2 95%   BMI 33.29 kg/m²   Tmax over 24 hours:  Temp (24hrs), Av.7 °F (37.1 °C), Min:96.4 °F (35.8 °C), Max:100.4 °F (38 °C)      Patient Vitals for the past 8 hrs:   BP Temp Temp src Pulse Resp SpO2   19 2042 (!) 142/87 98 °F (36.7 °C) Axillary 75 21 95 %   198 -- -- -- -- 22 96 %   19 1947 -- -- -- 76 23 96 %   19 1620 -- -- -- 75 23 98 %   19 1600 (!) 140/85 100.2 °F (37.9 °C) Oral 75 24 99 %         Intake/Output Summary (Last 24 hours) at 2019 2151  Last data filed at 2019 0800  Gross per 24 hour   Intake 800 ml   Output 650 ml   Net 150 ml     Date 19 0000 - 19   Shift 0416-1447 6703-5935 7331-1249 24 Hour Total   INTAKE   NG/GT(mL/kg) 800(8.3)   800(8.3)   Shift Total(mL/kg) 800(8.3)   800(8.3)   OUTPUT   Urine(mL/kg/hr) 300(0.4) 350(0.5)  650   Shift Total(mL/kg) 300(3.1) 350(3.6)  650(6.7)   Weight (kg) 96.4 96.4 96.4 96.4     Wt Readings from Last 3 Encounters:   19 212 lb 8.4 oz (96.4 kg)   19 225 lb (102.1 kg)   04/06/15 194 lb 9.6 oz (88.3 kg)     Body mass index is 33.29 kg/m².     ROS:  Unable to obtain;  Pt unresponsive    PHYSICAL EXAM:  HEENT:  Normocephalic, without obvious abnormality, atramatic, sinuses nontender on palpation, external ears without lesions, oral pharynx with moist mucus membranes, tonsils without erythema or exudates, gums normal and good dentition. NECK:  #8 dct Shiley trach. Short thick neck.  Sutures in place  LUNGS:  tachypneic, Moderate respiratory distress, decreased air exchange and wheeze right anterior and left anterior  CARDIOVASCULAR:  regular rate and rhythm, normal S1 and S2, no S3 and no murmur noted  ABDOMEN:  No scars, normal bowel sounds, soft, non-distended, non-tender, no masses palpated, no hepatosplenomegaly and g-tube site clean; luly TF well  NEUROLOGIC:  Obtunded; Dense right hemiplegia      MEDICATIONS:  Scheduled Meds:   hydrALAZINE  75 mg Oral 3 times per day    [Held by provider] insulin glargine  20 Units Subcutaneous BID    FLUoxetine  20 mg Oral Daily    hydrochlorothiazide  25 mg Oral Daily    bumetanide  1 mg Oral Daily    diltiazem  90 mg Oral 3 times per day    amLODIPine  10 mg Per NG tube Daily    albuterol  2.5 mg Nebulization Q6H    insulin lispro  0-18 Units Subcutaneous 4 times per day    famotidine (PEPCID) injection  20 mg Intravenous BID    sennosides-docusate sodium  2 tablet Oral Daily    modafinil  200 mg Per NG tube Daily    tetrahydrozoline  1 drop Left Eye TID    metFORMIN  1,000 mg Per NG tube BID WC    enoxaparin  40 mg Subcutaneous Daily    docusate  100 mg Per NG tube Daily    glyBURIDE  5 mg Per NG tube BID WC    vitamin B-1  100 mg Oral Daily    chlorhexidine  15 mL Mouth/Throat BID    lisinopril  40 mg Oral Daily    simethicone  40 mg Per NG tube 4x Daily    atorvastatin  40 mg Oral Daily    sodium chloride flush  10 mL Intravenous 2 times per day    methIMAzole  20 mg Oral Daily    methIMAzole  10 mg Oral QPM    metoprolol tartrate  100 mg Oral BID     Continuous Infusions:   dextrose Stopped (12/19/19 0952)     PRN Meds:   fentanNYL, 25 mcg, Q1H PRN  REFRESH LACRI-LUBE, , Nightly PRN  potassium chloride, 10 mEq, 12/20/19  0517 12/21/19  0635   * 146* 143   K 4.4 4.9 4.9    104 101   CO2 28 27 30   BUN 27* 32* 38*   CREATININE 1.12 1.29* 1.35*   GLUCOSE 157* 197* 173*   CALCIUM 10.2 10.1 10.5*      Magnesium:   Lab Results   Component Value Date    MG 2.0 12/10/2019    MG 2.0 12/09/2019    MG 1.8 12/08/2019     Phosphorus:   Lab Results   Component Value Date    PHOS 3.0 12/10/2019    PHOS 3.7 12/09/2019    PHOS 4.0 12/08/2019     Ionized Calcium:   Lab Results   Component Value Date    CAION 1.15 12/10/2019    CAION 1.20 12/09/2019    CAION 1.16 12/08/2019        Urinalysis:   Lab Results   Component Value Date    NITRU NEGATIVE 12/09/2019    COLORU YELLOW 12/09/2019    PHUR 5.5 12/09/2019    WBCUA None 12/09/2019    RBCUA 20 TO 50 12/09/2019    MUCUS NOT REPORTED 12/09/2019    TRICHOMONAS NOT REPORTED 12/09/2019    YEAST NOT REPORTED 12/09/2019    BACTERIA NOT REPORTED 12/09/2019    CLARITYU Clear 04/06/2015    SPECGRAV 1.033 12/09/2019    LEUKOCYTESUR NEGATIVE 12/09/2019    UROBILINOGEN Normal 12/09/2019    BILIRUBINUR NEGATIVE 12/09/2019    BLOODU Positive 04/06/2015    GLUCOSEU 3+ 12/09/2019    KETUA TRACE 12/09/2019    AMORPHOUS 1+ 12/09/2019       HgBA1c:    Lab Results   Component Value Date    LABA1C 7.4 12/06/2019     TSH:    Lab Results   Component Value Date    TSH 3.15 12/06/2019       Lactic Acid: No results found for: LACTA   Troponin: No results for input(s): TROPONINI in the last 72 hours.     Microbiology:  Urine Culture:  No components found for: CURINE  Blood Culture:  No components found for: CBLOOD, CFUNGUSBL  Stool Culture:  No components found for: CSTOOL  Sputum Culture:  No components found for: CSPUTUM      Other Labs:  CBC:   Lab Results   Component Value Date    WBC 13.1 12/21/2019    RBC 4.46 12/21/2019    HGB 14.1 12/21/2019    HCT 43.7 12/21/2019    MCV 98.0 12/21/2019    MCH 31.6 12/21/2019    MCHC 32.3 12/21/2019    RDW 14.5 12/21/2019     12/21/2019    MPV 12.0 12/21/2019 unchanged the prior exam. Elizabeth Víctor is slight improvement in the right basilar   opacification.  Linear opacification the right mid lung.  No large pleural   effusion or pneumothorax.           Impression   1. Support devices appear unchanged in position. 2. Improvement in the right basilar opacification. 3. Persistent enlargement of the cardiac silhouette. ASSESSMENT:     Patient Active Problem List    Diagnosis Date Noted    Diplopia 07/12/2015     Priority: High    Right sided weakness     Hypernatremia 12/15/2019    Chronic diastolic heart failure ([address] 12/15/2019    Aspiration pneumonia ([address] 12/15/2019    Ventilator dependence ([address]     Acute on chronic combined systolic and diastolic congestive heart failure (HCC)     Intraparenchymal hematoma of brain (HCC)     Nontraumatic cortical hemorrhage of left cerebral hemisphere ([address] 12/06/2019    Acute intra-cranial hemorrhage (HCC) 12/06/2019    Headache 07/12/2015    Hypertensive urgency 07/12/2015    Intractable headache 07/12/2015    Headache     Renal cyst     Microalbuminuria     Proteinuria     Atrial fibrillation (HCC)     Thyroid disease     Uncontrolled hypertension     CAD (coronary artery disease)     Diabetes mellitus type 2 in obese (HCC)     Hyperlipidemia     Hyperthyroidism     Other complications due to other cardiac device, implant, and graft          PLAN:     WEAN PER PROTOCOL:  [] No   [x] Yes  [] N/A    DISCONTINUE ANY LABS:   [x] No   [] Yes    ICU PROPHYLAXIS:  Stress ulcer:  [] PPI Agent  [x] Q4Hgmfk [] Sucralfate  [] Other:  VTE:   [] Enoxaparin  [] Unfract. Heparin Subcut  [x] EPC Cuffs    NUTRITION:  [] NPO [x] Tube Feeding (Specify: house) [] TPN  [] PO    HOME MEDICATIONS RECONCILED: [x] No  [] Yes    INSULIN DRIP:   [x] No   [] Yes    CONSULTATION NEEDED:  [x] No   [] Yes    FAMILY UPDATED:    [x] No   [] Yes    TRANSFER OUT OF ICU:   [] No   [x] Yes    ADDITIONAL PLAN:  1. Daily SBT  2.  Minimize air-trapping  3. Mobilize up in chair  4. PT/OT/speech  5. Transf to LTACH/ECF when approved. Prognosis for functional recovery is poor. Valerie Villegas DO  12/21/2019 9:51 PM  Dept.  Of Pulmonary and Critical Care Medicine

## 2019-12-23 PROBLEM — N17.9 AKI (ACUTE KIDNEY INJURY) (HCC): Status: ACTIVE | Noted: 2019-12-23

## 2019-12-23 LAB
ANION GAP SERPL CALCULATED.3IONS-SCNC: 12 MMOL/L (ref 9–17)
BUN BLDV-MCNC: 48 MG/DL (ref 8–23)
BUN/CREAT BLD: ABNORMAL (ref 9–20)
CALCIUM SERPL-MCNC: 10.4 MG/DL (ref 8.6–10.4)
CHLORIDE BLD-SCNC: 99 MMOL/L (ref 98–107)
CO2: 31 MMOL/L (ref 20–31)
CREAT SERPL-MCNC: 1.56 MG/DL (ref 0.7–1.2)
GFR AFRICAN AMERICAN: 55 ML/MIN
GFR NON-AFRICAN AMERICAN: 45 ML/MIN
GFR SERPL CREATININE-BSD FRML MDRD: ABNORMAL ML/MIN/{1.73_M2}
GFR SERPL CREATININE-BSD FRML MDRD: ABNORMAL ML/MIN/{1.73_M2}
GLUCOSE BLD-MCNC: 121 MG/DL (ref 75–110)
GLUCOSE BLD-MCNC: 169 MG/DL (ref 75–110)
GLUCOSE BLD-MCNC: 171 MG/DL (ref 75–110)
GLUCOSE BLD-MCNC: 176 MG/DL (ref 75–110)
GLUCOSE BLD-MCNC: 183 MG/DL (ref 70–99)
GLUCOSE BLD-MCNC: 191 MG/DL (ref 75–110)
HCT VFR BLD CALC: 47.9 % (ref 40.7–50.3)
HEMOGLOBIN: 15.3 G/DL (ref 13–17)
MCH RBC QN AUTO: 31.4 PG (ref 25.2–33.5)
MCHC RBC AUTO-ENTMCNC: 31.9 G/DL (ref 28.4–34.8)
MCV RBC AUTO: 98.4 FL (ref 82.6–102.9)
NRBC AUTOMATED: 0 PER 100 WBC
PDW BLD-RTO: 14.5 % (ref 11.8–14.4)
PLATELET # BLD: 249 K/UL (ref 138–453)
PMV BLD AUTO: 12 FL (ref 8.1–13.5)
POTASSIUM SERPL-SCNC: 4.2 MMOL/L (ref 3.7–5.3)
RBC # BLD: 4.87 M/UL (ref 4.21–5.77)
SODIUM BLD-SCNC: 142 MMOL/L (ref 135–144)
WBC # BLD: 13.1 K/UL (ref 3.5–11.3)

## 2019-12-23 PROCEDURE — 36415 COLL VENOUS BLD VENIPUNCTURE: CPT

## 2019-12-23 PROCEDURE — 6370000000 HC RX 637 (ALT 250 FOR IP): Performed by: STUDENT IN AN ORGANIZED HEALTH CARE EDUCATION/TRAINING PROGRAM

## 2019-12-23 PROCEDURE — 6360000002 HC RX W HCPCS: Performed by: STUDENT IN AN ORGANIZED HEALTH CARE EDUCATION/TRAINING PROGRAM

## 2019-12-23 PROCEDURE — 94003 VENT MGMT INPAT SUBQ DAY: CPT

## 2019-12-23 PROCEDURE — 6370000000 HC RX 637 (ALT 250 FOR IP): Performed by: NURSE PRACTITIONER

## 2019-12-23 PROCEDURE — 2580000003 HC RX 258: Performed by: INTERNAL MEDICINE

## 2019-12-23 PROCEDURE — 6370000000 HC RX 637 (ALT 250 FOR IP): Performed by: INTERNAL MEDICINE

## 2019-12-23 PROCEDURE — 82947 ASSAY GLUCOSE BLOOD QUANT: CPT

## 2019-12-23 PROCEDURE — 2060000000 HC ICU INTERMEDIATE R&B

## 2019-12-23 PROCEDURE — 6370000000 HC RX 637 (ALT 250 FOR IP): Performed by: FAMILY MEDICINE

## 2019-12-23 PROCEDURE — 2500000003 HC RX 250 WO HCPCS: Performed by: STUDENT IN AN ORGANIZED HEALTH CARE EDUCATION/TRAINING PROGRAM

## 2019-12-23 PROCEDURE — 99233 SBSQ HOSP IP/OBS HIGH 50: CPT | Performed by: INTERNAL MEDICINE

## 2019-12-23 PROCEDURE — 99232 SBSQ HOSP IP/OBS MODERATE 35: CPT | Performed by: INTERNAL MEDICINE

## 2019-12-23 PROCEDURE — 97110 THERAPEUTIC EXERCISES: CPT

## 2019-12-23 PROCEDURE — 85027 COMPLETE CBC AUTOMATED: CPT

## 2019-12-23 PROCEDURE — 2580000003 HC RX 258: Performed by: STUDENT IN AN ORGANIZED HEALTH CARE EDUCATION/TRAINING PROGRAM

## 2019-12-23 PROCEDURE — 80048 BASIC METABOLIC PNL TOTAL CA: CPT

## 2019-12-23 PROCEDURE — 94640 AIRWAY INHALATION TREATMENT: CPT

## 2019-12-23 PROCEDURE — 2700000000 HC OXYGEN THERAPY PER DAY

## 2019-12-23 PROCEDURE — 94770 HC ETCO2 MONITOR DAILY: CPT

## 2019-12-23 PROCEDURE — 94761 N-INVAS EAR/PLS OXIMETRY MLT: CPT

## 2019-12-23 RX ORDER — SODIUM CHLORIDE 450 MG/100ML
INJECTION, SOLUTION INTRAVENOUS CONTINUOUS
Status: DISCONTINUED | OUTPATIENT
Start: 2019-12-23 | End: 2019-12-24

## 2019-12-23 RX ADMIN — DILTIAZEM HYDROCHLORIDE 90 MG: 60 TABLET, FILM COATED ORAL at 21:30

## 2019-12-23 RX ADMIN — SIMETHICONE 40 MG: 20 SUSPENSION/ DROPS ORAL at 13:26

## 2019-12-23 RX ADMIN — AMLODIPINE BESYLATE 10 MG: 10 TABLET ORAL at 08:55

## 2019-12-23 RX ADMIN — SODIUM CHLORIDE, PRESERVATIVE FREE 10 ML: 5 INJECTION INTRAVENOUS at 21:43

## 2019-12-23 RX ADMIN — ENOXAPARIN SODIUM 40 MG: 40 INJECTION SUBCUTANEOUS at 08:54

## 2019-12-23 RX ADMIN — INSULIN LISPRO 3 UNITS: 100 INJECTION, SOLUTION INTRAVENOUS; SUBCUTANEOUS at 17:17

## 2019-12-23 RX ADMIN — SODIUM CHLORIDE, PRESERVATIVE FREE 10 ML: 5 INJECTION INTRAVENOUS at 08:54

## 2019-12-23 RX ADMIN — HYDRALAZINE HYDROCHLORIDE 75 MG: 50 TABLET, FILM COATED ORAL at 13:26

## 2019-12-23 RX ADMIN — HYDRALAZINE HYDROCHLORIDE 75 MG: 50 TABLET, FILM COATED ORAL at 05:23

## 2019-12-23 RX ADMIN — METFORMIN HYDROCHLORIDE 1000 MG: 500 TABLET ORAL at 08:55

## 2019-12-23 RX ADMIN — ALBUTEROL SULFATE 2.5 MG: 2.5 SOLUTION RESPIRATORY (INHALATION) at 19:39

## 2019-12-23 RX ADMIN — Medication 1 DROP: at 21:30

## 2019-12-23 RX ADMIN — FAMOTIDINE 20 MG: 10 INJECTION, SOLUTION INTRAVENOUS at 21:31

## 2019-12-23 RX ADMIN — DOCUSATE SODIUM 100 MG: 50 LIQUID ORAL at 08:54

## 2019-12-23 RX ADMIN — Medication 1 DROP: at 13:26

## 2019-12-23 RX ADMIN — DESMOPRESSIN ACETATE 40 MG: 0.2 TABLET ORAL at 21:31

## 2019-12-23 RX ADMIN — Medication 1 DROP: at 08:55

## 2019-12-23 RX ADMIN — FLUOXETINE HYDROCHLORIDE 20 MG: 20 CAPSULE ORAL at 08:55

## 2019-12-23 RX ADMIN — ALBUTEROL SULFATE 2.5 MG: 2.5 SOLUTION RESPIRATORY (INHALATION) at 13:22

## 2019-12-23 RX ADMIN — SIMETHICONE 40 MG: 20 SUSPENSION/ DROPS ORAL at 17:18

## 2019-12-23 RX ADMIN — Medication 100 MG: at 08:54

## 2019-12-23 RX ADMIN — SODIUM CHLORIDE: 4.5 INJECTION, SOLUTION INTRAVENOUS at 21:44

## 2019-12-23 RX ADMIN — SIMETHICONE 40 MG: 20 SUSPENSION/ DROPS ORAL at 21:31

## 2019-12-23 RX ADMIN — HYDRALAZINE HYDROCHLORIDE 75 MG: 50 TABLET, FILM COATED ORAL at 21:30

## 2019-12-23 RX ADMIN — SIMETHICONE 40 MG: 20 SUSPENSION/ DROPS ORAL at 08:54

## 2019-12-23 RX ADMIN — METOPROLOL TARTRATE 100 MG: 50 TABLET, FILM COATED ORAL at 08:55

## 2019-12-23 RX ADMIN — HYDROCHLOROTHIAZIDE 25 MG: 25 TABLET ORAL at 08:55

## 2019-12-23 RX ADMIN — INSULIN LISPRO 3 UNITS: 100 INJECTION, SOLUTION INTRAVENOUS; SUBCUTANEOUS at 11:04

## 2019-12-23 RX ADMIN — INSULIN GLARGINE 10 UNITS: 100 INJECTION, SOLUTION SUBCUTANEOUS at 21:30

## 2019-12-23 RX ADMIN — SODIUM CHLORIDE: 4.5 INJECTION, SOLUTION INTRAVENOUS at 09:02

## 2019-12-23 RX ADMIN — ALBUTEROL SULFATE 2.5 MG: 2.5 SOLUTION RESPIRATORY (INHALATION) at 08:52

## 2019-12-23 RX ADMIN — MODAFINIL 200 MG: 100 TABLET ORAL at 12:39

## 2019-12-23 RX ADMIN — Medication 15 ML: at 21:31

## 2019-12-23 RX ADMIN — HYDRALAZINE HYDROCHLORIDE 10 MG: 20 INJECTION INTRAMUSCULAR; INTRAVENOUS at 03:43

## 2019-12-23 RX ADMIN — METOPROLOL TARTRATE 100 MG: 50 TABLET, FILM COATED ORAL at 21:31

## 2019-12-23 RX ADMIN — DILTIAZEM HYDROCHLORIDE 90 MG: 60 TABLET, FILM COATED ORAL at 13:26

## 2019-12-23 RX ADMIN — Medication 15 ML: at 08:54

## 2019-12-23 RX ADMIN — INSULIN LISPRO 3 UNITS: 100 INJECTION, SOLUTION INTRAVENOUS; SUBCUTANEOUS at 05:21

## 2019-12-23 RX ADMIN — METHIMAZOLE 20 MG: 5 TABLET ORAL at 08:55

## 2019-12-23 RX ADMIN — METFORMIN HYDROCHLORIDE 1000 MG: 500 TABLET ORAL at 17:17

## 2019-12-23 RX ADMIN — GLYBURIDE 5 MG: 5 TABLET ORAL at 08:55

## 2019-12-23 RX ADMIN — LISINOPRIL 40 MG: 20 TABLET ORAL at 08:55

## 2019-12-23 RX ADMIN — DILTIAZEM HYDROCHLORIDE 90 MG: 60 TABLET, FILM COATED ORAL at 05:23

## 2019-12-23 RX ADMIN — SENNOSIDES AND DOCUSATE SODIUM 2 TABLET: 8.6; 5 TABLET ORAL at 08:55

## 2019-12-23 RX ADMIN — FAMOTIDINE 20 MG: 10 INJECTION, SOLUTION INTRAVENOUS at 08:55

## 2019-12-23 RX ADMIN — ALBUTEROL SULFATE 2.5 MG: 2.5 SOLUTION RESPIRATORY (INHALATION) at 03:30

## 2019-12-23 RX ADMIN — GLYBURIDE 5 MG: 5 TABLET ORAL at 17:17

## 2019-12-23 RX ADMIN — METHIMAZOLE 10 MG: 5 TABLET ORAL at 17:17

## 2019-12-23 ASSESSMENT — PAIN SCALES - WONG BAKER

## 2019-12-23 ASSESSMENT — PULMONARY FUNCTION TESTS
PIF_VALUE: 23
PIF_VALUE: 18
PIF_VALUE: 19
PIF_VALUE: 24
PIF_VALUE: 23
PIF_VALUE: 16

## 2019-12-23 ASSESSMENT — PAIN SCALES - GENERAL: PAINLEVEL_OUTOF10: 0

## 2019-12-23 NOTE — CARE COORDINATION
Transitional Planning  Notified by Stephanie Sabillon with [facility] that they have re-submitted for pre-cert. Pre-cert previously denied as patient was \"not in our facility long enough. \"

## 2019-12-23 NOTE — PLAN OF CARE
Problem: Falls - Risk of:  Goal: Will remain free from falls  Description  Will remain free from falls  12/23/2019 0240 by Cuh Kamara RN  Outcome: Ongoing  Pt assessed as a fall risk this shift. Pt remains free from falls at this time. Fall precautions in place, including falling star sign on door and fall risk band on pt. Floor free from obstacles, and bed is locked and in lowest position. Adequate lighting provided. Call light within reach. Bed alarm activated. Will continue to monitor needs during hourly rounding. Problem: Risk for Impaired Skin Integrity  Goal: Tissue integrity - skin and mucous membranes  Description  Structural intactness and normal physiological function of skin and  mucous membranes. 12/23/2019 0240 by Chu Kamara RN  Outcome: Ongoing  Full skin assessment completed this shift. No new skin breakdown at this time. Pt repositioned q2h and prn. Pt kept clean and dry. [place] mattress in place on bed, heels floated. Will continue to monitor.       Problem: ACTIVITY INTOLERANCE/IMPAIRED MOBILITY  Goal: Mobility/activity is maintained at optimum level for patient  12/23/2019 0240 by Chu Kamara RN  Outcome: Ongoing       Problem: MECHANICAL VENTILATION  Goal: Mobility/activity is maintained at optimum level for patient  12/23/2019 0240 by Chu Kamara RN  Outcome: Ongoing

## 2019-12-23 NOTE — PROGRESS NOTES
[facility] [address] [facility]    Progress Note    12/23/2019    3:32 PM    Name:   Arleen Benson  MRN:     [id number]     Acct:      [de-identified]   Room:   [facility] Day:  16  Admit Date:  12/6/2019 10:59 AM    PCP:   Murray Guan MD  Code Status:  Full Code    Subjective:     C/C:   Chief Complaint   Patient presents with    Cerebrovascular Accident     Interval History Status: not changed. Patient seen and examined this morning. No acute events overnight. Continues to be minimally responsive on the ventilator. Creatinine is currently uptrending. Vent settings are currently 5.0 of PEEP, 15 RRR, 450 VT, 40% FiO2. Vital signs are currently stable. He is receiving tube feeds at 55 cc/h. Last temperature was yesterday morning. Brief History:     Per my associates:    Mr. Cady Cameron is a 30 yr old male with hx of A fib on coumadin, CHF s/p AICD, CAD, DVT, HTN, DM, thyroidectomy who presented initially to outside hospital on 12/6 with complaints of headache, syncope, right side weakness, facial droop, and aphasia.  Initial imaging revealed a left basal ganglia bleed, transferred to [facility].     Upon arrival at [facility], findings confirmed, Initial NIH:10, patient with above symptoms along with left periphreal field vision loss, maintaining airway, following commands, speech difficulty, some confusion, and right upper and lower extremity weakness. He was noted to have uncontrolled HTN requiring cardene gtt, and INR:2.7 as patient is on coumadin for AFib requiring Vit. K and [place] for reversal.  Admitted to NICU.      In neuro ICU has worsening respiratory distress requiring intubation. Repeat imaging demonstrating midline shift. Had worsening secretions concerning for pneumonia, started on antibiotics. Unable to wean off ventilator, underwent eventual trach/PEG per surgery on 12/14. Started and tolerated tube feeds. DC planning to LTAC in progress. Last Modified POA    * (Principal) Nontraumatic cortical hemorrhage of left cerebral hemisphere ([address] 12/6/2019 Yes    MARILYN (acute kidney injury) ([address] 12/23/2019 No    Atrial fibrillation ([address] 12/6/2019 Yes    Uncontrolled hypertension 12/6/2019 Yes    CAD (coronary artery disease) 12/6/2019 Yes    Diabetes mellitus type 2 in obese ([address] 12/6/2019 Yes    Hyperlipidemia 12/6/2019 Yes    Hyperthyroidism 12/6/2019 Yes    Acute intra-cranial hemorrhage ([address] 12/6/2019 Yes    Intraparenchymal hematoma of brain ([address] 12/7/2019 Yes    Ventilator dependence ([address] 12/8/2019 Yes    Acute on chronic combined systolic and diastolic congestive heart failure ([address] 12/8/2019 Yes    Hypernatremia 12/15/2019 Yes    Aspiration pneumonia ([address] 12/15/2019 Yes    Right sided weakness 12/19/2019 Yes    Acute respiratory failure with hypoxia and hypercapnia ([address] 12/22/2019 Yes          Plan:        1. Appreciate the assistance of my consultants - neurology, pulmonology  2. Appreciate optho eval - continue polymycin, artificial tears  3. Appreciate pulm recs regarding vent management  4. S/p Trach/PEG on 12/14 - weaning mechanical ventilation as able, TF running at goal  5. MARILYN noted today - will start 0.45 NS @ 75 cc/hr; most recent EF 48% with advanced diastolic dysfunction; hold HCTZ and Bumex  6. Leukocytosis noted with periodic fevers; monitoring  7. Resume AC at Neuorlogy recommendation  8. Continue Lantus, ISS for glycemic control, continue metformin and diabeta  9.  Continue current anti-hypertensive regimen - Norvasc 10 QD, Cardizem 90 mg q 8 hours, Hydralazine 75 mg q 8 hours, lisinopril 40 qd and lopressor 100 BID  10. DC planning - LTAC for mechanical vent weaning    REYNA HEALY DO  12/23/2019  3:32 PM

## 2019-12-23 NOTE — PROGRESS NOTES
Occupational Therapy    Occupational Therapy Not Seen Note    DATE: 2019  Name: Eddi Mehta  : 1955  MRN: [id number]    Patient not available for Occupational Therapy due to: Other: Pt unable to functionally participate in therapy. Pt opens eyes when name called, displays no tracking ability with stimuli. Pt unable to squeeze therapist hands. Pt with decline in status, CT head demonstrating left thalamic CVA with intraventricular extension while on warfarin.         Electronically signed by TWAN Tinajero on 2019 at 1:33 PM

## 2019-12-23 NOTE — PROGRESS NOTES
CAD (coronary artery disease)    Diabetes mellitus type 2 in obese (HCC)    Hyperlipidemia    Hyperthyroidism    Acute intra-cranial hemorrhage (HCC)    Intraparenchymal hematoma of brain (HCC)    V[facility]il[facility] dependence [address]    Acute on chronic combined systolic and diastolic congestive heart failure (HCC)    Hypernatremia    Aspiration pneumonia (HCC)    Right sided weakness    Acute respiratory failure with hypoxia and hypercapnia (HCC)  Resolved Problems:    * No resolved hospital problems. *    Plan   -Continue daily spontaneous breathing trials, switch to cool aerosol trach collar when tolerated. -Mobilize up to chair  - PT/OT/speech therapy  - will continue to follow         Zaira Gupta MD      12/23/2019, 9:59 AM  Pulmonary & Critical Care  Attending Physician Statement  I have discussed the care of Marliyn Nieto, including pertinent history and exam findings,  with the resident. I have seen and examined the patient and the key elements of all parts of the encounter have been performed by me. I agree with the assessment, plan and orders as documented by the resident with additions . Treatment plan Discussed with nursing staff in detail , all questions answered . Electronically signed by Emmie Neely MD on   12/23/19 at 7:20 PM    Please note that this chart was generated using voice recognition Dragon dictation software. Although every effort was made to ensure the accuracy of this automated transcription, some errors in transcription may have occurred.

## 2019-12-24 LAB
ABSOLUTE EOS #: 0.12 K/UL (ref 0–0.44)
ABSOLUTE IMMATURE GRANULOCYTE: 0.09 K/UL (ref 0–0.3)
ABSOLUTE LYMPH #: 1.67 K/UL (ref 1.1–3.7)
ABSOLUTE MONO #: 0.49 K/UL (ref 0.1–1.2)
ANION GAP SERPL CALCULATED.3IONS-SCNC: 13 MMOL/L (ref 9–17)
BASOPHILS # BLD: 1 % (ref 0–2)
BASOPHILS ABSOLUTE: 0.09 K/UL (ref 0–0.2)
BUN BLDV-MCNC: 46 MG/DL (ref 8–23)
BUN/CREAT BLD: ABNORMAL (ref 9–20)
CALCIUM SERPL-MCNC: 9.9 MG/DL (ref 8.6–10.4)
CHLORIDE BLD-SCNC: 101 MMOL/L (ref 98–107)
CO2: 27 MMOL/L (ref 20–31)
CREAT SERPL-MCNC: 1.25 MG/DL (ref 0.7–1.2)
DIFFERENTIAL TYPE: ABNORMAL
EOSINOPHILS RELATIVE PERCENT: 1 % (ref 1–4)
GFR AFRICAN AMERICAN: >60 ML/MIN
GFR NON-AFRICAN AMERICAN: 59 ML/MIN
GFR SERPL CREATININE-BSD FRML MDRD: ABNORMAL ML/MIN/{1.73_M2}
GFR SERPL CREATININE-BSD FRML MDRD: ABNORMAL ML/MIN/{1.73_M2}
GLUCOSE BLD-MCNC: 111 MG/DL (ref 75–110)
GLUCOSE BLD-MCNC: 115 MG/DL (ref 75–110)
GLUCOSE BLD-MCNC: 128 MG/DL (ref 75–110)
GLUCOSE BLD-MCNC: 136 MG/DL (ref 75–110)
GLUCOSE BLD-MCNC: 137 MG/DL (ref 75–110)
GLUCOSE BLD-MCNC: 174 MG/DL (ref 75–110)
GLUCOSE BLD-MCNC: 191 MG/DL (ref 70–99)
HCT VFR BLD CALC: 45 % (ref 40.7–50.3)
HEMOGLOBIN: 14.3 G/DL (ref 13–17)
IMMATURE GRANULOCYTES: 1 %
LYMPHOCYTES # BLD: 18 % (ref 24–43)
MCH RBC QN AUTO: 31.6 PG (ref 25.2–33.5)
MCHC RBC AUTO-ENTMCNC: 31.8 G/DL (ref 28.4–34.8)
MCV RBC AUTO: 99.6 FL (ref 82.6–102.9)
MONOCYTES # BLD: 5 % (ref 3–12)
NRBC AUTOMATED: 0 PER 100 WBC
PDW BLD-RTO: 14.4 % (ref 11.8–14.4)
PLATELET # BLD: 222 K/UL (ref 138–453)
PLATELET ESTIMATE: ABNORMAL
PMV BLD AUTO: 11.6 FL (ref 8.1–13.5)
POTASSIUM SERPL-SCNC: 4.4 MMOL/L (ref 3.7–5.3)
RBC # BLD: 4.52 M/UL (ref 4.21–5.77)
RBC # BLD: ABNORMAL 10*6/UL
SEG NEUTROPHILS: 74 % (ref 36–65)
SEGMENTED NEUTROPHILS ABSOLUTE COUNT: 6.97 K/UL (ref 1.5–8.1)
SODIUM BLD-SCNC: 141 MMOL/L (ref 135–144)
WBC # BLD: 9.4 K/UL (ref 3.5–11.3)
WBC # BLD: ABNORMAL 10*3/UL

## 2019-12-24 PROCEDURE — 99232 SBSQ HOSP IP/OBS MODERATE 35: CPT | Performed by: INTERNAL MEDICINE

## 2019-12-24 PROCEDURE — 6370000000 HC RX 637 (ALT 250 FOR IP): Performed by: STUDENT IN AN ORGANIZED HEALTH CARE EDUCATION/TRAINING PROGRAM

## 2019-12-24 PROCEDURE — 82947 ASSAY GLUCOSE BLOOD QUANT: CPT

## 2019-12-24 PROCEDURE — 6370000000 HC RX 637 (ALT 250 FOR IP): Performed by: INTERNAL MEDICINE

## 2019-12-24 PROCEDURE — 6360000002 HC RX W HCPCS: Performed by: STUDENT IN AN ORGANIZED HEALTH CARE EDUCATION/TRAINING PROGRAM

## 2019-12-24 PROCEDURE — 6370000000 HC RX 637 (ALT 250 FOR IP): Performed by: FAMILY MEDICINE

## 2019-12-24 PROCEDURE — 97110 THERAPEUTIC EXERCISES: CPT

## 2019-12-24 PROCEDURE — 6370000000 HC RX 637 (ALT 250 FOR IP): Performed by: NURSE PRACTITIONER

## 2019-12-24 PROCEDURE — 85025 COMPLETE CBC W/AUTO DIFF WBC: CPT

## 2019-12-24 PROCEDURE — 2700000000 HC OXYGEN THERAPY PER DAY

## 2019-12-24 PROCEDURE — 2060000000 HC ICU INTERMEDIATE R&B

## 2019-12-24 PROCEDURE — 94003 VENT MGMT INPAT SUBQ DAY: CPT

## 2019-12-24 PROCEDURE — 51798 US URINE CAPACITY MEASURE: CPT

## 2019-12-24 PROCEDURE — 94761 N-INVAS EAR/PLS OXIMETRY MLT: CPT

## 2019-12-24 PROCEDURE — 80048 BASIC METABOLIC PNL TOTAL CA: CPT

## 2019-12-24 PROCEDURE — 2580000003 HC RX 258: Performed by: INTERNAL MEDICINE

## 2019-12-24 PROCEDURE — 99232 SBSQ HOSP IP/OBS MODERATE 35: CPT | Performed by: PSYCHIATRY & NEUROLOGY

## 2019-12-24 PROCEDURE — 51701 INSERT BLADDER CATHETER: CPT

## 2019-12-24 PROCEDURE — 94640 AIRWAY INHALATION TREATMENT: CPT

## 2019-12-24 PROCEDURE — 94770 HC ETCO2 MONITOR DAILY: CPT

## 2019-12-24 PROCEDURE — 2500000003 HC RX 250 WO HCPCS: Performed by: STUDENT IN AN ORGANIZED HEALTH CARE EDUCATION/TRAINING PROGRAM

## 2019-12-24 PROCEDURE — 2580000003 HC RX 258: Performed by: STUDENT IN AN ORGANIZED HEALTH CARE EDUCATION/TRAINING PROGRAM

## 2019-12-24 PROCEDURE — 97535 SELF CARE MNGMENT TRAINING: CPT

## 2019-12-24 PROCEDURE — 36415 COLL VENOUS BLD VENIPUNCTURE: CPT

## 2019-12-24 RX ORDER — MODAFINIL 100 MG/1
100 TABLET ORAL DAILY
Status: DISCONTINUED | OUTPATIENT
Start: 2019-12-24 | End: 2020-01-03 | Stop reason: HOSPADM

## 2019-12-24 RX ORDER — HYDRALAZINE HYDROCHLORIDE 50 MG/1
100 TABLET, FILM COATED ORAL EVERY 8 HOURS SCHEDULED
Status: DISCONTINUED | OUTPATIENT
Start: 2019-12-24 | End: 2020-01-03 | Stop reason: HOSPADM

## 2019-12-24 RX ORDER — SODIUM CHLORIDE 9 MG/ML
INJECTION, SOLUTION INTRAVENOUS CONTINUOUS
Status: DISCONTINUED | OUTPATIENT
Start: 2019-12-24 | End: 2019-12-25

## 2019-12-24 RX ADMIN — SIMETHICONE 40 MG: 20 SUSPENSION/ DROPS ORAL at 12:01

## 2019-12-24 RX ADMIN — SIMETHICONE 40 MG: 20 SUSPENSION/ DROPS ORAL at 08:47

## 2019-12-24 RX ADMIN — DESMOPRESSIN ACETATE 40 MG: 0.2 TABLET ORAL at 21:41

## 2019-12-24 RX ADMIN — SIMETHICONE 40 MG: 20 SUSPENSION/ DROPS ORAL at 21:41

## 2019-12-24 RX ADMIN — SODIUM CHLORIDE: 9 INJECTION, SOLUTION INTRAVENOUS at 17:08

## 2019-12-24 RX ADMIN — GLYBURIDE 5 MG: 5 TABLET ORAL at 08:47

## 2019-12-24 RX ADMIN — METHIMAZOLE 20 MG: 5 TABLET ORAL at 08:47

## 2019-12-24 RX ADMIN — ALBUTEROL SULFATE 2.5 MG: 2.5 SOLUTION RESPIRATORY (INHALATION) at 19:37

## 2019-12-24 RX ADMIN — SODIUM CHLORIDE, PRESERVATIVE FREE 10 ML: 5 INJECTION INTRAVENOUS at 21:42

## 2019-12-24 RX ADMIN — SIMETHICONE 40 MG: 20 SUSPENSION/ DROPS ORAL at 17:00

## 2019-12-24 RX ADMIN — Medication 100 MG: at 08:46

## 2019-12-24 RX ADMIN — SODIUM CHLORIDE, PRESERVATIVE FREE 10 ML: 5 INJECTION INTRAVENOUS at 08:48

## 2019-12-24 RX ADMIN — INSULIN LISPRO 3 UNITS: 100 INJECTION, SOLUTION INTRAVENOUS; SUBCUTANEOUS at 05:51

## 2019-12-24 RX ADMIN — HYDRALAZINE HYDROCHLORIDE 100 MG: 50 TABLET ORAL at 12:01

## 2019-12-24 RX ADMIN — DILTIAZEM HYDROCHLORIDE 90 MG: 60 TABLET, FILM COATED ORAL at 05:50

## 2019-12-24 RX ADMIN — FAMOTIDINE 20 MG: 10 INJECTION, SOLUTION INTRAVENOUS at 21:41

## 2019-12-24 RX ADMIN — ALBUTEROL SULFATE 2.5 MG: 2.5 SOLUTION RESPIRATORY (INHALATION) at 03:38

## 2019-12-24 RX ADMIN — Medication 1 DROP: at 12:01

## 2019-12-24 RX ADMIN — METFORMIN HYDROCHLORIDE 1000 MG: 500 TABLET ORAL at 17:00

## 2019-12-24 RX ADMIN — DILTIAZEM HYDROCHLORIDE 90 MG: 60 TABLET, FILM COATED ORAL at 21:41

## 2019-12-24 RX ADMIN — Medication 1 DROP: at 08:47

## 2019-12-24 RX ADMIN — ALBUTEROL SULFATE 2.5 MG: 2.5 SOLUTION RESPIRATORY (INHALATION) at 07:27

## 2019-12-24 RX ADMIN — ALBUTEROL SULFATE 2.5 MG: 2.5 SOLUTION RESPIRATORY (INHALATION) at 13:28

## 2019-12-24 RX ADMIN — FLUOXETINE HYDROCHLORIDE 20 MG: 20 CAPSULE ORAL at 08:46

## 2019-12-24 RX ADMIN — LISINOPRIL 40 MG: 20 TABLET ORAL at 08:46

## 2019-12-24 RX ADMIN — ENOXAPARIN SODIUM 40 MG: 40 INJECTION SUBCUTANEOUS at 08:46

## 2019-12-24 RX ADMIN — HYDRALAZINE HYDROCHLORIDE 100 MG: 50 TABLET ORAL at 21:41

## 2019-12-24 RX ADMIN — GLYBURIDE 5 MG: 5 TABLET ORAL at 17:00

## 2019-12-24 RX ADMIN — DOCUSATE SODIUM 100 MG: 50 LIQUID ORAL at 08:46

## 2019-12-24 RX ADMIN — MODAFINIL 100 MG: 100 TABLET ORAL at 12:01

## 2019-12-24 RX ADMIN — SENNOSIDES AND DOCUSATE SODIUM 2 TABLET: 8.6; 5 TABLET ORAL at 08:46

## 2019-12-24 RX ADMIN — METOPROLOL TARTRATE 100 MG: 50 TABLET, FILM COATED ORAL at 21:41

## 2019-12-24 RX ADMIN — Medication 15 ML: at 11:48

## 2019-12-24 RX ADMIN — FAMOTIDINE 20 MG: 10 INJECTION, SOLUTION INTRAVENOUS at 08:47

## 2019-12-24 RX ADMIN — HYDRALAZINE HYDROCHLORIDE 75 MG: 50 TABLET, FILM COATED ORAL at 05:51

## 2019-12-24 RX ADMIN — METFORMIN HYDROCHLORIDE 1000 MG: 500 TABLET ORAL at 08:47

## 2019-12-24 RX ADMIN — DILTIAZEM HYDROCHLORIDE 90 MG: 60 TABLET, FILM COATED ORAL at 13:32

## 2019-12-24 RX ADMIN — Medication 1 DROP: at 21:42

## 2019-12-24 RX ADMIN — METHIMAZOLE 10 MG: 5 TABLET ORAL at 17:00

## 2019-12-24 RX ADMIN — METOPROLOL TARTRATE 100 MG: 50 TABLET, FILM COATED ORAL at 08:47

## 2019-12-24 RX ADMIN — Medication 15 ML: at 21:42

## 2019-12-24 ASSESSMENT — PAIN SCALES - WONG BAKER
WONGBAKER_NUMERICALRESPONSE: 0

## 2019-12-24 ASSESSMENT — PULMONARY FUNCTION TESTS
PIF_VALUE: 16
PIF_VALUE: 29
PIF_VALUE: 19
PIF_VALUE: 25
PIF_VALUE: 26
PIF_VALUE: 28
PIF_VALUE: 17

## 2019-12-24 NOTE — PROGRESS NOTES
PULMONARY PROGRESS NOTE      Patient:  Ramos Du  YOB: 1955    MRN: [id number]     Acct: [de-identified]     Admit date: 12/6/2019    REASON FOR CONSULT:-Vent management    Pt seen and Chart reviewed. Subjective:   No acute events overnight  Patient was placed on T-piece yesterday for some time but desaturated and eventually switched to. Weaned for 90 mins this morning but became apneic multiple times  Opening eyes upon calling, not following commands  Tolerating tube feeds, had bowel movement yesterday  Creatinine improving 1.25 today, leukocytosis improving 9.4 today      Review of Systems -   Unable to obtain due to mental status      Physical Exam:  Vitals: /81   Pulse 75   Temp 97.7 °F (36.5 °C) (Axillary)   Resp 15   Ht 5' 7\" (1.702 m)   Wt 190 lb 11.2 oz (86.5 kg)   SpO2 98%   BMI 29.87 kg/m²   24 hour intake/output:    Intake/Output Summary (Last 24 hours) at 12/24/2019 0942  Last data filed at 12/24/2019 0650  Gross per 24 hour   Intake 3031 ml   Output 1300 ml   Net 1731 ml     Last 3 weights:   Wt Readings from Last 3 Encounters:   12/23/19 190 lb 11.2 oz (86.5 kg)   12/06/19 225 lb (102.1 kg)   04/06/15 194 lb 9.6 oz (88.3 kg)       General appearance: Physical Examination:   General appearance -opening eyes on calling, not following commands  Mental status -opening eyes on calling, not following commands  Eyes - pupils equal and reactive  Mouth - mucous membranes moist, pharynx normal without lesions  Neck - supple, no significant adenopathy, trach+  Chest - clear to auscultation, no wheezes, rales or rhonchi, symmetric air entry  Heart - normal rate, regular rhythm, normal S1, S2, no murmurs, rubs, clicks or gallops  Abdomen - soft, nontender, nondistended, no masses or organomegaly, PEG+  Neurological -opening eyes upon calling, not following commands, mild right facial droop, right hemiplegia+  extremities - peripheral pulses normal, no pedal edema, no clubbing or cyanosis      Diet:  DIET TUBE FEED CONTINUOUS/CYCLIC NPO; Diabetic; Gastrostomy; Continuous; 20; 55; 24    Medications:Current Inpatient    Scheduled Meds:   hydrALAZINE  100 mg Oral 3 times per day    modafinil  100 mg Per NG tube Daily    insulin glargine  10 Units Subcutaneous Daily    FLUoxetine  20 mg Oral Daily    [Held by provider] hydrochlorothiazide  25 mg Oral Daily    [Held by provider] bumetanide  1 mg Oral Daily    diltiazem  90 mg Oral 3 times per day    albuterol  2.5 mg Nebulization Q6H    insulin lispro  0-18 Units Subcutaneous 4 times per day    famotidine (PEPCID) injection  20 mg Intravenous BID    sennosides-docusate sodium  2 tablet Oral Daily    tetrahydrozoline  1 drop Left Eye TID    metFORMIN  1,000 mg Per NG tube BID WC    enoxaparin  40 mg Subcutaneous Daily    docusate  100 mg Per NG tube Daily    glyBURIDE  5 mg Per NG tube BID WC    vitamin B-1  100 mg Oral Daily    chlorhexidine  15 mL Mouth/Throat BID    lisinopril  40 mg Oral Daily    simethicone  40 mg Per NG tube 4x Daily    atorvastatin  40 mg Oral Daily    sodium chloride flush  10 mL Intravenous 2 times per day    methIMAzole  20 mg Oral Daily    methIMAzole  10 mg Oral QPM    metoprolol tartrate  100 mg Oral BID     Continuous Infusions:   sodium chloride 75 mL/hr at 12/23/19 2144    dextrose Stopped (12/19/19 0952)     PRN Meds:fentanNYL, REFRESH LACRI-LUBE, potassium chloride, fentanNYL, glucose, dextrose, glucagon (rDNA), dextrose, acetaminophen **OR** acetaminophen, sodium chloride flush, ondansetron, hydrALAZINE    Objective:    CBC:   Recent Labs     12/22/19 0448 12/23/19  0528 12/24/19  0549   WBC 11.3 13.1* 9.4   HGB 14.2 15.3 14.3    249 222     BMP:    Recent Labs     12/22/19 0448 12/23/19  0528 12/24/19  0549    142 141   K 4.6 4.2 4.4    99 101   CO2 29 31 27   BUN 43* 48* 46*   CREATININE 1.42* 1.56* 1.25*   GLUCOSE 200* 183* 191*     Calcium:  Recent Labs

## 2019-12-24 NOTE — PLAN OF CARE
Patient's bed locked and was set to the lowest height. Call light and side table were within reach. Patient remained free from falls this shift.

## 2019-12-24 NOTE — PROGRESS NOTES
progress. Review of Systems:     Unable to assess as patient is currently with tracheostomy and is minimally responsive to questioning. Medications:      Allergies:  No Known Allergies    Current Meds:   Scheduled Meds:    hydrALAZINE  100 mg Oral 3 times per day    modafinil  100 mg Per NG tube Daily    insulin glargine  10 Units Subcutaneous Daily    FLUoxetine  20 mg Oral Daily    [Held by provider] hydrochlorothiazide  25 mg Oral Daily    [Held by provider] bumetanide  1 mg Oral Daily    diltiazem  90 mg Oral 3 times per day    albuterol  2.5 mg Nebulization Q6H    insulin lispro  0-18 Units Subcutaneous 4 times per day    famotidine (PEPCID) injection  20 mg Intravenous BID    sennosides-docusate sodium  2 tablet Oral Daily    tetrahydrozoline  1 drop Left Eye TID    metFORMIN  1,000 mg Per NG tube BID WC    enoxaparin  40 mg Subcutaneous Daily    docusate  100 mg Per NG tube Daily    glyBURIDE  5 mg Per NG tube BID WC    vitamin B-1  100 mg Oral Daily    chlorhexidine  15 mL Mouth/Throat BID    lisinopril  40 mg Oral Daily    simethicone  40 mg Per NG tube 4x Daily    atorvastatin  40 mg Oral Daily    sodium chloride flush  10 mL Intravenous 2 times per day    methIMAzole  20 mg Oral Daily    methIMAzole  10 mg Oral QPM    metoprolol tartrate  100 mg Oral BID     Continuous Infusions:    sodium chloride 75 mL/hr at 12/23/19 2144    dextrose Stopped (12/19/19 0952)     PRN Meds: fentanNYL, REFRESH LACRI-LUBE, potassium chloride, fentanNYL, glucose, dextrose, glucagon (rDNA), dextrose, acetaminophen **OR** acetaminophen, sodium chloride flush, ondansetron, hydrALAZINE    Data:     Past Medical History:   has a past medical history of Atrial fibrillation ([facility] CAD (coronary artery disease), H/O heart artery stent, Hx of blood clots, Hyperlipidemia, Hypertension, Hyperthyroidism, Kidney stones, MI, old, Microalbuminuria, Other complications due to other cardiac device, implant,

## 2019-12-24 NOTE — PROGRESS NOTES
Neurology Progress Note           Subjective:   No acute events. Patient looks more alert today. He's still non-verbal.     INTERVAL HISTORY:  The patient has been followed up in the stepdown unit. He's been on modafinil 200 mg daily for psycho stimulation and on Prozac 20 mg daily. No acute events overnight. Underwent repeat CT head that showed interval decrease in size of left thalamic HMG with persistent associated edema. HISTORY OF PRESENT ILLNESS:              The patient is a 60 y.o. male with significant past medical history of chronic artery disease, atrial fibrillation on Coumadin who presented 12 days ago with right-sided weakness and speech difficulties and was found to have a left thalamic ICH with intraventricular extension. INR on presentation 2.9. Received Kcentra and vitamin K. Has been managed in the neuro ICU. ICH score on presentation is 0, then 1 due to intraventricular extension. He was managed for respiratory distress due to volume overload. Blood pressure was controlled initially with Cardene and systolic blood pressure was kept less than 160. UFH was started for DVT prophylaxis on 12/ 8. He was started on Lasix 20 mg for volume overload. He became febrile and was started on ceftriaxone and azithromycin for community-acquired pneumonia. Sputum positive for strep pneumoniae. Patient was alert and was considered to be extubated however he did not tolerate CPAP trial and due to copious bloody secretions was decided that he is unlikely to tolerate extubation at this time. He underwent trach and PEG on 12/14 and was stepdown on 12/15.       REVIEW OF SYSTEMS:  Review of Systems   Unable to perform ROS: Patient nonverbal         PHYSICAL EXAM:  Vitals:  BP (!) 151/94   Pulse 75   Temp 97.8 °F (36.6 °C) (Axillary)   Resp 23   Ht 5' 7\" (1.702 m)   Wt 190 lb 11.2 oz (86.5 kg)   SpO2 95%   BMI 29.87 kg/m²    Physical Exam  Eyes:      Extraocular Movements: Extraocular movements Glucose Fingerstick    Collection Time: 12/24/19 12:12 AM   Result Value Ref Range    POC Glucose 136 (H) 75 - 110 mg/dL   POC Glucose Fingerstick    Collection Time: 12/24/19  5:35 AM   Result Value Ref Range    POC Glucose 174 (H) 75 - 110 mg/dL   Basic Metabolic Panel w/ Reflex to MG    Collection Time: 12/24/19  5:49 AM   Result Value Ref Range    Glucose 191 (H) 70 - 99 mg/dL    BUN 46 (H) 8 - 23 mg/dL    CREATININE 1.25 (H) 0.70 - 1.20 mg/dL    Bun/Cre Ratio NOT REPORTED 9 - 20    Calcium 9.9 8.6 - 10.4 mg/dL    Sodium 141 135 - 144 mmol/L    Potassium 4.4 3.7 - 5.3 mmol/L    Chloride 101 98 - 107 mmol/L    CO2 27 20 - 31 mmol/L    Anion Gap 13 9 - 17 mmol/L    GFR Non-African American 59 (L) >60 mL/min    GFR African American >60 >60 mL/min    GFR Comment          GFR Staging NOT REPORTED    CBC Auto Differential    Collection Time: 12/24/19  5:49 AM   Result Value Ref Range    WBC 9.4 3.5 - 11.3 k/uL    RBC 4.52 4.21 - 5.77 m/uL    Hemoglobin 14.3 13.0 - 17.0 g/dL    Hematocrit 45.0 40.7 - 50.3 %    MCV 99.6 82.6 - 102.9 fL    MCH 31.6 25.2 - 33.5 pg    MCHC 31.8 28.4 - 34.8 g/dL    RDW 14.4 11.8 - 14.4 %    Platelets 593 438 - 424 k/uL    MPV 11.6 8.1 - 13.5 fL    NRBC Automated 0.0 0.0 per 100 WBC    Differential Type NOT REPORTED     Seg Neutrophils 74 (H) 36 - 65 %    Lymphocytes 18 (L) 24 - 43 %    Monocytes 5 3 - 12 %    Eosinophils % 1 1 - 4 %    Basophils 1 0 - 2 %    Immature Granulocytes 1 (H) 0 %    Segs Absolute 6.97 1.50 - 8.10 k/uL    Absolute Lymph # 1.67 1.10 - 3.70 k/uL    Absolute Mono # 0.49 0.10 - 1.20 k/uL    Absolute Eos # 0.12 0.00 - 0.44 k/uL    Basophils Absolute 0.09 0.00 - 0.20 k/uL    Absolute Immature Granulocyte 0.09 0.00 - 0.30 k/uL    WBC Morphology NOT REPORTED     RBC Morphology NOT REPORTED     Platelet Estimate NOT REPORTED        IMAGING  12/6 12/7 12/11 12/20    1. Interval decrease in size of left thalamic hemorrhage. Persistent associated edema.   2. Decrease in intraventricular blood posterior horns of the lateral ventricles. 3.  Mild rightward midline shift of 5.9 mm. IMPRESSION  Left thalamic ICH with intraventricular extension secondary to Coumadin anticoagulation s/p post reversal  Atrial fibrillation. CHANI VASC score : 3 (stroke risk 3.2% / year) / HAS-BLED score : 2 (risk of 4.1%)  Right hemiplegia due to above  Mutism due to above   MARILYN, improving  Ventilator dependent    Lethargy as sequelae of hemorrhagic stroke, improving    RECOMMENDATIONS:   MRI brain without contrast to assess for other causes of mutism  Continue Prozac 20 mg daily   Can decrease Modafinil to 100 mg daily giving improvement of alertness   Continue PT/OT  Follow up with cardiology as outpatient for assessment for watchman device placement  SBP goal less than 130  Follow up with neurology as outpatient in 3 and 6 months       Thank you for the consultation.  Case consulted with Dr. Joseluis Natarajan MD  Neurology Resident PGY-2  12/24/2019 at 8:14 AM

## 2019-12-24 NOTE — PROGRESS NOTES
Occupational Therapy  Facility/Department: [facility] 4A STEPDOWN  Daily Treatment Note  NAME: Eddi Mehta  : 1955  MRN: [id number]    Date of Service: 2019    Discharge Recommendations:  Patient would benefit from continued therapy after discharge       Assessment   Performance deficits / Impairments: Decreased functional mobility ; Decreased ADL status; Decreased ROM; Decreased strength;Decreased safe awareness;Decreased cognition;Decreased endurance;Decreased balance;Decreased high-level IADLs;Decreased vision/visual deficit; Decreased fine motor control;Decreased coordination;Decreased posture  Prognosis: Poor  Patient Education: OT POC with poor return. Activity Tolerance  Activity Tolerance: Treatment limited secondary to medical complications (free text)  Safety Devices  Safety Devices in place: Yes  Type of devices: Call light within reach; Left in bed;Nurse notified         Patient Diagnosis(es): The encounter diagnosis was Intraparenchymal hematoma of brain, left, without loss of consciousness, initial encounter ([facility] [address] has a past medical history of Atrial fibrillation ([facility] [address] CAD (coronary artery disease), H/O heart artery stent, Hx of blood clots, Hyperlipidemia, Hypertension, Hyperthyroidism, Kidney stones, MI, old, Microalbuminuria, Other complications due to other cardiac device, implant, and graft, Proteinuria, Renal cyst, Thyroid disease, and Type II or unspecified type diabetes mellitus without mention of complication, not stated as uncontrolled. has a past surgical history that includes Kidney surgery; Lithotripsy; Cystoscopy; Ureteroscopy; pacemaker placement; eye surgery (Left); Thyroidectomy; and gastrostomy (N/A, 2019).     Restrictions  Restrictions/Precautions  Restrictions/Precautions: General Precautions, Fall Risk  Required Braces or Orthoses?: No  Position Activity Restriction  Other position/activity restrictions: trach; G tube  Subjective   General  Patient assessed for term goal 2: demo supine<>sit transfer to EOB with mod Ax2  Short term goal 3: demo static/dynamic sitting on EOB for ~10 min with mod A   Short term goal 4: demo [address] through [address] for >5 min total during sitting activity to promote func regain  Short term goal 5: demo activity tolerance for 30 min+  Short term goal 6: notify OTR to update goals as mobility progresses to standing, etc.       Therapy Time   Individual Concurrent Group Co-treatment   Time In 1535         Time Out 1545         Minutes 10            Pt supine in bed upon therapist arrival. Pt pleasantly confused. See above for LOF for all tasks. Pt retired to supine in bed at end of session with call light within reach.     MARIE Garcia/MICHELE

## 2019-12-24 NOTE — PROCEDURES
Pt cannot have an mri. His pacemaker/lead system is not mri condtional.  aware. rn aware. Mri discontinued. Thanks. Della.

## 2019-12-24 NOTE — PLAN OF CARE
Problem: MECHANICAL VENTILATION  Goal: Mobility/activity is maintained at optimum level for patient  Outcome: Ongoing     Problem: MECHANICAL VENTILATION  Goal: Ability to express needs and understand communication  Outcome: Ongoing     Problem: MECHANICAL VENTILATION  Goal: Patient will maintain patent airway  Outcome: Ongoing     Problem: COMMUNICATION IMPAIRMENT  Goal: Ability to express needs and understand communication  Outcome: Ongoing     Problem: MECHANICAL VENTILATION  Goal: Tracheostomy will be managed safely  Outcome: Ongoing     Problem: OXYGENATION/RESPIRATORY FUNCTION  Goal: Patient will maintain patent airway  Outcome: Ongoing     Problem: OXYGENATION/RESPIRATORY FUNCTION  Goal: Patient will achieve/maintain normal respiratory rate/effort  Description  Respiratory rate and effort will be within normal limits for the patient  Outcome: Ongoing

## 2019-12-24 NOTE — PROGRESS NOTES
Nutrition Assessment (Enteral Nutrition)    Type and Reason for Visit: Reassess    Nutrition Recommendations:   - Suggest increasing TF goal rate to 60 mL/hr = 1728 kcal, 86 gm protein per day. Monitor TF tolerance/adequacy of intakes - modify as needed. - Monitor labs and bowel function. Nutrition Assessment: Pt tolerating TF well at goal rate per RN. Labs reviewed - Glucose 136-191 mg/dL. Noted last BM 12/23. Malnutrition Assessment:  · Malnutrition Status: At risk for malnutrition  · Context: Acute illness or injury    Nutrition Risk Level: Moderate    Nutrition Needs:  · Estimated Daily Total Kcal: 2430-0635 kcals/day  · Estimated Daily Protein (g):  gm/day    Nutrition Diagnosis:   · Problem: Inadequate oral intake  · Etiology: related to Impaired respiratory function-inability to consume food     Signs and symptoms:  as evidenced by NPO status due to medical condition, Nutrition support - EN    Objective Information:  · Wound Type: Skin Tears  · Current Nutrition Therapies:  · Oral Diet Orders: NPO   · Tube Feeding (TF) Orders:   · Feeding Route: Gastrostomy  · Formula: Diabetic  · Rate (ml/hr):55 mL/hr    · Volume (ml/day): 1320 mL/day  · Duration: Continuous  · Current TF & Flush Orders Provides: At 55 mL/hr = 1584 kcal, 79 gm protein  · Goal TF & Flush Orders Provides: At 60 mL/hr = 1728 kcal, 86 gm protein  · Anthropometric Measures:  · Ht: 5' 7\" (170.2 cm)   · Current Body Wt: 190 lb 11.2 oz (86.5 kg)  · Admission Body Wt: 211 lb (95.7 kg)  · Weight Change: 10% weight loss x 2.5 weeks   · Ideal Body Wt: 148 lb (67.1 kg), % Ideal Body 144%  · BMI Classification: BMI 30.0 - 34.9 Obese Class I    Nutrition Interventions:   Modify current Tube Feeding(Suggest increasing TF goal rate to 60 mL/hr.)  Continued Inpatient Monitoring, Education Not Indicated    Nutrition Evaluation:   · Evaluation: Goal achieved   · Goals: Meet % of estimated nutrient needs.    · Monitoring: TF Intake, TF Tolerance, Skin Integrity, I&O, Wound Healing, Pertinent Labs, Weight, Monitor Hemodynamic Status, Monitor Bowel Function    Electronically signed by Adonay Naranjo RD, CASIE on 12/24/19 at 11:42 AM    Contact Number: [phone]

## 2019-12-25 LAB
ABSOLUTE EOS #: 0.09 K/UL (ref 0–0.44)
ABSOLUTE IMMATURE GRANULOCYTE: 0.07 K/UL (ref 0–0.3)
ABSOLUTE LYMPH #: 1.36 K/UL (ref 1.1–3.7)
ABSOLUTE MONO #: 0.4 K/UL (ref 0.1–1.2)
ANION GAP SERPL CALCULATED.3IONS-SCNC: 12 MMOL/L (ref 9–17)
BASOPHILS # BLD: 1 % (ref 0–2)
BASOPHILS ABSOLUTE: 0.07 K/UL (ref 0–0.2)
BUN BLDV-MCNC: 41 MG/DL (ref 8–23)
BUN/CREAT BLD: ABNORMAL (ref 9–20)
CALCIUM SERPL-MCNC: 9.4 MG/DL (ref 8.6–10.4)
CHLORIDE BLD-SCNC: 104 MMOL/L (ref 98–107)
CO2: 25 MMOL/L (ref 20–31)
CREAT SERPL-MCNC: 1.09 MG/DL (ref 0.7–1.2)
DIFFERENTIAL TYPE: ABNORMAL
EOSINOPHILS RELATIVE PERCENT: 1 % (ref 1–4)
GFR AFRICAN AMERICAN: >60 ML/MIN
GFR NON-AFRICAN AMERICAN: >60 ML/MIN
GFR SERPL CREATININE-BSD FRML MDRD: ABNORMAL ML/MIN/{1.73_M2}
GFR SERPL CREATININE-BSD FRML MDRD: ABNORMAL ML/MIN/{1.73_M2}
GLUCOSE BLD-MCNC: 141 MG/DL (ref 75–110)
GLUCOSE BLD-MCNC: 184 MG/DL (ref 75–110)
GLUCOSE BLD-MCNC: 190 MG/DL (ref 75–110)
GLUCOSE BLD-MCNC: 206 MG/DL (ref 70–99)
GLUCOSE BLD-MCNC: 97 MG/DL (ref 75–110)
HCT VFR BLD CALC: 40.2 % (ref 40.7–50.3)
HEMOGLOBIN: 13.1 G/DL (ref 13–17)
IMMATURE GRANULOCYTES: 1 %
LYMPHOCYTES # BLD: 17 % (ref 24–43)
MCH RBC QN AUTO: 31.6 PG (ref 25.2–33.5)
MCHC RBC AUTO-ENTMCNC: 32.6 G/DL (ref 28.4–34.8)
MCV RBC AUTO: 97.1 FL (ref 82.6–102.9)
MONOCYTES # BLD: 5 % (ref 3–12)
NRBC AUTOMATED: 0 PER 100 WBC
PDW BLD-RTO: 14.2 % (ref 11.8–14.4)
PLATELET # BLD: 196 K/UL (ref 138–453)
PLATELET ESTIMATE: ABNORMAL
PMV BLD AUTO: 12 FL (ref 8.1–13.5)
POTASSIUM SERPL-SCNC: 4.4 MMOL/L (ref 3.7–5.3)
RBC # BLD: 4.14 M/UL (ref 4.21–5.77)
RBC # BLD: ABNORMAL 10*6/UL
SEG NEUTROPHILS: 75 % (ref 36–65)
SEGMENTED NEUTROPHILS ABSOLUTE COUNT: 6.06 K/UL (ref 1.5–8.1)
SODIUM BLD-SCNC: 141 MMOL/L (ref 135–144)
WBC # BLD: 8.1 K/UL (ref 3.5–11.3)
WBC # BLD: ABNORMAL 10*3/UL

## 2019-12-25 PROCEDURE — 85025 COMPLETE CBC W/AUTO DIFF WBC: CPT

## 2019-12-25 PROCEDURE — 82947 ASSAY GLUCOSE BLOOD QUANT: CPT

## 2019-12-25 PROCEDURE — 2700000000 HC OXYGEN THERAPY PER DAY

## 2019-12-25 PROCEDURE — 94640 AIRWAY INHALATION TREATMENT: CPT

## 2019-12-25 PROCEDURE — 2500000003 HC RX 250 WO HCPCS: Performed by: STUDENT IN AN ORGANIZED HEALTH CARE EDUCATION/TRAINING PROGRAM

## 2019-12-25 PROCEDURE — 6370000000 HC RX 637 (ALT 250 FOR IP): Performed by: STUDENT IN AN ORGANIZED HEALTH CARE EDUCATION/TRAINING PROGRAM

## 2019-12-25 PROCEDURE — 2580000003 HC RX 258: Performed by: STUDENT IN AN ORGANIZED HEALTH CARE EDUCATION/TRAINING PROGRAM

## 2019-12-25 PROCEDURE — 2060000000 HC ICU INTERMEDIATE R&B

## 2019-12-25 PROCEDURE — 51798 US URINE CAPACITY MEASURE: CPT

## 2019-12-25 PROCEDURE — 94003 VENT MGMT INPAT SUBQ DAY: CPT

## 2019-12-25 PROCEDURE — 6370000000 HC RX 637 (ALT 250 FOR IP): Performed by: INTERNAL MEDICINE

## 2019-12-25 PROCEDURE — 6360000002 HC RX W HCPCS: Performed by: STUDENT IN AN ORGANIZED HEALTH CARE EDUCATION/TRAINING PROGRAM

## 2019-12-25 PROCEDURE — 80048 BASIC METABOLIC PNL TOTAL CA: CPT

## 2019-12-25 PROCEDURE — 99231 SBSQ HOSP IP/OBS SF/LOW 25: CPT | Performed by: INTERNAL MEDICINE

## 2019-12-25 PROCEDURE — 99232 SBSQ HOSP IP/OBS MODERATE 35: CPT | Performed by: INTERNAL MEDICINE

## 2019-12-25 PROCEDURE — 36415 COLL VENOUS BLD VENIPUNCTURE: CPT

## 2019-12-25 PROCEDURE — 94761 N-INVAS EAR/PLS OXIMETRY MLT: CPT

## 2019-12-25 PROCEDURE — 6370000000 HC RX 637 (ALT 250 FOR IP): Performed by: NURSE PRACTITIONER

## 2019-12-25 PROCEDURE — 2580000003 HC RX 258: Performed by: INTERNAL MEDICINE

## 2019-12-25 PROCEDURE — 51701 INSERT BLADDER CATHETER: CPT

## 2019-12-25 RX ORDER — SODIUM CHLORIDE 450 MG/100ML
INJECTION, SOLUTION INTRAVENOUS CONTINUOUS
Status: DISCONTINUED | OUTPATIENT
Start: 2019-12-25 | End: 2019-12-25

## 2019-12-25 RX ORDER — BUMETANIDE 1 MG/1
0.5 TABLET ORAL DAILY
Status: DISCONTINUED | OUTPATIENT
Start: 2019-12-26 | End: 2020-01-03 | Stop reason: HOSPADM

## 2019-12-25 RX ADMIN — Medication 15 ML: at 21:34

## 2019-12-25 RX ADMIN — Medication 100 MG: at 10:18

## 2019-12-25 RX ADMIN — SODIUM CHLORIDE, PRESERVATIVE FREE 10 ML: 5 INJECTION INTRAVENOUS at 21:35

## 2019-12-25 RX ADMIN — FAMOTIDINE 20 MG: 10 INJECTION, SOLUTION INTRAVENOUS at 21:34

## 2019-12-25 RX ADMIN — METHIMAZOLE 20 MG: 5 TABLET ORAL at 10:12

## 2019-12-25 RX ADMIN — ALBUTEROL SULFATE 2.5 MG: 2.5 SOLUTION RESPIRATORY (INHALATION) at 20:38

## 2019-12-25 RX ADMIN — METOPROLOL TARTRATE 100 MG: 50 TABLET, FILM COATED ORAL at 10:07

## 2019-12-25 RX ADMIN — DILTIAZEM HYDROCHLORIDE 90 MG: 60 TABLET, FILM COATED ORAL at 15:27

## 2019-12-25 RX ADMIN — HYDRALAZINE HYDROCHLORIDE 100 MG: 50 TABLET ORAL at 15:26

## 2019-12-25 RX ADMIN — HYDRALAZINE HYDROCHLORIDE 100 MG: 50 TABLET ORAL at 06:11

## 2019-12-25 RX ADMIN — MINERAL OIL AND WHITE PETROLATUM: 150; 830 OINTMENT OPHTHALMIC at 21:58

## 2019-12-25 RX ADMIN — SENNOSIDES AND DOCUSATE SODIUM 2 TABLET: 8.6; 5 TABLET ORAL at 10:08

## 2019-12-25 RX ADMIN — INSULIN LISPRO 3 UNITS: 100 INJECTION, SOLUTION INTRAVENOUS; SUBCUTANEOUS at 06:11

## 2019-12-25 RX ADMIN — DILTIAZEM HYDROCHLORIDE 90 MG: 60 TABLET, FILM COATED ORAL at 21:35

## 2019-12-25 RX ADMIN — Medication 1 DROP: at 21:58

## 2019-12-25 RX ADMIN — METHIMAZOLE 10 MG: 5 TABLET ORAL at 17:34

## 2019-12-25 RX ADMIN — INSULIN LISPRO 3 UNITS: 100 INJECTION, SOLUTION INTRAVENOUS; SUBCUTANEOUS at 12:43

## 2019-12-25 RX ADMIN — LISINOPRIL 40 MG: 20 TABLET ORAL at 10:11

## 2019-12-25 RX ADMIN — MODAFINIL 100 MG: 100 TABLET ORAL at 12:42

## 2019-12-25 RX ADMIN — SIMETHICONE 40 MG: 20 SUSPENSION/ DROPS ORAL at 22:00

## 2019-12-25 RX ADMIN — METFORMIN HYDROCHLORIDE 1000 MG: 500 TABLET ORAL at 10:12

## 2019-12-25 RX ADMIN — Medication 15 ML: at 10:14

## 2019-12-25 RX ADMIN — SIMETHICONE 40 MG: 20 SUSPENSION/ DROPS ORAL at 15:28

## 2019-12-25 RX ADMIN — ALBUTEROL SULFATE 2.5 MG: 2.5 SOLUTION RESPIRATORY (INHALATION) at 09:10

## 2019-12-25 RX ADMIN — Medication 1 DROP: at 10:16

## 2019-12-25 RX ADMIN — SIMETHICONE 40 MG: 20 SUSPENSION/ DROPS ORAL at 17:35

## 2019-12-25 RX ADMIN — HYDRALAZINE HYDROCHLORIDE 100 MG: 50 TABLET ORAL at 21:35

## 2019-12-25 RX ADMIN — SODIUM CHLORIDE: 4.5 INJECTION, SOLUTION INTRAVENOUS at 10:07

## 2019-12-25 RX ADMIN — DESMOPRESSIN ACETATE 40 MG: 0.2 TABLET ORAL at 21:35

## 2019-12-25 RX ADMIN — DILTIAZEM HYDROCHLORIDE 90 MG: 60 TABLET, FILM COATED ORAL at 06:11

## 2019-12-25 RX ADMIN — ALBUTEROL SULFATE 2.5 MG: 2.5 SOLUTION RESPIRATORY (INHALATION) at 02:08

## 2019-12-25 RX ADMIN — FAMOTIDINE 20 MG: 10 INJECTION, SOLUTION INTRAVENOUS at 10:13

## 2019-12-25 RX ADMIN — ALBUTEROL SULFATE 2.5 MG: 2.5 SOLUTION RESPIRATORY (INHALATION) at 14:33

## 2019-12-25 RX ADMIN — DOCUSATE SODIUM 100 MG: 50 LIQUID ORAL at 10:15

## 2019-12-25 RX ADMIN — METOPROLOL TARTRATE 100 MG: 50 TABLET, FILM COATED ORAL at 21:45

## 2019-12-25 RX ADMIN — INSULIN GLARGINE 10 UNITS: 100 INJECTION, SOLUTION SUBCUTANEOUS at 21:36

## 2019-12-25 RX ADMIN — INSULIN LISPRO 3 UNITS: 100 INJECTION, SOLUTION INTRAVENOUS; SUBCUTANEOUS at 22:54

## 2019-12-25 RX ADMIN — GLYBURIDE 5 MG: 5 TABLET ORAL at 17:34

## 2019-12-25 RX ADMIN — GLYBURIDE 5 MG: 5 TABLET ORAL at 10:07

## 2019-12-25 RX ADMIN — ENOXAPARIN SODIUM 40 MG: 40 INJECTION SUBCUTANEOUS at 10:08

## 2019-12-25 RX ADMIN — Medication 1 DROP: at 15:28

## 2019-12-25 RX ADMIN — SIMETHICONE 40 MG: 20 SUSPENSION/ DROPS ORAL at 10:14

## 2019-12-25 RX ADMIN — METFORMIN HYDROCHLORIDE 1000 MG: 500 TABLET ORAL at 17:35

## 2019-12-25 RX ADMIN — FLUOXETINE HYDROCHLORIDE 20 MG: 20 CAPSULE ORAL at 10:13

## 2019-12-25 ASSESSMENT — PULMONARY FUNCTION TESTS
PIF_VALUE: 16
PIF_VALUE: 18
PIF_VALUE: 17
PIF_VALUE: 20
PIF_VALUE: 17
PIF_VALUE: 22

## 2019-12-25 ASSESSMENT — PAIN SCALES - GENERAL
PAINLEVEL_OUTOF10: 0
PAINLEVEL_OUTOF10: 0

## 2019-12-25 NOTE — PROGRESS NOTES
PULMONARY PROGRESS NOTE      Patient:  Antonia Nino  YOB: 1955    MRN: [id number]     Acct: [de-identified]     Admit date: 12/6/2019    REASON FOR CONSULT:-Vent management    Pt seen and Chart reviewed. Subjective:   No acute events overnight  He is on CPAP this morning  Opening eyes upon calling   Afebrile overnight, hemodynamically stable  MARILYN improved, creatinine 1.09 today  No leukocytosis WBC 8.1 today  Small to moderate white secretions from tracheostomy      Review of Systems -   Unable to obtain due to mental status      Physical Exam:  Vitals: /81   Pulse 75   Temp 97.4 °F (36.3 °C) (Temporal)   Resp 16   Ht 5' 7\" (1.702 m)   Wt 192 lb 0.3 oz (87.1 kg)   SpO2 93%   BMI 30.07 kg/m²   24 hour intake/output:    Intake/Output Summary (Last 24 hours) at 12/25/2019 1333  Last data filed at 12/25/2019 0655  Gross per 24 hour   Intake 2034.18 ml   Output 806 ml   Net 1228.18 ml     Last 3 weights:   Wt Readings from Last 3 Encounters:   12/25/19 192 lb 0.3 oz (87.1 kg)   12/06/19 225 lb (102.1 kg)   04/06/15 194 lb 9.6 oz (88.3 kg)       General appearance: Physical Examination:   General appearance -opening eyes on calling, not following commands  Mental status -opening eyes on calling, not following commands  Eyes - pupils equal and reactive  Mouth - mucous membranes moist, pharynx normal without lesions  Neck - supple, no significant adenopathy, trach+  Chest - clear to auscultation, no wheezes, rales or rhonchi, symmetric air entry  Heart - normal rate, regular rhythm, normal S1, S2, no murmurs, rubs, clicks or gallops  Abdomen - soft, nontender, nondistended, no masses or organomegaly, PEG+  Neurological -opening eyes upon calling, not following commands, mild right facial droop, right hemiplegia+  extremities - peripheral pulses normal, no pedal edema, no clubbing or cyanosis      Diet:  DIET TUBE FEED CONTINUOUS/CYCLIC NPO; Diabetic; Gastrostomy; Continuous; 20; 60; POCGLU 111* 190* 184*     Thyroid:   Lab Results   Component Value Date    TSH 3.15 12/06/2019      Urinalysis:   No results for input(s): BACTERIA, BLOODU, CLARITYU, COLORU, PHUR, PROTEINU, RBCUA, SPECGRAV, BILIRUBINUR, NITRU, WBCUA, LEUKOCYTESUR, GLUCOSEU in the last 72 hours. Xr Abdomen (kub) (single Ap View)    Result Date: 12/17/2019  Nonspecific bowel gas pattern without evidence for obstruction. Ct Head Wo Contrast    Result Date: 12/20/2019  1. Interval decrease in size of left thalamic hemorrhage. Persistent associated edema. 2.  Decrease in intraventricular blood posterior horns of the lateral ventricles. 3.  Mild rightward midline shift of 5.9 mm. Xr Chest Portable    Result Date: 12/19/2019  1. Support devices appear unchanged in position. 2. Improvement in the right basilar opacification. 3. Persistent enlargement of the cardiac silhouette. Xr Chest Portable    Result Date: 12/18/2019  Rotated exam.  Right basilar opacity can reflect atelectasis, pneumonia, or focal edema. Xr Chest Portable    Result Date: 12/17/2019  Stable mild cardiomegaly. Otherwise, unremarkable single upright portable AP view of the chest.  Note: Left costophrenic angle is not completely within the field of view. Xr Chest Portable    Result Date: 12/16/2019  1. Tracheostomy tube as detailed above. 2. Mild bilateral pulmonary vascular congestion. Mild bibasilar airspace disease, atelectasis and/or infiltrate. Trace bilateral pleural effusions. Findings favored to represent mild CHF related changes.          Assessment and Plan:  Principal Problem:    Nontraumatic cortical hemorrhage of left cerebral hemisphere [facility])  Active Problems:    MARILYN (acute kidney injury) ([facility] [address]    Atrial fibrillation (HCC)    Uncontrolled hypertension    CAD (coronary artery disease)    Diabetes mellitus type 2 in obese (HCC)    Hyperlipidemia    Hyperthyroidism    Acute intra-cranial hemorrhage (HCC)    Intraparenchymal hematoma of

## 2019-12-25 NOTE — PROGRESS NOTES
complications due to other cardiac device, implant, and graft, Proteinuria, Renal cyst, Thyroid disease, and Type II or unspecified type diabetes mellitus without mention of complication, not stated as uncontrolled. Social History:   reports that he has been smoking cigarettes. He has a 47.00 pack-year smoking history. He has never used smokeless tobacco. He reports that he does not drink alcohol. Family History: No family history on file. Vitals:  /76   Pulse 76   Temp 98.2 °F (36.8 °C) (Oral)   Resp 16   Ht 5' 7\" (1.702 m)   Wt 192 lb 0.3 oz (87.1 kg)   SpO2 98%   BMI 30.07 kg/m²   Temp (24hrs), Av.6 °F (36.4 °C), Min:97.2 °F (36.2 °C), Max:98.2 °F (36.8 °C)    Recent Labs     19  2340 19  0610 19  1141   POCGLU 115* 111* 190* 184*       I/O (24Hr):     Intake/Output Summary (Last 24 hours) at 2019 1657  Last data filed at 2019 0800  Gross per 24 hour   Intake 2034.18 ml   Output 806 ml   Net 1228.18 ml       Labs:  Hematology:  Recent Labs     19  0528 19  0549 19  0603   WBC 13.1* 9.4 8.1   RBC 4.87 4.52 4.14*   HGB 15.3 14.3 13.1   HCT 47.9 45.0 40.2*   MCV 98.4 99.6 97.1   MCH 31.4 31.6 31.6   MCHC 31.9 31.8 32.6   RDW 14.5* 14.4 14.2    222 196   MPV 12.0 11.6 12.0     Chemistry:  Recent Labs     19  0528 19  0549 19  0603    141 141   K 4.2 4.4 4.4   CL 99 101 104   CO2 31 27 25   GLUCOSE 183* 191* 206*   BUN 48* 46* 41*   CREATININE 1.56* 1.25* 1.09   ANIONGAP 12 13 12   LABGLOM 45* 59* >60   GFRAA 55* >60 >60   CALCIUM 10.4 9.9 9.4     Recent Labs     19  1150 19  1658 19  2049 19  2340 19  0610 19  1141   POCGLU 137* 128* 115* 111* 190* 184*     ABG:  Lab Results   Component Value Date    POCPH 7.416 2019    POCPCO2 46.4 2019    POCPO2 96.2 2019    POCHCO3 29.8 2019    NBEA NOT REPORTED 2019    PBEA 4 2019    YYA7DJR 31 12/14/2019    EREB5BFG 98 12/14/2019    FIO2 45.0 12/14/2019     Lab Results   Component Value Date/Time    SPECIAL  LT HAND 10ML 12/09/2019 05:46 PM     Lab Results   Component Value Date/Time    CULTURE NO GROWTH 6 DAYS 12/09/2019 05:46 PM       Radiology:  Timtohy Duarte Abdomen (kub) (single Ap View)    Result Date: 12/17/2019  Nonspecific bowel gas pattern without evidence for obstruction. Ct Head Wo Contrast    Result Date: 12/20/2019  1. Interval decrease in size of left thalamic hemorrhage. Persistent associated edema. 2.  Decrease in intraventricular blood posterior horns of the lateral ventricles. 3.  Mild rightward midline shift of 5.9 mm. Xr Chest Portable    Result Date: 12/19/2019  1. Support devices appear unchanged in position. 2. Improvement in the right basilar opacification. 3. Persistent enlargement of the cardiac silhouette. Xr Chest Portable    Result Date: 12/18/2019  Rotated exam.  Right basilar opacity can reflect atelectasis, pneumonia, or focal edema. Xr Chest Portable    Result Date: 12/17/2019  Stable mild cardiomegaly. Otherwise, unremarkable single upright portable AP view of the chest.  Note: Left costophrenic angle is not completely within the field of view.        Physical Examination:        General appearance: Chronically ill-appearing gentleman, not in any distress, tracheostomy is currently in place, patient requiring mechanical ventilation  Mental Status: Affect appears flat, he is alert, intermittently following commands  Lungs: Coarse upper airway sounds in bilateral upper and lower lung fields, mechanical breath sounds, no wheezing appreciated  Heart: Controlled rate, irregular rhythm, no murmur  Abdomen:  soft, nontender, nondistended, normal bowel sounds, no masses, hepatomegaly, splenomegaly, PEG tube in place in left upper quadrant  Extremities:  no edema, redness, tenderness in the calves  Neuro: 0/5 muscle strength on right upper extremity; 3/5 in left upper extremity; wiggles left toes; nod yes and no appropriately to questioning  Skin:  no gross lesions, rashes, induration    Assessment:        Hospital Problems           Last Modified POA    * (Principal) Nontraumatic cortical hemorrhage of left cerebral hemisphere ([address] 12/6/2019 Yes    MARILYN (acute kidney injury) ([address] 12/23/2019 No    Atrial fibrillation ([address] 12/6/2019 Yes    Uncontrolled hypertension 12/6/2019 Yes    CAD (coronary artery disease) 12/6/2019 Yes    Diabetes mellitus type 2 in obese ([address] 12/6/2019 Yes    Hyperlipidemia 12/6/2019 Yes    Hyperthyroidism 12/6/2019 Yes    Acute intra-cranial hemorrhage ([address] 12/6/2019 Yes    Intraparenchymal hematoma of brain ([address] 12/7/2019 Yes    Ventilator dependence ([address] 12/8/2019 Yes    Acute on chronic combined systolic and diastolic congestive heart failure ([address] 12/8/2019 Yes    Hypernatremia 12/15/2019 Yes    Aspiration pneumonia ([address] 12/15/2019 Yes    Right sided weakness 12/19/2019 Yes    Acute respiratory failure with hypoxia and hypercapnia ([address] 12/22/2019 Yes          Plan:        1. Appreciate the assistance of my consultants - neurology, pulmonology  2. Appreciate optho eval - continue polymycin, artificial tears  3. Appreciate pulm recs regarding vent management  4. S/p Trach/PEG on 12/14 - weaning mechanical ventilation as able, TF running at goal; 8.0 PEEP; Vt 530  5. MARILYN has essentially resolved. HL IVF; restart bumex tomorrow at half dose; stop HCTZ  6. Continue to monitor bladder scans and PRN straight caths  7. Labs reviewed - essentially no major abnormalities  8. Neurology recommending against restarting anticoagulation at this time; repeat CT head in 2 weeks  9. Consultation to cardiology for watchman device; await consultation  10. Continue Lantus, ISS for glycemic control, continue metformin and diabeta  11.  Continue current anti-hypertensive regimen - Norvasc 10 QD, Cardizem 90 mg q 8 hours, Hydralazine 75 mg q 8 hours, lisinopril 40 qd

## 2019-12-26 LAB
ABSOLUTE EOS #: 0.08 K/UL (ref 0–0.44)
ABSOLUTE IMMATURE GRANULOCYTE: 0.06 K/UL (ref 0–0.3)
ABSOLUTE LYMPH #: 1.21 K/UL (ref 1.1–3.7)
ABSOLUTE MONO #: 0.5 K/UL (ref 0.1–1.2)
BASOPHILS # BLD: 1 % (ref 0–2)
BASOPHILS ABSOLUTE: 0.06 K/UL (ref 0–0.2)
DIFFERENTIAL TYPE: ABNORMAL
EOSINOPHILS RELATIVE PERCENT: 1 % (ref 1–4)
GLUCOSE BLD-MCNC: 117 MG/DL (ref 75–110)
GLUCOSE BLD-MCNC: 130 MG/DL (ref 75–110)
GLUCOSE BLD-MCNC: 146 MG/DL (ref 75–110)
GLUCOSE BLD-MCNC: 164 MG/DL (ref 75–110)
GLUCOSE BLD-MCNC: 194 MG/DL (ref 75–110)
HCT VFR BLD CALC: 41.4 % (ref 40.7–50.3)
HEMOGLOBIN: 13.9 G/DL (ref 13–17)
IMMATURE GRANULOCYTES: 1 %
LYMPHOCYTES # BLD: 12 % (ref 24–43)
MCH RBC QN AUTO: 32.7 PG (ref 25.2–33.5)
MCHC RBC AUTO-ENTMCNC: 33.6 G/DL (ref 28.4–34.8)
MCV RBC AUTO: 97.4 FL (ref 82.6–102.9)
MONOCYTES # BLD: 5 % (ref 3–12)
NRBC AUTOMATED: 0 PER 100 WBC
PDW BLD-RTO: 14 % (ref 11.8–14.4)
PLATELET # BLD: 247 K/UL (ref 138–453)
PLATELET ESTIMATE: ABNORMAL
PMV BLD AUTO: 11.6 FL (ref 8.1–13.5)
RBC # BLD: 4.25 M/UL (ref 4.21–5.77)
RBC # BLD: ABNORMAL 10*6/UL
SEG NEUTROPHILS: 80 % (ref 36–65)
SEGMENTED NEUTROPHILS ABSOLUTE COUNT: 8.45 K/UL (ref 1.5–8.1)
WBC # BLD: 10.4 K/UL (ref 3.5–11.3)
WBC # BLD: ABNORMAL 10*3/UL

## 2019-12-26 PROCEDURE — 94640 AIRWAY INHALATION TREATMENT: CPT

## 2019-12-26 PROCEDURE — 94003 VENT MGMT INPAT SUBQ DAY: CPT

## 2019-12-26 PROCEDURE — 99231 SBSQ HOSP IP/OBS SF/LOW 25: CPT | Performed by: INTERNAL MEDICINE

## 2019-12-26 PROCEDURE — 6370000000 HC RX 637 (ALT 250 FOR IP): Performed by: STUDENT IN AN ORGANIZED HEALTH CARE EDUCATION/TRAINING PROGRAM

## 2019-12-26 PROCEDURE — 97110 THERAPEUTIC EXERCISES: CPT

## 2019-12-26 PROCEDURE — 6370000000 HC RX 637 (ALT 250 FOR IP): Performed by: INTERNAL MEDICINE

## 2019-12-26 PROCEDURE — 51798 US URINE CAPACITY MEASURE: CPT

## 2019-12-26 PROCEDURE — 2500000003 HC RX 250 WO HCPCS: Performed by: STUDENT IN AN ORGANIZED HEALTH CARE EDUCATION/TRAINING PROGRAM

## 2019-12-26 PROCEDURE — 94770 HC ETCO2 MONITOR DAILY: CPT

## 2019-12-26 PROCEDURE — 6360000002 HC RX W HCPCS: Performed by: STUDENT IN AN ORGANIZED HEALTH CARE EDUCATION/TRAINING PROGRAM

## 2019-12-26 PROCEDURE — 6370000000 HC RX 637 (ALT 250 FOR IP): Performed by: NURSE PRACTITIONER

## 2019-12-26 PROCEDURE — 2700000000 HC OXYGEN THERAPY PER DAY

## 2019-12-26 PROCEDURE — 82947 ASSAY GLUCOSE BLOOD QUANT: CPT

## 2019-12-26 PROCEDURE — 51701 INSERT BLADDER CATHETER: CPT

## 2019-12-26 PROCEDURE — 99233 SBSQ HOSP IP/OBS HIGH 50: CPT | Performed by: INTERNAL MEDICINE

## 2019-12-26 PROCEDURE — 2580000003 HC RX 258: Performed by: STUDENT IN AN ORGANIZED HEALTH CARE EDUCATION/TRAINING PROGRAM

## 2019-12-26 PROCEDURE — 36415 COLL VENOUS BLD VENIPUNCTURE: CPT

## 2019-12-26 PROCEDURE — 94761 N-INVAS EAR/PLS OXIMETRY MLT: CPT

## 2019-12-26 PROCEDURE — 85025 COMPLETE CBC W/AUTO DIFF WBC: CPT

## 2019-12-26 PROCEDURE — 2060000000 HC ICU INTERMEDIATE R&B

## 2019-12-26 RX ADMIN — METOPROLOL TARTRATE 100 MG: 50 TABLET, FILM COATED ORAL at 20:30

## 2019-12-26 RX ADMIN — DILTIAZEM HYDROCHLORIDE 90 MG: 60 TABLET, FILM COATED ORAL at 14:23

## 2019-12-26 RX ADMIN — LISINOPRIL 40 MG: 20 TABLET ORAL at 09:59

## 2019-12-26 RX ADMIN — FAMOTIDINE 20 MG: 10 INJECTION, SOLUTION INTRAVENOUS at 09:59

## 2019-12-26 RX ADMIN — DESMOPRESSIN ACETATE 40 MG: 0.2 TABLET ORAL at 20:30

## 2019-12-26 RX ADMIN — DOCUSATE SODIUM 100 MG: 50 LIQUID ORAL at 10:02

## 2019-12-26 RX ADMIN — SIMETHICONE 40 MG: 20 SUSPENSION/ DROPS ORAL at 20:32

## 2019-12-26 RX ADMIN — HYDRALAZINE HYDROCHLORIDE 100 MG: 50 TABLET ORAL at 20:31

## 2019-12-26 RX ADMIN — GLYBURIDE 5 MG: 5 TABLET ORAL at 10:02

## 2019-12-26 RX ADMIN — Medication 1 DROP: at 14:23

## 2019-12-26 RX ADMIN — SIMETHICONE 40 MG: 20 SUSPENSION/ DROPS ORAL at 09:58

## 2019-12-26 RX ADMIN — ALBUTEROL SULFATE 2.5 MG: 2.5 SOLUTION RESPIRATORY (INHALATION) at 03:52

## 2019-12-26 RX ADMIN — INSULIN GLARGINE 10 UNITS: 100 INJECTION, SOLUTION SUBCUTANEOUS at 20:43

## 2019-12-26 RX ADMIN — Medication 1 DROP: at 10:01

## 2019-12-26 RX ADMIN — METFORMIN HYDROCHLORIDE 1000 MG: 500 TABLET ORAL at 17:11

## 2019-12-26 RX ADMIN — ALBUTEROL SULFATE 2.5 MG: 2.5 SOLUTION RESPIRATORY (INHALATION) at 19:40

## 2019-12-26 RX ADMIN — MODAFINIL 100 MG: 100 TABLET ORAL at 17:43

## 2019-12-26 RX ADMIN — Medication 1 DROP: at 20:33

## 2019-12-26 RX ADMIN — Medication 15 ML: at 09:54

## 2019-12-26 RX ADMIN — BUMETANIDE 0.5 MG: 1 TABLET ORAL at 09:58

## 2019-12-26 RX ADMIN — GLYBURIDE 5 MG: 5 TABLET ORAL at 17:11

## 2019-12-26 RX ADMIN — HYDRALAZINE HYDROCHLORIDE 100 MG: 50 TABLET ORAL at 14:23

## 2019-12-26 RX ADMIN — INSULIN LISPRO 3 UNITS: 100 INJECTION, SOLUTION INTRAVENOUS; SUBCUTANEOUS at 17:14

## 2019-12-26 RX ADMIN — ALBUTEROL SULFATE 2.5 MG: 2.5 SOLUTION RESPIRATORY (INHALATION) at 14:48

## 2019-12-26 RX ADMIN — DILTIAZEM HYDROCHLORIDE 90 MG: 60 TABLET, FILM COATED ORAL at 06:12

## 2019-12-26 RX ADMIN — METHIMAZOLE 20 MG: 5 TABLET ORAL at 09:59

## 2019-12-26 RX ADMIN — Medication 100 MG: at 09:59

## 2019-12-26 RX ADMIN — HYDRALAZINE HYDROCHLORIDE 100 MG: 50 TABLET ORAL at 06:46

## 2019-12-26 RX ADMIN — FLUOXETINE HYDROCHLORIDE 20 MG: 20 CAPSULE ORAL at 09:59

## 2019-12-26 RX ADMIN — DILTIAZEM HYDROCHLORIDE 90 MG: 60 TABLET, FILM COATED ORAL at 20:31

## 2019-12-26 RX ADMIN — SIMETHICONE 40 MG: 20 SUSPENSION/ DROPS ORAL at 14:23

## 2019-12-26 RX ADMIN — INSULIN LISPRO 3 UNITS: 100 INJECTION, SOLUTION INTRAVENOUS; SUBCUTANEOUS at 11:55

## 2019-12-26 RX ADMIN — METOPROLOL TARTRATE 100 MG: 50 TABLET, FILM COATED ORAL at 09:59

## 2019-12-26 RX ADMIN — FAMOTIDINE 20 MG: 10 INJECTION, SOLUTION INTRAVENOUS at 20:32

## 2019-12-26 RX ADMIN — ENOXAPARIN SODIUM 40 MG: 40 INJECTION SUBCUTANEOUS at 10:00

## 2019-12-26 RX ADMIN — INSULIN LISPRO 3 UNITS: 100 INJECTION, SOLUTION INTRAVENOUS; SUBCUTANEOUS at 05:31

## 2019-12-26 RX ADMIN — METFORMIN HYDROCHLORIDE 1000 MG: 500 TABLET ORAL at 09:59

## 2019-12-26 RX ADMIN — ALBUTEROL SULFATE 2.5 MG: 2.5 SOLUTION RESPIRATORY (INHALATION) at 09:18

## 2019-12-26 RX ADMIN — SODIUM CHLORIDE, PRESERVATIVE FREE 10 ML: 5 INJECTION INTRAVENOUS at 20:32

## 2019-12-26 RX ADMIN — SIMETHICONE 40 MG: 20 SUSPENSION/ DROPS ORAL at 17:11

## 2019-12-26 RX ADMIN — METHIMAZOLE 10 MG: 5 TABLET ORAL at 17:11

## 2019-12-26 RX ADMIN — Medication 15 ML: at 20:32

## 2019-12-26 ASSESSMENT — PAIN SCALES - WONG BAKER

## 2019-12-26 ASSESSMENT — PULMONARY FUNCTION TESTS
PIF_VALUE: 17
PIF_VALUE: 17

## 2019-12-26 ASSESSMENT — PAIN SCALES - GENERAL
PAINLEVEL_OUTOF10: 0

## 2019-12-26 NOTE — PLAN OF CARE
Problem: MECHANICAL VENTILATION  Goal: Mobility/activity is maintained at optimum level for patient  12/25/2019 2042 by Brendan Barrera RCP  Outcome: Ongoing     Problem: MECHANICAL VENTILATION  Goal: Ability to express needs and understand communication  12/25/2019 2042 by Brendan Barrera RCP  Outcome: Ongoing     Problem: MECHANICAL VENTILATION  Goal: Patient will maintain patent airway  12/25/2019 2042 by Brendan Barrera RCP  Outcome: Ongoing     Problem: MECHANICAL VENTILATION  Goal: Oral health is maintained or improved  12/25/2019 2042 by Brendan Barrera RCP  Outcome: Ongoing     Problem: MECHANICAL VENTILATION  Goal: Tracheostomy will be managed safely  12/25/2019 2042 by Brendan Barrera RCP  Outcome: Ongoing     Problem: OXYGENATION/RESPIRATORY FUNCTION  Goal: Patient will maintain patent airway  12/25/2019 2042 by Brendan Barrera RCP  Outcome: Ongoing     Problem: OXYGENATION/RESPIRATORY FUNCTION  Goal: Patient will achieve/maintain normal respiratory rate/effort  Description  Respiratory rate and effort will be within normal limits for the patient  12/25/2019 2042 by Brendan Barrera RCP  Outcome: Ongoing   BRONCHOSPASM/BRONCHOCONSTRICTION     [x]         IMPROVE AERATION/BREATH SOUNDS  [x]   ADMINISTER BRONCHODILATOR THERAPY AS APPROPRIATE  [x]   ASSESS BREATH SOUNDS  [x]   IMPLEMENT AEROSOL/MDI PROTOCOL  [x]   PATIENT EDUCATION AS NEEDED

## 2019-12-26 NOTE — PROGRESS NOTES
Giuliano Overton [address]    Progress Note    12/26/2019    9:58 AM    Name:   Nicola Coleman  MRN:     [id number]     Acct:      [de-identified]   Room:   [facility]/[address] [place] [address] Day:  21  Admit Date:  12/6/2019 10:59 AM    PCP:   Sher Isabel MD  Code Status:  Full Code    Subjective:     C/C:   Chief Complaint   Patient presents with    Cerebrovascular Accident     Interval History Status: not changed. Patient seen and examined this morning. No acute events overnight. On CPAP currently with a pressure support of 8. Appears to be tolerating at this moment in time. He remains awake and alert. No pain, per patient. Continues to have left upper extremity strength 3/5. Wiggling toes on left. Denies shortness of breath. 1600 cc urine over past 24 hours. Brief History:     Per my associates:    Mr. Vivian Harding is a 30 yr old male with hx of A fib on coumadin, CHF s/p AICD, CAD, DVT, HTN, DM, thyroidectomy who presented initially to outside hospital on 12/6 with complaints of headache, syncope, right side weakness, facial droop, and aphasia.  Initial imaging revealed a left basal ganglia bleed, transferred to [facility].     Upon arrival at [facility], findings confirmed, Initial NIH:10, patient with above symptoms along with left periphreal field vision loss, maintaining airway, following commands, speech difficulty, some confusion, and right upper and lower extremity weakness. He was noted to have uncontrolled HTN requiring cardene gtt, and INR:2.7 as patient is on coumadin for AFib requiring Vit. K and [place] for reversal.  Admitted to NICU.      In neuro ICU has worsening respiratory distress requiring intubation. Repeat imaging demonstrating midline shift. Had worsening secretions concerning for pneumonia, started on antibiotics. Unable to wean off ventilator, underwent eventual trach/PEG per surgery on 12/14. Started and tolerated tube feeds.  DC planning to LTAC in progress. Review of Systems:     Unable to assess as patient is currently with tracheostomy and is minimally responsive to questioning. Shakes head no when asked if he is in pain or if he has trouble breathing. Medications:      Allergies:  No Known Allergies    Current Meds:   Scheduled Meds:    bumetanide  0.5 mg Oral Daily    hydrALAZINE  100 mg Oral 3 times per day    modafinil  100 mg Per NG tube Daily    insulin glargine  10 Units Subcutaneous Daily    FLUoxetine  20 mg Oral Daily    diltiazem  90 mg Oral 3 times per day    albuterol  2.5 mg Nebulization Q6H    insulin lispro  0-18 Units Subcutaneous 4 times per day    famotidine (PEPCID) injection  20 mg Intravenous BID    sennosides-docusate sodium  2 tablet Oral Daily    tetrahydrozoline  1 drop Left Eye TID    metFORMIN  1,000 mg Per NG tube BID WC    enoxaparin  40 mg Subcutaneous Daily    docusate  100 mg Per NG tube Daily    glyBURIDE  5 mg Per NG tube BID WC    vitamin B-1  100 mg Oral Daily    chlorhexidine  15 mL Mouth/Throat BID    lisinopril  40 mg Oral Daily    simethicone  40 mg Per NG tube 4x Daily    atorvastatin  40 mg Oral Daily    sodium chloride flush  10 mL Intravenous 2 times per day    methIMAzole  20 mg Oral Daily    methIMAzole  10 mg Oral QPM    metoprolol tartrate  100 mg Oral BID     Continuous Infusions:    dextrose Stopped (12/19/19 0952)     PRN Meds: fentanNYL, REFRESH LACRI-LUBE, potassium chloride, fentanNYL, glucose, dextrose, glucagon (rDNA), dextrose, acetaminophen **OR** acetaminophen, sodium chloride flush, ondansetron, hydrALAZINE    Data:     Past Medical History:   has a past medical history of Atrial fibrillation ([facility] [address] CAD (coronary artery disease), H/O heart artery stent, Hx of blood clots, Hyperlipidemia, Hypertension, Hyperthyroidism, Kidney stones, MI, old, Microalbuminuria, Other complications due to other cardiac device, implant, and graft, Proteinuria, Renal cyst, Thyroid disease, and Type II or unspecified type diabetes mellitus without mention of complication, not stated as uncontrolled. Social History:   reports that he has been smoking cigarettes. He has a 47.00 pack-year smoking history. He has never used smokeless tobacco. He reports that he does not drink alcohol. Family History: No family history on file. Vitals:  /78   Pulse 76   Temp 97.6 °F (36.4 °C) (Oral)   Resp 16   Ht 5' 7\" (1.702 m)   Wt 192 lb 0.3 oz (87.1 kg)   SpO2 97%   BMI 30.07 kg/m²   Temp (24hrs), Av.8 °F (36.6 °C), Min:97.6 °F (36.4 °C), Max:98.2 °F (36.8 °C)    Recent Labs     19  1141 19  1720 19  2150 19  0531   POCGLU 184* 97 141* 164*       I/O (24Hr):     Intake/Output Summary (Last 24 hours) at 2019 0958  Last data filed at 2019 0600  Gross per 24 hour   Intake 2099 ml   Output 1600 ml   Net 499 ml       Labs:  Hematology:  Recent Labs     19  0549 19  0603 19  0557   WBC 9.4 8.1 10.4   RBC 4.52 4.14* 4.25   HGB 14.3 13.1 13.9   HCT 45.0 40.2* 41.4   MCV 99.6 97.1 97.4   MCH 31.6 31.6 32.7   MCHC 31.8 32.6 33.6   RDW 14.4 14.2 14.0    196 247   MPV 11.6 12.0 11.6     Chemistry:  Recent Labs     19  0549 19  0603    141   K 4.4 4.4    104   CO2 27 25   GLUCOSE 191* 206*   BUN 46* 41*   CREATININE 1.25* 1.09   ANIONGAP 13 12   LABGLOM 59* >60   GFRAA >60 >60   CALCIUM 9.9 9.4     Recent Labs     19  2340 19  0610 19  1141 19  1720 19  2150 19  0531   POCGLU 111* 190* 184* 97 141* 164*     ABG:  Lab Results   Component Value Date    POCPH 7.416 2019    POCPCO2 46.4 2019    POCPO2 96.2 2019    POCHCO3 29.8 2019    NBEA NOT REPORTED 2019    PBEA 4 2019    CEC1DBK 31 2019    TKEM6KYA 98 2019    FIO2 45.0 2019     Lab Results   Component Value Date/Time    SPECIAL  LT HAND 10ML 2019 05:46 PM     Lab Results   Component Value Date/Time    CULTURE NO GROWTH 6 DAYS 12/09/2019 05:46 PM       Radiology:  [place] Abdomen (kub) (single Ap View)    Result Date: 12/17/2019  Nonspecific bowel gas pattern without evidence for obstruction. Ct Head Wo Contrast    Result Date: 12/20/2019  1. Interval decrease in size of left thalamic hemorrhage. Persistent associated edema. 2.  Decrease in intraventricular blood posterior horns of the lateral ventricles. 3.  Mild rightward midline shift of 5.9 mm. Xr Chest Portable    Result Date: 12/19/2019  1. Support devices appear unchanged in position. 2. Improvement in the right basilar opacification. 3. Persistent enlargement of the cardiac silhouette. Xr Chest Portable    Result Date: 12/18/2019  Rotated exam.  Right basilar opacity can reflect atelectasis, pneumonia, or focal edema. Xr Chest Portable    Result Date: 12/17/2019  Stable mild cardiomegaly. Otherwise, unremarkable single upright portable AP view of the chest.  Note: Left costophrenic angle is not completely within the field of view.        Physical Examination:        General appearance: Chronically ill-appearing gentleman, not in any distress, tracheostomy is currently in place, patient requiring mechanical ventilation  Mental Status: Affect appears flat, he is alert, intermittently following commands  Lungs: Coarse upper airway sounds in bilateral upper and lower lung fields, mechanical breath sounds, no wheezing appreciated  Heart: Controlled rate, irregular rhythm, no murmur  Abdomen:  soft, nontender, nondistended, normal bowel sounds, no masses, hepatomegaly, splenomegaly, PEG tube in place in left upper quadrant  Extremities:  no edema, redness, tenderness in the calves  Neuro: 0/5 muscle strength on right upper extremity; 3/5 in left upper extremity; wiggles left toes; nod yes and no appropriately to questioning  Skin:  no gross lesions, rashes, induration    Assessment:        Hospital Problems Last Modified POA    * (Principal) Nontraumatic cortical hemorrhage of left cerebral hemisphere ([address] 12/6/2019 Yes    MARILYN (acute kidney injury) ([address] 12/23/2019 No    Atrial fibrillation ([address] 12/6/2019 Yes    Uncontrolled hypertension 12/6/2019 Yes    CAD (coronary artery disease) 12/6/2019 Yes    Diabetes mellitus type 2 in obese ([address] 12/6/2019 Yes    Hyperlipidemia 12/6/2019 Yes    Hyperthyroidism 12/6/2019 Yes    Acute intra-cranial hemorrhage ([address] 12/6/2019 Yes    Intraparenchymal hematoma of brain ([address] 12/7/2019 Yes    Ventilator dependence ([address] 12/8/2019 Yes    Acute on chronic combined systolic and diastolic congestive heart failure ([address] 12/8/2019 Yes    Hypernatremia 12/15/2019 Yes    Aspiration pneumonia ([address] 12/15/2019 Yes    Right sided weakness 12/19/2019 Yes    Acute respiratory failure with hypoxia and hypercapnia ([address] 12/22/2019 Yes          Plan:        1. Appreciate the assistance of my consultants - neurology, pulmonology  2. Appreciate optho eval - continue polymycin, artificial tears  3. Appreciate pulm recs regarding vent management  4. S/p Trach/PEG on 12/14 - weaning mechanical ventilation as able, TF running at goal; on SBT this AM - Pressure support of 8  5. Check BMP in AM  6. Continue to monitor bladder scans and PRN straight caths  7. Neurology recommending against restarting anticoagulation at this time; repeat CT head in 2 weeks  8. Consultation to cardiology for watchman device; await consultation - I have reconsulted them today. 9. Continue Lantus, ISS for glycemic control, continue metformin and diabeta  10. Continue current anti-hypertensive regimen - Norvasc 10 QD, Cardizem 90 mg q 8 hours, Hydralazine 75 mg q 8 hours, lisinopril 40 qd and lopressor 100 BID - BP well controlled   11. DC planning - LTAC for mechanical vent weaning (precert is pending)  12.  Need to discuss with family today    Maria Fernanda Villa DO  12/26/2019  9:58 AM

## 2019-12-26 NOTE — PROGRESS NOTES
bumetanide  0.5 mg Oral Daily    hydrALAZINE  100 mg Oral 3 times per day    modafinil  100 mg Per NG tube Daily    insulin glargine  10 Units Subcutaneous Daily    FLUoxetine  20 mg Oral Daily    diltiazem  90 mg Oral 3 times per day    albuterol  2.5 mg Nebulization Q6H    insulin lispro  0-18 Units Subcutaneous 4 times per day    famotidine (PEPCID) injection  20 mg Intravenous BID    sennosides-docusate sodium  2 tablet Oral Daily    tetrahydrozoline  1 drop Left Eye TID    metFORMIN  1,000 mg Per NG tube BID WC    enoxaparin  40 mg Subcutaneous Daily    docusate  100 mg Per NG tube Daily    glyBURIDE  5 mg Per NG tube BID WC    vitamin B-1  100 mg Oral Daily    chlorhexidine  15 mL Mouth/Throat BID    lisinopril  40 mg Oral Daily    simethicone  40 mg Per NG tube 4x Daily    atorvastatin  40 mg Oral Daily    sodium chloride flush  10 mL Intravenous 2 times per day    methIMAzole  20 mg Oral Daily    methIMAzole  10 mg Oral QPM    metoprolol tartrate  100 mg Oral BID     Continuous Infusions:   dextrose Stopped (12/19/19 0952)     PRN Meds:fentanNYL, REFRESH LACRI-LUBE, potassium chloride, fentanNYL, glucose, dextrose, glucagon (rDNA), dextrose, acetaminophen **OR** acetaminophen, sodium chloride flush, ondansetron, hydrALAZINE    Objective:    CBC:   Recent Labs     12/24/19  0549 12/25/19  0603 12/26/19  0557   WBC 9.4 8.1 10.4   HGB 14.3 13.1 13.9    196 247     BMP:    Recent Labs     12/24/19  0549 12/25/19  0603    141   K 4.4 4.4    104   CO2 27 25   BUN 46* 41*   CREATININE 1.25* 1.09   GLUCOSE 191* 206*     Calcium:  Recent Labs     12/25/19  0603   CALCIUM 9.4     Glucose:  Recent Labs     12/25/19  1720 12/25/19  2150 12/26/19  0531   POCGLU 97 141* 164*     Thyroid:   Lab Results   Component Value Date    TSH 3.15 12/06/2019      Urinalysis:   No results for input(s): BACTERIA, BLOODU, CLARITYU, COLORU, [facility], [address] [facility], [address] [place] [address] 27, [address] [facility], [facility],

## 2019-12-26 NOTE — PROGRESS NOTES
12/26/19 0349   Surgical Airway (trach) Shiley Cuffed   Placement Date/Time: 12/14/19 0891   Timeout: Patient;Procedure;Site/Side;Appropriate Equipment; Consent Confirmed;Sterile Technique using full body drape  Placed By: In surgery  Surgical Airway Type: Tracheostomy  Brand: [place]  Style: Cuffed  Size (m. .. Status Secured   Site Assessment Clean;Dry   Site Care Cleansed;Dried;Dressing applied   Inner Cannula Care Changed/new   Ties Assessment Clean;Dry; Intact; Secure     Trach care completed w/o complications

## 2019-12-26 NOTE — CARE COORDINATION
Transitional Planning  Received call from Zenaida Villatoro at [facility] insurance company has denied again as \"not medically necessary. \" Peer to peer option is available - by calling [phone] with reference number [id number]. PS to Dr Roxane Martinez to update.

## 2019-12-27 ENCOUNTER — APPOINTMENT (OUTPATIENT)
Dept: GENERAL RADIOLOGY | Age: 64
DRG: 003 | End: 2019-12-27
Payer: MEDICARE

## 2019-12-27 LAB
ABSOLUTE EOS #: 0.05 K/UL (ref 0–0.44)
ABSOLUTE IMMATURE GRANULOCYTE: 0.08 K/UL (ref 0–0.3)
ABSOLUTE LYMPH #: 1.16 K/UL (ref 1.1–3.7)
ABSOLUTE MONO #: 0.5 K/UL (ref 0.1–1.2)
ANION GAP SERPL CALCULATED.3IONS-SCNC: 10 MMOL/L (ref 9–17)
BASOPHILS # BLD: 1 % (ref 0–2)
BASOPHILS ABSOLUTE: 0.07 K/UL (ref 0–0.2)
BUN BLDV-MCNC: 22 MG/DL (ref 8–23)
BUN/CREAT BLD: ABNORMAL (ref 9–20)
CALCIUM SERPL-MCNC: 9.4 MG/DL (ref 8.6–10.4)
CHLORIDE BLD-SCNC: 105 MMOL/L (ref 98–107)
CO2: 25 MMOL/L (ref 20–31)
CREAT SERPL-MCNC: 0.87 MG/DL (ref 0.7–1.2)
DIFFERENTIAL TYPE: ABNORMAL
EOSINOPHILS RELATIVE PERCENT: 1 % (ref 1–4)
GFR AFRICAN AMERICAN: >60 ML/MIN
GFR NON-AFRICAN AMERICAN: >60 ML/MIN
GFR SERPL CREATININE-BSD FRML MDRD: ABNORMAL ML/MIN/{1.73_M2}
GFR SERPL CREATININE-BSD FRML MDRD: ABNORMAL ML/MIN/{1.73_M2}
GLUCOSE BLD-MCNC: 137 MG/DL (ref 75–110)
GLUCOSE BLD-MCNC: 152 MG/DL (ref 75–110)
GLUCOSE BLD-MCNC: 167 MG/DL (ref 75–110)
GLUCOSE BLD-MCNC: 177 MG/DL (ref 70–99)
GLUCOSE BLD-MCNC: 206 MG/DL (ref 75–110)
HCT VFR BLD CALC: 39.8 % (ref 40.7–50.3)
HEMOGLOBIN: 13.3 G/DL (ref 13–17)
IMMATURE GRANULOCYTES: 1 %
LYMPHOCYTES # BLD: 11 % (ref 24–43)
MCH RBC QN AUTO: 31.6 PG (ref 25.2–33.5)
MCHC RBC AUTO-ENTMCNC: 33.4 G/DL (ref 28.4–34.8)
MCV RBC AUTO: 94.5 FL (ref 82.6–102.9)
MONOCYTES # BLD: 5 % (ref 3–12)
NRBC AUTOMATED: 0 PER 100 WBC
PDW BLD-RTO: 14.1 % (ref 11.8–14.4)
PLATELET # BLD: 188 K/UL (ref 138–453)
PLATELET ESTIMATE: ABNORMAL
PMV BLD AUTO: 11.3 FL (ref 8.1–13.5)
POTASSIUM SERPL-SCNC: 4.2 MMOL/L (ref 3.7–5.3)
RBC # BLD: 4.21 M/UL (ref 4.21–5.77)
RBC # BLD: ABNORMAL 10*6/UL
SEG NEUTROPHILS: 81 % (ref 36–65)
SEGMENTED NEUTROPHILS ABSOLUTE COUNT: 8.57 K/UL (ref 1.5–8.1)
SODIUM BLD-SCNC: 140 MMOL/L (ref 135–144)
WBC # BLD: 10.4 K/UL (ref 3.5–11.3)
WBC # BLD: ABNORMAL 10*3/UL

## 2019-12-27 PROCEDURE — 99233 SBSQ HOSP IP/OBS HIGH 50: CPT | Performed by: INTERNAL MEDICINE

## 2019-12-27 PROCEDURE — 36415 COLL VENOUS BLD VENIPUNCTURE: CPT

## 2019-12-27 PROCEDURE — 2500000003 HC RX 250 WO HCPCS: Performed by: STUDENT IN AN ORGANIZED HEALTH CARE EDUCATION/TRAINING PROGRAM

## 2019-12-27 PROCEDURE — 71045 X-RAY EXAM CHEST 1 VIEW: CPT

## 2019-12-27 PROCEDURE — 6370000000 HC RX 637 (ALT 250 FOR IP): Performed by: STUDENT IN AN ORGANIZED HEALTH CARE EDUCATION/TRAINING PROGRAM

## 2019-12-27 PROCEDURE — 6370000000 HC RX 637 (ALT 250 FOR IP): Performed by: INTERNAL MEDICINE

## 2019-12-27 PROCEDURE — 94640 AIRWAY INHALATION TREATMENT: CPT

## 2019-12-27 PROCEDURE — 82947 ASSAY GLUCOSE BLOOD QUANT: CPT

## 2019-12-27 PROCEDURE — 85025 COMPLETE CBC W/AUTO DIFF WBC: CPT

## 2019-12-27 PROCEDURE — 80048 BASIC METABOLIC PNL TOTAL CA: CPT

## 2019-12-27 PROCEDURE — 6360000002 HC RX W HCPCS: Performed by: STUDENT IN AN ORGANIZED HEALTH CARE EDUCATION/TRAINING PROGRAM

## 2019-12-27 PROCEDURE — 97110 THERAPEUTIC EXERCISES: CPT

## 2019-12-27 PROCEDURE — 2580000003 HC RX 258: Performed by: STUDENT IN AN ORGANIZED HEALTH CARE EDUCATION/TRAINING PROGRAM

## 2019-12-27 PROCEDURE — 2060000000 HC ICU INTERMEDIATE R&B

## 2019-12-27 PROCEDURE — 6370000000 HC RX 637 (ALT 250 FOR IP): Performed by: NURSE PRACTITIONER

## 2019-12-27 PROCEDURE — 99231 SBSQ HOSP IP/OBS SF/LOW 25: CPT | Performed by: INTERNAL MEDICINE

## 2019-12-27 PROCEDURE — 94761 N-INVAS EAR/PLS OXIMETRY MLT: CPT

## 2019-12-27 PROCEDURE — 2700000000 HC OXYGEN THERAPY PER DAY

## 2019-12-27 RX ADMIN — METFORMIN HYDROCHLORIDE 1000 MG: 500 TABLET ORAL at 08:32

## 2019-12-27 RX ADMIN — DOCUSATE SODIUM 100 MG: 50 LIQUID ORAL at 08:32

## 2019-12-27 RX ADMIN — Medication 1 DROP: at 21:24

## 2019-12-27 RX ADMIN — SIMETHICONE 40 MG: 20 SUSPENSION/ DROPS ORAL at 21:24

## 2019-12-27 RX ADMIN — SIMETHICONE 40 MG: 20 SUSPENSION/ DROPS ORAL at 12:37

## 2019-12-27 RX ADMIN — FLUOXETINE HYDROCHLORIDE 20 MG: 20 CAPSULE ORAL at 08:32

## 2019-12-27 RX ADMIN — MODAFINIL 100 MG: 100 TABLET ORAL at 12:37

## 2019-12-27 RX ADMIN — MINERAL OIL AND WHITE PETROLATUM: 150; 830 OINTMENT OPHTHALMIC at 08:33

## 2019-12-27 RX ADMIN — ALBUTEROL SULFATE 2.5 MG: 2.5 SOLUTION RESPIRATORY (INHALATION) at 19:37

## 2019-12-27 RX ADMIN — Medication 100 MG: at 08:32

## 2019-12-27 RX ADMIN — LISINOPRIL 40 MG: 20 TABLET ORAL at 08:32

## 2019-12-27 RX ADMIN — DILTIAZEM HYDROCHLORIDE 90 MG: 60 TABLET, FILM COATED ORAL at 12:38

## 2019-12-27 RX ADMIN — GLYBURIDE 5 MG: 5 TABLET ORAL at 08:32

## 2019-12-27 RX ADMIN — Medication 1 DROP: at 12:37

## 2019-12-27 RX ADMIN — HYDRALAZINE HYDROCHLORIDE 100 MG: 50 TABLET ORAL at 12:38

## 2019-12-27 RX ADMIN — METHIMAZOLE 10 MG: 5 TABLET ORAL at 18:04

## 2019-12-27 RX ADMIN — SIMETHICONE 40 MG: 20 SUSPENSION/ DROPS ORAL at 18:02

## 2019-12-27 RX ADMIN — METOPROLOL TARTRATE 100 MG: 50 TABLET, FILM COATED ORAL at 21:24

## 2019-12-27 RX ADMIN — SIMETHICONE 40 MG: 20 SUSPENSION/ DROPS ORAL at 08:33

## 2019-12-27 RX ADMIN — GLYBURIDE 5 MG: 5 TABLET ORAL at 18:02

## 2019-12-27 RX ADMIN — METOPROLOL TARTRATE 100 MG: 50 TABLET, FILM COATED ORAL at 08:32

## 2019-12-27 RX ADMIN — DILTIAZEM HYDROCHLORIDE 90 MG: 60 TABLET, FILM COATED ORAL at 05:17

## 2019-12-27 RX ADMIN — Medication 1 DROP: at 08:33

## 2019-12-27 RX ADMIN — ENOXAPARIN SODIUM 40 MG: 40 INJECTION SUBCUTANEOUS at 08:32

## 2019-12-27 RX ADMIN — FAMOTIDINE 20 MG: 10 INJECTION, SOLUTION INTRAVENOUS at 08:32

## 2019-12-27 RX ADMIN — SODIUM CHLORIDE, PRESERVATIVE FREE 10 ML: 5 INJECTION INTRAVENOUS at 08:32

## 2019-12-27 RX ADMIN — BUMETANIDE 0.5 MG: 1 TABLET ORAL at 08:32

## 2019-12-27 RX ADMIN — ALBUTEROL SULFATE 2.5 MG: 2.5 SOLUTION RESPIRATORY (INHALATION) at 03:15

## 2019-12-27 RX ADMIN — HYDRALAZINE HYDROCHLORIDE 100 MG: 50 TABLET ORAL at 05:16

## 2019-12-27 RX ADMIN — HYDRALAZINE HYDROCHLORIDE 100 MG: 50 TABLET ORAL at 21:24

## 2019-12-27 RX ADMIN — DESMOPRESSIN ACETATE 40 MG: 0.2 TABLET ORAL at 21:25

## 2019-12-27 RX ADMIN — METHIMAZOLE 20 MG: 5 TABLET ORAL at 08:32

## 2019-12-27 RX ADMIN — Medication 15 ML: at 21:25

## 2019-12-27 RX ADMIN — Medication 15 ML: at 08:33

## 2019-12-27 RX ADMIN — INSULIN LISPRO 6 UNITS: 100 INJECTION, SOLUTION INTRAVENOUS; SUBCUTANEOUS at 12:39

## 2019-12-27 RX ADMIN — SODIUM CHLORIDE, PRESERVATIVE FREE 10 ML: 5 INJECTION INTRAVENOUS at 21:25

## 2019-12-27 RX ADMIN — INSULIN LISPRO 3 UNITS: 100 INJECTION, SOLUTION INTRAVENOUS; SUBCUTANEOUS at 05:21

## 2019-12-27 RX ADMIN — FAMOTIDINE 20 MG: 10 INJECTION, SOLUTION INTRAVENOUS at 21:25

## 2019-12-27 RX ADMIN — INSULIN GLARGINE 10 UNITS: 100 INJECTION, SOLUTION SUBCUTANEOUS at 21:26

## 2019-12-27 RX ADMIN — ALBUTEROL SULFATE 2.5 MG: 2.5 SOLUTION RESPIRATORY (INHALATION) at 07:52

## 2019-12-27 RX ADMIN — ALBUTEROL SULFATE 2.5 MG: 2.5 SOLUTION RESPIRATORY (INHALATION) at 15:01

## 2019-12-27 RX ADMIN — METFORMIN HYDROCHLORIDE 1000 MG: 500 TABLET ORAL at 18:02

## 2019-12-27 RX ADMIN — SENNOSIDES AND DOCUSATE SODIUM 2 TABLET: 8.6; 5 TABLET ORAL at 08:32

## 2019-12-27 RX ADMIN — DILTIAZEM HYDROCHLORIDE 90 MG: 60 TABLET, FILM COATED ORAL at 21:24

## 2019-12-27 ASSESSMENT — PAIN SCALES - WONG BAKER
WONGBAKER_NUMERICALRESPONSE: 0

## 2019-12-27 ASSESSMENT — PAIN SCALES - GENERAL: PAINLEVEL_OUTOF10: 0

## 2019-12-27 NOTE — PROGRESS NOTES
Physical Therapy  DATE: 2019  NAME: Claudia Gallego  MRN: [id number]   : 1955    Discharge Recommendations: Continue to Assess (pending progress)   Subjective: RN agreed to ROM, Pt Nods yes to ROM. Eyes open throughout, SPO2 dropping to 84% with exercises. and back to 90% with rest.  Pain: Nods no   Patient follows: little Commands  Is patient on ventilator; Is patient on sedation: NO  Precautions: General, Trach     Therapeutic exercises: PROM to TASNEEM and LLE, AAROM to RUE pt holds on to writers hand throughout . x15 reps each all planes.     Bilateral  gastrocnemius stretching 3 reps x 20 seconds   Goals  Short Term Goals  Short term goal 1: Pt to perform bed mobility mod A  Short term goal 2: Demonstrate static sitting balance of fair -   Short term goal 3: Actively demonstrate participation in 30 minutes of therapy to demonstrate increased endurance  Short term goal 4: Perform STS transfer in [facility] modAx2    Plan: Progress functional mobility as medically appropriate.    Time In: 0957  Time Out: 1018  Time Coded Minutes (treatment minutes): 21  Rehab Potential: Gaurded  Treatments/week: 5-6x    Kacy Lau, CRISTIAN

## 2019-12-27 NOTE — PROGRESS NOTES
LTAC in progress. Review of Systems:     Unable to assess as patient is currently with tracheostomy and is minimally responsive to questioning. Shakes head no when asked if he is in pain or if he has trouble breathing. Medications:      Allergies:  No Known Allergies    Current Meds:   Scheduled Meds:    bumetanide  0.5 mg Oral Daily    hydrALAZINE  100 mg Oral 3 times per day    modafinil  100 mg Per NG tube Daily    insulin glargine  10 Units Subcutaneous Daily    FLUoxetine  20 mg Oral Daily    diltiazem  90 mg Oral 3 times per day    albuterol  2.5 mg Nebulization Q6H    insulin lispro  0-18 Units Subcutaneous 4 times per day    famotidine (PEPCID) injection  20 mg Intravenous BID    sennosides-docusate sodium  2 tablet Oral Daily    tetrahydrozoline  1 drop Left Eye TID    metFORMIN  1,000 mg Per NG tube BID WC    enoxaparin  40 mg Subcutaneous Daily    docusate  100 mg Per NG tube Daily    glyBURIDE  5 mg Per NG tube BID WC    vitamin B-1  100 mg Oral Daily    chlorhexidine  15 mL Mouth/Throat BID    lisinopril  40 mg Oral Daily    simethicone  40 mg Per NG tube 4x Daily    atorvastatin  40 mg Oral Daily    sodium chloride flush  10 mL Intravenous 2 times per day    methIMAzole  20 mg Oral Daily    methIMAzole  10 mg Oral QPM    metoprolol tartrate  100 mg Oral BID     Continuous Infusions:    dextrose Stopped (12/19/19 0952)     PRN Meds: fentanNYL, REFRESH LACRI-LUBE, potassium chloride, fentanNYL, glucose, dextrose, glucagon (rDNA), dextrose, acetaminophen **OR** acetaminophen, sodium chloride flush, ondansetron, hydrALAZINE    Data:     Past Medical History:   has a past medical history of Atrial fibrillation ([facility] [address] CAD (coronary artery disease), H/O heart artery stent, Hx of blood clots, Hyperlipidemia, Hypertension, Hyperthyroidism, Kidney stones, MI, old, Microalbuminuria, Other complications due to other cardiac device, implant, and graft, Proteinuria, Renal cyst, Thyroid disease, and Type II or unspecified type diabetes mellitus without mention of complication, not stated as uncontrolled. Social History:   reports that he has been smoking cigarettes. He has a 47.00 pack-year smoking history. He has never used smokeless tobacco. He reports that he does not drink alcohol. Family History: No family history on file. Vitals:  /76   Pulse 75   Temp 98.1 °F (36.7 °C) (Axillary)   Resp 20   Ht 5' 7\" (1.702 m)   Wt 188 lb 7.9 oz (85.5 kg)   SpO2 97%   BMI 29.52 kg/m²   Temp (24hrs), Av.5 °F (36.9 °C), Min:98.1 °F (36.7 °C), Max:99.2 °F (37.3 °C)    Recent Labs     19  1708 19  2350 19  0515   POCGLU 146* 130* 117* 167*       I/O (24Hr):     Intake/Output Summary (Last 24 hours) at 2019 0836  Last data filed at 2019 0415  Gross per 24 hour   Intake 2924 ml   Output --   Net 2924 ml       Labs:  Hematology:  Recent Labs     19  0603 19  0557 19  0458   WBC 8.1 10.4 10.4   RBC 4.14* 4.25 4.21   HGB 13.1 13.9 13.3   HCT 40.2* 41.4 39.8*   MCV 97.1 97.4 94.5   MCH 31.6 32.7 31.6   MCHC 32.6 33.6 33.4   RDW 14.2 14.0 14.1    247 188   MPV 12.0 11.6 11.3     Chemistry:  Recent Labs     19  0603 19  0458    140   K 4.4 4.2    105   CO2 25 25   GLUCOSE 206* 177*   BUN 41* 22   CREATININE 1.09 0.87   ANIONGAP 12 10   LABGLOM >60 >60   GFRAA >60 >60   CALCIUM 9.4 9.4     Recent Labs     19  0531 19  1144 19  17019/19  2350 19  0515   POCGLU 164* 194* 146* 130* 117* 167*     ABG:  Lab Results   Component Value Date    POCPH 7.416 2019    POCPCO2 46.4 2019    POCPO2 96.2 2019    POCHCO3 29.8 2019    NBEA NOT REPORTED 2019    PBEA 4 2019    UYX6QXT 31 2019    KXBV4XUM 98 2019    FIO2 45.0 2019     Lab Results   Component Value Date/Time    SPECIAL  LT HAND 10ML 2019 05:46 PM Lab Results   Component Value Date/Time    CULTURE NO GROWTH 6 DAYS 12/09/2019 05:46 PM       Radiology:  [place] Abdomen (kub) (single Ap View)    Result Date: 12/17/2019  Nonspecific bowel gas pattern without evidence for obstruction. Ct Head Wo Contrast    Result Date: 12/20/2019  1. Interval decrease in size of left thalamic hemorrhage. Persistent associated edema. 2.  Decrease in intraventricular blood posterior horns of the lateral ventricles. 3.  Mild rightward midline shift of 5.9 mm. Xr Chest Portable    Result Date: 12/19/2019  1. Support devices appear unchanged in position. 2. Improvement in the right basilar opacification. 3. Persistent enlargement of the cardiac silhouette. Xr Chest Portable    Result Date: 12/18/2019  Rotated exam.  Right basilar opacity can reflect atelectasis, pneumonia, or focal edema. Xr Chest Portable    Result Date: 12/17/2019  Stable mild cardiomegaly. Otherwise, unremarkable single upright portable AP view of the chest.  Note: Left costophrenic angle is not completely within the field of view.        Physical Examination:        General appearance: Chronically ill-appearing gentleman, not in any distress, tracheostomy is currently in place, On T-piece today  Mental Status: Affect appears flat, he is alert, intermittently following commands  HENT: Tracheostomy is present; no secretions around stoma, secretions in T-piece  Lungs: Coarse upper airway sounds in bilateral upper and lower lung fields, mechanical breath sounds, no wheezing appreciated  Heart: Controlled rate, irregular rhythm, no murmur  Abdomen:  soft, nontender, nondistended, normal bowel sounds, no masses, hepatomegaly, splenomegaly, PEG tube in place in left upper quadrant  Extremities:  no edema, redness, tenderness in the calves  Neuro: 0/5 muscle strength on right upper extremity; 3/5 in left upper extremity; wiggles left toes; nod yes and no appropriately to questioning  Skin:  no gross lesions, rashes, induration    Assessment:        Hospital Problems           Last Modified POA    * (Principal) Nontraumatic cortical hemorrhage of left cerebral hemisphere ([address] 12/6/2019 Yes    MARILYN (acute kidney injury) ([address] 12/23/2019 No    Atrial fibrillation ([address] 12/6/2019 Yes    Uncontrolled hypertension 12/6/2019 Yes    CAD (coronary artery disease) 12/6/2019 Yes    Diabetes mellitus type 2 in obese ([address] 12/6/2019 Yes    Hyperlipidemia 12/6/2019 Yes    Hyperthyroidism 12/6/2019 Yes    Acute intra-cranial hemorrhage ([address] 12/6/2019 Yes    Intraparenchymal hematoma of brain ([address] 12/7/2019 Yes    Ventilator dependence ([address] 12/8/2019 Yes    Acute on chronic combined systolic and diastolic congestive heart failure ([address] 12/8/2019 Yes    Hypernatremia 12/15/2019 Yes    Aspiration pneumonia ([address] 12/15/2019 Yes    Right sided weakness 12/19/2019 Yes    Acute respiratory failure with hypoxia and hypercapnia ([address] 12/22/2019 Yes          Plan:        1. Appreciate the assistance of my consultants - neurology, pulmonology  2. Appreciate optho eval - continue polymycin, artificial tears  3. Appreciate pulm recs regarding vent management  4. S/p Trach/PEG on 12/14 - weaning mechanical ventilation as able, TF running at goal; on t-piece today  5. Continue to monitor bladder scans and PRN straight caths  6. Neurology recommending against restarting anticoagulation at this time; repeat CT head in 2 weeks  7. Consultation to cardiology for watchman device; await consultation - I have reconsulted them for the third time today. 8. Continue Lantus, ISS for glycemic control, continue metformin and diabeta  9. Continue current anti-hypertensive regimen - Norvasc 10 QD, Cardizem 90 mg q 8 hours, Hydralazine 75 mg q 8 hours, lisinopril 40 qd and lopressor 100 BID - BP well controlled   10. CXR ordered today and reviewed - appears stable when compared to priors  11.  DC planning - LTAC for mechanical vent weaning; will need peer to peer performed on Monday. Physician's number: [phone].     33 Olivia Leija DO  12/27/2019  8:36 AM

## 2019-12-27 NOTE — CARE COORDINATION
Transitional Planning  Spoke with attending, states he left message for insurance company yesterday with contact information. Awaiting return call to complete peer to peer. Initial SNF choice [facility], but unable to care for vent. Will need SNF choice if insurance continues to deny LTACH.

## 2019-12-27 NOTE — PROGRESS NOTES
Nutrition Assessment (Enteral Nutrition)    Type and Reason for Visit: Reassess    Nutrition Recommendations:   - Continue Tube Feedings of Glucerna (diabetic) at goal rate of 60 mL/hr. Monitor TF tolerance/adequacy of intakes - modify as needed. - Will monitor labs, bowel function, and plan of care. Nutrition Assessment: Pt continues to tolerate Tube Feedings well at goal rate of 60 mL/hr per RN. Noted free water flushes of 200 mL every 4 hours continue. Labs reviewed - glucose 167-177 mg/dL. Last recorded BM on 12/26. Malnutrition Assessment:  · Malnutrition Status: At risk for malnutrition  · Context: Acute illness or injury    Nutrition Risk Level: Moderate    Nutrition Needs:  · Estimated Daily Total Kcal: 0887-6616 kcals/day  · Estimated Daily Protein (g):  gm/day    Nutrition Diagnosis:   · Problem: Inadequate oral intake  · Etiology: related to Impaired respiratory function-inability to consume food     Signs and symptoms:  as evidenced by NPO status due to medical condition, Nutrition support - EN    Objective Information:  · Nutrition-Focused Physical Findings: +BM 12/26. · Wound Type: Skin Tears  · Current Nutrition Therapies:  · Oral Diet Orders: NPO   ·   · Tube Feeding (TF) Orders:   · Feeding Route: Gastrostomy  · Formula: Diabetic  · Rate (ml/hr):60 mL/hr    · Volume (ml/day): 1440 mL/day  · Duration: Continuous  · Water Flushes: 200 mL every 4 hours  · Current TF & Flush Orders Provides: 1728 kcal, 86 gm protein  · Goal TF & Flush Orders Provides:  At 60 mL/hr = 1728 kcal, 86 gm protein  · Anthropometric Measures:  · Ht: 5' 7\" (170.2 cm)   · Current Body Wt: 188 lb 7.9 oz (85.5 kg)  · Admission Body Wt: 211 lb (95.7 kg)  · Weight Change: 11% x 3 weeks   · Ideal Body Wt: 148 lb (67.1 kg), % Ideal Body 144%  · BMI Classification: BMI 30.0 - 34.9 Obese Class I    Nutrition Interventions:   Continue current Tube Feeding  Education Not Indicated    Nutrition Evaluation: · Evaluation: Goal achieved   · Goals: Meet % of estimated nutrient needs.    · Monitoring: TF Intake, TF Tolerance, Skin Integrity, Wound Healing, I&O, Weight, Pertinent Labs, Monitor Hemodynamic Status, Monitor Bowel Function    Electronically signed by Mary Cedeño RD, LD on 12/27/19 at 11:50 AM    Contact Number: [phone]

## 2019-12-27 NOTE — PROGRESS NOTES
PULMONARY PROGRESS NOTE      Patient:  Ramos Du  YOB: 1955    MRN: [id number]     Acct: [de-identified]     Admit date: 12/6/2019    REASON FOR CONSULT:-Vent management    Pt seen and Chart reviewed. Subjective:   No acute events overnight  He is on T-piece since yesterday afternoon  Opening eyes upon calling and follows simple commands  Afebrile, hemodynamically stable  No leukocytosis    Review of Systems -   Unable to obtain due to mental status      Physical Exam:  Vitals: /76   Pulse 75   Temp 98.1 °F (36.7 °C) (Axillary)   Resp 20   Ht 5' 7\" (1.702 m)   Wt 188 lb 7.9 oz (85.5 kg)   SpO2 97%   BMI 29.52 kg/m²   24 hour intake/output:    Intake/Output Summary (Last 24 hours) at 12/27/2019 0844  Last data filed at 12/27/2019 0415  Gross per 24 hour   Intake 2924 ml   Output --   Net 2924 ml     Last 3 weights:   Wt Readings from Last 3 Encounters:   12/27/19 188 lb 7.9 oz (85.5 kg)   12/06/19 225 lb (102.1 kg)   04/06/15 194 lb 9.6 oz (88.3 kg)       General appearance: Physical Examination:   General appearance -opening eyes on calling, following commands  Mental status -opening eyes on calling, following commands   eyes - pupils equal and reactive  Mouth - mucous membranes moist, pharynx normal without lesions  Neck - supple, no significant adenopathy, trach+  Chest - clear to auscultation, no wheezes, rales or rhonchi, symmetric air entry  Heart - normal rate, regular rhythm, normal S1, S2, no murmurs, rubs, clicks or gallops  Abdomen - soft, nontender, nondistended, no masses or organomegaly, PEG+  Neurological -opening eyes upon calling, following commands, mild right facial droop, right hemiplegia+  extremities - peripheral pulses normal, no pedal edema, no clubbing or cyanosis      Diet:  DIET TUBE FEED CONTINUOUS/CYCLIC NPO; Diabetic; Gastrostomy; Continuous; 20; 60; 24    Medications:Current Inpatient    Scheduled Meds:   bumetanide  0.5 mg Oral Daily    hydrALAZINE  100 mg Oral 3 times per day    modafinil  100 mg Per NG tube Daily    insulin glargine  10 Units Subcutaneous Daily    FLUoxetine  20 mg Oral Daily    diltiazem  90 mg Oral 3 times per day    albuterol  2.5 mg Nebulization Q6H    insulin lispro  0-18 Units Subcutaneous 4 times per day    famotidine (PEPCID) injection  20 mg Intravenous BID    sennosides-docusate sodium  2 tablet Oral Daily    tetrahydrozoline  1 drop Left Eye TID    metFORMIN  1,000 mg Per NG tube BID WC    enoxaparin  40 mg Subcutaneous Daily    docusate  100 mg Per NG tube Daily    glyBURIDE  5 mg Per NG tube BID WC    vitamin B-1  100 mg Oral Daily    chlorhexidine  15 mL Mouth/Throat BID    lisinopril  40 mg Oral Daily    simethicone  40 mg Per NG tube 4x Daily    atorvastatin  40 mg Oral Daily    sodium chloride flush  10 mL Intravenous 2 times per day    methIMAzole  20 mg Oral Daily    methIMAzole  10 mg Oral QPM    metoprolol tartrate  100 mg Oral BID     Continuous Infusions:   dextrose Stopped (12/19/19 0952)     PRN Meds:fentanNYL, REFRESH LACRI-LUBE, potassium chloride, fentanNYL, glucose, dextrose, glucagon (rDNA), dextrose, acetaminophen **OR** acetaminophen, sodium chloride flush, ondansetron, hydrALAZINE    Objective:    CBC:   Recent Labs     12/25/19  0603 12/26/19  0557 12/27/19  0458   WBC 8.1 10.4 10.4   HGB 13.1 13.9 13.3    247 188     BMP:    Recent Labs     12/25/19  0603 12/27/19  0458    140   K 4.4 4.2    105   CO2 25 25   BUN 41* 22   CREATININE 1.09 0.87   GLUCOSE 206* 177*     Calcium:  Recent Labs     12/27/19  0458   CALCIUM 9.4     Glucose:  Recent Labs     12/26/19  2030 12/26/19  2350 12/27/19  0515   POCGLU 130* 117* 167*     Thyroid:   Lab Results   Component Value Date    TSH 3.15 12/06/2019      Urinalysis:   No results for input(s): BACTERIA, BLOODU, CLARITYU, COLORU, PHUR, PROTEINU, RBCUA, [address], BILIRUBINUR, NITRU, [address], LEUKOCYTESUR, GLUCOSEU in the last 72 Problems:    * No resolved hospital problems.  *      Plan   -On T-piece since yesterday afternoon.  -Tolerating tube feeds  -Mobilize up to chair  - PT/OT/speech therapy  - will continue to follow         Hector Hinds MD      12/27/2019, 8:44 AM  Pulmonary & Critical Care

## 2019-12-27 NOTE — PLAN OF CARE
Update:    Missed call from peer to peer physician today at 3:05. Physician is done working for the day and will not be available until Monday. Please have whomever resumes care of this case on Monday follow up with this physician at [phone].     Jordyn Akbar, Kieran Nichols

## 2019-12-27 NOTE — PROGRESS NOTES
Restraint criteria told to patient and patient by \"picky swearing\" will not pull at his lines or tubes. Restraint removed and pt continued to be cooperative.

## 2019-12-27 NOTE — PLAN OF CARE
BRONCHOSPASM/BRONCHOCONSTRICTION     [x]         IMPROVE AERATION/BREATH SOUNDS  [x]   ADMINISTER BRONCHODILATOR THERAPY AS APPROPRIATE  [x]   ASSESS BREATH SOUNDS  [x]   IMPLEMENT AEROSOL/MDI PROTOCOL  [x]   PATIENT EDUCATION AS NEEDED    ENRIQUE LIMON, PPatient Assessment complete. Acute cerebrovascular accident (CVA) ([facility] [address] [I63.9]  Acute intra-cranial hemorrhage ([facility] [address] [I62.9] . Vitals:    12/27/19 0750   BP:    Pulse:    Resp: 20   Temp:    SpO2: 97%   . Patients home meds are   Prior to Admission medications    Medication Sig Start Date End Date Taking? Authorizing Provider   bumetanide (BUMEX) 0.5 MG tablet Take 0.5 mg by mouth daily   Yes Historical Provider, MD   lisinopril (PRINIVIL;ZESTRIL) 40 MG tablet Take 1 tablet by mouth daily 7/14/15  Yes Jignesh Sol MD   isosorbide mononitrate (IMDUR) 120 MG CR tablet Take 1 tablet by mouth daily 7/15/15  Yes Jignesh Sol MD   diltiazem (CARDIZEM CD) 240 MG ER capsule Take 1 capsule by mouth daily 7/14/15  Yes Jignesh Sol MD   aspirin 81 MG tablet Take 81 mg by mouth daily. Yes Historical Provider, MD   atorvastatin (LIPITOR) 40 MG tablet Take 40 mg by mouth daily. Yes Historical Provider, MD   glyBURIDE (DIABETA) 5 MG tablet Take 5 mg by mouth 2 times daily (with meals). Yes Historical Provider, MD   insulin glargine (LANTUS) 100 UNIT/ML injection Inject 60 Units into the skin daily    Yes Historical Provider, MD   metformin (GLUCOPHAGE) 1000 MG tablet Take 1,000 mg by mouth 2 times daily (with meals). Yes Historical Provider, MD   metoprolol (LOPRESSOR) 100 MG tablet Take 100 mg by mouth 2 times daily. Yes Historical Provider, MD   nitroGLYCERIN (NITROSTAT) 0.4 MG SL tablet Place 0.4 mg under the tongue every 5 minutes as needed.    Yes Historical Provider, MD   insulin lispro (HUMALOG) 100 UNIT/ML injection vial Inject 15 Units into the skin 3 times daily (with meals)  Patient taking differently: Inject 15 Units into the skin 3 times daily (with meals) Per wife pt takes a sliding scale dose not a fixed unit amount 7/14/15   Crystal Friedman MD   methimazole (TAPAZOLE) 10 MG tablet Take 10 mg by mouth every evening    Historical Provider, MD   Blood Pressure Monitor KIT CHECK BLOOD PRESSURE DAILY 4/6/15   Ciaran Patterson MD   prasugrel (EFFIENT) 10 MG TABS Take 10 mg by mouth daily.     Historical Provider, MD   methimazole (TAPAZOLE) 10 MG tablet   Take 20 mg by mouth daily     Historical Provider, MD   .    RR 17  Breath Sounds: clear/diminished      · Bronchodilator assessment at level  2  · Hyperinflation assessment at level   · Secretion Management assessment at level    ·   · []    Bronchodilator Assessment  BRONCHODILATOR ASSESSMENT SCORE  Score 0 1 2 3 4 5   Breath Sounds   []  Patient Baseline []  No Wheeze good aeration [x]  Faint, scattered wheezing, good aeration []  Expiratory Wheezing and or moderately diminished []  Insp/Exp wheeze and/or very diminished []  Insp/Exp and/ or marked distress   Respiratory Rate   []  Patient Baseline []  Less than 20 [x]  Less than 20 []  20-25 []  Greater than 25 []  Greater than 25   Peak flow % of Pred or PB []  NA   []  Greater than 90%  []  81-90% []  71-80% []  Less than or equal to 70%  or unable to perform []  Unable due to Respiratory Distress   Dyspnea re []  Patient Baseline []  No SOB [x]  No SOB []  SOB on exertion []  SOB min activity []  At rest/acute   e FEV% Predicted       []  NA []  Above 69%  []  Unable []  Above 60-69%  []  Unable []  Above 50-59%  []  Unable []  Above 35-49%  []  Unable []  Less than 35%  []  Unable                 []  Hyperinflation Assessment  Score 1 2 3   CXR and Breath Sounds   []  Clear []  No atelectasis  Basilar aeration []  Atelectasis or absent basilar breath sounds   Incentive Spirometry Volume  (Per IBW)   []  Greater than or equal to 15ml/Kg []  less than 15ml/Kg []  less than 15ml/Kg   Surgery within last 2 weeks []  None or general   []  Abdominal

## 2019-12-28 LAB
ABSOLUTE EOS #: 0.06 K/UL (ref 0–0.44)
ABSOLUTE IMMATURE GRANULOCYTE: 0.09 K/UL (ref 0–0.3)
ABSOLUTE LYMPH #: 1.18 K/UL (ref 1.1–3.7)
ABSOLUTE MONO #: 0.57 K/UL (ref 0.1–1.2)
ANION GAP SERPL CALCULATED.3IONS-SCNC: 12 MMOL/L (ref 9–17)
BASOPHILS # BLD: 1 % (ref 0–2)
BASOPHILS ABSOLUTE: 0.08 K/UL (ref 0–0.2)
BUN BLDV-MCNC: 21 MG/DL (ref 8–23)
BUN/CREAT BLD: ABNORMAL (ref 9–20)
CALCIUM SERPL-MCNC: 9 MG/DL (ref 8.6–10.4)
CHLORIDE BLD-SCNC: 101 MMOL/L (ref 98–107)
CO2: 24 MMOL/L (ref 20–31)
CREAT SERPL-MCNC: 0.8 MG/DL (ref 0.7–1.2)
DIFFERENTIAL TYPE: ABNORMAL
EOSINOPHILS RELATIVE PERCENT: 1 % (ref 1–4)
GFR AFRICAN AMERICAN: >60 ML/MIN
GFR NON-AFRICAN AMERICAN: >60 ML/MIN
GFR SERPL CREATININE-BSD FRML MDRD: ABNORMAL ML/MIN/{1.73_M2}
GFR SERPL CREATININE-BSD FRML MDRD: ABNORMAL ML/MIN/{1.73_M2}
GLUCOSE BLD-MCNC: 148 MG/DL (ref 75–110)
GLUCOSE BLD-MCNC: 161 MG/DL (ref 75–110)
GLUCOSE BLD-MCNC: 165 MG/DL (ref 75–110)
GLUCOSE BLD-MCNC: 172 MG/DL (ref 75–110)
GLUCOSE BLD-MCNC: 173 MG/DL (ref 75–110)
GLUCOSE BLD-MCNC: 184 MG/DL (ref 75–110)
GLUCOSE BLD-MCNC: 207 MG/DL (ref 70–99)
HCT VFR BLD CALC: 40.7 % (ref 40.7–50.3)
HEMOGLOBIN: 13.5 G/DL (ref 13–17)
IMMATURE GRANULOCYTES: 1 %
LYMPHOCYTES # BLD: 13 % (ref 24–43)
MCH RBC QN AUTO: 31.6 PG (ref 25.2–33.5)
MCHC RBC AUTO-ENTMCNC: 33.2 G/DL (ref 28.4–34.8)
MCV RBC AUTO: 95.3 FL (ref 82.6–102.9)
MONOCYTES # BLD: 6 % (ref 3–12)
NRBC AUTOMATED: 0 PER 100 WBC
PDW BLD-RTO: 14.4 % (ref 11.8–14.4)
PLATELET # BLD: 193 K/UL (ref 138–453)
PLATELET ESTIMATE: ABNORMAL
PMV BLD AUTO: 10.7 FL (ref 8.1–13.5)
POTASSIUM SERPL-SCNC: 4.3 MMOL/L (ref 3.7–5.3)
RBC # BLD: 4.27 M/UL (ref 4.21–5.77)
RBC # BLD: ABNORMAL 10*6/UL
SEG NEUTROPHILS: 78 % (ref 36–65)
SEGMENTED NEUTROPHILS ABSOLUTE COUNT: 7.45 K/UL (ref 1.5–8.1)
SODIUM BLD-SCNC: 137 MMOL/L (ref 135–144)
WBC # BLD: 9.4 K/UL (ref 3.5–11.3)
WBC # BLD: ABNORMAL 10*3/UL

## 2019-12-28 PROCEDURE — 6370000000 HC RX 637 (ALT 250 FOR IP): Performed by: STUDENT IN AN ORGANIZED HEALTH CARE EDUCATION/TRAINING PROGRAM

## 2019-12-28 PROCEDURE — 2500000003 HC RX 250 WO HCPCS: Performed by: STUDENT IN AN ORGANIZED HEALTH CARE EDUCATION/TRAINING PROGRAM

## 2019-12-28 PROCEDURE — 94640 AIRWAY INHALATION TREATMENT: CPT

## 2019-12-28 PROCEDURE — 6370000000 HC RX 637 (ALT 250 FOR IP): Performed by: INTERNAL MEDICINE

## 2019-12-28 PROCEDURE — 85025 COMPLETE CBC W/AUTO DIFF WBC: CPT

## 2019-12-28 PROCEDURE — 36415 COLL VENOUS BLD VENIPUNCTURE: CPT

## 2019-12-28 PROCEDURE — 94761 N-INVAS EAR/PLS OXIMETRY MLT: CPT

## 2019-12-28 PROCEDURE — 82947 ASSAY GLUCOSE BLOOD QUANT: CPT

## 2019-12-28 PROCEDURE — 6360000002 HC RX W HCPCS: Performed by: STUDENT IN AN ORGANIZED HEALTH CARE EDUCATION/TRAINING PROGRAM

## 2019-12-28 PROCEDURE — 99233 SBSQ HOSP IP/OBS HIGH 50: CPT | Performed by: INTERNAL MEDICINE

## 2019-12-28 PROCEDURE — 80048 BASIC METABOLIC PNL TOTAL CA: CPT

## 2019-12-28 PROCEDURE — 2700000000 HC OXYGEN THERAPY PER DAY

## 2019-12-28 PROCEDURE — 51798 US URINE CAPACITY MEASURE: CPT

## 2019-12-28 PROCEDURE — 2060000000 HC ICU INTERMEDIATE R&B

## 2019-12-28 PROCEDURE — 6370000000 HC RX 637 (ALT 250 FOR IP): Performed by: NURSE PRACTITIONER

## 2019-12-28 PROCEDURE — 2580000003 HC RX 258: Performed by: STUDENT IN AN ORGANIZED HEALTH CARE EDUCATION/TRAINING PROGRAM

## 2019-12-28 PROCEDURE — 99231 SBSQ HOSP IP/OBS SF/LOW 25: CPT | Performed by: INTERNAL MEDICINE

## 2019-12-28 RX ORDER — ALBUTEROL SULFATE 2.5 MG/3ML
2.5 SOLUTION RESPIRATORY (INHALATION)
Status: DISCONTINUED | OUTPATIENT
Start: 2019-12-28 | End: 2020-01-03 | Stop reason: HOSPADM

## 2019-12-28 RX ORDER — ALBUTEROL SULFATE 2.5 MG/3ML
2.5 SOLUTION RESPIRATORY (INHALATION) 3 TIMES DAILY
Status: DISCONTINUED | OUTPATIENT
Start: 2019-12-29 | End: 2020-01-03 | Stop reason: HOSPADM

## 2019-12-28 RX ADMIN — BUMETANIDE 0.5 MG: 1 TABLET ORAL at 09:27

## 2019-12-28 RX ADMIN — METOPROLOL TARTRATE 100 MG: 50 TABLET, FILM COATED ORAL at 09:27

## 2019-12-28 RX ADMIN — GLYBURIDE 5 MG: 5 TABLET ORAL at 17:45

## 2019-12-28 RX ADMIN — HYDRALAZINE HYDROCHLORIDE 100 MG: 50 TABLET ORAL at 23:25

## 2019-12-28 RX ADMIN — ALBUTEROL SULFATE 2.5 MG: 2.5 SOLUTION RESPIRATORY (INHALATION) at 04:49

## 2019-12-28 RX ADMIN — FAMOTIDINE 20 MG: 10 INJECTION, SOLUTION INTRAVENOUS at 09:27

## 2019-12-28 RX ADMIN — METHIMAZOLE 10 MG: 5 TABLET ORAL at 17:45

## 2019-12-28 RX ADMIN — Medication 15 ML: at 09:28

## 2019-12-28 RX ADMIN — SIMETHICONE 40 MG: 20 SUSPENSION/ DROPS ORAL at 17:58

## 2019-12-28 RX ADMIN — DESMOPRESSIN ACETATE 40 MG: 0.2 TABLET ORAL at 20:42

## 2019-12-28 RX ADMIN — INSULIN LISPRO 3 UNITS: 100 INJECTION, SOLUTION INTRAVENOUS; SUBCUTANEOUS at 06:07

## 2019-12-28 RX ADMIN — Medication 1 DROP: at 09:28

## 2019-12-28 RX ADMIN — MODAFINIL 100 MG: 100 TABLET ORAL at 09:27

## 2019-12-28 RX ADMIN — ALBUTEROL SULFATE 2.5 MG: 2.5 SOLUTION RESPIRATORY (INHALATION) at 14:38

## 2019-12-28 RX ADMIN — DOCUSATE SODIUM 100 MG: 50 LIQUID ORAL at 09:28

## 2019-12-28 RX ADMIN — INSULIN LISPRO 3 UNITS: 100 INJECTION, SOLUTION INTRAVENOUS; SUBCUTANEOUS at 01:02

## 2019-12-28 RX ADMIN — DILTIAZEM HYDROCHLORIDE 90 MG: 60 TABLET, FILM COATED ORAL at 12:44

## 2019-12-28 RX ADMIN — Medication 1 DROP: at 20:43

## 2019-12-28 RX ADMIN — HYDRALAZINE HYDROCHLORIDE 100 MG: 50 TABLET ORAL at 06:07

## 2019-12-28 RX ADMIN — INSULIN LISPRO 3 UNITS: 100 INJECTION, SOLUTION INTRAVENOUS; SUBCUTANEOUS at 23:25

## 2019-12-28 RX ADMIN — SIMETHICONE 40 MG: 20 SUSPENSION/ DROPS ORAL at 14:29

## 2019-12-28 RX ADMIN — ENOXAPARIN SODIUM 40 MG: 40 INJECTION SUBCUTANEOUS at 09:28

## 2019-12-28 RX ADMIN — DILTIAZEM HYDROCHLORIDE 90 MG: 60 TABLET, FILM COATED ORAL at 23:24

## 2019-12-28 RX ADMIN — SODIUM CHLORIDE, PRESERVATIVE FREE 10 ML: 5 INJECTION INTRAVENOUS at 20:42

## 2019-12-28 RX ADMIN — GLYBURIDE 5 MG: 5 TABLET ORAL at 09:28

## 2019-12-28 RX ADMIN — Medication 1 DROP: at 12:46

## 2019-12-28 RX ADMIN — METFORMIN HYDROCHLORIDE 1000 MG: 500 TABLET ORAL at 17:45

## 2019-12-28 RX ADMIN — FAMOTIDINE 20 MG: 10 INJECTION, SOLUTION INTRAVENOUS at 20:42

## 2019-12-28 RX ADMIN — INSULIN LISPRO 3 UNITS: 100 INJECTION, SOLUTION INTRAVENOUS; SUBCUTANEOUS at 12:47

## 2019-12-28 RX ADMIN — ALBUTEROL SULFATE 2.5 MG: 2.5 SOLUTION RESPIRATORY (INHALATION) at 08:03

## 2019-12-28 RX ADMIN — INSULIN GLARGINE 10 UNITS: 100 INJECTION, SOLUTION SUBCUTANEOUS at 20:42

## 2019-12-28 RX ADMIN — Medication 100 MG: at 09:27

## 2019-12-28 RX ADMIN — DILTIAZEM HYDROCHLORIDE 90 MG: 60 TABLET, FILM COATED ORAL at 06:07

## 2019-12-28 RX ADMIN — METHIMAZOLE 20 MG: 5 TABLET ORAL at 09:27

## 2019-12-28 RX ADMIN — HYDRALAZINE HYDROCHLORIDE 100 MG: 50 TABLET ORAL at 12:46

## 2019-12-28 RX ADMIN — SODIUM CHLORIDE, PRESERVATIVE FREE 10 ML: 5 INJECTION INTRAVENOUS at 09:41

## 2019-12-28 RX ADMIN — SIMETHICONE 40 MG: 20 SUSPENSION/ DROPS ORAL at 23:25

## 2019-12-28 RX ADMIN — METOPROLOL TARTRATE 100 MG: 50 TABLET, FILM COATED ORAL at 20:42

## 2019-12-28 RX ADMIN — INSULIN LISPRO 3 UNITS: 100 INJECTION, SOLUTION INTRAVENOUS; SUBCUTANEOUS at 17:45

## 2019-12-28 RX ADMIN — SIMETHICONE 40 MG: 20 SUSPENSION/ DROPS ORAL at 11:33

## 2019-12-28 RX ADMIN — FLUOXETINE HYDROCHLORIDE 20 MG: 20 CAPSULE ORAL at 09:27

## 2019-12-28 RX ADMIN — LISINOPRIL 40 MG: 20 TABLET ORAL at 09:27

## 2019-12-28 RX ADMIN — METFORMIN HYDROCHLORIDE 1000 MG: 500 TABLET ORAL at 09:27

## 2019-12-28 RX ADMIN — Medication 15 ML: at 20:47

## 2019-12-28 ASSESSMENT — PAIN SCALES - WONG BAKER

## 2019-12-28 NOTE — PROGRESS NOTES
PROVIDE ADEQUATE OXYGENATION WITH ACCEPTABLE SP02/ABG'S    [x]  IDENTIFY APPROPRIATE OXYGEN THERAPY  [x]   MONITOR SP02/ABG'S AS NEEDED   []   PATIENT EDUCATION AS NEEDED

## 2019-12-28 NOTE — PLAN OF CARE
Problem: Risk for Impaired Skin Integrity  Goal: Tissue integrity - skin and mucous membranes  Description  Structural intactness and normal physiological function of skin and  mucous membranes. 12/28/2019 1449 by Doug Hampton RN  Outcome: Met This Shift   Full skin assessment completed this shift. No new skin breakdown at this time. Pt repositioned q2h and prn. Pt kept clean and dry. Gel overlay mattress in place on bed, heels floated. Will continue to monitor.

## 2019-12-28 NOTE — PROGRESS NOTES
12/28/19 0455   Surgical Airway (trach) Shiley Cuffed   Placement Date/Time: 12/14/19 3423   Timeout: Patient;Procedure;Site/Side;Appropriate Equipment; Consent Confirmed;Sterile Technique using full body drape  Placed By: In surgery  Surgical Airway Type: Tracheostomy  Brand: [place]  Style: Cuffed  Size (m. .. Status Secured   Site Assessment Clean;Dry   Site Care Dressing applied;Cleansed;Dried   Inner Cannula Care Changed/new   Ties Assessment Intact; Secure   trach care done, pt luly well

## 2019-12-28 NOTE — PROGRESS NOTES
History:   reports that he has been smoking cigarettes. He has a 47.00 pack-year smoking history. He has never used smokeless tobacco. He reports that he does not drink alcohol. Family History: No family history on file. Vitals:  BP (!) 155/97   Pulse 75   Temp 97.6 °F (36.4 °C) (Axillary)   Resp 22   Ht 5' 7\" (1.702 m)   Wt 188 lb 7.9 oz (85.5 kg)   SpO2 97%   BMI 29.52 kg/m²   Temp (24hrs), Av.7 °F (36.5 °C), Min:97.5 °F (36.4 °C), Max:98.1 °F (36.7 °C)    Recent Labs     19  0002 19  0511 19  1241 19  1631   POCGLU 172* 184* 173* 165*       I/O (24Hr):     Intake/Output Summary (Last 24 hours) at 2019 1801  Last data filed at 2019 0800  Gross per 24 hour   Intake 1344 ml   Output 0 ml   Net 1344 ml       Labs:  Hematology:  Recent Labs     19  0557 19  0458 19  0756   WBC 10.4 10.4 9.4   RBC 4.25 4.21 4.27   HGB 13.9 13.3 13.5   HCT 41.4 39.8* 40.7   MCV 97.4 94.5 95.3   MCH 32.7 31.6 31.6   MCHC 33.6 33.4 33.2   RDW 14.0 14.1 14.4    188 193   MPV 11.6 11.3 10.7     Chemistry:  Recent Labs     19  0458 19  0756    137   K 4.2 4.3    101   CO2 25 24   GLUCOSE 177* 207*   BUN 22 21   CREATININE 0.87 0.80   ANIONGAP 10 12   LABGLOM >60 >60   GFRAA >60 >60   CALCIUM 9.4 9.0     Recent Labs     19  1757 19  2119 19  0002 19  0511 19  1241 19  1631   POCGLU 137* 152* 172* 184* 173* 165*     ABG:  Lab Results   Component Value Date    POCPH 7.416 2019    POCPCO2 46.4 2019    POCPO2 96.2 2019    POCHCO3 29.8 2019    NBEA NOT REPORTED 2019    PBEA 4 2019    KOF9UWN 31 2019    TTRG4NKW 98 2019    FIO2 45.0 2019     Lab Results   Component Value Date/Time    SPECIAL  LT HAND 10ML 2019 05:46 PM     Lab Results   Component Value Date/Time    CULTURE NO GROWTH 6 DAYS 2019 05:46 PM       Radiology:  Fahad Hof Abdomen (kub) (single Ap DO NIRAJ  12/28/2019  6:01 PM

## 2019-12-28 NOTE — PLAN OF CARE
Problem: Falls - Risk of:  Goal: Will remain free from falls  Description  Will remain free from falls  Outcome: Ongoing     Problem: Falls - Risk of:  Goal: Absence of physical injury  Description  Absence of physical injury  Outcome: Ongoing     Problem: Risk for Impaired Skin Integrity  Goal: Tissue integrity - skin and mucous membranes  Description  Structural intactness and normal physiological function of skin and  mucous membranes. Outcome: Ongoing  Full skin assessment completed this shift. No new skin breakdown at this time. Pt repositioned q2h and prn. Pt kept clean and dry. Waffle mattress in place on bed, heels floated. Will continue to monitor.

## 2019-12-28 NOTE — PROGRESS NOTES
PULMONARY PROGRESS NOTE      Patient:  Danny Dawson  YOB: 1955    MRN: [id number]     Acct: [de-identified]     Admit date: 12/6/2019    REASON FOR CONSULT:-Vent management    Pt seen and Chart reviewed. Subjective:   No acute events overnight  T-piece switched to trach mask last night  Opening eyes upon calling and follows simple commands on left side   Afebrile, hemodynamically stable  No leukocytosis    Review of Systems -   Unable to obtain due to trach      Physical Exam:  Vitals: BP (!) 139/90   Pulse 75   Temp 97.5 °F (36.4 °C) (Temporal)   Resp 22   Ht 5' 7\" (1.702 m)   Wt 188 lb 7.9 oz (85.5 kg)   SpO2 95%   BMI 29.52 kg/m²   24 hour intake/output:    Intake/Output Summary (Last 24 hours) at 12/28/2019 0911  Last data filed at 12/28/2019 0400  Gross per 24 hour   Intake 1204 ml   Output 0 ml   Net 1204 ml     Last 3 weights:   Wt Readings from Last 3 Encounters:   12/27/19 188 lb 7.9 oz (85.5 kg)   12/06/19 225 lb (102.1 kg)   04/06/15 194 lb 9.6 oz (88.3 kg)       General appearance: Physical Examination:   General appearance -alert, following commands  Mental status -alert, following commands   eyes - pupils equal and reactive  Mouth - mucous membranes moist, pharynx normal without lesions  Neck - supple, no significant adenopathy, trach+  Chest - clear to auscultatio with some rhonchi bilaterally, no wheezes, rales, symmetric air entry  Heart - normal rate, regular rhythm, normal S1, S2, no murmurs, rubs, clicks or gallops  Abdomen - soft, nontender, nondistended, no masses or organomegaly, PEG+  Neurological -alert, following commands, mild right facial droop, right hemiplegia+  extremities - peripheral pulses normal, no pedal edema, no clubbing or cyanosis      Diet:  DIET TUBE FEED CONTINUOUS/CYCLIC NPO; Diabetic; Gastrostomy; Continuous; 20; 60; 24    Medications:Current Inpatient    Scheduled Meds:   bumetanide  0.5 mg Oral Daily    hydrALAZINE  100 mg Oral 3 times per day    View)    Result Date: 12/17/2019  Nonspecific bowel gas pattern without evidence for obstruction. Ct Head Wo Contrast    Result Date: 12/20/2019  1. Interval decrease in size of left thalamic hemorrhage. Persistent associated edema. 2.  Decrease in intraventricular blood posterior horns of the lateral ventricles. 3.  Mild rightward midline shift of 5.9 mm. Xr Chest Portable    Result Date: 12/19/2019  1. Support devices appear unchanged in position. 2. Improvement in the right basilar opacification. 3. Persistent enlargement of the cardiac silhouette. Xr Chest Portable    Result Date: 12/18/2019  Rotated exam.  Right basilar opacity can reflect atelectasis, pneumonia, or focal edema. Xr Chest Portable    Result Date: 12/17/2019  Stable mild cardiomegaly. Otherwise, unremarkable single upright portable AP view of the chest.  Note: Left costophrenic angle is not completely within the field of view. Xr Chest Portable    Result Date: 12/16/2019  1. Tracheostomy tube as detailed above. 2. Mild bilateral pulmonary vascular congestion. Mild bibasilar airspace disease, atelectasis and/or infiltrate. Trace bilateral pleural effusions. Findings favored to represent mild CHF related changes.          Assessment and Plan:  Principal Problem:    Nontraumatic cortical hemorrhage of left cerebral hemisphere [facility])  Active Problems:    MARILYN (acute kidney injury) ([address]    Atrial fibrillation (HCC)    Uncontrolled hypertension    CAD (coronary artery disease)    Diabetes mellitus type 2 in obese (HCC)    Hyperlipidemia    Hyperthyroidism    Acute intra-cranial hemorrhage (HCC)    Intraparenchymal hematoma of brain (HCC)    Ventilator dependence ([address]    Acute on chronic combined systolic and diastolic congestive heart failure (HCC)    Hypernatremia    Aspiration pneumonia (HCC)    Right sided weakness    Acute respiratory failure with hypoxia and hypercapnia ([address]  Resolved Problems:    * No resolved hospital problems. *      Plan   -T-piece switched to trach mask last night. Can downsize to #6 if patient continues to be off ventilator for 3 days   -Tolerating tube feeds  -Mobilize up to chair  - PT/OT/speech therapy  - will continue to follow         Chiki Adams MD      12/28/2019, 9:11 AM  Pulmonary & Critical Care      Attending Physician Statement  I have discussed the care of Naomy Mcgrath, including pertinent history and exam findings with the resident. I have reviewed the key elements of all parts of the encounter with the resident. I have seen and examined the patient with the resident. I agree with the assessment and plan and status of the problem list as documented. I have seen the patient during my round today, I have reviewed the chart, events noted, lab seen and medications reviewed. He is arousable but lethargic look comfortable not in distress. He is afebrile hemodynamically stable saturation 97 to 98% currently on tracheostomy collar at 35%. Chest x-ray 12/27/2019 seen and there is no area of infiltrate cardiomegaly is present, no consolidation seen. Large amount of tracheostomy but clear secretions reported by nursing staff, tracheostomy site look okay and GJ tube site is okay. Continue with tracheostomy care and monitor secretions. Continue with tracheostomy collar and to watch off ventilator  Currently on beta-blocker calcium channel blocker and lisinopril and on DVT prophylaxis. Discussed with nursing staff, treatment plan discussed. Harper Contreras MD  12/28/2019 3:02 PM    Please note that this chart was generated using voice recognition Dragon dictation software. Although every effort was made to ensure the accuracy of this automated transcription, some errors in transcription may have occurred.

## 2019-12-28 NOTE — PLAN OF CARE
PROVIDE ADEQUATE OXYGENATION WITH ACCEPTABLE SP02/ABG'S    [x]  IDENTIFY APPROPRIATE OXYGEN THERAPY  [x]   MONITOR SP02/ABG'S AS NEEDED   [x]   PATIENT EDUCATION AS NEEDED     Trach care as needed

## 2019-12-29 LAB
ABSOLUTE EOS #: 0.05 K/UL (ref 0–0.44)
ABSOLUTE IMMATURE GRANULOCYTE: 0.09 K/UL (ref 0–0.3)
ABSOLUTE LYMPH #: 1.08 K/UL (ref 1.1–3.7)
ABSOLUTE MONO #: 0.27 K/UL (ref 0.1–1.2)
ANION GAP SERPL CALCULATED.3IONS-SCNC: 12 MMOL/L (ref 9–17)
BASOPHILS # BLD: 1 % (ref 0–2)
BASOPHILS ABSOLUTE: 0.04 K/UL (ref 0–0.2)
BUN BLDV-MCNC: 21 MG/DL (ref 8–23)
BUN/CREAT BLD: ABNORMAL (ref 9–20)
CALCIUM SERPL-MCNC: 9.2 MG/DL (ref 8.6–10.4)
CHLORIDE BLD-SCNC: 101 MMOL/L (ref 98–107)
CO2: 25 MMOL/L (ref 20–31)
CREAT SERPL-MCNC: 0.78 MG/DL (ref 0.7–1.2)
DIFFERENTIAL TYPE: ABNORMAL
EOSINOPHILS RELATIVE PERCENT: 1 % (ref 1–4)
GFR AFRICAN AMERICAN: >60 ML/MIN
GFR NON-AFRICAN AMERICAN: >60 ML/MIN
GFR SERPL CREATININE-BSD FRML MDRD: ABNORMAL ML/MIN/{1.73_M2}
GFR SERPL CREATININE-BSD FRML MDRD: ABNORMAL ML/MIN/{1.73_M2}
GLUCOSE BLD-MCNC: 153 MG/DL (ref 75–110)
GLUCOSE BLD-MCNC: 159 MG/DL (ref 75–110)
GLUCOSE BLD-MCNC: 172 MG/DL (ref 75–110)
GLUCOSE BLD-MCNC: 178 MG/DL (ref 75–110)
GLUCOSE BLD-MCNC: 183 MG/DL (ref 70–99)
HCT VFR BLD CALC: 39.4 % (ref 40.7–50.3)
HEMOGLOBIN: 13.7 G/DL (ref 13–17)
IMMATURE GRANULOCYTES: 2 %
LYMPHOCYTES # BLD: 18 % (ref 24–43)
MCH RBC QN AUTO: 32 PG (ref 25.2–33.5)
MCHC RBC AUTO-ENTMCNC: 34.8 G/DL (ref 28.4–34.8)
MCV RBC AUTO: 92.1 FL (ref 82.6–102.9)
MONOCYTES # BLD: 5 % (ref 3–12)
NRBC AUTOMATED: 0 PER 100 WBC
PDW BLD-RTO: 14.3 % (ref 11.8–14.4)
PLATELET # BLD: ABNORMAL K/UL (ref 138–453)
PLATELET ESTIMATE: ABNORMAL
PLATELET, FLUORESCENCE: NORMAL K/UL (ref 138–453)
PMV BLD AUTO: ABNORMAL FL (ref 8.1–13.5)
POTASSIUM SERPL-SCNC: 4.6 MMOL/L (ref 3.7–5.3)
RBC # BLD: 4.28 M/UL (ref 4.21–5.77)
RBC # BLD: ABNORMAL 10*6/UL
SEG NEUTROPHILS: 74 % (ref 36–65)
SEGMENTED NEUTROPHILS ABSOLUTE COUNT: 4.34 K/UL (ref 1.5–8.1)
SODIUM BLD-SCNC: 138 MMOL/L (ref 135–144)
WBC # BLD: 5.9 K/UL (ref 3.5–11.3)
WBC # BLD: ABNORMAL 10*3/UL

## 2019-12-29 PROCEDURE — 85025 COMPLETE CBC W/AUTO DIFF WBC: CPT

## 2019-12-29 PROCEDURE — 6370000000 HC RX 637 (ALT 250 FOR IP): Performed by: INTERNAL MEDICINE

## 2019-12-29 PROCEDURE — 6370000000 HC RX 637 (ALT 250 FOR IP): Performed by: STUDENT IN AN ORGANIZED HEALTH CARE EDUCATION/TRAINING PROGRAM

## 2019-12-29 PROCEDURE — 6360000002 HC RX W HCPCS: Performed by: STUDENT IN AN ORGANIZED HEALTH CARE EDUCATION/TRAINING PROGRAM

## 2019-12-29 PROCEDURE — 99231 SBSQ HOSP IP/OBS SF/LOW 25: CPT | Performed by: INTERNAL MEDICINE

## 2019-12-29 PROCEDURE — 82947 ASSAY GLUCOSE BLOOD QUANT: CPT

## 2019-12-29 PROCEDURE — 94640 AIRWAY INHALATION TREATMENT: CPT

## 2019-12-29 PROCEDURE — 2500000003 HC RX 250 WO HCPCS: Performed by: STUDENT IN AN ORGANIZED HEALTH CARE EDUCATION/TRAINING PROGRAM

## 2019-12-29 PROCEDURE — 6360000002 HC RX W HCPCS: Performed by: NURSE PRACTITIONER

## 2019-12-29 PROCEDURE — 51798 US URINE CAPACITY MEASURE: CPT

## 2019-12-29 PROCEDURE — 6370000000 HC RX 637 (ALT 250 FOR IP): Performed by: NURSE PRACTITIONER

## 2019-12-29 PROCEDURE — 85055 RETICULATED PLATELET ASSAY: CPT

## 2019-12-29 PROCEDURE — 36415 COLL VENOUS BLD VENIPUNCTURE: CPT

## 2019-12-29 PROCEDURE — 2580000003 HC RX 258: Performed by: STUDENT IN AN ORGANIZED HEALTH CARE EDUCATION/TRAINING PROGRAM

## 2019-12-29 PROCEDURE — 99232 SBSQ HOSP IP/OBS MODERATE 35: CPT | Performed by: INTERNAL MEDICINE

## 2019-12-29 PROCEDURE — 2060000000 HC ICU INTERMEDIATE R&B

## 2019-12-29 PROCEDURE — 2700000000 HC OXYGEN THERAPY PER DAY

## 2019-12-29 PROCEDURE — 94761 N-INVAS EAR/PLS OXIMETRY MLT: CPT

## 2019-12-29 PROCEDURE — 80048 BASIC METABOLIC PNL TOTAL CA: CPT

## 2019-12-29 RX ADMIN — GLYBURIDE 5 MG: 5 TABLET ORAL at 16:13

## 2019-12-29 RX ADMIN — DESMOPRESSIN ACETATE 40 MG: 0.2 TABLET ORAL at 22:05

## 2019-12-29 RX ADMIN — METFORMIN HYDROCHLORIDE 1000 MG: 500 TABLET ORAL at 16:13

## 2019-12-29 RX ADMIN — DILTIAZEM HYDROCHLORIDE 90 MG: 60 TABLET, FILM COATED ORAL at 06:07

## 2019-12-29 RX ADMIN — SIMETHICONE 40 MG: 20 SUSPENSION/ DROPS ORAL at 10:39

## 2019-12-29 RX ADMIN — BUMETANIDE 0.5 MG: 1 TABLET ORAL at 09:17

## 2019-12-29 RX ADMIN — INSULIN GLARGINE 10 UNITS: 100 INJECTION, SOLUTION SUBCUTANEOUS at 22:09

## 2019-12-29 RX ADMIN — DILTIAZEM HYDROCHLORIDE 90 MG: 60 TABLET, FILM COATED ORAL at 15:07

## 2019-12-29 RX ADMIN — SIMETHICONE 40 MG: 20 SUSPENSION/ DROPS ORAL at 22:06

## 2019-12-29 RX ADMIN — INSULIN LISPRO 3 UNITS: 100 INJECTION, SOLUTION INTRAVENOUS; SUBCUTANEOUS at 06:25

## 2019-12-29 RX ADMIN — INSULIN LISPRO 3 UNITS: 100 INJECTION, SOLUTION INTRAVENOUS; SUBCUTANEOUS at 11:38

## 2019-12-29 RX ADMIN — HYDRALAZINE HYDROCHLORIDE 100 MG: 50 TABLET ORAL at 15:07

## 2019-12-29 RX ADMIN — ALBUTEROL SULFATE 2.5 MG: 2.5 SOLUTION RESPIRATORY (INHALATION) at 13:07

## 2019-12-29 RX ADMIN — METFORMIN HYDROCHLORIDE 1000 MG: 500 TABLET ORAL at 09:16

## 2019-12-29 RX ADMIN — Medication 100 MG: at 09:15

## 2019-12-29 RX ADMIN — LISINOPRIL 40 MG: 20 TABLET ORAL at 09:16

## 2019-12-29 RX ADMIN — DOCUSATE SODIUM 100 MG: 50 LIQUID ORAL at 09:17

## 2019-12-29 RX ADMIN — INSULIN LISPRO 3 UNITS: 100 INJECTION, SOLUTION INTRAVENOUS; SUBCUTANEOUS at 17:22

## 2019-12-29 RX ADMIN — FLUOXETINE HYDROCHLORIDE 20 MG: 20 CAPSULE ORAL at 09:16

## 2019-12-29 RX ADMIN — Medication 15 ML: at 09:17

## 2019-12-29 RX ADMIN — SENNOSIDES AND DOCUSATE SODIUM 2 TABLET: 8.6; 5 TABLET ORAL at 09:15

## 2019-12-29 RX ADMIN — Medication 1 DROP: at 09:17

## 2019-12-29 RX ADMIN — SIMETHICONE 40 MG: 20 SUSPENSION/ DROPS ORAL at 15:07

## 2019-12-29 RX ADMIN — DILTIAZEM HYDROCHLORIDE 90 MG: 60 TABLET, FILM COATED ORAL at 22:08

## 2019-12-29 RX ADMIN — SODIUM CHLORIDE, PRESERVATIVE FREE 10 ML: 5 INJECTION INTRAVENOUS at 09:37

## 2019-12-29 RX ADMIN — METOPROLOL TARTRATE 100 MG: 50 TABLET, FILM COATED ORAL at 09:15

## 2019-12-29 RX ADMIN — INSULIN LISPRO 3 UNITS: 100 INJECTION, SOLUTION INTRAVENOUS; SUBCUTANEOUS at 23:40

## 2019-12-29 RX ADMIN — SODIUM CHLORIDE, PRESERVATIVE FREE 10 ML: 5 INJECTION INTRAVENOUS at 22:04

## 2019-12-29 RX ADMIN — Medication 1 DROP: at 22:06

## 2019-12-29 RX ADMIN — GLYBURIDE 5 MG: 5 TABLET ORAL at 09:16

## 2019-12-29 RX ADMIN — FAMOTIDINE 20 MG: 10 INJECTION, SOLUTION INTRAVENOUS at 22:05

## 2019-12-29 RX ADMIN — HYDRALAZINE HYDROCHLORIDE 100 MG: 50 TABLET ORAL at 22:08

## 2019-12-29 RX ADMIN — METHIMAZOLE 10 MG: 5 TABLET ORAL at 17:22

## 2019-12-29 RX ADMIN — ENOXAPARIN SODIUM 40 MG: 40 INJECTION SUBCUTANEOUS at 09:16

## 2019-12-29 RX ADMIN — SIMETHICONE 40 MG: 20 SUSPENSION/ DROPS ORAL at 16:13

## 2019-12-29 RX ADMIN — MODAFINIL 100 MG: 100 TABLET ORAL at 10:39

## 2019-12-29 RX ADMIN — METHIMAZOLE 20 MG: 5 TABLET ORAL at 09:16

## 2019-12-29 RX ADMIN — FAMOTIDINE 20 MG: 10 INJECTION, SOLUTION INTRAVENOUS at 09:16

## 2019-12-29 RX ADMIN — Medication 15 ML: at 22:31

## 2019-12-29 RX ADMIN — METOPROLOL TARTRATE 100 MG: 50 TABLET, FILM COATED ORAL at 22:05

## 2019-12-29 RX ADMIN — Medication 1 DROP: at 15:07

## 2019-12-29 RX ADMIN — HYDRALAZINE HYDROCHLORIDE 100 MG: 50 TABLET ORAL at 06:07

## 2019-12-29 ASSESSMENT — PAIN SCALES - GENERAL
PAINLEVEL_OUTOF10: 0

## 2019-12-29 ASSESSMENT — PAIN SCALES - WONG BAKER
WONGBAKER_NUMERICALRESPONSE: 0

## 2019-12-29 NOTE — PROGRESS NOTES
Systems:     Unable to assess as patient is currently with tracheostomy and is minimally responsive to questioning. Shakes head no when asked if he is in pain or if he has trouble breathing. Medications:      Allergies:  No Known Allergies    Current Meds:   Scheduled Meds:    albuterol  2.5 mg Nebulization TID    bumetanide  0.5 mg Oral Daily    hydrALAZINE  100 mg Oral 3 times per day    modafinil  100 mg Per NG tube Daily    insulin glargine  10 Units Subcutaneous Daily    FLUoxetine  20 mg Oral Daily    diltiazem  90 mg Oral 3 times per day    insulin lispro  0-18 Units Subcutaneous 4 times per day    famotidine (PEPCID) injection  20 mg Intravenous BID    sennosides-docusate sodium  2 tablet Oral Daily    tetrahydrozoline  1 drop Left Eye TID    metFORMIN  1,000 mg Per NG tube BID WC    enoxaparin  40 mg Subcutaneous Daily    docusate  100 mg Per NG tube Daily    glyBURIDE  5 mg Per NG tube BID WC    vitamin B-1  100 mg Oral Daily    chlorhexidine  15 mL Mouth/Throat BID    lisinopril  40 mg Oral Daily    simethicone  40 mg Per NG tube 4x Daily    atorvastatin  40 mg Oral Daily    sodium chloride flush  10 mL Intravenous 2 times per day    methIMAzole  20 mg Oral Daily    methIMAzole  10 mg Oral QPM    metoprolol tartrate  100 mg Oral BID     Continuous Infusions:    dextrose Stopped (12/19/19 0952)     PRN Meds: albuterol, fentanNYL, REFRESH LACRI-LUBE, potassium chloride, fentanNYL, glucose, dextrose, glucagon (rDNA), dextrose, acetaminophen **OR** acetaminophen, sodium chloride flush, ondansetron, hydrALAZINE    Data:     Past Medical History:   has a past medical history of Atrial fibrillation ([facility] [address] CAD (coronary artery disease), H/O heart artery stent, Hx of blood clots, Hyperlipidemia, Hypertension, Hyperthyroidism, Kidney stones, MI, old, Microalbuminuria, Other complications due to other cardiac device, implant, and graft, Proteinuria, Renal cyst, Thyroid disease, and Type appropriately to questioning  Skin:  no gross lesions, rashes, induration    Assessment:        Hospital Problems           Last Modified POA    * (Principal) Nontraumatic cortical hemorrhage of left cerebral hemisphere ([address] 12/6/2019 Yes    MARILYN (acute kidney injury) ([address] 12/23/2019 No    Atrial fibrillation ([address] 12/6/2019 Yes    Uncontrolled hypertension 12/6/2019 Yes    CAD (coronary artery disease) 12/6/2019 Yes    Diabetes mellitus type 2 in obese ([address] 12/6/2019 Yes    Hyperlipidemia 12/6/2019 Yes    Hyperthyroidism 12/6/2019 Yes    Acute intra-cranial hemorrhage ([address] 12/6/2019 Yes    Intraparenchymal hematoma of brain ([address] 12/7/2019 Yes    Ventilator dependence ([address] 12/8/2019 Yes    Acute on chronic combined systolic and diastolic congestive heart failure ([address] 12/8/2019 Yes    Hypernatremia 12/15/2019 Yes    Aspiration pneumonia ([address] 12/15/2019 Yes    Right sided weakness 12/19/2019 Yes    Acute respiratory failure with hypoxia and hypercapnia ([address] 12/22/2019 Yes          Plan:        1. Appreciate the assistance of my consultants - neurology, pulmonology  2. Appreciate optho eval - continue polymycin, artificial tears  3. Appreciate pulm recs regarding vent management  4. S/p Trach/PEG on 12/14 - weaning mechanical ventilation as able, TF running at goal; continues to be on T-piece throughout the day mostly. 5. Continue to monitor bladdr scans and PRN straight caths; currently with a condom cath in place  6. Neurology recommending against restarting anticoagulation at this time; repeat CT head in 2 weeks (approximately 1/6)  7. Consultation to cardiology for watchman device  8. Continue Lantus, ISS for glycemic control, continue metformin and diabeta  9. Continue current anti-hypertensive regimen - Norvasc 10 QD, Cardizem 90 mg q 8 hours, Hydralazine 75 mg q 8 hours, lisinopril 40 qd and lopressor 100 BID - BP somewhat elevated today, may need to increase dosing  10.  DC planning - LTAC for

## 2019-12-29 NOTE — PROGRESS NOTES
PULMONARY PROGRESS NOTE      Patient:  Ramos Du  YOB: 1955    MRN: [id number]     Acct: [de-identified]     Admit date: 12/6/2019    REASON FOR CONSULT:-  Vent management  Acute respiratory failure. Pt seen and Chart reviewed. Subjective:   No acute events overnight reported  Overnight he remains on trach collar and he is on 35% maintaining saturation 96%. He is lethargic eyes opening but did not follow commands move left upper extremity. T-piece switched to trach mask last night  Opening eyes upon calling and follows simple commands on left side   He is afebrile and hemodynamically stable. Tracheostomy secretions reported to be moderate to large but white and thin. Review of Systems -   Unable to obtain due to trach and mentation and not communicative      Physical Exam:  Vitals: /83   Pulse 75   Temp 97.8 °F (36.6 °C) (Axillary)   Resp 18   Ht 5' 7\" (1.702 m)   Wt 192 lb 10.9 oz (87.4 kg)   SpO2 95%   BMI 30.18 kg/m²   24 hour intake/output:    Intake/Output Summary (Last 24 hours) at 12/29/2019 0849  Last data filed at 12/29/2019 0500  Gross per 24 hour   Intake 2819 ml   Output --   Net 2819 ml     Last 3 weights: Wt Readings from Last 3 Encounters:   12/29/19 192 lb 10.9 oz (87.4 kg)   12/06/19 225 lb (102.1 kg)   04/06/15 194 lb 9.6 oz (88.3 kg)       General appearance: Physical Examination:   General appearance -lethargic but arousable did not follow all commands  Mental status -lethargic moving spontaneously left upper extremity and on commands  eyes - pupils equal and reactive  Mouth - mucous membranes moist, pharynx normal without lesions  Neck - supple, no significant adenopathy, trach+, tracheostomy site look okay without much drainage  Chest -no accessory muscles use no distress no cyanosis or retraction, air entry is bilaterally present, he has mild bilateral rhonchi no expiratory wheezing and no crackles.   Heart - normal rate, regular rhythm, normal S1, S2, no murmurs, rubs, clicks or gallops  Abdomen - soft, nontender, nondistended, no masses or organomegaly, PEG+ site look okay without drainage  Neurological -lethargic but arousable, following commands only variably, mild right facial droop, right hemiplegia+  extremities - peripheral pulses normal, no pedal edema, no clubbing or cyanosis      Diet:  DIET TUBE FEED CONTINUOUS/CYCLIC NPO; Diabetic; Gastrostomy; Continuous; 20; 60; 24    Medications:Current Inpatient    Scheduled Meds:   albuterol  2.5 mg Nebulization TID    bumetanide  0.5 mg Oral Daily    hydrALAZINE  100 mg Oral 3 times per day    modafinil  100 mg Per NG tube Daily    insulin glargine  10 Units Subcutaneous Daily    FLUoxetine  20 mg Oral Daily    diltiazem  90 mg Oral 3 times per day    insulin lispro  0-18 Units Subcutaneous 4 times per day    famotidine (PEPCID) injection  20 mg Intravenous BID    sennosides-docusate sodium  2 tablet Oral Daily    tetrahydrozoline  1 drop Left Eye TID    metFORMIN  1,000 mg Per NG tube BID WC    enoxaparin  40 mg Subcutaneous Daily    docusate  100 mg Per NG tube Daily    glyBURIDE  5 mg Per NG tube BID WC    vitamin B-1  100 mg Oral Daily    chlorhexidine  15 mL Mouth/Throat BID    lisinopril  40 mg Oral Daily    simethicone  40 mg Per NG tube 4x Daily    atorvastatin  40 mg Oral Daily    sodium chloride flush  10 mL Intravenous 2 times per day    methIMAzole  20 mg Oral Daily    methIMAzole  10 mg Oral QPM    metoprolol tartrate  100 mg Oral BID     Continuous Infusions:   dextrose Stopped (12/19/19 0952)     PRN Meds:albuterol, fentanNYL, REFRESH LACRI-LUBE, potassium chloride, fentanNYL, glucose, dextrose, glucagon (rDNA), dextrose, acetaminophen **OR** acetaminophen, sodium chloride flush, ondansetron, hydrALAZINE    Objective:    CBC:   Recent Labs     12/27/19  0458 12/28/19  0756 12/29/19  0558   WBC 10.4 9.4 5.9   HGB 13.3 13.5 13.7    193 See Reflexed IPF Result     BMP: Recent Labs     12/27/19  0458 12/28/19  0756 12/29/19  0558    137 138   K 4.2 4.3 4.6    101 101   CO2 25 24 25   BUN 22 21 21   CREATININE 0.87 0.80 0.78   GLUCOSE 177* 207* 183*     Calcium:  Recent Labs     12/29/19  0558   CALCIUM 9.2     Glucose:  Recent Labs     12/28/19  2041 12/28/19  2327 12/29/19  0603   POCGLU 148* 161* 172*     Thyroid:   Lab Results   Component Value Date    TSH 3.15 12/06/2019      Urinalysis:   No results for input(s): BACTERIA, BLOODU, CLARITYU, COLORU, PHUR, PROTEINU, RBCUA, SPECGRAV, BILIRUBINUR, NITRU, WBCUA, LEUKOCYTESUR, GLUCOSEU in the last 72 hours. Xr Abdomen (kub) (single Ap View)    Result Date: 12/17/2019  Nonspecific bowel gas pattern without evidence for obstruction. Ct Head Wo Contrast    Result Date: 12/20/2019  1. Interval decrease in size of left thalamic hemorrhage. Persistent associated edema. 2.  Decrease in intraventricular blood posterior horns of the lateral ventricles. 3.  Mild rightward midline shift of 5.9 mm. Xr Chest Portable    Result Date: 12/19/2019  1. Support devices appear unchanged in position. 2. Improvement in the right basilar opacification. 3. Persistent enlargement of the cardiac silhouette. Xr Chest Portable    Result Date: 12/18/2019  Rotated exam.  Right basilar opacity can reflect atelectasis, pneumonia, or focal edema. Xr Chest Portable    Result Date: 12/17/2019  Stable mild cardiomegaly. Otherwise, unremarkable single upright portable AP view of the chest.  Note: Left costophrenic angle is not completely within the field of view. Xr Chest Portable    Result Date: 12/16/2019  1. Tracheostomy tube as detailed above. 2. Mild bilateral pulmonary vascular congestion. Mild bibasilar airspace disease, atelectasis and/or infiltrate. Trace bilateral pleural effusions. Findings favored to represent mild CHF related changes.      Chest x-ray 12/27/2019 seen and there is no area of infiltrate cardiomegaly is present, no consolidation seen. Assessment and Plan:  Principal Problem:    Nontraumatic cortical hemorrhage of left cerebral hemisphere [facility])  Active Problems:    MARILYN (acute kidney injury) ([facility] [address]    Atrial fibrillation (HCC)    Uncontrolled hypertension    CAD (coronary artery disease)    Diabetes mellitus type 2 in obese (HCC)    Hyperlipidemia    Hyperthyroidism    Acute intra-cranial hemorrhage (HCC)    Intraparenchymal hematoma of brain ([facility])    Ventilator dependence ([facility] [address]    Acute on chronic combined systolic and diastolic congestive heart failure ([facility])    Hypernatremia    Aspiration pneumonia ([facility])    Right sided weakness    Acute respiratory failure with hypoxia and hypercapnia ([facility])  Resolved Problems:    * No resolved hospital problems. *      Plan   Continue with tracheostomy care and monitor ostomy secretions. Continue with tracheostomy collar and to watch off ventilator. PT/OT and recommend out of bed and ambulate as much as possible. Monitor neurological status and mentation. Continue with tube feeding. Currently on beta-blocker calcium channel blocker and lisinopril and on DVT prophylaxis. Discussed with nursing staff, treatment plan discussed. Yeyo Cox MD  12/29/2019 8:49 AM  Pulmonary & Critical Care    Please note that this chart was generated using voice recognition Dragon dictation software. Although every effort was made to ensure the accuracy of this automated transcription, some errors in transcription may have occurred.

## 2019-12-29 NOTE — CONSULTS
[facility]   Consult Note         Today's Date: 12/29/2019  Patient Name: Lorena Tidwell  Date of admission: 12/6/2019 10:59 AM  Patient's age: 60 y.o., 1955  Admission Dx: Acute cerebrovascular accident (CVA) ([facility] [address] [I63.9]  Acute intra-cranial hemorrhage ([facility] [address] [I62.9]    Reason for Consult:  Cardiac evaluation    Requesting Physician: Yenny Macdonald DO    REASON FOR CONSULT:      For watchman device    History Obtained From:  Patient, chart, staff, records    HISTORY OF PRESENT ILLNESS:      The patient is a 60 y.o. male past medical history of CAD, CHF status post AICD, A. fib on Coumadin who came in with headache, syncope, right-sided weakness, facial droop low GCS intubated unable to extubate leading to tracheostomy. CT left basal ganglion bleed. Neurology from note 12/24/2019 risk outweigh benefits for anticoagulation for A. Fib recommending cardiology consult for watchman device for A. fib. Past Medical History:   has a past medical history of Atrial fibrillation ([facility] [address] CAD (coronary artery disease), H/O heart artery stent, Hx of blood clots, Hyperlipidemia, Hypertension, Hyperthyroidism, Kidney stones, MI, old, Microalbuminuria, Other complications due to other cardiac device, implant, and graft, Proteinuria, Renal cyst, Thyroid disease, and Type II or unspecified type diabetes mellitus without mention of complication, not stated as uncontrolled. Past Surgical History:   has a past surgical history that includes Kidney surgery; Lithotripsy; Cystoscopy; Ureteroscopy; pacemaker placement; eye surgery (Left); Thyroidectomy; and gastrostomy (N/A, 12/14/2019). Home Medications:    Prior to Admission medications    Medication Sig Start Date End Date Taking?  Authorizing Provider   bumetanide (BUMEX) 0.5 MG tablet Take 0.5 mg by mouth daily   Yes Historical Provider, MD   lisinopril (PRINIVIL;ZESTRIL) 40 MG tablet Take 1 tablet by mouth daily 7/14/15  Yes Fernando Maria MD implant, and graft    Renal cyst    Microalbuminuria    Diplopia    Headache    Hypertensive urgency    Intractable headache    Headache    Nontraumatic cortical hemorrhage of left cerebral hemisphere [facility])    Acute intra-cranial hemorrhage (HCC)    Intraparenchymal hematoma of brain (HCC)    Ventilator dependence (HCC)    Acute on chronic combined systolic and diastolic congestive heart failure (HCC)    Hypernatremia    Chronic diastolic heart failure (HCC)    Aspiration pneumonia (HCC)    Right sided weakness    Acute respiratory failure with hypoxia and hypercapnia (HCC)    MARILYN (acute kidney injury) ([facility] [address]           IMPRESSION & Recommendations:    72-year-old male history of atrial fibrillation on Coumadin with left basal ganglion bleed in this admission. Can stop all blood thinners. We can do outpatient work up for possible Watchman. Device. Please arrange outpatient follow-up with cardiology once discharged for further evaluation  Will evaluate outpatient for watchman device. Discussed with patient, family, and Nurse. Electronically signed by Susan Godoy MD on 12/29/2019 at 10:57 AM      Attending Cardiologist Addendum: I have reviewed and performed the history, physical, subjective, objective, assessment, and plan with the resident/fellow and agree with the note. I performed the history and physical personally. I have made changes to the note above as needed. Thank you for allowing me to participate in the care of this patient, please do not hesitate to call if you have any questions. Beto Trinh, [id number] [facility]  [facility] [employer]  [phone]

## 2019-12-29 NOTE — PLAN OF CARE
BRONCHOSPASM/BRONCHOCONSTRICTION     []         IMPROVE AERATION/BREATH SOUNDS  []   ADMINISTER BRONCHODILATOR THERAPY AS APPROPRIATE  []   ASSESS BREATH SOUNDS  []   IMPLEMENT AEROSOL/MDI PROTOCOL  []   PATIENT EDUCATION AS NEEDED    PROVIDE ADEQUATE OXYGENATION WITH ACCEPTABLE SP02/ABG'S    []  IDENTIFY APPROPRIATE OXYGEN THERAPY  []   MONITOR SP02/ABG'S AS NEEDED   []   PATIENT EDUCATION AS NEEDED    [facility] C[facility], [facility]. Acute cerebrovascular accident (CVA) ([facility] [address] [I63.9]  Acute intra-cranial hemorrhage ([facility] [address] [I62.9] . Vitals:    12/29/19 0901   BP:    Pulse:    Resp: 18   Temp:    SpO2: 96%   . Patients home meds are   Prior to Admission medications    Medication Sig Start Date End Date Taking? Authorizing Provider   bumetanide (BUMEX) 0.5 MG tablet Take 0.5 mg by mouth daily   Yes Historical Provider, MD   lisinopril (PRINIVIL;ZESTRIL) 40 MG tablet Take 1 tablet by mouth daily 7/14/15  Yes Hussein Gilbertr, MD   isosorbide mononitrate (IMDUR) 120 MG CR tablet Take 1 tablet by mouth daily 7/15/15  Yes Hussein Elizabeth MD   diltiazem (CARDIZEM CD) 240 MG ER capsule Take 1 capsule by mouth daily 7/14/15  Yes Hussein Elizabeth MD   aspirin 81 MG tablet Take 81 mg by mouth daily. Yes Historical Provider, MD   atorvastatin (LIPITOR) 40 MG tablet Take 40 mg by mouth daily. Yes Historical Provider, MD   glyBURIDE (DIABETA) 5 MG tablet Take 5 mg by mouth 2 times daily (with meals). Yes Historical Provider, MD   insulin glargine (LANTUS) 100 UNIT/ML injection Inject 60 Units into the skin daily    Yes Historical Provider, MD   metformin (GLUCOPHAGE) 1000 MG tablet Take 1,000 mg by mouth 2 times daily (with meals). Yes Historical Provider, MD   metoprolol (LOPRESSOR) 100 MG tablet Take 100 mg by mouth 2 times daily. Yes Historical Provider, MD   nitroGLYCERIN (NITROSTAT) 0.4 MG SL tablet Place 0.4 mg under the tongue every 5 minutes as needed.    Yes Historical Provider, MD   insulin lispro (HUMALOG) 100 UNIT/ML injection vial Inject 15 Units into the skin 3 times daily (with meals)  Patient taking differently: Inject 15 Units into the skin 3 times daily (with meals) Per wife pt takes a sliding scale dose not a fixed unit amount 7/14/15   Jignesh Sol MD   methimazole (TAPAZOLE) 10 MG tablet Take 10 mg by mouth every evening    Historical Provider, MD   Blood Pressure Monitor KIT CHECK BLOOD PRESSURE DAILY 4/6/15   Oz Dash MD   prasugrel (EFFIENT) 10 MG TABS Take 10 mg by mouth daily.     Historical Provider, MD   methimazole (TAPAZOLE) 10 MG tablet   Take 20 mg by mouth daily     Historical Provider, MD   .  Recent Surgical History: None = 0     Assessment Frequent suctioning required    RR 15  Breath Sounds: rhonchi with frequent suction      · Bronchodilator assessment at level  2  · Hyperinflation assessment at level   · Secretion Management assessment at level    ·   · []    Bronchodilator Assessment  BRONCHODILATOR ASSESSMENT SCORE  Score 0 1 2 3 4 5   Breath Sounds   []  Patient Baseline []  No Wheeze good aeration [x]  Faint, scattered wheezing, good aeration []  Expiratory Wheezing and or moderately diminished []  Insp/Exp wheeze and/or very diminished []  Insp/Exp and/ or marked distress   Respiratory Rate   []  Patient Baseline [x]  Less than 20 []  Less than 20 []  20-25 []  Greater than 25 []  Greater than 25   Peak flow % of Pred or PB []  NA   []  Greater than 90%  []  81-90% []  71-80% []  Less than or equal to 70%  or unable to perform []  Unable due to Respiratory Distress   Dyspnea re []  Patient Baseline [x]  No SOB []  No SOB []  SOB on exertion []  SOB min activity []  At rest/acute   e FEV% Predicted       []  NA []  Above 69%  []  Unable []  Above 60-69%  []  Unable []  Above 50-59%  []  Unable []  Above 35-49%  []  Unable []  Less than 35%  []  Unable                 []  Hyperinflation Assessment  Score 1 2 3   CXR and Breath Sounds   []  Clear []  No

## 2019-12-30 LAB
ABSOLUTE EOS #: 0.06 K/UL (ref 0–0.44)
ABSOLUTE IMMATURE GRANULOCYTE: 0.11 K/UL (ref 0–0.3)
ABSOLUTE LYMPH #: 1.5 K/UL (ref 1.1–3.7)
ABSOLUTE MONO #: 0.61 K/UL (ref 0.1–1.2)
ANION GAP SERPL CALCULATED.3IONS-SCNC: 12 MMOL/L (ref 9–17)
BASOPHILS # BLD: 1 % (ref 0–2)
BASOPHILS ABSOLUTE: 0.13 K/UL (ref 0–0.2)
BUN BLDV-MCNC: 22 MG/DL (ref 8–23)
BUN/CREAT BLD: ABNORMAL (ref 9–20)
CALCIUM SERPL-MCNC: 9.6 MG/DL (ref 8.6–10.4)
CHLORIDE BLD-SCNC: 99 MMOL/L (ref 98–107)
CO2: 23 MMOL/L (ref 20–31)
CREAT SERPL-MCNC: 0.78 MG/DL (ref 0.7–1.2)
DIFFERENTIAL TYPE: ABNORMAL
EOSINOPHILS RELATIVE PERCENT: 1 % (ref 1–4)
GFR AFRICAN AMERICAN: >60 ML/MIN
GFR NON-AFRICAN AMERICAN: >60 ML/MIN
GFR SERPL CREATININE-BSD FRML MDRD: ABNORMAL ML/MIN/{1.73_M2}
GFR SERPL CREATININE-BSD FRML MDRD: ABNORMAL ML/MIN/{1.73_M2}
GLUCOSE BLD-MCNC: 180 MG/DL (ref 75–110)
GLUCOSE BLD-MCNC: 188 MG/DL (ref 75–110)
GLUCOSE BLD-MCNC: 190 MG/DL (ref 75–110)
GLUCOSE BLD-MCNC: 195 MG/DL (ref 70–99)
GLUCOSE BLD-MCNC: 207 MG/DL (ref 75–110)
HCT VFR BLD CALC: 44.1 % (ref 40.7–50.3)
HEMOGLOBIN: 14.6 G/DL (ref 13–17)
IMMATURE GRANULOCYTES: 1 %
LYMPHOCYTES # BLD: 17 % (ref 24–43)
MCH RBC QN AUTO: 31.5 PG (ref 25.2–33.5)
MCHC RBC AUTO-ENTMCNC: 33.1 G/DL (ref 28.4–34.8)
MCV RBC AUTO: 95.2 FL (ref 82.6–102.9)
MONOCYTES # BLD: 7 % (ref 3–12)
NRBC AUTOMATED: 0 PER 100 WBC
PDW BLD-RTO: 14.6 % (ref 11.8–14.4)
PLATELET # BLD: 232 K/UL (ref 138–453)
PLATELET ESTIMATE: ABNORMAL
PMV BLD AUTO: 11.7 FL (ref 8.1–13.5)
POTASSIUM SERPL-SCNC: 4.9 MMOL/L (ref 3.7–5.3)
RBC # BLD: 4.63 M/UL (ref 4.21–5.77)
RBC # BLD: ABNORMAL 10*6/UL
SEG NEUTROPHILS: 73 % (ref 36–65)
SEGMENTED NEUTROPHILS ABSOLUTE COUNT: 6.58 K/UL (ref 1.5–8.1)
SODIUM BLD-SCNC: 134 MMOL/L (ref 135–144)
WBC # BLD: 9 K/UL (ref 3.5–11.3)
WBC # BLD: ABNORMAL 10*3/UL

## 2019-12-30 PROCEDURE — 6370000000 HC RX 637 (ALT 250 FOR IP): Performed by: INTERNAL MEDICINE

## 2019-12-30 PROCEDURE — 6370000000 HC RX 637 (ALT 250 FOR IP): Performed by: STUDENT IN AN ORGANIZED HEALTH CARE EDUCATION/TRAINING PROGRAM

## 2019-12-30 PROCEDURE — 51701 INSERT BLADDER CATHETER: CPT

## 2019-12-30 PROCEDURE — 6370000000 HC RX 637 (ALT 250 FOR IP): Performed by: NURSE PRACTITIONER

## 2019-12-30 PROCEDURE — 6360000002 HC RX W HCPCS: Performed by: STUDENT IN AN ORGANIZED HEALTH CARE EDUCATION/TRAINING PROGRAM

## 2019-12-30 PROCEDURE — 2700000000 HC OXYGEN THERAPY PER DAY

## 2019-12-30 PROCEDURE — 94761 N-INVAS EAR/PLS OXIMETRY MLT: CPT

## 2019-12-30 PROCEDURE — 85025 COMPLETE CBC W/AUTO DIFF WBC: CPT

## 2019-12-30 PROCEDURE — 2580000003 HC RX 258: Performed by: STUDENT IN AN ORGANIZED HEALTH CARE EDUCATION/TRAINING PROGRAM

## 2019-12-30 PROCEDURE — 97110 THERAPEUTIC EXERCISES: CPT

## 2019-12-30 PROCEDURE — 36415 COLL VENOUS BLD VENIPUNCTURE: CPT

## 2019-12-30 PROCEDURE — 6360000002 HC RX W HCPCS: Performed by: NURSE PRACTITIONER

## 2019-12-30 PROCEDURE — 82947 ASSAY GLUCOSE BLOOD QUANT: CPT

## 2019-12-30 PROCEDURE — 99232 SBSQ HOSP IP/OBS MODERATE 35: CPT | Performed by: INTERNAL MEDICINE

## 2019-12-30 PROCEDURE — 2060000000 HC ICU INTERMEDIATE R&B

## 2019-12-30 PROCEDURE — 80048 BASIC METABOLIC PNL TOTAL CA: CPT

## 2019-12-30 PROCEDURE — 51798 US URINE CAPACITY MEASURE: CPT

## 2019-12-30 PROCEDURE — 2500000003 HC RX 250 WO HCPCS: Performed by: STUDENT IN AN ORGANIZED HEALTH CARE EDUCATION/TRAINING PROGRAM

## 2019-12-30 PROCEDURE — 94669 MECHANICAL CHEST WALL OSCILL: CPT

## 2019-12-30 PROCEDURE — 94640 AIRWAY INHALATION TREATMENT: CPT

## 2019-12-30 RX ADMIN — DILTIAZEM HYDROCHLORIDE 90 MG: 60 TABLET, FILM COATED ORAL at 15:00

## 2019-12-30 RX ADMIN — ALBUTEROL SULFATE 2.5 MG: 2.5 SOLUTION RESPIRATORY (INHALATION) at 03:19

## 2019-12-30 RX ADMIN — MODAFINIL 100 MG: 100 TABLET ORAL at 10:29

## 2019-12-30 RX ADMIN — SIMETHICONE 40 MG: 20 SUSPENSION/ DROPS ORAL at 10:21

## 2019-12-30 RX ADMIN — DESMOPRESSIN ACETATE 40 MG: 0.2 TABLET ORAL at 21:02

## 2019-12-30 RX ADMIN — HYDRALAZINE HYDROCHLORIDE 100 MG: 50 TABLET ORAL at 21:04

## 2019-12-30 RX ADMIN — SIMETHICONE 40 MG: 20 SUSPENSION/ DROPS ORAL at 13:00

## 2019-12-30 RX ADMIN — FAMOTIDINE 20 MG: 10 INJECTION, SOLUTION INTRAVENOUS at 21:04

## 2019-12-30 RX ADMIN — DILTIAZEM HYDROCHLORIDE 90 MG: 60 TABLET, FILM COATED ORAL at 21:03

## 2019-12-30 RX ADMIN — SODIUM CHLORIDE, PRESERVATIVE FREE 10 ML: 5 INJECTION INTRAVENOUS at 21:05

## 2019-12-30 RX ADMIN — METOPROLOL TARTRATE 100 MG: 50 TABLET, FILM COATED ORAL at 10:14

## 2019-12-30 RX ADMIN — Medication 1 DROP: at 21:05

## 2019-12-30 RX ADMIN — ACETAMINOPHEN 650 MG: 325 TABLET ORAL at 10:13

## 2019-12-30 RX ADMIN — SIMETHICONE 40 MG: 20 SUSPENSION/ DROPS ORAL at 17:00

## 2019-12-30 RX ADMIN — ENOXAPARIN SODIUM 40 MG: 40 INJECTION SUBCUTANEOUS at 10:31

## 2019-12-30 RX ADMIN — HYDRALAZINE HYDROCHLORIDE 100 MG: 50 TABLET ORAL at 15:00

## 2019-12-30 RX ADMIN — Medication 1 DROP: at 15:00

## 2019-12-30 RX ADMIN — SENNOSIDES AND DOCUSATE SODIUM 2 TABLET: 8.6; 5 TABLET ORAL at 10:14

## 2019-12-30 RX ADMIN — MINERAL OIL AND WHITE PETROLATUM: 150; 830 OINTMENT OPHTHALMIC at 21:04

## 2019-12-30 RX ADMIN — HYDRALAZINE HYDROCHLORIDE 100 MG: 50 TABLET ORAL at 05:39

## 2019-12-30 RX ADMIN — INSULIN GLARGINE 10 UNITS: 100 INJECTION, SOLUTION SUBCUTANEOUS at 21:10

## 2019-12-30 RX ADMIN — DOCUSATE SODIUM 100 MG: 50 LIQUID ORAL at 10:13

## 2019-12-30 RX ADMIN — INSULIN LISPRO 3 UNITS: 100 INJECTION, SOLUTION INTRAVENOUS; SUBCUTANEOUS at 18:13

## 2019-12-30 RX ADMIN — METHIMAZOLE 10 MG: 5 TABLET ORAL at 18:08

## 2019-12-30 RX ADMIN — Medication 15 ML: at 21:04

## 2019-12-30 RX ADMIN — GLYBURIDE 5 MG: 5 TABLET ORAL at 17:00

## 2019-12-30 RX ADMIN — INSULIN LISPRO 6 UNITS: 100 INJECTION, SOLUTION INTRAVENOUS; SUBCUTANEOUS at 12:22

## 2019-12-30 RX ADMIN — ALBUTEROL SULFATE 2.5 MG: 2.5 SOLUTION RESPIRATORY (INHALATION) at 15:35

## 2019-12-30 RX ADMIN — METFORMIN HYDROCHLORIDE 1000 MG: 500 TABLET ORAL at 10:14

## 2019-12-30 RX ADMIN — FLUOXETINE HYDROCHLORIDE 20 MG: 20 CAPSULE ORAL at 10:15

## 2019-12-30 RX ADMIN — Medication 1 DROP: at 10:29

## 2019-12-30 RX ADMIN — SODIUM CHLORIDE, PRESERVATIVE FREE 10 ML: 5 INJECTION INTRAVENOUS at 10:29

## 2019-12-30 RX ADMIN — METFORMIN HYDROCHLORIDE 1000 MG: 500 TABLET ORAL at 17:00

## 2019-12-30 RX ADMIN — LISINOPRIL 40 MG: 20 TABLET ORAL at 10:14

## 2019-12-30 RX ADMIN — BUMETANIDE 0.5 MG: 1 TABLET ORAL at 10:15

## 2019-12-30 RX ADMIN — METOPROLOL TARTRATE 100 MG: 50 TABLET, FILM COATED ORAL at 21:03

## 2019-12-30 RX ADMIN — ALBUTEROL SULFATE 2.5 MG: 2.5 SOLUTION RESPIRATORY (INHALATION) at 07:37

## 2019-12-30 RX ADMIN — SIMETHICONE 40 MG: 20 SUSPENSION/ DROPS ORAL at 21:02

## 2019-12-30 RX ADMIN — Medication 15 ML: at 10:13

## 2019-12-30 RX ADMIN — DILTIAZEM HYDROCHLORIDE 90 MG: 60 TABLET, FILM COATED ORAL at 05:39

## 2019-12-30 RX ADMIN — INSULIN LISPRO 3 UNITS: 100 INJECTION, SOLUTION INTRAVENOUS; SUBCUTANEOUS at 05:40

## 2019-12-30 RX ADMIN — Medication 100 MG: at 10:14

## 2019-12-30 RX ADMIN — FAMOTIDINE 20 MG: 10 INJECTION, SOLUTION INTRAVENOUS at 10:13

## 2019-12-30 RX ADMIN — ALBUTEROL SULFATE 2.5 MG: 2.5 SOLUTION RESPIRATORY (INHALATION) at 19:55

## 2019-12-30 ASSESSMENT — PAIN SCALES - GENERAL
PAINLEVEL_OUTOF10: 0
PAINLEVEL_OUTOF10: 3
PAINLEVEL_OUTOF10: 0

## 2019-12-30 ASSESSMENT — PAIN SCALES - WONG BAKER: WONGBAKER_NUMERICALRESPONSE: 0

## 2019-12-30 NOTE — PROGRESS NOTES
times per day    modafinil  100 mg Per NG tube Daily    insulin glargine  10 Units Subcutaneous Daily    FLUoxetine  20 mg Oral Daily    diltiazem  90 mg Oral 3 times per day    insulin lispro  0-18 Units Subcutaneous 4 times per day    famotidine (PEPCID) injection  20 mg Intravenous BID    sennosides-docusate sodium  2 tablet Oral Daily    tetrahydrozoline  1 drop Left Eye TID    metFORMIN  1,000 mg Per NG tube BID WC    enoxaparin  40 mg Subcutaneous Daily    docusate  100 mg Per NG tube Daily    glyBURIDE  5 mg Per NG tube BID WC    vitamin B-1  100 mg Oral Daily    chlorhexidine  15 mL Mouth/Throat BID    lisinopril  40 mg Oral Daily    simethicone  40 mg Per NG tube 4x Daily    atorvastatin  40 mg Oral Daily    sodium chloride flush  10 mL Intravenous 2 times per day    methIMAzole  20 mg Oral Daily    methIMAzole  10 mg Oral QPM    metoprolol tartrate  100 mg Oral BID     Continuous Infusions:   dextrose Stopped (12/19/19 0952)     PRN Meds:albuterol, fentanNYL, REFRESH LACRI-LUBE, potassium chloride, fentanNYL, glucose, dextrose, glucagon (rDNA), dextrose, acetaminophen **OR** acetaminophen, sodium chloride flush, ondansetron, hydrALAZINE    Objective:    CBC:   Recent Labs     12/28/19  0756 12/29/19  0558 12/30/19  0729   WBC 9.4 5.9 9.0   HGB 13.5 13.7 14.6    See Reflexed IPF Result 232     BMP:    Recent Labs     12/28/19  0756 12/29/19  0558 12/30/19  0729    138 134*   K 4.3 4.6 4.9    101 99   CO2 24 25 23   BUN 21 21 22   CREATININE 0.80 0.78 0.78   GLUCOSE 207* 183* 195*     Calcium:  Recent Labs     12/30/19  0729   CALCIUM 9.6     Glucose:  Recent Labs     12/29/19  1715 12/29/19  2212 12/30/19  0539   POCGLU 159* 153* 188*     Thyroid:   Lab Results   Component Value Date    TSH 3.15 12/06/2019      Urinalysis:   No results for input(s): BACTERIA, BLOODU, CLARITYU, COLORU, [address] [facility], [address], [address] [place] [address], [address] [facility], [facility],  Hang Garcia

## 2019-12-30 NOTE — PROGRESS NOTES
Daily    hydrALAZINE  100 mg Oral 3 times per day    modafinil  100 mg Per NG tube Daily    insulin glargine  10 Units Subcutaneous Daily    FLUoxetine  20 mg Oral Daily    diltiazem  90 mg Oral 3 times per day    insulin lispro  0-18 Units Subcutaneous 4 times per day    famotidine (PEPCID) injection  20 mg Intravenous BID    sennosides-docusate sodium  2 tablet Oral Daily    tetrahydrozoline  1 drop Left Eye TID    metFORMIN  1,000 mg Per NG tube BID WC    enoxaparin  40 mg Subcutaneous Daily    docusate  100 mg Per NG tube Daily    glyBURIDE  5 mg Per NG tube BID WC    vitamin B-1  100 mg Oral Daily    chlorhexidine  15 mL Mouth/Throat BID    lisinopril  40 mg Oral Daily    simethicone  40 mg Per NG tube 4x Daily    atorvastatin  40 mg Oral Daily    sodium chloride flush  10 mL Intravenous 2 times per day    methIMAzole  20 mg Oral Daily    methIMAzole  10 mg Oral QPM    metoprolol tartrate  100 mg Oral BID     Continuous Infusions:    dextrose Stopped (12/19/19 0952)     PRN Meds: albuterol, fentanNYL, REFRESH LACRI-LUBE, potassium chloride, fentanNYL, glucose, dextrose, glucagon (rDNA), dextrose, acetaminophen **OR** acetaminophen, sodium chloride flush, ondansetron, hydrALAZINE    Data:     Past Medical History:   has a past medical history of Atrial fibrillation ([facility] [address] CAD (coronary artery disease), H/O heart artery stent, Hx of blood clots, Hyperlipidemia, Hypertension, Hyperthyroidism, Kidney stones, MI, old, Microalbuminuria, Other complications due to other cardiac device, implant, and graft, Proteinuria, Renal cyst, Thyroid disease, and Type II or unspecified type diabetes mellitus without mention of complication, not stated as uncontrolled. Social History:   reports that he has been smoking cigarettes. He has a 47.00 pack-year smoking history. He has never used smokeless tobacco. He reports that he does not drink alcohol.      Family History: No family history on

## 2019-12-30 NOTE — PROGRESS NOTES
Physical Therapy  DATE: 2019  NAME: Patricia Simpson  MRN: [id number]   : 1955    Discharge Recommendations: Continue to Assess (pending progress)   Subjective: RN agreed to ROM, Pt Nods yes to ROM  Pain: Nods no   Patient follows: little Commands  Is patient on ventilator; Is patient on sedation: NO  Precautions: General, Trach     Therapeutic exercises: PROM to TASNEEM and LLE, AAROM to RUE and pt mildly assist with RLE . x15 reps each all planes. Pt fatigues easily and writer will complete reps.     Bilateral  gastrocnemius stretching 3 reps x 20 seconds     Goals  Short Term Goals  Short term goal 1: Pt to perform bed mobility mod A  Short term goal 2: Demonstrate static sitting balance of fair -   Short term goal 3: Actively demonstrate participation in 30 minutes of therapy to demonstrate increased endurance  Short term goal 4: Perform STS transfer in sravani stedy modAx2       Plan: Progress functional mobility as medically appropriate.    Time In: 916  Time Out: 930  Time Coded Minutes (treatment minutes): 16  Rehab Potential: Fair  Treatments/week: 5-6x/wk     Tena Dyson PTA

## 2019-12-30 NOTE — PLAN OF CARE
No acute events overnight      Problem: Falls - Risk of:  Goal: Will remain free from falls  Description  Will remain free from falls  Outcome: Met This Shift  Goal: Absence of physical injury  Description  Absence of physical injury  Outcome: Met This Shift     Problem: HEMODYNAMIC STATUS  Goal: Patient has stable vital signs and fluid balance  Outcome: Met This Shift     Problem: Nutrition  Goal: Optimal nutrition therapy  Outcome: Met This Shift     Problem: MECHANICAL VENTILATION  Goal: Patient will maintain patent airway  Outcome: Met This Shift  Goal: Oral health is maintained or improved  Outcome: Met This Shift  Goal: Tracheostomy will be managed safely  Outcome: Met This Shift     Problem: OXYGENATION/RESPIRATORY FUNCTION  Goal: Patient will maintain patent airway  Outcome: Met This Shift  Goal: Patient will achieve/maintain normal respiratory rate/effort  Description  Respiratory rate and effort will be within normal limits for the patient  Outcome: Met This Shift     Problem: Pain:  Goal: Pain level will decrease  Description  Pain level will decrease  Outcome: Met This Shift  Goal: Control of acute pain  Description  Control of acute pain  Outcome: Met This Shift  Goal: Control of chronic pain  Description  Control of chronic pain  Outcome: Met This Shift     Problem: Musculor/Skeletal Functional Status  Goal: Absence of falls  Outcome: Met This Shift     Problem:  Activity:  Goal: Fatigue will decrease  Description  Fatigue will decrease  Outcome: Met This Shift     Problem: Coping:  Goal: Level of anxiety will decrease  Description  Level of anxiety will decrease  Outcome: Met This Shift  Goal: Ability to cope will improve  Description  Ability to cope will improve  Outcome: Met This Shift  Goal: Ability to establish a method of communication will improve  Description  Ability to establish a method of communication will improve  Outcome: Met This Shift     Problem: Nutritional:  Goal: Consumption of the

## 2019-12-31 LAB
ABSOLUTE EOS #: 0.04 K/UL (ref 0–0.44)
ABSOLUTE IMMATURE GRANULOCYTE: 0.07 K/UL (ref 0–0.3)
ABSOLUTE LYMPH #: 0.74 K/UL (ref 1.1–3.7)
ABSOLUTE MONO #: 0.51 K/UL (ref 0.1–1.2)
ANION GAP SERPL CALCULATED.3IONS-SCNC: 11 MMOL/L (ref 9–17)
BASOPHILS # BLD: 1 % (ref 0–2)
BASOPHILS ABSOLUTE: 0.08 K/UL (ref 0–0.2)
BUN BLDV-MCNC: 21 MG/DL (ref 8–23)
BUN/CREAT BLD: ABNORMAL (ref 9–20)
CALCIUM SERPL-MCNC: 9.6 MG/DL (ref 8.6–10.4)
CHLORIDE BLD-SCNC: 100 MMOL/L (ref 98–107)
CO2: 25 MMOL/L (ref 20–31)
CREAT SERPL-MCNC: 0.83 MG/DL (ref 0.7–1.2)
DIFFERENTIAL TYPE: ABNORMAL
EOSINOPHILS RELATIVE PERCENT: 1 % (ref 1–4)
GFR AFRICAN AMERICAN: >60 ML/MIN
GFR NON-AFRICAN AMERICAN: >60 ML/MIN
GFR SERPL CREATININE-BSD FRML MDRD: ABNORMAL ML/MIN/{1.73_M2}
GFR SERPL CREATININE-BSD FRML MDRD: ABNORMAL ML/MIN/{1.73_M2}
GLUCOSE BLD-MCNC: 135 MG/DL (ref 75–110)
GLUCOSE BLD-MCNC: 170 MG/DL (ref 75–110)
GLUCOSE BLD-MCNC: 184 MG/DL (ref 75–110)
GLUCOSE BLD-MCNC: 186 MG/DL (ref 75–110)
GLUCOSE BLD-MCNC: 199 MG/DL (ref 70–99)
GLUCOSE BLD-MCNC: 202 MG/DL (ref 75–110)
HCT VFR BLD CALC: 44.2 % (ref 40.7–50.3)
HEMOGLOBIN: 14.7 G/DL (ref 13–17)
IMMATURE GRANULOCYTES: 1 %
LYMPHOCYTES # BLD: 11 % (ref 24–43)
MCH RBC QN AUTO: 31.7 PG (ref 25.2–33.5)
MCHC RBC AUTO-ENTMCNC: 33.3 G/DL (ref 28.4–34.8)
MCV RBC AUTO: 95.3 FL (ref 82.6–102.9)
MONOCYTES # BLD: 8 % (ref 3–12)
NRBC AUTOMATED: 0 PER 100 WBC
PDW BLD-RTO: 14.5 % (ref 11.8–14.4)
PLATELET # BLD: 187 K/UL (ref 138–453)
PLATELET ESTIMATE: ABNORMAL
PMV BLD AUTO: 11 FL (ref 8.1–13.5)
POTASSIUM SERPL-SCNC: 4.8 MMOL/L (ref 3.7–5.3)
RBC # BLD: 4.64 M/UL (ref 4.21–5.77)
RBC # BLD: ABNORMAL 10*6/UL
SEG NEUTROPHILS: 78 % (ref 36–65)
SEGMENTED NEUTROPHILS ABSOLUTE COUNT: 5.1 K/UL (ref 1.5–8.1)
SODIUM BLD-SCNC: 136 MMOL/L (ref 135–144)
WBC # BLD: 6.5 K/UL (ref 3.5–11.3)
WBC # BLD: ABNORMAL 10*3/UL

## 2019-12-31 PROCEDURE — 36415 COLL VENOUS BLD VENIPUNCTURE: CPT

## 2019-12-31 PROCEDURE — 99232 SBSQ HOSP IP/OBS MODERATE 35: CPT | Performed by: INTERNAL MEDICINE

## 2019-12-31 PROCEDURE — 82947 ASSAY GLUCOSE BLOOD QUANT: CPT

## 2019-12-31 PROCEDURE — 2700000000 HC OXYGEN THERAPY PER DAY

## 2019-12-31 PROCEDURE — 94640 AIRWAY INHALATION TREATMENT: CPT

## 2019-12-31 PROCEDURE — 6370000000 HC RX 637 (ALT 250 FOR IP): Performed by: INTERNAL MEDICINE

## 2019-12-31 PROCEDURE — 94761 N-INVAS EAR/PLS OXIMETRY MLT: CPT

## 2019-12-31 PROCEDURE — 6370000000 HC RX 637 (ALT 250 FOR IP): Performed by: STUDENT IN AN ORGANIZED HEALTH CARE EDUCATION/TRAINING PROGRAM

## 2019-12-31 PROCEDURE — 6360000002 HC RX W HCPCS: Performed by: NURSE PRACTITIONER

## 2019-12-31 PROCEDURE — 2060000000 HC ICU INTERMEDIATE R&B

## 2019-12-31 PROCEDURE — 6370000000 HC RX 637 (ALT 250 FOR IP): Performed by: NURSE PRACTITIONER

## 2019-12-31 PROCEDURE — 2500000003 HC RX 250 WO HCPCS: Performed by: STUDENT IN AN ORGANIZED HEALTH CARE EDUCATION/TRAINING PROGRAM

## 2019-12-31 PROCEDURE — 80048 BASIC METABOLIC PNL TOTAL CA: CPT

## 2019-12-31 PROCEDURE — 2580000003 HC RX 258: Performed by: STUDENT IN AN ORGANIZED HEALTH CARE EDUCATION/TRAINING PROGRAM

## 2019-12-31 PROCEDURE — 6360000002 HC RX W HCPCS: Performed by: STUDENT IN AN ORGANIZED HEALTH CARE EDUCATION/TRAINING PROGRAM

## 2019-12-31 PROCEDURE — 51798 US URINE CAPACITY MEASURE: CPT

## 2019-12-31 PROCEDURE — 51701 INSERT BLADDER CATHETER: CPT

## 2019-12-31 PROCEDURE — 85025 COMPLETE CBC W/AUTO DIFF WBC: CPT

## 2019-12-31 PROCEDURE — 97110 THERAPEUTIC EXERCISES: CPT

## 2019-12-31 PROCEDURE — 97535 SELF CARE MNGMENT TRAINING: CPT

## 2019-12-31 RX ADMIN — SIMETHICONE 40 MG: 20 SUSPENSION/ DROPS ORAL at 12:28

## 2019-12-31 RX ADMIN — METFORMIN HYDROCHLORIDE 1000 MG: 500 TABLET ORAL at 08:19

## 2019-12-31 RX ADMIN — SIMETHICONE 40 MG: 20 SUSPENSION/ DROPS ORAL at 20:56

## 2019-12-31 RX ADMIN — METHIMAZOLE 10 MG: 5 TABLET ORAL at 17:39

## 2019-12-31 RX ADMIN — Medication 1 DROP: at 08:20

## 2019-12-31 RX ADMIN — ALBUTEROL SULFATE 2.5 MG: 2.5 SOLUTION RESPIRATORY (INHALATION) at 08:16

## 2019-12-31 RX ADMIN — SODIUM CHLORIDE, PRESERVATIVE FREE 10 ML: 5 INJECTION INTRAVENOUS at 08:15

## 2019-12-31 RX ADMIN — GLYBURIDE 5 MG: 5 TABLET ORAL at 08:18

## 2019-12-31 RX ADMIN — BUMETANIDE 0.5 MG: 1 TABLET ORAL at 08:18

## 2019-12-31 RX ADMIN — ALBUTEROL SULFATE 2.5 MG: 2.5 SOLUTION RESPIRATORY (INHALATION) at 19:48

## 2019-12-31 RX ADMIN — HYDRALAZINE HYDROCHLORIDE 100 MG: 50 TABLET ORAL at 12:29

## 2019-12-31 RX ADMIN — DILTIAZEM HYDROCHLORIDE 90 MG: 60 TABLET, FILM COATED ORAL at 12:29

## 2019-12-31 RX ADMIN — MODAFINIL 100 MG: 100 TABLET ORAL at 12:24

## 2019-12-31 RX ADMIN — INSULIN LISPRO 3 UNITS: 100 INJECTION, SOLUTION INTRAVENOUS; SUBCUTANEOUS at 06:13

## 2019-12-31 RX ADMIN — GLYBURIDE 5 MG: 5 TABLET ORAL at 17:41

## 2019-12-31 RX ADMIN — METOPROLOL TARTRATE 100 MG: 50 TABLET, FILM COATED ORAL at 08:15

## 2019-12-31 RX ADMIN — METOPROLOL TARTRATE 100 MG: 50 TABLET, FILM COATED ORAL at 20:54

## 2019-12-31 RX ADMIN — LISINOPRIL 40 MG: 20 TABLET ORAL at 08:18

## 2019-12-31 RX ADMIN — Medication 1 DROP: at 12:28

## 2019-12-31 RX ADMIN — ACETAMINOPHEN 650 MG: 325 TABLET ORAL at 12:28

## 2019-12-31 RX ADMIN — FAMOTIDINE 20 MG: 10 INJECTION, SOLUTION INTRAVENOUS at 08:20

## 2019-12-31 RX ADMIN — SODIUM CHLORIDE, PRESERVATIVE FREE 10 ML: 5 INJECTION INTRAVENOUS at 20:55

## 2019-12-31 RX ADMIN — DESMOPRESSIN ACETATE 40 MG: 0.2 TABLET ORAL at 20:55

## 2019-12-31 RX ADMIN — INSULIN LISPRO 6 UNITS: 100 INJECTION, SOLUTION INTRAVENOUS; SUBCUTANEOUS at 12:39

## 2019-12-31 RX ADMIN — INSULIN LISPRO 3 UNITS: 100 INJECTION, SOLUTION INTRAVENOUS; SUBCUTANEOUS at 17:39

## 2019-12-31 RX ADMIN — FLUOXETINE HYDROCHLORIDE 20 MG: 20 CAPSULE ORAL at 08:18

## 2019-12-31 RX ADMIN — HYDRALAZINE HYDROCHLORIDE 100 MG: 50 TABLET ORAL at 20:54

## 2019-12-31 RX ADMIN — METHIMAZOLE 20 MG: 5 TABLET ORAL at 12:27

## 2019-12-31 RX ADMIN — INSULIN LISPRO 3 UNITS: 100 INJECTION, SOLUTION INTRAVENOUS; SUBCUTANEOUS at 01:05

## 2019-12-31 RX ADMIN — FAMOTIDINE 20 MG: 10 INJECTION, SOLUTION INTRAVENOUS at 20:55

## 2019-12-31 RX ADMIN — SIMETHICONE 40 MG: 20 SUSPENSION/ DROPS ORAL at 08:15

## 2019-12-31 RX ADMIN — Medication 15 ML: at 12:24

## 2019-12-31 RX ADMIN — SIMETHICONE 40 MG: 20 SUSPENSION/ DROPS ORAL at 17:39

## 2019-12-31 RX ADMIN — DILTIAZEM HYDROCHLORIDE 90 MG: 60 TABLET, FILM COATED ORAL at 23:23

## 2019-12-31 RX ADMIN — ENOXAPARIN SODIUM 40 MG: 40 INJECTION SUBCUTANEOUS at 08:20

## 2019-12-31 RX ADMIN — Medication 1 DROP: at 20:55

## 2019-12-31 RX ADMIN — INSULIN LISPRO 3 UNITS: 100 INJECTION, SOLUTION INTRAVENOUS; SUBCUTANEOUS at 23:23

## 2019-12-31 RX ADMIN — Medication 15 ML: at 20:55

## 2019-12-31 RX ADMIN — INSULIN GLARGINE 10 UNITS: 100 INJECTION, SOLUTION SUBCUTANEOUS at 20:57

## 2019-12-31 RX ADMIN — Medication 100 MG: at 08:18

## 2019-12-31 RX ADMIN — DOCUSATE SODIUM 100 MG: 50 LIQUID ORAL at 08:20

## 2019-12-31 RX ADMIN — HYDRALAZINE HYDROCHLORIDE 100 MG: 50 TABLET ORAL at 06:03

## 2019-12-31 RX ADMIN — SENNOSIDES AND DOCUSATE SODIUM 2 TABLET: 8.6; 5 TABLET ORAL at 08:23

## 2019-12-31 RX ADMIN — DILTIAZEM HYDROCHLORIDE 90 MG: 60 TABLET, FILM COATED ORAL at 06:03

## 2019-12-31 RX ADMIN — METFORMIN HYDROCHLORIDE 1000 MG: 500 TABLET ORAL at 17:41

## 2019-12-31 RX ADMIN — ALBUTEROL SULFATE 2.5 MG: 2.5 SOLUTION RESPIRATORY (INHALATION) at 14:35

## 2019-12-31 ASSESSMENT — PAIN SCALES - GENERAL
PAINLEVEL_OUTOF10: 0

## 2019-12-31 ASSESSMENT — PULMONARY FUNCTION TESTS: PIF_VALUE: 19

## 2019-12-31 NOTE — PROGRESS NOTES
12/31/19 1422   Surgical Airway (trach) Shiley Cuffed   Placement Date/Time: 12/14/19 0152   Timeout: Patient;Procedure;Site/Side;Appropriate Equipment; Consent Confirmed;Sterile Technique using full body drape  Placed By: In surgery  Surgical Airway Type: Tracheostomy  Brand: [place]  Style: Cuffed  Size (m. ..    Status Secured   Site Assessment Clean;Dry   Site Care Dressing applied;Dried;Cleansed   Inner Cannula Care Changed/new   Ties Assessment Dry;Clean

## 2019-12-31 NOTE — PROGRESS NOTES
Occupational Therapy  Facility/Department: [address] STEPDOWN  Daily Treatment Note  NAME: Tyson Lee  : 1955  MRN: [id number]    Date of Service: 2019    Discharge Recommendations:  Patient would benefit from continued therapy after discharge    Assessment   Performance deficits / Impairments: Decreased functional mobility ; Decreased cognition;Decreased high-level IADLs;Decreased ADL status; Decreased endurance;Decreased fine motor control;Decreased coordination;Decreased ROM; Decreased strength;Decreased balance;Decreased posture;Decreased safe awareness;Decreased vision/visual deficit  Prognosis: Poor  OT Education: OT Role  Patient Education: Purpose of activity  Barriers to Learning: pt demo P understanding  REQUIRES OT FOLLOW UP: Yes  Activity Tolerance  Activity Tolerance: Patient limited by fatigue;Treatment limited secondary to decreased cognition  Safety Devices  Safety Devices in place: Yes  Type of devices: Patient at risk for falls; Left in bed;Bed alarm in place;Call light within reach;Nurse notified         Patient Diagnosis(es): The encounter diagnosis was Intraparenchymal hematoma of brain, left, without loss of consciousness, initial encounter ([facility] [address] has a past medical history of Atrial fibrillation ([facility] [address] CAD (coronary artery disease), H/O heart artery stent, Hx of blood clots, Hyperlipidemia, Hypertension, Hyperthyroidism, Kidney stones, MI, old, Microalbuminuria, Other complications due to other cardiac device, implant, and graft, Proteinuria, Renal cyst, Thyroid disease, and Type II or unspecified type diabetes mellitus without mention of complication, not stated as uncontrolled. has a past surgical history that includes Kidney surgery; Lithotripsy; Cystoscopy; Ureteroscopy; pacemaker placement; eye surgery (Left); Thyroidectomy; and gastrostomy (N/A, 2019).     Restrictions  Restrictions/Precautions  Restrictions/Precautions: General Precautions, Fall Risk  Required Braces or Orthoses?: No  Position Activity Restriction  Other position/activity restrictions: trach; G-tube  Subjective   General  Patient assessed for rehabilitation services?: Yes  Family / Caregiver Present: No  Diagnosis: L basal ganglia bleed, R weakness, facial droop, fever  Pain Assessment  Pain Assessment: Faces  Pain Level: 0  Vital Signs  Patient Currently in Pain: Denies   Orientation  Orientation  Overall Orientation Status: Impaired  Orientation Level: Oriented to person(Pt non verbal but able to shake head yes and no. Pt able to shake head yes at  but did not respond to any place or time questions)  Objective    ADL  Grooming: Maximum assistance;Setup;Verbal cueing; Increased time to complete(face washing completed supine in bed. Pt able to  wash cloth and wipe face however req SALAZAR [place] assistance and max verbal instructions)  Additional Comments: Pt demo difficulty following commands req increased time with repition and verbal/tactile instructions. Pt demo max/dependent assitance for ADLs this day  Functional Mobility  Functional Mobility Comments: Pt not appropriate to sit at EOB this day  Cognition  Overall Cognitive Status: Exceptions  Arousal/Alertness: Inconsistent responses to stimuli  Following Commands: Inconsistently follows commands; Follows one step commands with increased time; Follows one step commands with repetition(Pt req verbal and physical cues )  Attention Span: Difficulty attending to directions  Initiation: Requires cues for all  Sequencing: Requires cues for all  Cognition Comment: pt not able to follow commands consistently. Pt able to  wash cloth when asked but unable to squeeze SALAZAR hands even with increased time and repetition  Pt and RN agreeable to therapy this day. Pt able to follow SALAZAR with R eye when spoken to but req verbal instruction on visual scanning to the L, pt unable to complete. Pt supine in bed at session end with call light in reach.   Plan

## 2019-12-31 NOTE — PROGRESS NOTES
Physical Therapy  DATE: 2019  NAME: Duane Bland  MRN: [id number]   : 1955    Discharge Recommendations: Continue to Assess (pending progress)     Subjective: Pt lying  supine upon PT arrival. RN agreeable to PT this afternoon. Pt not following commands this date throughout session. Pain: Pt's vitals remained stable throughout session. Patient follows: No Commands  Is patient on ventilator: Yes  Is patient on sedation: No  Precautions: Fall Risk; General precautions; trach; G-tube    Therapeutic exercises:  UE/LE(s)  Bilateral Passive range of motion all planes x 15 reps; pt unable to complete AAROM this date, pt not following commands throughout session. bilateral gastrocnemius stretching 3 reps x 20 seconds    Goals  Short Term Goals  Short term goal 1: Pt to perform bed mobility mod A  Short term goal 2: Demonstrate static sitting balance of fair -   Short term goal 3: Actively demonstrate participation in 30 minutes of therapy to demonstrate increased endurance  Short term goal 4: Perform STS transfer in sravani stedy modAx2       Plan: Progress functional mobility as medically appropriate.    Time In: 1540  Time Out: 1556  Time Coded Minutes (treatment minutes): 15  Rehab Potential: Fair  Treatments/week: 3-5x/week    Rafat Joiner, PT

## 2019-12-31 NOTE — PLAN OF CARE
Problem: Falls - Risk of:  Goal: Will remain free from falls  Description  Will remain free from falls  12/31/2019 0216 by Simba James RN  Outcome: Ongoing  12/30/2019 1828 by Yosef Tavares RN  Outcome: Ongoing  Goal: Absence of physical injury  Description  Absence of physical injury  12/31/2019 0216 by Simba James RN  Outcome: Ongoing  12/30/2019 1828 by Yosef Tavares RN  Outcome: Ongoing     Problem: Risk for Impaired Skin Integrity  Goal: Tissue integrity - skin and mucous membranes  Description  Structural intactness and normal physiological function of skin and  mucous membranes.   Outcome: Ongoing     Problem: HEMODYNAMIC STATUS  Goal: Patient has stable vital signs and fluid balance  12/31/2019 0216 by Simba James RN  Outcome: Ongoing  12/30/2019 1828 by Yosef Tavares RN  Outcome: Ongoing     Problem: ACTIVITY INTOLERANCE/IMPAIRED MOBILITY  Goal: Mobility/activity is maintained at optimum level for patient  12/31/2019 0216 by Simba James RN  Outcome: Ongoing  12/30/2019 1828 by Yosef Tavares RN  Outcome: Ongoing     Problem: COMMUNICATION IMPAIRMENT  Goal: Ability to express needs and understand communication  12/31/2019 0216 by Simba James RN  Outcome: Ongoing  12/30/2019 1828 by Yosef Tavares RN  Outcome: Ongoing     Problem: Neurological  Goal: Maximum potential motor/sensory/cognitive function  12/31/2019 0216 by Simba James RN  Outcome: Ongoing  12/30/2019 1828 by Yosef Tavares RN  Outcome: Ongoing     Problem: Nutrition  Goal: Optimal nutrition therapy  12/31/2019 0216 by Simba James RN  Outcome: Ongoing  12/30/2019 1828 by Yosef Tavares RN  Outcome: Ongoing     Problem: MECHANICAL VENTILATION  Goal: Mobility/activity is maintained at optimum level for patient  12/31/2019 0216 by Simba James RN  Outcome: Ongoing  12/30/2019 1828 by Yosef Tavares RN  Outcome: Ongoing  Goal: Ability to express needs and understand communication  12/31/2019 0216 by RN  Outcome: Ongoing     Problem:  Activity:  Goal: Fatigue will decrease  Description  Fatigue will decrease  12/31/2019 0216 by Jennifer Boateng RN  Outcome: Ongoing  12/30/2019 1828 by Adrian Chinchilla RN  Outcome: Ongoing     Problem: Coping:  Goal: Level of anxiety will decrease  Description  Level of anxiety will decrease  12/31/2019 0216 by Jennifer Boateng RN  Outcome: Ongoing  12/30/2019 1828 by Adrian Chinchilla RN  Outcome: Ongoing  Goal: Ability to cope will improve  Description  Ability to cope will improve  12/31/2019 0216 by Jennifer Boateng RN  Outcome: Ongoing  12/30/2019 1828 by Adrian Chinchilla RN  Outcome: Ongoing  Goal: Ability to establish a method of communication will improve  Description  Ability to establish a method of communication will improve  12/31/2019 0216 by Jennifer Boateng RN  Outcome: Ongoing  12/30/2019 1828 by Adrian Chinchilla RN  Outcome: Ongoing     Problem: Nutritional:  Goal: Consumption of the prescribed amount of daily calories will improve  Description  Consumption of the prescribed amount of daily calories will improve  12/31/2019 0216 by Jennifer Boateng RN  Outcome: Ongoing  12/30/2019 1828 by Adrian Chinchilla RN  Outcome: Ongoing     Problem: Respiratory:  Goal: Ability to maintain a clear airway will improve  Description  Ability to maintain a clear airway will improve  12/31/2019 0216 by Jennifer Boateng RN  Outcome: Ongoing  12/30/2019 1828 by Adrian Chinchilla RN  Outcome: Ongoing  Goal: Ability to maintain adequate ventilation will improve  Description  Ability to maintain adequate ventilation will improve  12/31/2019 0216 by Jennifer Boateng RN  Outcome: Ongoing  12/30/2019 1828 by Adrian Chinchilla RN  Outcome: Ongoing  Goal: Complications related to the disease process, condition or treatment will be avoided or minimized  Description  Complications related to the disease process, condition or treatment will be avoided or minimized  12/31/2019 0216 by Jennifer Boateng

## 2019-12-31 NOTE — PROGRESS NOTES
related changes. Chest x-ray 12/27/2019 seen and there is no area of infiltrate cardiomegaly is present, no consolidation seen. Assessment and Plan:  Principal Problem:    Nontraumatic cortical hemorrhage of left cerebral hemisphere [facility])  Active Problems:    MARILYN (acute kidney injury) ([facility] [address]    Atrial fibrillation (HCC)    Uncontrolled hypertension    CAD (coronary artery disease)    Diabetes mellitus type 2 in obese (HCC)    Hyperlipidemia    Hyperthyroidism    Acute intra-cranial hemorrhage (HCC)    Intraparenchymal hematoma of brain ([facility])    Ventilator dependence ([facility] [address]    Acute on chronic combined systolic and diastolic congestive heart failure ([facility])    Hypernatremia    Aspiration pneumonia ([facility])    Right sided weakness    Acute respiratory failure with hypoxia and hypercapnia ([facility])  Resolved Problems:    * No resolved hospital problems. *      Plan   Continue with tracheostomy care and monitor tracheostomy stoma and tracheostomy secretion. Continue with T-piece and tracheostomy collar and to watch off ventilator. PT/OT and recommend out of bed and ambulate as much as possible. Monitor neurological status and mentation. Monitor temperature and if fever despite then cultures  Continue with tube feeding. Currently on beta-blocker calcium channel blocker and lisinopril   Monitor intake and output on Bumex through G-tube  On DVT prophylaxis. Munira Mitchell MD  12/31/2019 3:04 PM  Pulmonary & Critical Care    Please note that this chart was generated using voice recognition Dragon dictation software. Although every effort was made to ensure the accuracy of this automated transcription, some errors in transcription may have occurred.

## 2019-12-31 NOTE — PROGRESS NOTES
file.    Vitals:  BP (!) 153/93   Pulse 75   Temp 100.8 °F (38.2 °C) (Oral)   Resp 21   Ht 5' 7\" (1.702 m)   Wt 198 lb 13.7 oz (90.2 kg)   SpO2 92%   BMI 31.15 kg/m²   Temp (24hrs), Av.1 °F (37.3 °C), Min:97.6 °F (36.4 °C), Max:100.8 °F (38.2 °C)    Recent Labs     19  1200 19  1812 19  0605   POCGLU 207* 190* 180* 186*       I/O (24Hr): Intake/Output Summary (Last 24 hours) at 2019 1004  Last data filed at 2019 0834  Gross per 24 hour   Intake 1158 ml   Output 1025 ml   Net 133 ml       Labs:  Hematology:  Recent Labs     19  0558 19  0729 19  0622   WBC 5.9 9.0 6.5   RBC 4.28 4.63 4.64   HGB 13.7 14.6 14.7   HCT 39.4* 44.1 44.2   MCV 92.1 95.2 95.3   MCH 32.0 31.5 31.7   MCHC 34.8 33.1 33.3   RDW 14.3 14.6* 14.5*   PLT See Reflexed IPF Result 232 187   MPV NOT REPORTED 11.7 11.0     Chemistry:  Recent Labs     19  0558 19  0729 19  0622    134* 136   K 4.6 4.9 4.8    99 100   CO2 25 23 25   GLUCOSE 183* 195* 199*   BUN 21 22 21   CREATININE 0.78 0.78 0.83   ANIONGAP 12 12 11   LABGLOM >60 >60 >60   GFRAA >60 >60 >60   CALCIUM 9.2 9.6 9.6     Recent Labs     19  2212 19  0539 19  1200 19  1812 198 19  0605   POCGLU 153* 188* 207* 190* 180* 186*     ABG:  Lab Results   Component Value Date    POCPH 7.416 2019    POCPCO2 46.4 2019    POCPO2 96.2 2019    POCHCO3 29.8 2019    NBEA NOT REPORTED 2019    PBEA 4 2019    XKB0THY 31 2019    QWIZ5UWA 98 2019    FIO2 45.0 2019     Lab Results   Component Value Date/Time    SPECIAL  LT HAND 10ML 2019 05:46 PM     Lab Results   Component Value Date/Time    CULTURE NO GROWTH 6 DAYS 2019 05:46 PM       Radiology:  Xr Chest (single View Frontal)    Result Date: 2019  Cardiomegaly with vascular congestion.        Physical Examination:        General appearance: alert, no acute distress, tracheostomy in place   Mental Status:   Follows some commands intermittently   Lungs:  clear to auscultation bilaterally, normal effort  Heart:  regular rate and normal S1 S2   Abdomen:  soft, nontender, nondistended, normal bowel sounds, PEG in place   Extremities:  no edema, redness, tenderness in the calves  Skin:  no gross lesions, rashes, induration    Assessment:        Hospital Problems           Last Modified POA    * (Principal) Nontraumatic cortical hemorrhage of left cerebral hemisphere ([address] 12/6/2019 Yes    MARILYN (acute kidney injury) ([address] 12/23/2019 No    Atrial fibrillation ([address] 12/6/2019 Yes    Uncontrolled hypertension 12/6/2019 Yes    CAD (coronary artery disease) 12/6/2019 Yes    Diabetes mellitus type 2 in obese ([address] 12/6/2019 Yes    Hyperlipidemia 12/6/2019 Yes    Hyperthyroidism 12/6/2019 Yes    Acute intra-cranial hemorrhage ([address] 12/6/2019 Yes    Intraparenchymal hematoma of brain ([address] 12/7/2019 Yes    Ventilator dependence ([address] 12/8/2019 Yes    Acute on chronic combined systolic and diastolic congestive heart failure ([address] 12/8/2019 Yes    Hypernatremia 12/15/2019 Yes    Aspiration pneumonia ([address] 12/15/2019 Yes    Right sided weakness 12/19/2019 Yes    Acute respiratory failure with hypoxia and hypercapnia ([address] 12/22/2019 Yes          Plan:        - Labs and medications reviewed   - Neurology following - hold antiplatelets and AC, recommend Watchman device  - Cardiology consulted - watchman device w/u as OP  - NS consulted - no need for intervention   - Ophthalmology consulted - trichiasis - s/p polymycin, artifical tears 4 x daily  - Surgery consulted - s/p trach/PEG (12/14)  - Pulmonology following for vent management   - Continue tube feeds, dietary following  - AC for A fib on hold per neurology - repeat CT head in 2 weeks  - Continue selected home medications  - Last A1c 7.4, continue current DM medications  - PT/OT/ST  - DC planning in progress  - Patient would benefit from additional 2-3 weeks of ventilator/tracheostomy management in LTAC setting given secretions       Shiraz Lance MD  12/31/2019  10:04 AM

## 2019-12-31 NOTE — CARE COORDINATION
Transitional Planning  Spoke to [facility] carlos at [employer], inquired if peer to peer was completed. Discussed unsure if it was done and what the outcome was. 56 - Spoke with patient's wife Ezekiel Mendoza, discussed SNF choice while awaiting result of peer to peer. Given choice, she would like referral to [facility]. eReferral sent. Will need pre-cert. 1240 - Received call from Dr Iveth Montenegro, states she did peer to peer yesterday. Insurance peer to peer option  on Friday, but expedited appeal is an option. Spoke to [facility], states she will e-mail appeal letter to Dr Iveth Montenegro for signature and will submit appeal early Thursday morning as insurance company is now closed for the holiday.

## 2020-01-01 LAB
ABSOLUTE EOS #: <0.03 K/UL (ref 0–0.44)
ABSOLUTE IMMATURE GRANULOCYTE: 0.09 K/UL (ref 0–0.3)
ABSOLUTE LYMPH #: 0.99 K/UL (ref 1.1–3.7)
ABSOLUTE MONO #: 0.71 K/UL (ref 0.1–1.2)
ANION GAP SERPL CALCULATED.3IONS-SCNC: 12 MMOL/L (ref 9–17)
BASOPHILS # BLD: 1 % (ref 0–2)
BASOPHILS ABSOLUTE: 0.09 K/UL (ref 0–0.2)
BUN BLDV-MCNC: 26 MG/DL (ref 8–23)
BUN/CREAT BLD: ABNORMAL (ref 9–20)
CALCIUM SERPL-MCNC: 9.9 MG/DL (ref 8.6–10.4)
CHLORIDE BLD-SCNC: 98 MMOL/L (ref 98–107)
CO2: 24 MMOL/L (ref 20–31)
CREAT SERPL-MCNC: 0.91 MG/DL (ref 0.7–1.2)
DIFFERENTIAL TYPE: ABNORMAL
EOSINOPHILS RELATIVE PERCENT: 0 % (ref 1–4)
GFR AFRICAN AMERICAN: >60 ML/MIN
GFR NON-AFRICAN AMERICAN: >60 ML/MIN
GFR SERPL CREATININE-BSD FRML MDRD: ABNORMAL ML/MIN/{1.73_M2}
GFR SERPL CREATININE-BSD FRML MDRD: ABNORMAL ML/MIN/{1.73_M2}
GLUCOSE BLD-MCNC: 158 MG/DL (ref 75–110)
GLUCOSE BLD-MCNC: 171 MG/DL (ref 75–110)
GLUCOSE BLD-MCNC: 207 MG/DL (ref 75–110)
GLUCOSE BLD-MCNC: 213 MG/DL (ref 75–110)
GLUCOSE BLD-MCNC: 259 MG/DL (ref 70–99)
HCT VFR BLD CALC: 44.7 % (ref 40.7–50.3)
HEMOGLOBIN: 14.7 G/DL (ref 13–17)
IMMATURE GRANULOCYTES: 1 %
LYMPHOCYTES # BLD: 13 % (ref 24–43)
MCH RBC QN AUTO: 32.2 PG (ref 25.2–33.5)
MCHC RBC AUTO-ENTMCNC: 32.9 G/DL (ref 28.4–34.8)
MCV RBC AUTO: 98 FL (ref 82.6–102.9)
MONOCYTES # BLD: 9 % (ref 3–12)
NRBC AUTOMATED: 0 PER 100 WBC
PDW BLD-RTO: 14.7 % (ref 11.8–14.4)
PLATELET # BLD: 133 K/UL (ref 138–453)
PLATELET ESTIMATE: ABNORMAL
PMV BLD AUTO: 12.8 FL (ref 8.1–13.5)
POTASSIUM SERPL-SCNC: 4.8 MMOL/L (ref 3.7–5.3)
POTASSIUM SERPL-SCNC: 5.5 MMOL/L (ref 3.7–5.3)
RBC # BLD: 4.56 M/UL (ref 4.21–5.77)
RBC # BLD: ABNORMAL 10*6/UL
SEG NEUTROPHILS: 76 % (ref 36–65)
SEGMENTED NEUTROPHILS ABSOLUTE COUNT: 5.84 K/UL (ref 1.5–8.1)
SODIUM BLD-SCNC: 134 MMOL/L (ref 135–144)
WBC # BLD: 7.7 K/UL (ref 3.5–11.3)
WBC # BLD: ABNORMAL 10*3/UL

## 2020-01-01 PROCEDURE — 6370000000 HC RX 637 (ALT 250 FOR IP): Performed by: NURSE PRACTITIONER

## 2020-01-01 PROCEDURE — 2500000003 HC RX 250 WO HCPCS: Performed by: STUDENT IN AN ORGANIZED HEALTH CARE EDUCATION/TRAINING PROGRAM

## 2020-01-01 PROCEDURE — 99232 SBSQ HOSP IP/OBS MODERATE 35: CPT | Performed by: INTERNAL MEDICINE

## 2020-01-01 PROCEDURE — 94640 AIRWAY INHALATION TREATMENT: CPT

## 2020-01-01 PROCEDURE — 6370000000 HC RX 637 (ALT 250 FOR IP): Performed by: STUDENT IN AN ORGANIZED HEALTH CARE EDUCATION/TRAINING PROGRAM

## 2020-01-01 PROCEDURE — 2580000003 HC RX 258: Performed by: STUDENT IN AN ORGANIZED HEALTH CARE EDUCATION/TRAINING PROGRAM

## 2020-01-01 PROCEDURE — 2700000000 HC OXYGEN THERAPY PER DAY

## 2020-01-01 PROCEDURE — 84132 ASSAY OF SERUM POTASSIUM: CPT

## 2020-01-01 PROCEDURE — 85025 COMPLETE CBC W/AUTO DIFF WBC: CPT

## 2020-01-01 PROCEDURE — 2060000000 HC ICU INTERMEDIATE R&B

## 2020-01-01 PROCEDURE — 6370000000 HC RX 637 (ALT 250 FOR IP): Performed by: INTERNAL MEDICINE

## 2020-01-01 PROCEDURE — 6360000002 HC RX W HCPCS: Performed by: STUDENT IN AN ORGANIZED HEALTH CARE EDUCATION/TRAINING PROGRAM

## 2020-01-01 PROCEDURE — 82947 ASSAY GLUCOSE BLOOD QUANT: CPT

## 2020-01-01 PROCEDURE — 94761 N-INVAS EAR/PLS OXIMETRY MLT: CPT

## 2020-01-01 PROCEDURE — 6360000002 HC RX W HCPCS: Performed by: NURSE PRACTITIONER

## 2020-01-01 PROCEDURE — 36415 COLL VENOUS BLD VENIPUNCTURE: CPT

## 2020-01-01 PROCEDURE — 80048 BASIC METABOLIC PNL TOTAL CA: CPT

## 2020-01-01 RX ADMIN — FAMOTIDINE 20 MG: 10 INJECTION, SOLUTION INTRAVENOUS at 22:25

## 2020-01-01 RX ADMIN — DILTIAZEM HYDROCHLORIDE 90 MG: 60 TABLET, FILM COATED ORAL at 06:29

## 2020-01-01 RX ADMIN — ALBUTEROL SULFATE 2.5 MG: 2.5 SOLUTION RESPIRATORY (INHALATION) at 19:57

## 2020-01-01 RX ADMIN — Medication 1 DROP: at 22:17

## 2020-01-01 RX ADMIN — ALBUTEROL SULFATE 2.5 MG: 2.5 SOLUTION RESPIRATORY (INHALATION) at 15:13

## 2020-01-01 RX ADMIN — FAMOTIDINE 20 MG: 10 INJECTION, SOLUTION INTRAVENOUS at 08:20

## 2020-01-01 RX ADMIN — SIMETHICONE 40 MG: 20 SUSPENSION/ DROPS ORAL at 14:26

## 2020-01-01 RX ADMIN — DILTIAZEM HYDROCHLORIDE 90 MG: 60 TABLET, FILM COATED ORAL at 22:17

## 2020-01-01 RX ADMIN — SIMETHICONE 40 MG: 20 SUSPENSION/ DROPS ORAL at 17:35

## 2020-01-01 RX ADMIN — INSULIN LISPRO 3 UNITS: 100 INJECTION, SOLUTION INTRAVENOUS; SUBCUTANEOUS at 17:35

## 2020-01-01 RX ADMIN — DOCUSATE SODIUM 100 MG: 50 LIQUID ORAL at 08:20

## 2020-01-01 RX ADMIN — DILTIAZEM HYDROCHLORIDE 90 MG: 60 TABLET, FILM COATED ORAL at 14:26

## 2020-01-01 RX ADMIN — INSULIN LISPRO 3 UNITS: 100 INJECTION, SOLUTION INTRAVENOUS; SUBCUTANEOUS at 22:17

## 2020-01-01 RX ADMIN — MINERAL OIL AND WHITE PETROLATUM: 150; 830 OINTMENT OPHTHALMIC at 22:16

## 2020-01-01 RX ADMIN — MODAFINIL 100 MG: 100 TABLET ORAL at 08:30

## 2020-01-01 RX ADMIN — METFORMIN HYDROCHLORIDE 1000 MG: 500 TABLET ORAL at 17:32

## 2020-01-01 RX ADMIN — METHIMAZOLE 20 MG: 5 TABLET ORAL at 08:20

## 2020-01-01 RX ADMIN — INSULIN LISPRO 6 UNITS: 100 INJECTION, SOLUTION INTRAVENOUS; SUBCUTANEOUS at 06:29

## 2020-01-01 RX ADMIN — GLYBURIDE 5 MG: 5 TABLET ORAL at 17:32

## 2020-01-01 RX ADMIN — Medication 15 ML: at 08:20

## 2020-01-01 RX ADMIN — SENNOSIDES AND DOCUSATE SODIUM 2 TABLET: 8.6; 5 TABLET ORAL at 08:20

## 2020-01-01 RX ADMIN — FLUOXETINE HYDROCHLORIDE 20 MG: 20 CAPSULE ORAL at 08:20

## 2020-01-01 RX ADMIN — BUMETANIDE 0.5 MG: 1 TABLET ORAL at 08:20

## 2020-01-01 RX ADMIN — DESMOPRESSIN ACETATE 40 MG: 0.2 TABLET ORAL at 22:17

## 2020-01-01 RX ADMIN — SODIUM CHLORIDE, PRESERVATIVE FREE 10 ML: 5 INJECTION INTRAVENOUS at 08:29

## 2020-01-01 RX ADMIN — ENOXAPARIN SODIUM 40 MG: 40 INJECTION SUBCUTANEOUS at 08:20

## 2020-01-01 RX ADMIN — INSULIN LISPRO 6 UNITS: 100 INJECTION, SOLUTION INTRAVENOUS; SUBCUTANEOUS at 12:47

## 2020-01-01 RX ADMIN — HYDRALAZINE HYDROCHLORIDE 10 MG: 20 INJECTION INTRAMUSCULAR; INTRAVENOUS at 04:29

## 2020-01-01 RX ADMIN — METOPROLOL TARTRATE 100 MG: 50 TABLET, FILM COATED ORAL at 08:20

## 2020-01-01 RX ADMIN — SIMETHICONE 40 MG: 20 SUSPENSION/ DROPS ORAL at 22:18

## 2020-01-01 RX ADMIN — LISINOPRIL 40 MG: 20 TABLET ORAL at 08:20

## 2020-01-01 RX ADMIN — HYDRALAZINE HYDROCHLORIDE 100 MG: 50 TABLET ORAL at 06:29

## 2020-01-01 RX ADMIN — METOPROLOL TARTRATE 100 MG: 50 TABLET, FILM COATED ORAL at 22:17

## 2020-01-01 RX ADMIN — ALBUTEROL SULFATE 2.5 MG: 2.5 SOLUTION RESPIRATORY (INHALATION) at 07:53

## 2020-01-01 RX ADMIN — METFORMIN HYDROCHLORIDE 1000 MG: 500 TABLET ORAL at 08:20

## 2020-01-01 RX ADMIN — METHIMAZOLE 10 MG: 5 TABLET ORAL at 17:44

## 2020-01-01 RX ADMIN — HYDRALAZINE HYDROCHLORIDE 100 MG: 50 TABLET ORAL at 14:26

## 2020-01-01 RX ADMIN — Medication 1 DROP: at 08:19

## 2020-01-01 RX ADMIN — GLYBURIDE 5 MG: 5 TABLET ORAL at 08:20

## 2020-01-01 RX ADMIN — SIMETHICONE 40 MG: 20 SUSPENSION/ DROPS ORAL at 08:20

## 2020-01-01 RX ADMIN — HYDRALAZINE HYDROCHLORIDE 100 MG: 50 TABLET ORAL at 22:17

## 2020-01-01 RX ADMIN — Medication 100 MG: at 08:20

## 2020-01-01 RX ADMIN — Medication 1 DROP: at 14:27

## 2020-01-01 RX ADMIN — Medication 15 ML: at 22:17

## 2020-01-01 RX ADMIN — SODIUM CHLORIDE, PRESERVATIVE FREE 10 ML: 5 INJECTION INTRAVENOUS at 22:18

## 2020-01-01 ASSESSMENT — PAIN SCALES - GENERAL
PAINLEVEL_OUTOF10: 0
PAINLEVEL_OUTOF10: 0

## 2020-01-01 NOTE — PROGRESS NOTES
[facility]    Progress Note    1/1/2020    8:31 AM    Name:   Antonia Nino  MRN:     [id number]     Acct:      [de-identified]   Room:   [address] [place] [address] Day:  32  Admit Date:  12/6/2019 10:59 AM    PCP:   Tena Catalan MD  Code Status:  Full Code    Subjective:     C/C:   Chief Complaint   Patient presents with    Cerebrovascular Accident     Interval History Status: not changed. No acute issues overnight  Appears more lethargic this AM  Still opens eyes to command  Nods appropriately   Afebrile  Having BM    Brief History:     Mr. Walt Lisa is a 30 yr old male with hx of A fib on coumadin, CHF s/p AICD, CAD, DVT, HTN, DM, thyroidectomy who presented initially to outside hospital on 12/6 with complaints of headache, syncope, right side weakness, facial droop, and aphasia.  Initial imaging revealed a left basal ganglia bleed, transferred to [address] [place] [address].     Upon arrival at [address] [place] [address], findings confirmed, Initial NIH:10, patient with above symptoms along with left periphreal field vision loss, maintaining airway, following commands, speech difficulty, some confusion, and right upper and lower extremity weakness. He was noted to have uncontrolled HTN requiring cardene gtt, and INR:2.7 as patient is on coumadin for AFib requiring Vit. K and Milus Raysa for reversal.  Admitted to NICU.      In neuro ICU has worsening respiratory distress requiring intubation. Repeat imaging demonstrating midline shift. Had worsening secretions concerning for pneumonia, started on antibiotics. Unable to wean off ventilator, underwent eventual trach/PEG per surgery on 12/14. Started and tolerated tube feeds. DC planning to LTAC in progress.   Peer to peer on Monday with [facility]. Review of Systems:     Unable to obtain - tracheostomy in place, non verbal     Medications:      Allergies:  No Known Allergies    Current Meds:   Scheduled Meds:    albuterol  2.5 mg Nebulization TID benefit from additional 2-3 weeks of ventilator/tracheostomy management in LTAC setting given secretions   - De escalate lab draws, repeat K given elevation this AM      Stevie Morrissey MD  1/1/2020  8:31 AM

## 2020-01-01 NOTE — PROGRESS NOTES
[facility]enee Powell, [facility]. Acute cerebrovascular accident (CVA) ([facility] [I63.9]  Acute intra-cranial hemorrhage ([facility] [facility] [I62.9] . Vitals:    01/01/20 0753   BP:    Pulse:    Resp: 18   Temp:    SpO2: 94%   . Patients home meds are   Prior to Admission medications    Medication Sig Start Date End Date Taking? Authorizing Provider   bumetanide (BUMEX) 0.5 MG tablet Take 0.5 mg by mouth daily   Yes Historical Provider, MD   lisinopril (PRINIVIL;ZESTRIL) 40 MG tablet Take 1 tablet by mouth daily 7/14/15  Yes Vandana Mack MD   isosorbide mononitrate (IMDUR) 120 MG CR tablet Take 1 tablet by mouth daily 7/15/15  Yes Vandana Mack MD   diltiazem (CARDIZEM CD) 240 MG ER capsule Take 1 capsule by mouth daily 7/14/15  Yes Vandana Mack MD   aspirin 81 MG tablet Take 81 mg by mouth daily. Yes Historical Provider, MD   atorvastatin (LIPITOR) 40 MG tablet Take 40 mg by mouth daily. Yes Historical Provider, MD   glyBURIDE (DIABETA) 5 MG tablet Take 5 mg by mouth 2 times daily (with meals). Yes Historical Provider, MD   insulin glargine (LANTUS) 100 UNIT/ML injection Inject 60 Units into the skin daily    Yes Historical Provider, MD   metformin (GLUCOPHAGE) 1000 MG tablet Take 1,000 mg by mouth 2 times daily (with meals). Yes Historical Provider, MD   metoprolol (LOPRESSOR) 100 MG tablet Take 100 mg by mouth 2 times daily. Yes Historical Provider, MD   nitroGLYCERIN (NITROSTAT) 0.4 MG SL tablet Place 0.4 mg under the tongue every 5 minutes as needed.    Yes Historical Provider, MD   insulin lispro (HUMALOG) 100 UNIT/ML injection vial Inject 15 Units into the skin 3 times daily (with meals)  Patient taking differently: Inject 15 Units into the skin 3 times daily (with meals) Per wife pt takes a sliding scale dose not a fixed unit amount 7/14/15   Vandana Mack MD   methimazole (TAPAZOLE) 10 MG tablet Take 10 mg by mouth every evening    Historical Provider, MD   Blood Pressure Monitor KIT CHECK BLOOD

## 2020-01-01 NOTE — PLAN OF CARE
Problem: OXYGENATION/RESPIRATORY FUNCTION  Goal: Patient will maintain patent airway  1/1/2020 0802 by Mahi Lozada RCP  Outcome: Ongoing  1/1/2020 0250 by Naya Ricketts RN  Outcome: Ongoing  Intervention: HYPEROXYGENATE WITH 100% FIO2  12/31/2019 1955 by Marcos Linton RCP  Note:   BRONCHOSPASM/BRONCHOCONSTRICTION   [x]  IMPROVE AERATION/BREATH SOUNDS  [x]   ADMINISTER BRONCHODILATOR THERAPY AS APPROPRIATE  [x]   ASSESS BREATH SOUNDS  [x]   IMPLEMENT AEROSOL/MDI PROTOCOL  [x]   PATIENT EDUCATION AS NEEDED     Goal: Patient will achieve/maintain normal respiratory rate/effort  Description  Respiratory rate and effort will be within normal limits for the patient  1/1/2020 0802 by Mahi Lozada RCP  Outcome: Ongoing  1/1/2020 0250 by Naya Ricketts RN  Outcome: Ongoing

## 2020-01-01 NOTE — PLAN OF CARE
Problem: Falls - Risk of:  Goal: Will remain free from falls  Description  Will remain free from falls  Outcome: Met This Shift  Goal: Absence of physical injury  Description  Absence of physical injury  Outcome: Met This Shift     Problem: Risk for Impaired Skin Integrity  Goal: Tissue integrity - skin and mucous membranes  Description  Structural intactness and normal physiological function of skin and  mucous membranes.   Outcome: Met This Shift     Problem: HEMODYNAMIC STATUS  Goal: Patient has stable vital signs and fluid balance  Outcome: Met This Shift     Problem: ACTIVITY INTOLERANCE/IMPAIRED MOBILITY  Goal: Mobility/activity is maintained at optimum level for patient  Outcome: Met This Shift     Problem: COMMUNICATION IMPAIRMENT  Goal: Ability to express needs and understand communication  Outcome: Met This Shift     Problem: Neurological  Goal: Maximum potential motor/sensory/cognitive function  Outcome: Met This Shift     Problem: Nutrition  Goal: Optimal nutrition therapy  Outcome: Met This Shift     Problem: MECHANICAL VENTILATION  Goal: Mobility/activity is maintained at optimum level for patient  Outcome: Met This Shift  Goal: Ability to express needs and understand communication  Outcome: Met This Shift  Goal: Patient will maintain patent airway  1/1/2020 1813 by Hortencia Tellez RN  Outcome: Met This Shift  1/1/2020 0802 by Moni Singh RCP  Outcome: Ongoing  Goal: Oral health is maintained or improved  Outcome: Met This Shift  Goal: Tracheostomy will be managed safely  Outcome: Met This Shift     Problem: OXYGENATION/RESPIRATORY FUNCTION  Goal: Patient will maintain patent airway  1/1/2020 1813 by Hortencia Tellez RN  Outcome: Met This Shift  1/1/2020 0802 by Moni Singh RCP  Outcome: Ongoing  Goal: Patient will achieve/maintain normal respiratory rate/effort  Description  Respiratory rate and effort will be within normal limits for the patient  1/1/2020 1813 by Hortencia Tellez RN  Outcome: Met This Shift  1/1/2020 0802 by Debby Cade RCP  Outcome: Ongoing     Problem: Pain:  Description  Pain management should include both nonpharmacologic and pharmacologic interventions. Goal: Pain level will decrease  Description  Pain level will decrease  Outcome: Met This Shift  Goal: Control of acute pain  Description  Control of acute pain  Outcome: Met This Shift  Goal: Control of chronic pain  Description  Control of chronic pain  Outcome: Met This Shift     Problem: Musculor/Skeletal Functional Status  Goal: Highest potential functional level  Outcome: Met This Shift  Goal: Absence of falls  Outcome: Met This Shift     Problem:  Activity:  Goal: Fatigue will decrease  Description  Fatigue will decrease  Outcome: Met This Shift     Problem: Coping:  Goal: Level of anxiety will decrease  Description  Level of anxiety will decrease  Outcome: Met This Shift  Goal: Ability to cope will improve  Description  Ability to cope will improve  Outcome: Met This Shift  Goal: Ability to establish a method of communication will improve  Description  Ability to establish a method of communication will improve  Outcome: Met This Shift     Problem: Nutritional:  Goal: Consumption of the prescribed amount of daily calories will improve  Description  Consumption of the prescribed amount of daily calories will improve  Outcome: Met This Shift     Problem: Respiratory:  Goal: Ability to maintain a clear airway will improve  Description  Ability to maintain a clear airway will improve  Outcome: Met This Shift  Goal: Ability to maintain adequate ventilation will improve  Description  Ability to maintain adequate ventilation will improve  Outcome: Met This Shift  Goal: Complications related to the disease process, condition or treatment will be avoided or minimized  Description  Complications related to the disease process, condition or treatment will be avoided or minimized  Outcome: Met This Shift     Problem: Skin Integrity:  Goal: Risk

## 2020-01-01 NOTE — PROGRESS NOTES
Problems:    MARILYN (acute kidney injury) ([facility] [address]    Atrial fibrillation (HCC)    Uncontrolled hypertension    CAD (coronary artery disease)    Diabetes mellitus type 2 in obese (HCC)    Hyperlipidemia    Hyperthyroidism    Acute intra-cranial hemorrhage (HCC)    Intraparenchymal hematoma of brain (HCC)    Ventilator dependence ([facility] [address]    Acute on chronic combined systolic and diastolic congestive heart failure (HCC)    Hypernatremia    Aspiration pneumonia (HCC)    Right sided weakness    Acute respiratory failure with hypoxia and hypercapnia (HCC)  Resolved Problems:    * No resolved hospital problems. *      Plan   Continue with tracheostomy care and monitor tracheostomy stoma and tracheostomy secretion. Continue with T-piece and tracheostomy collar  PT/OT and recommend out of bed and ambulate as much as possible. Monitor neurological status and mentation. Monitor temperature and if fever despite then cultures  Continue with tube feeding. Currently on beta-blocker calcium channel blocker and lisinopril   Monitor intake and output on Bumex through G-tube  On DVT prophylaxis. Washington Betts MD  1/1/2020 5:23 PM  Pulmonary & Critical Care    Please note that this chart was generated using voice recognition Dragon dictation software. Although every effort was made to ensure the accuracy of this automated transcription, some errors in transcription may have occurred.

## 2020-01-02 ENCOUNTER — APPOINTMENT (OUTPATIENT)
Dept: CT IMAGING | Age: 65
DRG: 003 | End: 2020-01-02
Payer: MEDICARE

## 2020-01-02 LAB
GLUCOSE BLD-MCNC: 165 MG/DL (ref 75–110)
GLUCOSE BLD-MCNC: 165 MG/DL (ref 75–110)
GLUCOSE BLD-MCNC: 168 MG/DL (ref 75–110)
GLUCOSE BLD-MCNC: 202 MG/DL (ref 75–110)

## 2020-01-02 PROCEDURE — 2060000000 HC ICU INTERMEDIATE R&B

## 2020-01-02 PROCEDURE — 6370000000 HC RX 637 (ALT 250 FOR IP): Performed by: INTERNAL MEDICINE

## 2020-01-02 PROCEDURE — 99233 SBSQ HOSP IP/OBS HIGH 50: CPT | Performed by: INTERNAL MEDICINE

## 2020-01-02 PROCEDURE — 6370000000 HC RX 637 (ALT 250 FOR IP): Performed by: STUDENT IN AN ORGANIZED HEALTH CARE EDUCATION/TRAINING PROGRAM

## 2020-01-02 PROCEDURE — 2500000003 HC RX 250 WO HCPCS: Performed by: STUDENT IN AN ORGANIZED HEALTH CARE EDUCATION/TRAINING PROGRAM

## 2020-01-02 PROCEDURE — 94761 N-INVAS EAR/PLS OXIMETRY MLT: CPT

## 2020-01-02 PROCEDURE — 82947 ASSAY GLUCOSE BLOOD QUANT: CPT

## 2020-01-02 PROCEDURE — 6360000002 HC RX W HCPCS: Performed by: NURSE PRACTITIONER

## 2020-01-02 PROCEDURE — 6370000000 HC RX 637 (ALT 250 FOR IP): Performed by: NURSE PRACTITIONER

## 2020-01-02 PROCEDURE — 99232 SBSQ HOSP IP/OBS MODERATE 35: CPT | Performed by: INTERNAL MEDICINE

## 2020-01-02 PROCEDURE — 2580000003 HC RX 258: Performed by: STUDENT IN AN ORGANIZED HEALTH CARE EDUCATION/TRAINING PROGRAM

## 2020-01-02 PROCEDURE — 6360000002 HC RX W HCPCS: Performed by: STUDENT IN AN ORGANIZED HEALTH CARE EDUCATION/TRAINING PROGRAM

## 2020-01-02 PROCEDURE — 6360000002 HC RX W HCPCS: Performed by: PSYCHIATRY & NEUROLOGY

## 2020-01-02 PROCEDURE — 70450 CT HEAD/BRAIN W/O DYE: CPT

## 2020-01-02 PROCEDURE — 94640 AIRWAY INHALATION TREATMENT: CPT

## 2020-01-02 PROCEDURE — 97110 THERAPEUTIC EXERCISES: CPT

## 2020-01-02 PROCEDURE — 2700000000 HC OXYGEN THERAPY PER DAY

## 2020-01-02 RX ADMIN — INSULIN LISPRO 3 UNITS: 100 INJECTION, SOLUTION INTRAVENOUS; SUBCUTANEOUS at 23:17

## 2020-01-02 RX ADMIN — FAMOTIDINE 20 MG: 10 INJECTION, SOLUTION INTRAVENOUS at 21:16

## 2020-01-02 RX ADMIN — INSULIN LISPRO 3 UNITS: 100 INJECTION, SOLUTION INTRAVENOUS; SUBCUTANEOUS at 17:22

## 2020-01-02 RX ADMIN — METFORMIN HYDROCHLORIDE 1000 MG: 500 TABLET ORAL at 07:51

## 2020-01-02 RX ADMIN — Medication 100 MG: at 07:51

## 2020-01-02 RX ADMIN — Medication 1 DROP: at 14:45

## 2020-01-02 RX ADMIN — SIMETHICONE 40 MG: 20 SUSPENSION/ DROPS ORAL at 07:50

## 2020-01-02 RX ADMIN — HYDRALAZINE HYDROCHLORIDE 100 MG: 50 TABLET ORAL at 21:15

## 2020-01-02 RX ADMIN — DOCUSATE SODIUM 100 MG: 50 LIQUID ORAL at 07:52

## 2020-01-02 RX ADMIN — METHIMAZOLE 20 MG: 5 TABLET ORAL at 07:51

## 2020-01-02 RX ADMIN — SIMETHICONE 40 MG: 20 SUSPENSION/ DROPS ORAL at 12:10

## 2020-01-02 RX ADMIN — GLYBURIDE 5 MG: 5 TABLET ORAL at 17:20

## 2020-01-02 RX ADMIN — MODAFINIL 100 MG: 100 TABLET ORAL at 08:09

## 2020-01-02 RX ADMIN — SODIUM CHLORIDE, PRESERVATIVE FREE 10 ML: 5 INJECTION INTRAVENOUS at 21:16

## 2020-01-02 RX ADMIN — SENNOSIDES AND DOCUSATE SODIUM 2 TABLET: 8.6; 5 TABLET ORAL at 07:51

## 2020-01-02 RX ADMIN — METHIMAZOLE 10 MG: 5 TABLET ORAL at 17:20

## 2020-01-02 RX ADMIN — INSULIN LISPRO 3 UNITS: 100 INJECTION, SOLUTION INTRAVENOUS; SUBCUTANEOUS at 12:10

## 2020-01-02 RX ADMIN — Medication 15 ML: at 21:16

## 2020-01-02 RX ADMIN — INSULIN GLARGINE 10 UNITS: 100 INJECTION, SOLUTION SUBCUTANEOUS at 23:16

## 2020-01-02 RX ADMIN — ALBUTEROL SULFATE 2.5 MG: 2.5 SOLUTION RESPIRATORY (INHALATION) at 19:30

## 2020-01-02 RX ADMIN — FLUOXETINE HYDROCHLORIDE 20 MG: 20 CAPSULE ORAL at 07:51

## 2020-01-02 RX ADMIN — Medication 1 DROP: at 21:16

## 2020-01-02 RX ADMIN — ALBUTEROL SULFATE 2.5 MG: 2.5 SOLUTION RESPIRATORY (INHALATION) at 07:29

## 2020-01-02 RX ADMIN — Medication 15 ML: at 07:51

## 2020-01-02 RX ADMIN — METFORMIN HYDROCHLORIDE 1000 MG: 500 TABLET ORAL at 17:20

## 2020-01-02 RX ADMIN — HYDRALAZINE HYDROCHLORIDE 100 MG: 50 TABLET ORAL at 06:45

## 2020-01-02 RX ADMIN — Medication 1 DROP: at 07:50

## 2020-01-02 RX ADMIN — SIMETHICONE 40 MG: 20 SUSPENSION/ DROPS ORAL at 17:20

## 2020-01-02 RX ADMIN — BUMETANIDE 0.5 MG: 1 TABLET ORAL at 07:52

## 2020-01-02 RX ADMIN — GLYBURIDE 5 MG: 5 TABLET ORAL at 07:51

## 2020-01-02 RX ADMIN — SODIUM CHLORIDE, PRESERVATIVE FREE 10 ML: 5 INJECTION INTRAVENOUS at 08:10

## 2020-01-02 RX ADMIN — ALBUTEROL SULFATE 2.5 MG: 2.5 SOLUTION RESPIRATORY (INHALATION) at 13:58

## 2020-01-02 RX ADMIN — DILTIAZEM HYDROCHLORIDE 90 MG: 60 TABLET, FILM COATED ORAL at 21:15

## 2020-01-02 RX ADMIN — DILTIAZEM HYDROCHLORIDE 90 MG: 60 TABLET, FILM COATED ORAL at 06:45

## 2020-01-02 RX ADMIN — DESMOPRESSIN ACETATE 40 MG: 0.2 TABLET ORAL at 21:15

## 2020-01-02 RX ADMIN — METOPROLOL TARTRATE 100 MG: 50 TABLET, FILM COATED ORAL at 07:51

## 2020-01-02 RX ADMIN — LISINOPRIL 40 MG: 20 TABLET ORAL at 07:51

## 2020-01-02 RX ADMIN — SIMETHICONE 40 MG: 20 SUSPENSION/ DROPS ORAL at 21:16

## 2020-01-02 RX ADMIN — INSULIN LISPRO 6 UNITS: 100 INJECTION, SOLUTION INTRAVENOUS; SUBCUTANEOUS at 06:44

## 2020-01-02 RX ADMIN — METOPROLOL TARTRATE 100 MG: 50 TABLET, FILM COATED ORAL at 21:15

## 2020-01-02 RX ADMIN — FAMOTIDINE 20 MG: 10 INJECTION, SOLUTION INTRAVENOUS at 07:51

## 2020-01-02 RX ADMIN — HYDRALAZINE HYDROCHLORIDE 100 MG: 50 TABLET ORAL at 14:45

## 2020-01-02 RX ADMIN — ENOXAPARIN SODIUM 40 MG: 40 INJECTION SUBCUTANEOUS at 07:52

## 2020-01-02 RX ADMIN — DILTIAZEM HYDROCHLORIDE 90 MG: 60 TABLET, FILM COATED ORAL at 14:45

## 2020-01-02 RX ADMIN — FENTANYL CITRATE 25 MCG: 50 INJECTION, SOLUTION INTRAMUSCULAR; INTRAVENOUS at 23:17

## 2020-01-02 ASSESSMENT — PAIN SCALES - GENERAL
PAINLEVEL_OUTOF10: 0
PAINLEVEL_OUTOF10: 0
PAINLEVEL_OUTOF10: 6
PAINLEVEL_OUTOF10: 0

## 2020-01-02 NOTE — PROGRESS NOTES
[facility]    Progress Note    1/2/2020    9:17 AM    Name:   Letha Wallace  MRN:     [id number]     Acct:      [de-identified]   Room:   [address] [place] [address] Day:  32  Admit Date:  12/6/2019 10:59 AM    PCP:   Teresa Julien MD  Code Status:  Full Code    Subjective:     C/C:   Chief Complaint   Patient presents with    Cerebrovascular Accident     Interval History Status: not changed. No acute issues overnight  Lethargic  More awake this AM per RN  Plans for routine repeat head CT as requested per neurology     Brief History:     Mr. Iveth Amin is a 30 yr old male with hx of A fib on coumadin, CHF s/p AICD, CAD, DVT, HTN, DM, thyroidectomy who presented initially to outside hospital on 12/6 with complaints of headache, syncope, right side weakness, facial droop, and aphasia.  Initial imaging revealed a left basal ganglia bleed, transferred to [facility].     Upon arrival at [facility], findings confirmed, Initial NIH:10, patient with above symptoms along with left periphreal field vision loss, maintaining airway, following commands, speech difficulty, some confusion, and right upper and lower extremity weakness. He was noted to have uncontrolled HTN requiring cardene gtt, and INR:2.7 as patient is on coumadin for AFib requiring Vit. K and Juliaette Andrade for reversal.  Admitted to NICU.      In neuro ICU has worsening respiratory distress requiring intubation. Repeat imaging demonstrating midline shift. Had worsening secretions concerning for pneumonia, started on antibiotics. Unable to wean off ventilator, underwent eventual trach/PEG per surgery on 12/14. Started and tolerated tube feeds. DC planning to LTAC in progress.   Peer to peer on Monday with Drew Brown. Review of Systems:     Unable to obtain - tracheostomy in place, non verbal     Medications:      Allergies:  No Known Allergies    Current Meds:   Scheduled Meds:    albuterol  2.5 mg Nebulization TID family history on file. Vitals:  /76   Pulse 77   Temp 99.1 °F (37.3 °C) (Oral)   Resp 20   Ht 5' 7\" (1.702 m)   Wt 198 lb 13.7 oz (90.2 kg)   SpO2 96%   BMI 31.15 kg/m²   Temp (24hrs), Av.6 °F (37 °C), Min:98.2 °F (36.8 °C), Max:99.1 °F (37.3 °C)    Recent Labs     20  1244 20  0644   POCGLU 213* 171* 158* 202*       I/O (24Hr): Intake/Output Summary (Last 24 hours) at 2020 09  Last data filed at 2020 0400  Gross per 24 hour   Intake 1464 ml   Output 950 ml   Net 514 ml       Labs:  Hematology:  Recent Labs     19  0620  0657   WBC 6.5 7.7   RBC 4.64 4.56   HGB 14.7 14.7   HCT 44.2 44.7   MCV 95.3 98.0   MCH 31.7 32.2   MCHC 33.3 32.9   RDW 14.5* 14.7*    133*   MPV 11.0 12.8     Chemistry:  Recent Labs     19  0622 20  0657 20  1116    134*  --    K 4.8 5.5* 4.8    98  --    CO2 25 24  --    GLUCOSE 199* 259*  --    BUN 21 26*  --    CREATININE 0.83 0.91  --    ANIONGAP 11 12  --    LABGLOM >60 >60  --    GFRAA >60 >60  --    CALCIUM 9.6 9.9  --      Recent Labs     19  2315 20  0455 20  1244 20  17220  0644   POCGLU 184* 207* 213* 171* 158* 202*     ABG:  Lab Results   Component Value Date    POCPH 7.416 2019    POCPCO2 46.4 2019    POCPO2 96.2 2019    POCHCO3 29.8 2019    NBEA NOT REPORTED 2019    PBEA 4 2019    ZVL9CJT 31 2019    VDND1CZF 98 2019    FIO2 45.0 2019     Lab Results   Component Value Date/Time    SPECIAL  LT HAND 10ML 2019 05:46 PM     Lab Results   Component Value Date/Time    CULTURE NO GROWTH 6 DAYS 2019 05:46 PM       Radiology:  Xr Chest (single View Frontal)    Result Date: 2019  Cardiomegaly with vascular congestion. Physical Examination:        General appearance:  alert, no acute distress, tracheostomy in place   Mental Status:   Follows some

## 2020-01-02 NOTE — PROGRESS NOTES
PROVIDE ADEQUATE OXYGENATION WITH ACCEPTABLE SP02/ABG'S    [x]  IDENTIFY APPROPRIATE OXYGEN THERAPY  [x]   MONITOR SP02/ABG'S AS NEEDED   [x]   PATIENT EDUCATION AS NEEDED    BRONCHOSPASM/BRONCHOCONSTRICTION     [x]         IMPROVE AERATION/BREATH SOUNDS  [x]   ADMINISTER BRONCHODILATOR THERAPY AS APPROPRIATE  [x]   ASSESS BREATH SOUNDS  [x]   IMPLEMENT AEROSOL/MDI PROTOCOL  [x]   PATIENT EDUCATION AS NEEDED

## 2020-01-02 NOTE — PLAN OF CARE
Problem: Falls - Risk of:  Goal: Will remain free from falls  Description  Will remain free from falls  Outcome: Ongoing  Goal: Absence of physical injury  Description  Absence of physical injury  Outcome: Ongoing     Problem: Risk for Impaired Skin Integrity  Goal: Tissue integrity - skin and mucous membranes  Description  Structural intactness and normal physiological function of skin and  mucous membranes.   Outcome: Ongoing     Problem: HEMODYNAMIC STATUS  Goal: Patient has stable vital signs and fluid balance  Outcome: Ongoing     Problem: ACTIVITY INTOLERANCE/IMPAIRED MOBILITY  Goal: Mobility/activity is maintained at optimum level for patient  1/2/2020 1844 by Aleks Abrams RN  Outcome: Ongoing  1/2/2020 0745 by Reginold Schilder, RCP  Outcome: Ongoing     Problem: COMMUNICATION IMPAIRMENT  Goal: Ability to express needs and understand communication  1/2/2020 1844 by Aleks Abrams RN  Outcome: Ongoing  1/2/2020 0745 by Reginold Schilder, RCP  Outcome: Ongoing     Problem: Neurological  Goal: Maximum potential motor/sensory/cognitive function  Outcome: Ongoing     Problem: Nutrition  Goal: Optimal nutrition therapy  Outcome: Ongoing     Problem: MECHANICAL VENTILATION  Goal: Mobility/activity is maintained at optimum level for patient  1/2/2020 1844 by Aleks Abrams RN  Outcome: Ongoing  1/2/2020 0745 by Reginold Schilder, RCP  Outcome: Ongoing  Goal: Ability to express needs and understand communication  1/2/2020 1844 by Aleks Abrams RN  Outcome: Ongoing  1/2/2020 0745 by Reginold Schilder, RCP  Outcome: Ongoing  Goal: Patient will maintain patent airway  1/2/2020 1844 by Aleks Abrams RN  Outcome: Ongoing  1/2/2020 0745 by Reginold Schilder, RCP  Outcome: Ongoing  Goal: Oral health is maintained or improved  1/2/2020 1844 by Aleks Abrams RN  Outcome: Ongoing  1/2/2020 0745 by Reginold Schilder, RCP  Outcome: Ongoing  Goal: Tracheostomy will be managed safely  1/2/2020 1844 by Aleks Abrams RN  Outcome: Ongoing  1/2/2020 0745 by Meghna Redmond RCP  Outcome: Ongoing     Problem: OXYGENATION/RESPIRATORY FUNCTION  Goal: Patient will maintain patent airway  1/2/2020 1844 by Blane Matthews RN  Outcome: Ongoing  1/2/2020 0745 by Meghna Redmond RCP  Outcome: Ongoing  Goal: Patient will achieve/maintain normal respiratory rate/effort  Description  Respiratory rate and effort will be within normal limits for the patient  1/2/2020 1844 by Blane Matthews RN  Outcome: Ongoing  1/2/2020 0745 by Meghna Redmond RCP  Outcome: Ongoing     Problem: Pain:  Description  Pain management should include both nonpharmacologic and pharmacologic interventions. Goal: Pain level will decrease  Description  Pain level will decrease  Outcome: Ongoing  Goal: Control of acute pain  Description  Control of acute pain  Outcome: Ongoing  Goal: Control of chronic pain  Description  Control of chronic pain  Outcome: Ongoing     Problem: Musculor/Skeletal Functional Status  Goal: Highest potential functional level  Outcome: Ongoing  Goal: Absence of falls  Outcome: Ongoing     Problem:  Activity:  Goal: Fatigue will decrease  Description  Fatigue will decrease  Outcome: Ongoing     Problem: Coping:  Goal: Level of anxiety will decrease  Description  Level of anxiety will decrease  Outcome: Ongoing  Goal: Ability to cope will improve  Description  Ability to cope will improve  Outcome: Ongoing  Goal: Ability to establish a method of communication will improve  Description  Ability to establish a method of communication will improve  Outcome: Ongoing     Problem: Nutritional:  Goal: Consumption of the prescribed amount of daily calories will improve  Description  Consumption of the prescribed amount of daily calories will improve  Outcome: Ongoing     Problem: Respiratory:  Goal: Ability to maintain a clear airway will improve  Description  Ability to maintain a clear airway will improve  1/2/2020 1844 by Blane Matthews RN  Outcome: Ongoing  1/2/2020 0745 by Meghna Redmond SELENE  Outcome: Ongoing  Goal: Ability to maintain adequate ventilation will improve  Description  Ability to maintain adequate ventilation will improve  1/2/2020 1844 by Shayla Lennox, RN  Outcome: Ongoing  1/2/2020 0745 by Les Luz RCP  Outcome: Ongoing  Goal: Complications related to the disease process, condition or treatment will be avoided or minimized  Description  Complications related to the disease process, condition or treatment will be avoided or minimized  1/2/2020 1844 by Shayla Lennox, RN  Outcome: Ongoing  1/2/2020 0745 by Les Luz RCP  Outcome: Ongoing     Problem: Skin Integrity:  Goal: Risk for impaired skin integrity will decrease  Description  Risk for impaired skin integrity will decrease  1/2/2020 1844 by Shayla Lennox, RN  Outcome: Ongoing  1/2/2020 0745 by Les Luz RCP  Outcome: Ongoing

## 2020-01-02 NOTE — ADT AUTH CERT
Stroke: Hemorrhagic - Care Day 23 (12/28/2019) by García De La Cruz RN         Review Status Review Entered   Completed 1/2/2020 09:13       Criteria Review      Care Day: 23 Care Date: 12/28/2019 Level of Care: ICU    Guideline Day 4    Level Of Care    (X) Stroke unit or floor to discharge    Clinical Status    (X) * Mental status at baseline or stable    ( ) * Neurologic deficits absent or stable    (X) * Hemodynamic stability    (X) * Dangerous arrhythmia absent    (X) * Adequate dietary intake    (X) * Discharge plans and education understood    Activity    (X) * Up to chair    (X) * Assisted ambulation as tolerated    Routes    (X) * Oral hydration, medications, and diet    ( ) Advance diet as tolerated    1/2/2020 9:13 AM EST by Kamar Cervantes      tube feeds    Interventions    (X) Glucose control    (X) Possible physical therapy    (X) Possible occupational and speech therapy    (X) DVT prophylaxis    Medications    (X) Possible blood pressure control    * Milestone   Additional Notes   Day 23,  12/28/19   Medical Narrative:   No acute events overnight   T-piece switched to trach mask last night   Opening eyes upon calling and follows simple commands on left side    Afebrile, hemodynamically stable   No leukocytosis       Review of Systems -    Unable to obtain due to trach           Physical Exam:   Vitals: BP (!) 139/90   Pulse 75   Temp 97.5 °F (36.4 °C) (Temporal)   Resp 22   Ht 5' 7\" (1.702 m)   Wt 188 lb 7.9 oz (85.5 kg)   SpO2 95%   BMI 29.52 kg/m²       Labs:     Ref.  Range 12/28/2019 07:56   Sodium Latest Ref Range: 135 - 144 mmol/L 137   Potassium Latest Ref Range: 3.7 - 5.3 mmol/L 4.3   Chloride Latest Ref Range: 98 - 107 mmol/L 101   CO2 Latest Ref Range: 20 - 31 mmol/L 24   BUN Latest Ref Range: 8 - 23 mg/dL 21   Creatinine Latest Ref Range: 0.70 - 1.20 mg/dL 0.80   Bun/Cre Ratio Latest Ref Range: 9 - 20 NOT REPORTED   Anion Gap Latest Ref Range: 9 - 17 mmol/L 12   GFR Non-African Oral Daily   · hydrALAZINE 100 mg Oral 3 times per day   · modafinil 100 mg Per NG tube Daily   · insulin glargine 10 Units Subcutaneous Daily   · FLUoxetine 20 mg Oral Daily   · diltiazem 90 mg Oral 3 times per day   · insulin lispro 0-18 Units Subcutaneous 4 times per day   · famotidine (PEPCID) injection 20 mg Intravenous BID   · sennosides-docusate sodium 2 tablet Oral Daily   · tetrahydrozoline 1 drop Left Eye TID   · metFORMIN 1,000 mg Per NG tube BID WC   · enoxaparin 40 mg Subcutaneous Daily   · docusate 100 mg Per NG tube Daily   · glyBURIDE 5 mg Per NG tube BID WC   · vitamin B-1 100 mg Oral Daily   · chlorhexidine 15 mL Mouth/Throat BID   · lisinopril 40 mg Oral Daily   · simethicone 40 mg Per NG tube 4x Daily   · atorvastatin 40 mg Oral Daily   · sodium chloride flush 10 mL Intravenous 2 times per day   · methIMAzole 20 mg Oral Daily   · methIMAzole 10 mg Oral QPM   · metoprolol tartrate 100 mg Oral BID      Continuous Infusions:   ·       PRN Meds:.          Orders:  telemetry, pulse ox, neuro checks q4h, PT/OT/SL, trach care, wound care, trach mask 35% FIO2   Diet:   DIET TUBE FEED CONTINUOUS/CYCLIC NPO; Diabetic; Gastrostomy; Continuous; 20; 60; 24      Physician Comments:   Plan    -T-piece switched to trach mask last night.  Can downsize to #6 if patient continues to be off ventilator for 3 days    -Tolerating tube feeds   -Mobilize up to chair   - PT/OT/speech therapy   - will continue to follow        Initial Discharge Planning:  [facility]                      Stroke: Hemorrhagic - Care Day 17 (12/22/2019) by Rose Hatfield RN         Review Status Review Entered   Completed 1/2/2020 08:47       Criteria Review      Care Day: 17 Care Date: 12/22/2019 Level of Care:    Guideline Day 3    Level Of Care    (X) Stroke unit or ICU    Clinical Status    (X) * Mental status at baseline or stable    ( ) * Neurologic deficits absent or stable    ( ) * Unimpaired swallowing    1/2/2020 8:47 mg Oral QPM   · metoprolol tartrate 100 mg Oral BID      Continuous Infusions:   · dextrose Stopped (12/19/19 0952)      PRN Meds  Tylenol x1, Lopressor 5 mg IV x1 , SS Lispro insulin coverage  x4      Orders:  telemetry, pulse ox, NGT, mechanical vent weaning, PT/OT, glucose control, trach care, wound care      Physician Comments:   Impression and Plan: Mr. Talha Allred is a 60 y. o. male with    Left thalamic hemorrhagic CVA with warfarin   Aphasia and right hemiplegia secondary to above   status post trach and PEG on 12/14/2019       CT head reports and films most recent on comparison to prior studies are reviewed with patient's wife at bedside. Alta Guevara understood about the interval decrease in size of left thalamic bleed.     She was explained about delayed motor recovery in patients with aphasia. Alta Guevara was strongly advised about passive range of motion exercises while she was at bedside to prevent spasticity and contractures.       On methylphenidate for ? Stroke recovery because of its dopamine and noradrenaline enhancing properties to lessen lethargy and to improve mood to avoid depression related to stroke. Continue PT/OT/speech therapy       Off of antiplatelets and anticoagulants; on Lovenox   Repeat CT head showed interval decrease in size of left thalamic hemorrhage. Comorbid conditions include HTN, DM, A Fib s/p AV node ablation, CAD        Care plan was again discussed with the patient's wife at bedside and she voiced understanding all of those instructions.          Initial Discharge Planning:  rehab vs [facility]ach

## 2020-01-02 NOTE — DISCHARGE INSTR - COC
Continuity of Care Form    Patient Name: Tyson Lee   :  1955  MRN:  [id number]    [facility] date:  2019  Discharge date:  ***    Code Status Order: Full Code   Advance Directives:   Advance Care Flowsheet Documentation     Date/Time Healthcare Directive Type of Healthcare Directive Copy in [address] Agent's Name Healthcare Agent's Phone Number    19 1502  No, patient does not have an advance directive for healthcare treatment -- -- -- -- --    19 1330  No, patient does not have an advance directive for healthcare treatment -- -- -- -- --          Admitting Physician:  Loreto Owen DO  PCP: Yessi Teixeira MD    Discharging Nurse: [facility] Unit/Room#: 8537/4176-58  Discharging Unit Phone Number: [phone]    Emergency Contact:   Extended Emergency Contact Information  Primary Emergency Contact: Lm Guillen  Address: [address] [place] [address],[facility]. [address] [place] [address], [address] [place] [address]  Home Phone: [phone]  Relation: Spouse  Secondary Emergency Contact: Itzel Martinez  Home Phone: [phone]  Relation: Child    Past Surgical History:  Past Surgical History:   Procedure Laterality Date    CYSTOSCOPY      EYE SURGERY Left     s/p thyroidectomy double vision rec'd radiation 1yr ago    GASTROSTOMY N/A 2019    TRACHEOSTOMY, OPEN GASTROSTOMY TUBE PLACEMENT performed by Shubham Heard DO at [address] LITHOTRIPSY      PACEMAKER PLACEMENT      [id number], THIS SYSTEM IS MRI CONDITIONAL HOWEVER THE LEADS ARE FROM [facility] AND THAT MAKES THIS SYSTEM NOT MRI CONDITIONAL AND PATIENT CANNOT HAVE AN MRI.  THYROIDECTOMY      URETEROSCOPY         Immunization History: There is no immunization history on file for this patient.     Active Problems:  Patient Active Problem List   Diagnosis Code    Proteinuria R80.9    Atrial fibrillation (HCC) I48.91    Thyroid disease E07.9 [phone]    Dialysis Facility (if applicable)   · Name:  · Address:  · Dialysis Schedule:  · Phone:  · Fax:    / signature: Electronically signed by Rojas Salinas RN on 1/2/20 at 11:05 AM    PHYSICIAN SECTION    Prognosis: Fair    Condition at Discharge: Stable    Rehab Potential (if transferring to Rehab): Fair    Recommended Labs or Other Treatments After Discharge:   - Follow up with neurology   - Follow up with cardiology for watchman device workup      Physician Certification: I certify the above information and transfer of Danny Dawson  is necessary for the continuing treatment of the diagnosis listed and that he requires [facility] for greater 30 days.      Update Admission H&P: Changes in H&P as follows - see dc summary     PHYSICIAN SIGNATURE:  Electronically signed by Carmencita Gonzalez MD on 1/3/20 at 4:55 PM

## 2020-01-02 NOTE — PROGRESS NOTES
extremity, mild right facial droop, right hemiplegia+  extremities - peripheral pulses normal, no pedal edema, no clubbing or cyanosis    Diet:  DIET TUBE FEED CONTINUOUS/CYCLIC NPO; Diabetic; Gastrostomy; Continuous; 20; 60; 24    Medications:Current Inpatient    Scheduled Meds:   albuterol  2.5 mg Nebulization TID    bumetanide  0.5 mg Oral Daily    hydrALAZINE  100 mg Oral 3 times per day    modafinil  100 mg Per NG tube Daily    insulin glargine  10 Units Subcutaneous Daily    FLUoxetine  20 mg Oral Daily    diltiazem  90 mg Oral 3 times per day    insulin lispro  0-18 Units Subcutaneous 4 times per day    famotidine (PEPCID) injection  20 mg Intravenous BID    sennosides-docusate sodium  2 tablet Oral Daily    tetrahydrozoline  1 drop Left Eye TID    metFORMIN  1,000 mg Per NG tube BID WC    enoxaparin  40 mg Subcutaneous Daily    docusate  100 mg Per NG tube Daily    glyBURIDE  5 mg Per NG tube BID WC    vitamin B-1  100 mg Oral Daily    chlorhexidine  15 mL Mouth/Throat BID    lisinopril  40 mg Oral Daily    simethicone  40 mg Per NG tube 4x Daily    atorvastatin  40 mg Oral Daily    sodium chloride flush  10 mL Intravenous 2 times per day    methIMAzole  20 mg Oral Daily    methIMAzole  10 mg Oral QPM    metoprolol tartrate  100 mg Oral BID     Continuous Infusions:   dextrose Stopped (12/19/19 0952)     PRN Meds:albuterol, fentanNYL, REFRESH LACRI-LUBE, potassium chloride, fentanNYL, glucose, dextrose, glucagon (rDNA), dextrose, acetaminophen **OR** acetaminophen, sodium chloride flush, ondansetron, hydrALAZINE    Objective:    CBC:   Recent Labs     12/31/19 0622 01/01/20  0657   WBC 6.5 7.7   HGB 14.7 14.7    133*     BMP:    Recent Labs     12/31/19 0622 01/01/20  0657 01/01/20  1116    134*  --    K 4.8 5.5* 4.8    98  --    CO2 25 24  --    BUN 21 26*  --    CREATININE 0.83 0.91  --    GLUCOSE 199* 259*  --      Calcium:  Recent Labs     01/01/20 0657 CALCIUM 9.9     Glucose:  Recent Labs     01/01/20  1720 01/01/20  2201 01/02/20  0644   POCGLU 171* 158* 202*     Thyroid:   Lab Results   Component Value Date    TSH 3.15 12/06/2019      Urinalysis:   No results for input(s): BACTERIA, BLOODU, CLARITYU, COLORU, PHUR, PROTEINU, RBCUA, SPECGRAV, BILIRUBINUR, NITRU, WBCUA, LEUKOCYTESUR, GLUCOSEU in the last 72 hours. Xr Abdomen (kub) (single Ap View)    Result Date: 12/17/2019  Nonspecific bowel gas pattern without evidence for obstruction. Ct Head Wo Contrast    Result Date: 12/20/2019  1. Interval decrease in size of left thalamic hemorrhage. Persistent associated edema. 2.  Decrease in intraventricular blood posterior horns of the lateral ventricles. 3.  Mild rightward midline shift of 5.9 mm. Xr Chest Portable    Result Date: 12/19/2019  1. Support devices appear unchanged in position. 2. Improvement in the right basilar opacification. 3. Persistent enlargement of the cardiac silhouette. Xr Chest Portable    Result Date: 12/18/2019  Rotated exam.  Right basilar opacity can reflect atelectasis, pneumonia, or focal edema. Xr Chest Portable    Result Date: 12/17/2019  Stable mild cardiomegaly. Otherwise, unremarkable single upright portable AP view of the chest.  Note: Left costophrenic angle is not completely within the field of view. Xr Chest Portable    Result Date: 12/16/2019  1. Tracheostomy tube as detailed above. 2. Mild bilateral pulmonary vascular congestion. Mild bibasilar airspace disease, atelectasis and/or infiltrate. Trace bilateral pleural effusions. Findings favored to represent mild CHF related changes. Chest x-ray 12/27/2019 seen and there is no area of infiltrate cardiomegaly is present, no consolidation seen.       Assessment and Plan:  Principal Problem:    Nontraumatic cortical hemorrhage of left cerebral hemisphere [facility])  Active Problems:    MARILYN (acute kidney injury) ([facility]    Atrial fibrillation ([facility] [address]

## 2020-01-02 NOTE — PLAN OF CARE
Problem: ACTIVITY INTOLERANCE/IMPAIRED MOBILITY  Goal: Mobility/activity is maintained at optimum level for patient  1/2/2020 0745 by Les Luz RCP  Outcome: Ongoing  1/1/2020 1813 by Shayla Lennox, RN  Outcome: Met This Shift     Problem: COMMUNICATION IMPAIRMENT  Goal: Ability to express needs and understand communication  1/2/2020 0745 by Les Luz RCP  Outcome: Ongoing  1/1/2020 1813 by Shayla Lennox, RN  Outcome: Met This Shift     Problem: MECHANICAL VENTILATION  Goal: Mobility/activity is maintained at optimum level for patient  1/2/2020 0745 by Les Luz RCP  Outcome: Ongoing  1/1/2020 1813 by Shayla Lennox, RN  Outcome: Met This Shift     Problem: MECHANICAL VENTILATION  Goal: Ability to express needs and understand communication  1/2/2020 0745 by Les Luz RCP  Outcome: Ongoing  1/1/2020 1813 by Shayla Lennox, RN  Outcome: Met This Shift     Problem: MECHANICAL VENTILATION  Goal: Patient will maintain patent airway  1/2/2020 0745 by Lse Luz RCP  Outcome: Ongoing  1/1/2020 1813 by Shayla Lennox, RN  Outcome: Met This Shift     Problem: MECHANICAL VENTILATION  Goal: Oral health is maintained or improved  1/2/2020 0745 by Les Luz RCP  Outcome: Ongoing  1/1/2020 1813 by Shayla Lennox, RN  Outcome: Met This Shift     Problem: MECHANICAL VENTILATION  Goal: Oral health is maintained or improved  1/2/2020 0745 by Les Luz RCP  Outcome: Ongoing  1/1/2020 1813 by Shayla Lennox, RN  Outcome: Met This Shift     Problem: MECHANICAL VENTILATION  Goal: Tracheostomy will be managed safely  1/2/2020 0745 by Les Luz RCP  Outcome: Ongoing  1/1/2020 1813 by Shayla Lennox, RN  Outcome: Met This Shift     Problem: OXYGENATION/RESPIRATORY FUNCTION  Goal: Patient will maintain patent airway  1/2/2020 0745 by Les Luz RCP  Outcome: Ongoing  1/1/2020 1813 by Shayla Lennox, RN  Outcome: Met This Shift     Problem: OXYGENATION/RESPIRATORY FUNCTION  Goal: Patient will achieve/maintain normal respiratory rate/effort  Description  Respiratory rate and effort will be within normal limits for the patient  1/2/2020 0745 by Carmen Quezada RCP  Outcome: Ongoing  1/1/2020 1813 by Neelam Elliott RN  Outcome: Met This Shift     Problem: Respiratory:  Goal: Ability to maintain a clear airway will improve  Description  Ability to maintain a clear airway will improve  1/2/2020 0745 by Carmen Quezada RCP  Outcome: Ongoing  1/1/2020 1813 by Neelam Elliott RN  Outcome: Met This Shift     Problem: Respiratory:  Goal: Complications related to the disease process, condition or treatment will be avoided or minimized  Description  Complications related to the disease process, condition or treatment will be avoided or minimized  1/2/2020 0745 by Carmen Quezada RCP  Outcome: Ongoing  1/1/2020 1813 by Neelam Elliott RN  Outcome: Met This Shift     Problem: Skin Integrity:  Goal: Risk for impaired skin integrity will decrease  Description  Risk for impaired skin integrity will decrease  1/2/2020 0745 by Carmen Quezada RCP  Outcome: Ongoing  1/1/2020 1813 by Neelam Elliott RN  Outcome: Met This Shift

## 2020-01-02 NOTE — PLAN OF CARE
PROVIDE ADEQUATE OXYGENATION WITH ACCEPTABLE SP02/ABG'S    [x]  IDENTIFY APPROPRIATE OXYGEN THERAPY  [x]   MONITOR SP02/ABG'S AS NEEDED   [x]   PATIENT EDUCATION AS NEEDED    BRONCHOSPASM/BRONCHOCONSTRICTION     [x]         IMPROVE AERATION/BREATH SOUNDS  [x]   ADMINISTER BRONCHODILATOR THERAPY AS APPROPRIATE  [x]   ASSESS BREATH SOUNDS  []   IMPLEMENT AEROSOL/MDI PROTOCOL  [x]   PATIENT EDUCATION AS NEEDED

## 2020-01-03 VITALS
DIASTOLIC BLOOD PRESSURE: 95 MMHG | HEIGHT: 67 IN | WEIGHT: 198.85 LBS | OXYGEN SATURATION: 97 % | TEMPERATURE: 98.4 F | SYSTOLIC BLOOD PRESSURE: 148 MMHG | RESPIRATION RATE: 18 BRPM | HEART RATE: 75 BPM | BODY MASS INDEX: 31.21 KG/M2

## 2020-01-03 LAB
ABSOLUTE EOS #: 0.04 K/UL (ref 0–0.44)
ABSOLUTE IMMATURE GRANULOCYTE: 0.12 K/UL (ref 0–0.3)
ABSOLUTE LYMPH #: 1.53 K/UL (ref 1.1–3.7)
ABSOLUTE MONO #: 0.57 K/UL (ref 0.1–1.2)
ANION GAP SERPL CALCULATED.3IONS-SCNC: 13 MMOL/L (ref 9–17)
BASOPHILS # BLD: 1 % (ref 0–2)
BASOPHILS ABSOLUTE: 0.07 K/UL (ref 0–0.2)
BUN BLDV-MCNC: 32 MG/DL (ref 8–23)
BUN/CREAT BLD: ABNORMAL (ref 9–20)
CALCIUM SERPL-MCNC: 9.8 MG/DL (ref 8.6–10.4)
CHLORIDE BLD-SCNC: 96 MMOL/L (ref 98–107)
CO2: 27 MMOL/L (ref 20–31)
CREAT SERPL-MCNC: 0.74 MG/DL (ref 0.7–1.2)
DIFFERENTIAL TYPE: ABNORMAL
EOSINOPHILS RELATIVE PERCENT: 1 % (ref 1–4)
GFR AFRICAN AMERICAN: >60 ML/MIN
GFR NON-AFRICAN AMERICAN: >60 ML/MIN
GFR SERPL CREATININE-BSD FRML MDRD: ABNORMAL ML/MIN/{1.73_M2}
GFR SERPL CREATININE-BSD FRML MDRD: ABNORMAL ML/MIN/{1.73_M2}
GLUCOSE BLD-MCNC: 150 MG/DL (ref 75–110)
GLUCOSE BLD-MCNC: 198 MG/DL (ref 75–110)
GLUCOSE BLD-MCNC: 211 MG/DL (ref 75–110)
GLUCOSE BLD-MCNC: 217 MG/DL (ref 70–99)
HCT VFR BLD CALC: 42 % (ref 40.7–50.3)
HEMOGLOBIN: 14.1 G/DL (ref 13–17)
IMMATURE GRANULOCYTES: 2 %
LYMPHOCYTES # BLD: 22 % (ref 24–43)
MCH RBC QN AUTO: 31.7 PG (ref 25.2–33.5)
MCHC RBC AUTO-ENTMCNC: 33.6 G/DL (ref 28.4–34.8)
MCV RBC AUTO: 94.4 FL (ref 82.6–102.9)
MONOCYTES # BLD: 8 % (ref 3–12)
NRBC AUTOMATED: 0 PER 100 WBC
PDW BLD-RTO: 14.8 % (ref 11.8–14.4)
PLATELET # BLD: 194 K/UL (ref 138–453)
PLATELET ESTIMATE: ABNORMAL
PMV BLD AUTO: 10.5 FL (ref 8.1–13.5)
POTASSIUM SERPL-SCNC: 4.9 MMOL/L (ref 3.7–5.3)
RBC # BLD: 4.45 M/UL (ref 4.21–5.77)
RBC # BLD: ABNORMAL 10*6/UL
SEG NEUTROPHILS: 66 % (ref 36–65)
SEGMENTED NEUTROPHILS ABSOLUTE COUNT: 4.49 K/UL (ref 1.5–8.1)
SODIUM BLD-SCNC: 136 MMOL/L (ref 135–144)
WBC # BLD: 6.8 K/UL (ref 3.5–11.3)
WBC # BLD: ABNORMAL 10*3/UL

## 2020-01-03 PROCEDURE — 97110 THERAPEUTIC EXERCISES: CPT

## 2020-01-03 PROCEDURE — 94640 AIRWAY INHALATION TREATMENT: CPT

## 2020-01-03 PROCEDURE — 6370000000 HC RX 637 (ALT 250 FOR IP): Performed by: INTERNAL MEDICINE

## 2020-01-03 PROCEDURE — 6360000002 HC RX W HCPCS: Performed by: NURSE PRACTITIONER

## 2020-01-03 PROCEDURE — 99239 HOSP IP/OBS DSCHRG MGMT >30: CPT | Performed by: INTERNAL MEDICINE

## 2020-01-03 PROCEDURE — 2500000003 HC RX 250 WO HCPCS: Performed by: STUDENT IN AN ORGANIZED HEALTH CARE EDUCATION/TRAINING PROGRAM

## 2020-01-03 PROCEDURE — 6370000000 HC RX 637 (ALT 250 FOR IP): Performed by: STUDENT IN AN ORGANIZED HEALTH CARE EDUCATION/TRAINING PROGRAM

## 2020-01-03 PROCEDURE — 2700000000 HC OXYGEN THERAPY PER DAY

## 2020-01-03 PROCEDURE — 6360000002 HC RX W HCPCS: Performed by: STUDENT IN AN ORGANIZED HEALTH CARE EDUCATION/TRAINING PROGRAM

## 2020-01-03 PROCEDURE — 92610 EVALUATE SWALLOWING FUNCTION: CPT

## 2020-01-03 PROCEDURE — 36415 COLL VENOUS BLD VENIPUNCTURE: CPT

## 2020-01-03 PROCEDURE — 94761 N-INVAS EAR/PLS OXIMETRY MLT: CPT

## 2020-01-03 PROCEDURE — 2580000003 HC RX 258: Performed by: STUDENT IN AN ORGANIZED HEALTH CARE EDUCATION/TRAINING PROGRAM

## 2020-01-03 PROCEDURE — 99232 SBSQ HOSP IP/OBS MODERATE 35: CPT | Performed by: INTERNAL MEDICINE

## 2020-01-03 PROCEDURE — 85025 COMPLETE CBC W/AUTO DIFF WBC: CPT

## 2020-01-03 PROCEDURE — 6370000000 HC RX 637 (ALT 250 FOR IP): Performed by: NURSE PRACTITIONER

## 2020-01-03 PROCEDURE — 80048 BASIC METABOLIC PNL TOTAL CA: CPT

## 2020-01-03 PROCEDURE — 82947 ASSAY GLUCOSE BLOOD QUANT: CPT

## 2020-01-03 RX ORDER — CHLORHEXIDINE GLUCONATE 0.12 MG/ML
15 RINSE ORAL 2 TIMES DAILY
Qty: 420 ML | Refills: 0 | Status: SHIPPED | OUTPATIENT
Start: 2020-01-03 | End: 2020-01-17

## 2020-01-03 RX ORDER — HYDRALAZINE HYDROCHLORIDE 100 MG/1
100 TABLET, FILM COATED ORAL EVERY 8 HOURS SCHEDULED
Qty: 90 TABLET | Refills: 3 | Status: ON HOLD | OUTPATIENT
Start: 2020-01-03 | End: 2020-01-13

## 2020-01-03 RX ORDER — INSULIN GLARGINE 100 [IU]/ML
10 INJECTION, SOLUTION SUBCUTANEOUS DAILY
Qty: 1 VIAL | Refills: 3 | Status: SHIPPED | OUTPATIENT
Start: 2020-01-03

## 2020-01-03 RX ORDER — DILTIAZEM HCL 90 MG
90 TABLET ORAL EVERY 8 HOURS SCHEDULED
Qty: 120 TABLET | Refills: 3 | Status: ON HOLD | OUTPATIENT
Start: 2020-01-03 | End: 2020-01-13

## 2020-01-03 RX ORDER — FLUOXETINE HYDROCHLORIDE 20 MG/1
20 CAPSULE ORAL DAILY
Qty: 30 CAPSULE | Refills: 3 | Status: ON HOLD | OUTPATIENT
Start: 2020-01-04 | End: 2020-01-13

## 2020-01-03 RX ORDER — SIMETHICONE 20 MG/.3ML
40 EMULSION ORAL 4 TIMES DAILY
Qty: 60 ML | Refills: 3 | Status: SHIPPED | OUTPATIENT
Start: 2020-01-03

## 2020-01-03 RX ORDER — SENNA AND DOCUSATE SODIUM 50; 8.6 MG/1; MG/1
2 TABLET, FILM COATED ORAL DAILY
Qty: 30 TABLET | Refills: 0 | Status: SHIPPED | OUTPATIENT
Start: 2020-01-04

## 2020-01-03 RX ORDER — TETRAHYDROZOLINE HCL 0.05 %
1 DROPS OPHTHALMIC (EYE) 3 TIMES DAILY
Qty: 10 ML | Refills: 4 | Status: ON HOLD | OUTPATIENT
Start: 2020-01-03 | End: 2020-01-13

## 2020-01-03 RX ADMIN — METFORMIN HYDROCHLORIDE 1000 MG: 500 TABLET ORAL at 08:33

## 2020-01-03 RX ADMIN — ALBUTEROL SULFATE 2.5 MG: 2.5 SOLUTION RESPIRATORY (INHALATION) at 07:28

## 2020-01-03 RX ADMIN — FAMOTIDINE 20 MG: 10 INJECTION, SOLUTION INTRAVENOUS at 08:34

## 2020-01-03 RX ADMIN — HYDRALAZINE HYDROCHLORIDE 10 MG: 20 INJECTION INTRAMUSCULAR; INTRAVENOUS at 04:08

## 2020-01-03 RX ADMIN — ALBUTEROL SULFATE 2.5 MG: 2.5 SOLUTION RESPIRATORY (INHALATION) at 14:41

## 2020-01-03 RX ADMIN — GLYBURIDE 5 MG: 5 TABLET ORAL at 08:34

## 2020-01-03 RX ADMIN — INSULIN LISPRO 3 UNITS: 100 INJECTION, SOLUTION INTRAVENOUS; SUBCUTANEOUS at 05:24

## 2020-01-03 RX ADMIN — INSULIN LISPRO 3 UNITS: 100 INJECTION, SOLUTION INTRAVENOUS; SUBCUTANEOUS at 17:57

## 2020-01-03 RX ADMIN — SENNOSIDES AND DOCUSATE SODIUM 2 TABLET: 8.6; 5 TABLET ORAL at 08:33

## 2020-01-03 RX ADMIN — Medication 1 DROP: at 08:35

## 2020-01-03 RX ADMIN — SODIUM CHLORIDE, PRESERVATIVE FREE 10 ML: 5 INJECTION INTRAVENOUS at 08:34

## 2020-01-03 RX ADMIN — HYDRALAZINE HYDROCHLORIDE 100 MG: 50 TABLET ORAL at 05:23

## 2020-01-03 RX ADMIN — BUMETANIDE 0.5 MG: 1 TABLET ORAL at 11:28

## 2020-01-03 RX ADMIN — DOCUSATE SODIUM 100 MG: 50 LIQUID ORAL at 08:34

## 2020-01-03 RX ADMIN — DILTIAZEM HYDROCHLORIDE 90 MG: 60 TABLET, FILM COATED ORAL at 05:23

## 2020-01-03 RX ADMIN — FLUOXETINE HYDROCHLORIDE 20 MG: 20 CAPSULE ORAL at 08:34

## 2020-01-03 RX ADMIN — METHIMAZOLE 10 MG: 5 TABLET ORAL at 18:03

## 2020-01-03 RX ADMIN — ENOXAPARIN SODIUM 40 MG: 40 INJECTION SUBCUTANEOUS at 08:34

## 2020-01-03 RX ADMIN — HYDRALAZINE HYDROCHLORIDE 100 MG: 50 TABLET ORAL at 14:44

## 2020-01-03 RX ADMIN — ACETAMINOPHEN 650 MG: 325 TABLET ORAL at 14:45

## 2020-01-03 RX ADMIN — METFORMIN HYDROCHLORIDE 1000 MG: 500 TABLET ORAL at 17:57

## 2020-01-03 RX ADMIN — MODAFINIL 100 MG: 100 TABLET ORAL at 11:28

## 2020-01-03 RX ADMIN — SIMETHICONE 40 MG: 20 SUSPENSION/ DROPS ORAL at 17:57

## 2020-01-03 RX ADMIN — SIMETHICONE 40 MG: 20 SUSPENSION/ DROPS ORAL at 14:38

## 2020-01-03 RX ADMIN — METOPROLOL TARTRATE 100 MG: 50 TABLET, FILM COATED ORAL at 08:33

## 2020-01-03 RX ADMIN — Medication 100 MG: at 08:33

## 2020-01-03 RX ADMIN — GLYBURIDE 5 MG: 5 TABLET ORAL at 17:57

## 2020-01-03 RX ADMIN — Medication 1 DROP: at 14:48

## 2020-01-03 RX ADMIN — INSULIN LISPRO 6 UNITS: 100 INJECTION, SOLUTION INTRAVENOUS; SUBCUTANEOUS at 11:46

## 2020-01-03 RX ADMIN — ALBUTEROL SULFATE 2.5 MG: 2.5 SOLUTION RESPIRATORY (INHALATION) at 19:44

## 2020-01-03 RX ADMIN — SIMETHICONE 40 MG: 20 SUSPENSION/ DROPS ORAL at 08:34

## 2020-01-03 RX ADMIN — METHIMAZOLE 20 MG: 5 TABLET ORAL at 08:33

## 2020-01-03 RX ADMIN — LISINOPRIL 40 MG: 20 TABLET ORAL at 08:34

## 2020-01-03 RX ADMIN — DILTIAZEM HYDROCHLORIDE 90 MG: 60 TABLET, FILM COATED ORAL at 14:44

## 2020-01-03 RX ADMIN — Medication 15 ML: at 08:34

## 2020-01-03 ASSESSMENT — PAIN SCALES - GENERAL: PAINLEVEL_OUTOF10: 0

## 2020-01-03 ASSESSMENT — PAIN - FUNCTIONAL ASSESSMENT: PAIN_FUNCTIONAL_ASSESSMENT: 0-10

## 2020-01-03 NOTE — PLAN OF CARE
Patient will maintain patent airway  1/3/2020 0401 by Baron Norah RN  Outcome: Ongoing  1/2/2020 1844 by Ella Villarreal RN  Outcome: Ongoing  Goal: Patient will achieve/maintain normal respiratory rate/effort  Description  Respiratory rate and effort will be within normal limits for the patient  1/2/2020 1844 by Ella Villarreal RN  Outcome: Ongoing     Problem: Pain:  Goal: Pain level will decrease  Description  Pain level will decrease  1/2/2020 1844 by Ella Villarreal RN  Outcome: Ongoing  Goal: Control of acute pain  Description  Control of acute pain  1/2/2020 1844 by Ella Villarreal RN  Outcome: Ongoing  Goal: Control of chronic pain  Description  Control of chronic pain  1/2/2020 1844 by Ella Villarreal RN  Outcome: Ongoing     Problem: Musculor/Skeletal Functional Status  Goal: Highest potential functional level  1/2/2020 1844 by Ella Villarreal RN  Outcome: Ongoing  Goal: Absence of falls  1/2/2020 1844 by Ella Villarreal RN  Outcome: Ongoing     Problem:  Activity:  Goal: Fatigue will decrease  Description  Fatigue will decrease  1/2/2020 1844 by Ella Villarreal RN  Outcome: Ongoing     Problem: Coping:  Goal: Level of anxiety will decrease  Description  Level of anxiety will decrease  1/2/2020 1844 by Ella Villarreal RN  Outcome: Ongoing  Goal: Ability to cope will improve  Description  Ability to cope will improve  1/2/2020 1844 by Ella Villarreal RN  Outcome: Ongoing  Goal: Ability to establish a method of communication will improve  Description  Ability to establish a method of communication will improve  1/2/2020 1844 by Ella Villarreal RN  Outcome: Ongoing     Problem: Nutritional:  Goal: Consumption of the prescribed amount of daily calories will improve  Description  Consumption of the prescribed amount of daily calories will improve  1/2/2020 1844 by Ella Villarreal RN  Outcome: Ongoing     Problem: Respiratory:  Goal: Ability to maintain a clear airway will improve  Description  Ability to maintain a clear airway will improve  1/2/2020

## 2020-01-03 NOTE — PLAN OF CARE
Problem: ACTIVITY INTOLERANCE/IMPAIRED MOBILITY  Goal: Mobility/activity is maintained at optimum level for patient  1/3/2020 0739 by Francine Scott RCP  Outcome: Ongoing  1/2/2020 1844 by Mele Faustin RN  Outcome: Ongoing     Problem: COMMUNICATION IMPAIRMENT  Goal: Ability to express needs and understand communication  1/3/2020 0739 by Francine Scott RCP  Outcome: Ongoing  1/2/2020 1844 by Mele Faustin RN  Outcome: Ongoing     Problem: MECHANICAL VENTILATION  Goal: Mobility/activity is maintained at optimum level for patient  1/3/2020 0739 by Francine Scott RCP  Outcome: Ongoing  1/2/2020 1844 by Mele Faustin RN  Outcome: Ongoing     Problem: MECHANICAL VENTILATION  Goal: Ability to express needs and understand communication  1/3/2020 0739 by Francine Scott RCP  Outcome: Ongoing  1/2/2020 1844 by Mele Faustin RN  Outcome: Ongoing     Problem: MECHANICAL VENTILATION  Goal: Patient will maintain patent airway  1/3/2020 0739 by Francine Scott RCP  Outcome: Ongoing  1/3/2020 0401 by Belinda Rizzo RN  Outcome: Ongoing  1/2/2020 1844 by Mele Faustin RN  Outcome: Ongoing     Problem: MECHANICAL VENTILATION  Goal: Oral health is maintained or improved  1/3/2020 0739 by Francine Scott RCP  Outcome: Ongoing  1/2/2020 1844 by Mele Faustin RN  Outcome: Ongoing     Problem: MECHANICAL VENTILATION  Goal: Tracheostomy will be managed safely  1/3/2020 0739 by Francine Scott RCP  Outcome: Ongoing  1/2/2020 1844 by Mele Faustin RN  Outcome: Ongoing     Problem: OXYGENATION/RESPIRATORY FUNCTION  Goal: Patient will maintain patent airway  1/3/2020 0739 by Francine Scott RCP  Outcome: Ongoing  1/3/2020 0401 by Belinda Rizzo RN  Outcome: Ongoing  1/2/2020 1844 by Mele Faustin RN  Outcome: Ongoing     Problem: OXYGENATION/RESPIRATORY FUNCTION  Goal: Patient will achieve/maintain normal respiratory rate/effort  Description  Respiratory rate and effort will be within normal limits for the patient  1/3/2020 0739 by Chelsea Lindsey Eligio Mohan RCP  Outcome: Ongoing  1/2/2020 1844 by Deirdre Soliman RN  Outcome: Ongoing     Problem: Respiratory:  Goal: Ability to maintain a clear airway will improve  Description  Ability to maintain a clear airway will improve  1/3/2020 0739 by Re Samnao RCP  Outcome: Ongoing  1/2/2020 1844 by Deirdre Soliman RN  Outcome: Ongoing     Problem: Respiratory:  Goal: Ability to maintain adequate ventilation will improve  Description  Ability to maintain adequate ventilation will improve  1/3/2020 0739 by Re Samano RCP  Outcome: Ongoing  1/2/2020 1844 by Deirdre Soliman RN  Outcome: Ongoing     Problem: Skin Integrity:  Goal: Risk for impaired skin integrity will decrease  Description  Risk for impaired skin integrity will decrease  1/3/2020 0739 by Re Samano RCP  Outcome: Ongoing  1/2/2020 1844 by Deirdre Soliman RN  Outcome: Ongoing

## 2020-01-03 NOTE — PLAN OF CARE
MOBILIZE SECRETIONS    [x]   ASSESS BREATH SOUNDS  [x]   ASSESS SPUTUM PRODUCTION  [x]   COUGH AND DEEP BREATHING  []  IMPLEMENT SECRETION MANAGEMENT PROTOCOL  [x]   PATIENT EDUCATION AS NEEDED    BRONCHOSPASM/BRONCHOCONSTRICTION     [x]         IMPROVE AERATION/BREATH SOUNDS  [x]   ADMINISTER BRONCHODILATOR THERAPY AS APPROPRIATE  []   ASSESS BREATH SOUNDS  []   IMPLEMENT AEROSOL/MDI PROTOCOL  [x]   PATIENT EDUCATION AS NEEDED

## 2020-01-03 NOTE — PROGRESS NOTES
Speech Language Pathology  Facility/Department: [facility]   CLINICAL BEDSIDE SWALLOW EVALUATION    NAME: Marilyn Nieto  : 1955  MRN: [id number]    ADMISSION DATE: 2019  ADMITTING DIAGNOSIS: has Proteinuria; Atrial fibrillation ([address] Thyroid disease; Uncontrolled hypertension; CAD (coronary artery disease); Diabetes mellitus type 2 in obese ([address] Hyperlipidemia; Hyperthyroidism; Other complications due to other cardiac device, implant, and graft; Renal cyst; Microalbuminuria; Diplopia; Headache; Hypertensive urgency; Intractable headache; Headache; Nontraumatic cortical hemorrhage of left cerebral hemisphere [facility]); Acute intra-cranial hemorrhage ([address] Intraparenchymal hematoma of brain ([address] Ventilator dependence ([address] Acute on chronic combined systolic and diastolic congestive heart failure ([address] Hypernatremia; Chronic diastolic heart failure ([address] Aspiration pneumonia ([address] Right sided weakness; Acute respiratory failure with hypoxia and hypercapnia (HCC); and MARILYN (acute kidney injury) ([address] on their problem list.    Recent Chest Xray: (  19  )    Impression   Cardiomegaly with vascular congestion.               Date of Eval: 1/3/2020  Evaluating Therapist: Becky Harrison    Current Diet level:  Current Diet : NPO  Current Liquid Diet : NPO      Primary Complaint: Per chart, The patient is a 60 y.o. male presented with past medical history of type 2 diabetes thyroid disease hypothyroidism, hypertension hyperlipidemia coronary artery disease status post stent A. fib on Pradaxa 150 mg twice daily, patulous basilar tip came as a transfer from [facility] with last well-known oh 6 AM this morning when wife was with him and the patient had coffee he then went to lie on bed and around 8 AM to take his medication sitting on the couch and the wife noticed that he was mumbling unable to move his right arm and had a left facial droop and she called 911.   At [facility] his blood discharge. Recommended Diet and Intervention  Diet Solids Recommendation: NPO  Liquid Consistency Recommendation: NPO  Recommended Form of Meds: Via alternative means of nutrition  Recommendations: NPO  Therapeutic Interventions: Patient/Family education    Treatment/Goals  Dysphagia Goals: The patient will tolerate instrumental swallowing procedure    General  Chart Reviewed: Yes  Behavior/Cognition: Alert; Cooperative  Temperature Spikes Noted: No  Respiratory Status: Trach uncuffed  O2 Device: Trach mask  Communication Observation: Non-verbal  Follows Directions: Simple  Dentition: Some missing teeth  Patient Positioning: Upright in bed  Consistencies Administered: Dysphagia Pureed (Dysphagia I)      Pain Level: 0    Vision/Hearing  Vision  Vision: Impaired  Vision Exceptions: Visual field cut  Hearing  Hearing: Within functional limits    Oral Motor Deficits  Oral/Motor  Oral Motor:  Within functional limits    Oral Phase Dysfunction  Oral Phase  Oral Phase: WFL     Indicators of Pharyngeal Phase Dysfunction   Pharyngeal Phase  Pharyngeal Phase: WFL  Pharyngeal Phase   Pharyngeal: No immediate return of blue dye with trach suctioning following x1 trial puree    Prognosis  Prognosis  Prognosis for safe diet advancement: fair  Individuals consulted  Consulted and agree with results and recommendations: Patient;RN    Education  Patient Education: yes  Patient Education Response: Verbalizes understanding               Therapy Time  SLP Individual Minutes  Time In: 7924  Time Out: [address]  Minutes: 74-03 [facility] [place]    1/3/2020 10:24 AM

## 2020-01-03 NOTE — PROGRESS NOTES
PULMONARY PROGRESS NOTE      Patient:  Claudia Gallego  YOB: 1955    MRN: [id number]     Acct: [de-identified]     Admit date: 12/6/2019    REASON FOR CONSULT:-Vent management    Pt seen and Chart reviewed. Subjective:   No acute events overnight  Failed swallow study   Review of Systems -   Unable to obtain due to mental status      Physical Exam:  Vitals: BP (!) 150/87   Pulse 75   Temp 98.3 °F (36.8 °C) (Oral)   Resp 17   Ht 5' 7\" (1.702 m)   Wt 198 lb 13.7 oz (90.2 kg)   SpO2 90%   BMI 31.15 kg/m²   24 hour intake/output:    Intake/Output Summary (Last 24 hours) at 1/3/2020 1522  Last data filed at 1/3/2020 0535  Gross per 24 hour   Intake 1923 ml   Output 1350 ml   Net 573 ml     Last 3 weights:   Wt Readings from Last 3 Encounters:   12/30/19 198 lb 13.7 oz (90.2 kg)   12/06/19 225 lb (102.1 kg)   04/06/15 194 lb 9.6 oz (88.3 kg)       General appearance: Physical Examination:   General appearance -opening eyes on calling, not following commands  Mental status -opening eyes on calling, not following commands  Eyes - pupils equal and reactive  Mouth - mucous membranes moist, pharynx normal without lesions  Neck - supple, no significant adenopathy, trach+  Chest - clear to auscultation, no wheezes, rales or rhonchi, symmetric air entry  Heart - normal rate, regular rhythm, normal S1, S2, no murmurs, rubs, clicks or gallops  Abdomen - soft, nontender, nondistended, no masses or organomegaly, PEG+  Neurological -opening eyes upon calling, not following commands, mild right facial droop, right hemiplegia+  extremities - peripheral pulses normal, no pedal edema, no clubbing or cyanosis      Diet:  DIET TUBE FEED CONTINUOUS/CYCLIC NPO; Diabetic; Gastrostomy; Continuous; 20; 60; 24    Medications:Current Inpatient    Scheduled Meds:   albuterol  2.5 mg Nebulization TID    bumetanide  0.5 mg Oral Daily    hydrALAZINE  100 mg Oral 3 times per day    modafinil  100 mg Per NG tube Daily    insulin

## 2020-01-03 NOTE — PROGRESS NOTES
Giuliano Overton 21    Progress Note    1/3/2020    8:49 AM    Name:   Ly Rivas  MRN:     [id number]     Acct:      [de-identified]   Room:   [address] [place] [address] Day:  29  Admit Date:  12/6/2019 10:59 AM    PCP:   Tiana Morris MD  Code Status:  Full Code    Subjective:     C/C:   Chief Complaint   Patient presents with    Cerebrovascular Accident     Interval History Status: not changed. No acute issues overnight  Repeat CT head stable  More awake and alert today  Following commands with LUIS fox  Working on placement     Brief History:     Mr. Nestor Slade is a 30 yr old male with hx of A fib on coumadin, CHF s/p AICD, CAD, DVT, HTN, DM, thyroidectomy who presented initially to outside hospital on 12/6 with complaints of headache, syncope, right side weakness, facial droop, and aphasia.  Initial imaging revealed a left basal ganglia bleed, transferred to [facility].     Upon arrival at [facility], findings confirmed, Initial NIH:10, patient with above symptoms along with left periphreal field vision loss, maintaining airway, following commands, speech difficulty, some confusion, and right upper and lower extremity weakness. He was noted to have uncontrolled HTN requiring cardene gtt, and INR:2.7 as patient is on coumadin for AFib requiring Vit. K and Jodelle Papaaloa for reversal.  Admitted to NICU.      In neuro ICU has worsening respiratory distress requiring intubation. Repeat imaging demonstrating midline shift. Had worsening secretions concerning for pneumonia, started on antibiotics. Unable to wean off ventilator, underwent eventual trach/PEG per surgery on 12/14. Started and tolerated tube feeds. DC planning to LTAC in progress.   Peer to peer on Monday with Hermann Taylor. Review of Systems:     Unable to obtain - tracheostomy in place, non verbal     Medications:      Allergies:  No Known Allergies    Current Meds:   Scheduled Meds:    albuterol  2.5 mg Nebulization TID    bumetanide  0.5 mg Oral Daily    hydrALAZINE  100 mg Oral 3 times per day    modafinil  100 mg Per NG tube Daily    insulin glargine  10 Units Subcutaneous Daily    FLUoxetine  20 mg Oral Daily    diltiazem  90 mg Oral 3 times per day    insulin lispro  0-18 Units Subcutaneous 4 times per day    famotidine (PEPCID) injection  20 mg Intravenous BID    sennosides-docusate sodium  2 tablet Oral Daily    tetrahydrozoline  1 drop Left Eye TID    metFORMIN  1,000 mg Per NG tube BID WC    enoxaparin  40 mg Subcutaneous Daily    docusate  100 mg Per NG tube Daily    glyBURIDE  5 mg Per NG tube BID WC    vitamin B-1  100 mg Oral Daily    chlorhexidine  15 mL Mouth/Throat BID    lisinopril  40 mg Oral Daily    simethicone  40 mg Per NG tube 4x Daily    atorvastatin  40 mg Oral Daily    sodium chloride flush  10 mL Intravenous 2 times per day    methIMAzole  20 mg Oral Daily    methIMAzole  10 mg Oral QPM    metoprolol tartrate  100 mg Oral BID     Continuous Infusions:    dextrose Stopped (12/19/19 0952)     PRN Meds: albuterol, fentanNYL, REFRESH LACRI-LUBE, potassium chloride, fentanNYL, glucose, dextrose, glucagon (rDNA), dextrose, acetaminophen **OR** acetaminophen, sodium chloride flush, ondansetron, hydrALAZINE    Data:     Past Medical History:   has a past medical history of Atrial fibrillation ([facility] CAD (coronary artery disease), H/O heart artery stent, Hx of blood clots, Hyperlipidemia, Hypertension, Hyperthyroidism, Kidney stones, MI, old, Microalbuminuria, Other complications due to other cardiac device, implant, and graft, Proteinuria, Renal cyst, Thyroid disease, and Type II or unspecified type diabetes mellitus without mention of complication, not stated as uncontrolled. Social History:   reports that he has been smoking cigarettes. He has a 47.00 pack-year smoking history. He has never used smokeless tobacco. He reports that he does not drink alcohol.      Family History: No family history on file. Vitals:  BP (!) 141/94   Pulse 75   Temp 98.1 °F (36.7 °C)   Resp 15   Ht 5' 7\" (1.702 m)   Wt 198 lb 13.7 oz (90.2 kg)   SpO2 95%   BMI 31.15 kg/m²   Temp (24hrs), Av.8 °F (36.6 °C), Min:97.5 °F (36.4 °C), Max:98.1 °F (36.7 °C)    Recent Labs     20  1151 20  17120  0514   POCGLU 168* 165* 165* 198*       I/O (24Hr): Intake/Output Summary (Last 24 hours) at 1/3/2020 0849  Last data filed at 1/3/2020 0535  Gross per 24 hour   Intake 2123 ml   Output 1350 ml   Net 773 ml       Labs:  Hematology:  Recent Labs     20  0657 20  0608   WBC 7.7 6.8   RBC 4.56 4.45   HGB 14.7 14.1   HCT 44.7 42.0   MCV 98.0 94.4   MCH 32.2 31.7   MCHC 32.9 33.6   RDW 14.7* 14.8*   * 194   MPV 12.8 10.5     Chemistry:  Recent Labs     20  0657 20  1116 20  0608   *  --  136   K 5.5* 4.8 4.9   CL 98  --  96*   CO2 24  --  27   GLUCOSE 259*  --  217*   BUN 26*  --  32*   CREATININE 0.91  --  0.74   ANIONGAP 12  --  13   LABGLOM >60  --  >60   GFRAA >60  --  >60   CALCIUM 9.9  --  9.8     Recent Labs     20  2201 20  0644 20  1151 20  17120  23120  0514   POCGLU 158* 202* 168* 165* 165* 198*     ABG:  Lab Results   Component Value Date    POCPH 7.416 2019    POCPCO2 46.4 2019    POCPO2 96.2 2019    POCHCO3 29.8 2019    NBEA NOT REPORTED 2019    PBEA 4 2019    UKQ3LZY 31 2019    OOSN5IGO 98 2019    FIO2 45.0 2019     Lab Results   Component Value Date/Time    SPECIAL  LT HAND 10ML 2019 05:46 PM     Lab Results   Component Value Date/Time    CULTURE NO GROWTH 6 DAYS 2019 05:46 PM       Radiology:  Xr Chest (single View Frontal)    Result Date: 2019  Cardiomegaly with vascular congestion.        Physical Examination:        General appearance:  alert, no acute distress, tracheostomy in place   Mental Status: Follows some commands intermittently   Lungs:  clear to auscultation bilaterally, normal effort  Heart:  regular rate and normal S1 S2   Abdomen:  soft, nontender, nondistended, normal bowel sounds, PEG in place   Extremities:  no edema, redness, tenderness in the calves  Skin:  no gross lesions, rashes, induration    Assessment:        Hospital Problems           Last Modified POA    * (Principal) Nontraumatic cortical hemorrhage of left cerebral hemisphere ([address] 12/6/2019 Yes    MARILYN (acute kidney injury) ([address] 12/23/2019 No    Atrial fibrillation ([address] 12/6/2019 Yes    Uncontrolled hypertension 12/6/2019 Yes    CAD (coronary artery disease) 12/6/2019 Yes    Diabetes mellitus type 2 in obese ([address] 12/6/2019 Yes    Hyperlipidemia 12/6/2019 Yes    Hyperthyroidism 12/6/2019 Yes    Acute intra-cranial hemorrhage ([address] 12/6/2019 Yes    Intraparenchymal hematoma of brain ([address] 12/7/2019 Yes    Ventilator dependence ([address] 12/8/2019 Yes    Acute on chronic combined systolic and diastolic congestive heart failure ([address] 12/8/2019 Yes    Hypernatremia 12/15/2019 Yes    Aspiration pneumonia ([address] 12/15/2019 Yes    Right sided weakness 12/19/2019 Yes    Acute respiratory failure with hypoxia and hypercapnia ([address] 12/22/2019 Yes          Plan:        - Labs and medications reviewed   - Neurology consulted - hold antiplatelets and AC, recommend Watchman device, CT in 2 weeks  - Cardiology consulted - watchman device w/u as OP  - NS consulted - no need for intervention   - Ophthalmology consulted - trichiasis - s/p polymycin, artifical tears 4 x daily  - Surgery consulted - s/p trach/PEG (12/14)  - Pulmonology following - continue tracheostomy care   - Continue tube feeds, dietary following  - AC for A fib on hold per neurology  - Continue selected home medications  - Last A1c 7.4, continue current DM medications  - DC planning in progress   - Speech/swallow consult/evaluation       Tracee Sosa MD  1/3/2020  8:49 AM

## 2020-01-03 NOTE — PLAN OF CARE
PROVIDE ADEQUATE OXYGENATION WITH ACCEPTABLE SP02/ABG'S    [x]  IDENTIFY APPROPRIATE OXYGEN THERAPY  [x]   MONITOR SP02/ABG'S AS NEEDED   [x]   PATIENT EDUCATION AS NEEDED        BRONCHOSPASM/BRONCHOCONSTRICTION     [x]         IMPROVE AERATION/BREATH SOUNDS  [x]   ADMINISTER BRONCHODILATOR THERAPY AS APPROPRIATE  [x]   ASSESS BREATH SOUNDS  []   IMPLEMENT AEROSOL/MDI PROTOCOL  [x]   PATIENT EDUCATION AS NEEDED    Melanie Garcia, RCPPatient Assessment complete. Acute cerebrovascular accident (CVA) ([facility] [I63.9]  Acute intra-cranial hemorrhage ([facility] [facility] [I62.9] . Vitals:    01/03/20 0729   BP:    Pulse:    Resp: 15   Temp:    SpO2: 96%     Assessment   Pt non-verbal, does not take respiratory treatments at home. Pt has #8 Shley DIC on CATC at 8L O2 at 35%, tolerating well.       RR 15  Breath Sounds: diminished rales      · Bronchodilator assessment at level  1  ·   · []    Bronchodilator Assessment  BRONCHODILATOR ASSESSMENT SCORE  Score 0 1 2 3 4 5   Breath Sounds   [x]  Patient Baseline []  No Wheeze good aeration []  Faint, scattered wheezing, good aeration []  Expiratory Wheezing and or moderately diminished []  Insp/Exp wheeze and/or very diminished []  Insp/Exp and/ or marked distress   Respiratory Rate   [x]  Patient Baseline []  Less than 20 []  Less than 20 []  20-25 []  Greater than 25 []  Greater than 25   Peak flow % of Pred or PB [x]  NA   []  Greater than 90%  []  81-90% []  71-80% []  Less than or equal to 70%  or unable to perform []  Unable due to Respiratory Distress   Dyspnea re [x]  Patient Baseline []  No SOB []  No SOB []  SOB on exertion []  SOB min activity []  At rest/acute   e FEV% Predicted       [x]  NA []  Above 69%  []  Unable []  Above 60-69%  []  Unable []  Above 50-59%  []  Unable []  Above 35-49%  []  Unable []  Less than 35%  []  Unable

## 2020-01-04 ENCOUNTER — HOSPITAL ENCOUNTER (OUTPATIENT)
Age: 65
Setting detail: SPECIMEN
Discharge: HOME | End: 2020-01-04
Payer: MEDICARE

## 2020-01-04 LAB
ANION GAP SERPL CALCULATED.3IONS-SCNC: 14 MMOL/L (ref 9–17)
BUN BLDV-MCNC: 34 MG/DL (ref 8–23)
BUN/CREAT BLD: 36 (ref 9–20)
CALCIUM SERPL-MCNC: 10.6 MG/DL (ref 8.6–10.4)
CHLORIDE BLD-SCNC: 96 MMOL/L (ref 98–107)
CO2: 28 MMOL/L (ref 20–31)
CREAT SERPL-MCNC: 0.95 MG/DL (ref 0.7–1.2)
GFR AFRICAN AMERICAN: >60 ML/MIN
GFR NON-AFRICAN AMERICAN: >60 ML/MIN
GFR SERPL CREATININE-BSD FRML MDRD: ABNORMAL ML/MIN/{1.73_M2}
GFR SERPL CREATININE-BSD FRML MDRD: ABNORMAL ML/MIN/{1.73_M2}
GLUCOSE BLD-MCNC: 228 MG/DL (ref 70–99)
HCT VFR BLD CALC: 45.3 % (ref 40.7–50.3)
HEMOGLOBIN: 14.9 G/DL (ref 13–17)
MCH RBC QN AUTO: 31.6 PG (ref 25.2–33.5)
MCHC RBC AUTO-ENTMCNC: 32.9 G/DL (ref 28.4–34.8)
MCV RBC AUTO: 96 FL (ref 82.6–102.9)
NRBC AUTOMATED: 0 PER 100 WBC
PDW BLD-RTO: 14.6 % (ref 11.8–14.4)
PLATELET # BLD: 211 K/UL (ref 138–453)
PMV BLD AUTO: 11.8 FL (ref 8.1–13.5)
POTASSIUM SERPL-SCNC: 4.6 MMOL/L (ref 3.7–5.3)
RBC # BLD: 4.72 M/UL (ref 4.21–5.77)
SODIUM BLD-SCNC: 138 MMOL/L (ref 135–144)
WBC # BLD: 8.9 K/UL (ref 3.5–11.3)

## 2020-01-04 PROCEDURE — 36415 COLL VENOUS BLD VENIPUNCTURE: CPT

## 2020-01-04 PROCEDURE — 80048 BASIC METABOLIC PNL TOTAL CA: CPT

## 2020-01-04 PROCEDURE — 85027 COMPLETE CBC AUTOMATED: CPT

## 2020-01-04 PROCEDURE — P9603 ONE-WAY ALLOW PRORATED MILES: HCPCS

## 2020-01-04 NOTE — PROGRESS NOTES
Pt transferred to stretcher, all belongings accounted for and bagged up. PIV D/C'd, PEG tube capped, dry brief in place, LLE Prafo foot brace in place, trach dsg changed earlier by RT and venti trach mask applied for trip to SNF. Report called in to facility by dayshift nurse, Chris Cervantes RN. Blue packet sent with pt. Charge nurse updated as to pt's discharge from unit. Room cleaned of pt equipment for housekeeping.

## 2020-01-04 NOTE — DISCHARGE SUMMARY
Component Value Date    GLUCOSE 217 01/03/2020     01/03/2020    K 4.9 01/03/2020    CL 96 01/03/2020    CO2 27 01/03/2020    BUN 32 01/03/2020    CREATININE 0.74 01/03/2020    ANIONGAP 13 01/03/2020    ALKPHOS 83 12/06/2019    ALT 13 12/06/2019    AST 13 12/06/2019    BILITOT 0.51 12/06/2019    LABALBU 3.4 12/06/2019    ALBUMIN 0.9 12/06/2019    LABGLOM >60 01/03/2020    GFRAA >60 01/03/2020    GFR      01/03/2020    GFR NOT REPORTED 01/03/2020    PROT 7.0 12/06/2019    CALCIUM 9.8 01/03/2020     PT/INR:    Lab Results   Component Value Date    PROTIME 10.1 12/10/2019    INR 0.9 12/10/2019     PTT:   Lab Results   Component Value Date    APTT 24.5 12/10/2019     FLP:    Lab Results   Component Value Date    CHOL 194 07/11/2015    TRIG 181 12/16/2019    HDL 26 07/11/2015     U/A:    Lab Results   Component Value Date    COLORU YELLOW 12/22/2019    TURBIDITY CLOUDY 12/22/2019    SPECGRAV 1.021 12/22/2019    HGBUR NEGATIVE 12/22/2019    PHUR 5.5 12/22/2019    PROTEINU 3+ 12/22/2019    GLUCOSEU NEGATIVE 12/22/2019    KETUA NEGATIVE 12/22/2019    BILIRUBINUR NEGATIVE 12/22/2019    UROBILINOGEN Normal 12/22/2019    NITRU NEGATIVE 12/22/2019    LEUKOCYTESUR NEGATIVE 12/22/2019     TSH:    Lab Results   Component Value Date    TSH 3.15 12/06/2019        Radiology:  Ct Head Wo Contrast    Result Date: 1/3/2020  Expected maturation of left thalamic hemorrhage with decrease in overall size and attenuation. Associated edema extending into the harry related to presence of hemorrhage, slightly improved. No new hemorrhage. Decrease in size of intraventricular hemorrhage.        Consultations:    Consults:     Final Specialist Recommendations/Findings:   IP CONSULT TO NEUROCRITICAL CARE  IP CONSULT TO CASE MANAGEMENT  IP CONSULT TO NEUROSURGERY  IP CONSULT TO GENERAL SURGERY  IP CONSULT TO DIETITIAN  IP CONSULT TO HOSPITALIST  IP CONSULT TO OPHTHALMOLOGY  IP CONSULT TO PULMONOLOGY  IP CONSULT TO CARDIOLOGY      The patient was seen and examined on day of discharge and this discharge summary is in conjunction with any daily progress note from day of discharge.     Discharge plan:     Disposition: SNF    Physician Follow Up:     Pedro Pablo Hayes MD  [address] [place] [address],[address]  [address] [facility] [address]  [address] [place], [place] [address]Alissa Friend [address] [place] [address]  [phone]    Schedule an appointment as soon as possible for a visit in 3 months         Requiring Further Evaluation/Follow Up POST HOSPITALIZATION/Incidental Findings:     Diet: tube feeds    Activity: As tolerated    Instructions to Patient: follow up with neurology, cardiology     Discharge Medications:      Medication List      START taking these medications    chlorhexidine 0.12 % solution  Commonly known as:  PERIDEX  Take 15 mLs by mouth 2 times daily for 14 days     diltiazem 90 MG tablet  Commonly known as:  CARDIZEM  Take 1 tablet by mouth every 8 hours     FLUoxetine 20 MG capsule  Commonly known as:  PROZAC  Take 1 capsule by mouth daily     hydrALAZINE 100 MG tablet  Commonly known as:  APRESOLINE  Take 1 tablet by mouth every 8 hours     sennosides-docusate sodium 8.6-50 MG tablet  Commonly known as:  SENOKOT-S  Take 2 tablets by mouth daily     simethicone 40 MG/0.6ML drops  Commonly known as:  MYLICON  0.6 mLs by Per NG tube route 4 times daily     tetrahydrozoline 0.05 % ophthalmic solution  Place 1 drop into the left eye 3 times daily        CHANGE how you take these medications    insulin glargine 100 UNIT/ML injection vial  Commonly known as:  LANTUS  Inject 10 Units into the skin daily  What changed:  how much to take     insulin lispro 100 UNIT/ML injection vial  Commonly known as:  HUMALOG  Inject 15 Units into the skin 3 times daily (with meals)  What changed:  additional instructions        CONTINUE taking these medications    atorvastatin 40 MG tablet  Commonly known as:  LIPITOR     Blood Pressure Monitor Kit  CHECK BLOOD patient's care.

## 2020-01-10 ENCOUNTER — HOSPITAL ENCOUNTER (OUTPATIENT)
Age: 65
Setting detail: SPECIMEN
Discharge: HOME | End: 2020-01-10
Payer: MEDICARE

## 2020-01-10 LAB
ANION GAP SERPL CALCULATED.3IONS-SCNC: 14 MMOL/L (ref 9–17)
BUN BLDV-MCNC: 49 MG/DL (ref 8–23)
BUN/CREAT BLD: 41 (ref 9–20)
CALCIUM SERPL-MCNC: 10.1 MG/DL (ref 8.6–10.4)
CHLORIDE BLD-SCNC: 89 MMOL/L (ref 98–107)
CO2: 28 MMOL/L (ref 20–31)
CREAT SERPL-MCNC: 1.19 MG/DL (ref 0.7–1.2)
GFR AFRICAN AMERICAN: >60 ML/MIN
GFR NON-AFRICAN AMERICAN: >60 ML/MIN
GFR SERPL CREATININE-BSD FRML MDRD: ABNORMAL ML/MIN/{1.73_M2}
GFR SERPL CREATININE-BSD FRML MDRD: ABNORMAL ML/MIN/{1.73_M2}
GLUCOSE BLD-MCNC: 210 MG/DL (ref 70–99)
HCT VFR BLD CALC: 42.9 % (ref 40.7–50.3)
HEMOGLOBIN: 14.3 G/DL (ref 13–17)
MCH RBC QN AUTO: 32 PG (ref 25.2–33.5)
MCHC RBC AUTO-ENTMCNC: 33.3 G/DL (ref 28.4–34.8)
MCV RBC AUTO: 96 FL (ref 82.6–102.9)
NRBC AUTOMATED: 0 PER 100 WBC
PDW BLD-RTO: 15.1 % (ref 11.8–14.4)
PLATELET # BLD: 142 K/UL (ref 138–453)
PMV BLD AUTO: 12.2 FL (ref 8.1–13.5)
POTASSIUM SERPL-SCNC: 5 MMOL/L (ref 3.7–5.3)
RBC # BLD: 4.47 M/UL (ref 4.21–5.77)
SODIUM BLD-SCNC: 131 MMOL/L (ref 135–144)
WBC # BLD: 7.9 K/UL (ref 3.5–11.3)

## 2020-01-10 PROCEDURE — P9603 ONE-WAY ALLOW PRORATED MILES: HCPCS

## 2020-01-10 PROCEDURE — 36415 COLL VENOUS BLD VENIPUNCTURE: CPT

## 2020-01-10 PROCEDURE — 85027 COMPLETE CBC AUTOMATED: CPT

## 2020-01-10 PROCEDURE — 80048 BASIC METABOLIC PNL TOTAL CA: CPT

## 2020-01-12 ENCOUNTER — APPOINTMENT (OUTPATIENT)
Dept: CT IMAGING | Age: 65
End: 2020-01-12
Payer: MEDICARE

## 2020-01-12 ENCOUNTER — APPOINTMENT (OUTPATIENT)
Dept: GENERAL RADIOLOGY | Age: 65
End: 2020-01-12
Payer: MEDICARE

## 2020-01-12 ENCOUNTER — HOSPITAL ENCOUNTER (EMERGENCY)
Age: 65
Discharge: ANOTHER HOSPITAL | End: 2020-01-13
Attending: EMERGENCY MEDICINE
Payer: MEDICARE

## 2020-01-12 PROBLEM — K56.609 BOWEL OBSTRUCTION (HCC): Status: ACTIVE | Noted: 2020-01-12

## 2020-01-12 LAB
-: ABNORMAL
ABO/RH: NORMAL
ABSOLUTE EOS #: 0 K/UL (ref 0–0.4)
ABSOLUTE IMMATURE GRANULOCYTE: ABNORMAL K/UL (ref 0–0.3)
ABSOLUTE LYMPH #: 1.4 K/UL (ref 1–4.8)
ABSOLUTE MONO #: 0.7 K/UL (ref 0.1–1.3)
ALBUMIN SERPL-MCNC: 3.8 G/DL (ref 3.5–5.2)
ALBUMIN/GLOBULIN RATIO: ABNORMAL (ref 1–2.5)
ALP BLD-CCNC: 76 U/L (ref 40–129)
ALT SERPL-CCNC: 19 U/L (ref 5–41)
AMORPHOUS: ABNORMAL
AMYLASE: 26 U/L (ref 28–100)
ANION GAP SERPL CALCULATED.3IONS-SCNC: 14 MMOL/L (ref 9–17)
ANION GAP SERPL CALCULATED.3IONS-SCNC: 15 MMOL/L (ref 9–17)
ANTIBODY SCREEN: NEGATIVE
ARM BAND NUMBER: NORMAL
AST SERPL-CCNC: 20 U/L
BACTERIA: ABNORMAL
BASOPHILS # BLD: 1 % (ref 0–2)
BASOPHILS ABSOLUTE: 0.1 K/UL (ref 0–0.2)
BILIRUB SERPL-MCNC: 0.92 MG/DL (ref 0.3–1.2)
BILIRUBIN DIRECT: 0.37 MG/DL
BILIRUBIN URINE: ABNORMAL
BILIRUBIN, INDIRECT: 0.55 MG/DL (ref 0–1)
BLOOD BANK COMMENT: NORMAL
BNP INTERPRETATION: ABNORMAL
BUN BLDV-MCNC: 61 MG/DL (ref 8–23)
BUN BLDV-MCNC: 66 MG/DL (ref 8–23)
BUN/CREAT BLD: ABNORMAL (ref 9–20)
BUN/CREAT BLD: ABNORMAL (ref 9–20)
CALCIUM SERPL-MCNC: 10.3 MG/DL (ref 8.6–10.4)
CALCIUM SERPL-MCNC: 10.5 MG/DL (ref 8.6–10.4)
CASTS UA: ABNORMAL /LPF
CHLORIDE BLD-SCNC: 85 MMOL/L (ref 98–107)
CHLORIDE BLD-SCNC: 87 MMOL/L (ref 98–107)
CO2: 29 MMOL/L (ref 20–31)
CO2: 34 MMOL/L (ref 20–31)
COLOR: ABNORMAL
COMMENT UA: ABNORMAL
CREAT SERPL-MCNC: 1.38 MG/DL (ref 0.7–1.2)
CREAT SERPL-MCNC: 1.62 MG/DL (ref 0.7–1.2)
CRYSTALS, UA: ABNORMAL /HPF
CRYSTALS, UA: ABNORMAL /HPF
DIFFERENTIAL TYPE: ABNORMAL
EOSINOPHILS RELATIVE PERCENT: 0 % (ref 0–4)
EPITHELIAL CELLS UA: ABNORMAL /HPF
EXPIRATION DATE: NORMAL
GFR AFRICAN AMERICAN: 53 ML/MIN
GFR AFRICAN AMERICAN: >60 ML/MIN
GFR NON-AFRICAN AMERICAN: 43 ML/MIN
GFR NON-AFRICAN AMERICAN: 52 ML/MIN
GFR SERPL CREATININE-BSD FRML MDRD: ABNORMAL ML/MIN/{1.73_M2}
GLOBULIN: ABNORMAL G/DL (ref 1.5–3.8)
GLUCOSE BLD-MCNC: 148 MG/DL (ref 70–99)
GLUCOSE BLD-MCNC: 194 MG/DL (ref 70–99)
GLUCOSE URINE: ABNORMAL
HCT VFR BLD CALC: 43.1 % (ref 41–53)
HEMOGLOBIN: 14.8 G/DL (ref 13.5–17.5)
IMMATURE GRANULOCYTES: ABNORMAL %
INR BLD: 1
KETONES, URINE: ABNORMAL
LACTIC ACID: 1.6 MMOL/L (ref 0.5–2.2)
LEUKOCYTE ESTERASE, URINE: NEGATIVE
LIPASE: 17 U/L (ref 13–60)
LYMPHOCYTES # BLD: 14 % (ref 24–44)
MCH RBC QN AUTO: 32.3 PG (ref 26–34)
MCHC RBC AUTO-ENTMCNC: 34.3 G/DL (ref 31–37)
MCV RBC AUTO: 94.3 FL (ref 80–100)
MONOCYTES # BLD: 8 % (ref 1–7)
MUCUS: ABNORMAL
NITRITE, URINE: NEGATIVE
NRBC AUTOMATED: ABNORMAL PER 100 WBC
OTHER OBSERVATIONS UA: ABNORMAL
PARTIAL THROMBOPLASTIN TIME: 32.5 SEC (ref 24–36)
PDW BLD-RTO: 16.2 % (ref 11.5–14.9)
PH UA: 6.5 (ref 5–8)
PLATELET # BLD: 202 K/UL (ref 150–450)
PLATELET ESTIMATE: ABNORMAL
PMV BLD AUTO: 8.7 FL (ref 6–12)
POTASSIUM SERPL-SCNC: 5 MMOL/L (ref 3.7–5.3)
POTASSIUM SERPL-SCNC: 5.7 MMOL/L (ref 3.7–5.3)
PRO-BNP: 789 PG/ML
PROTEIN UA: ABNORMAL
PROTHROMBIN TIME: 13.3 SEC (ref 11.8–14.6)
RBC # BLD: 4.58 M/UL (ref 4.5–5.9)
RBC # BLD: ABNORMAL 10*6/UL
RBC UA: ABNORMAL /HPF
RENAL EPITHELIAL, UA: ABNORMAL /HPF
SEG NEUTROPHILS: 77 % (ref 36–66)
SEGMENTED NEUTROPHILS ABSOLUTE COUNT: 7.3 K/UL (ref 1.3–9.1)
SODIUM BLD-SCNC: 131 MMOL/L (ref 135–144)
SODIUM BLD-SCNC: 133 MMOL/L (ref 135–144)
SPECIFIC GRAVITY UA: 1.02 (ref 1–1.03)
TOTAL PROTEIN: 7.7 G/DL (ref 6.4–8.3)
TRICHOMONAS: ABNORMAL
TROPONIN INTERP: ABNORMAL
TROPONIN T: ABNORMAL NG/ML
TROPONIN, HIGH SENSITIVITY: 135 NG/L (ref 0–22)
TROPONIN, HIGH SENSITIVITY: 135 NG/L (ref 0–22)
TROPONIN, HIGH SENSITIVITY: 139 NG/L (ref 0–22)
TURBIDITY: ABNORMAL
URINE HGB: NEGATIVE
UROBILINOGEN, URINE: NORMAL
WBC # BLD: 9.5 K/UL (ref 3.5–11)
WBC # BLD: ABNORMAL 10*3/UL
WBC UA: ABNORMAL /HPF
YEAST: ABNORMAL

## 2020-01-12 PROCEDURE — 83605 ASSAY OF LACTIC ACID: CPT

## 2020-01-12 PROCEDURE — 80048 BASIC METABOLIC PNL TOTAL CA: CPT

## 2020-01-12 PROCEDURE — 93005 ELECTROCARDIOGRAM TRACING: CPT | Performed by: EMERGENCY MEDICINE

## 2020-01-12 PROCEDURE — 84484 ASSAY OF TROPONIN QUANT: CPT

## 2020-01-12 PROCEDURE — 86901 BLOOD TYPING SEROLOGIC RH(D): CPT

## 2020-01-12 PROCEDURE — 70450 CT HEAD/BRAIN W/O DYE: CPT

## 2020-01-12 PROCEDURE — 83690 ASSAY OF LIPASE: CPT

## 2020-01-12 PROCEDURE — 2700000000 HC OXYGEN THERAPY PER DAY

## 2020-01-12 PROCEDURE — 80076 HEPATIC FUNCTION PANEL: CPT

## 2020-01-12 PROCEDURE — 83880 ASSAY OF NATRIURETIC PEPTIDE: CPT

## 2020-01-12 PROCEDURE — 94761 N-INVAS EAR/PLS OXIMETRY MLT: CPT

## 2020-01-12 PROCEDURE — 82150 ASSAY OF AMYLASE: CPT

## 2020-01-12 PROCEDURE — 86850 RBC ANTIBODY SCREEN: CPT

## 2020-01-12 PROCEDURE — 85610 PROTHROMBIN TIME: CPT

## 2020-01-12 PROCEDURE — 99285 EMERGENCY DEPT VISIT HI MDM: CPT

## 2020-01-12 PROCEDURE — 86900 BLOOD TYPING SEROLOGIC ABO: CPT

## 2020-01-12 PROCEDURE — 85025 COMPLETE CBC W/AUTO DIFF WBC: CPT

## 2020-01-12 PROCEDURE — 74022 RADEX COMPL AQT ABD SERIES: CPT

## 2020-01-12 PROCEDURE — 87040 BLOOD CULTURE FOR BACTERIA: CPT

## 2020-01-12 PROCEDURE — 85730 THROMBOPLASTIN TIME PARTIAL: CPT

## 2020-01-12 PROCEDURE — 81001 URINALYSIS AUTO W/SCOPE: CPT

## 2020-01-12 PROCEDURE — 94770 HC ETCO2 MONITOR DAILY: CPT

## 2020-01-12 PROCEDURE — 36415 COLL VENOUS BLD VENIPUNCTURE: CPT

## 2020-01-12 PROCEDURE — 96374 THER/PROPH/DIAG INJ IV PUSH: CPT

## 2020-01-12 RX ORDER — FENTANYL CITRATE 50 UG/ML
25 INJECTION, SOLUTION INTRAMUSCULAR; INTRAVENOUS ONCE
Status: COMPLETED | OUTPATIENT
Start: 2020-01-12 | End: 2020-01-13

## 2020-01-12 ASSESSMENT — ENCOUNTER SYMPTOMS
COUGH: 0
FACIAL SWELLING: 0
NAUSEA: 0
SHORTNESS OF BREATH: 0
CONSTIPATION: 0
DIARRHEA: 0
SORE THROAT: 0
SINUS PRESSURE: 0
VOMITING: 0
CHEST TIGHTNESS: 0
COLOR CHANGE: 0

## 2020-01-12 ASSESSMENT — PULMONARY FUNCTION TESTS: PIF_VALUE: 8

## 2020-01-12 NOTE — ED NOTES
Per Dr Gerald Abrams, RN does not have to accompany pt to CT with respiratory and CT Tech.       Paula Laguerre RN  01/12/20 8750

## 2020-01-12 NOTE — ED PROVIDER NOTES
EMERGENCY DEPARTMENT ENCOUNTER    Pt Name: Judah Mata  MRN: [id number]  [place] 1955  Date of evaluation: 1/12/20  CHIEF COMPLAINT       Chief Complaint   Patient presents with    Emesis    Nausea     HISTORY OF PRESENT ILLNESS   HPI  This is a 73-year-old gentleman seen today chief complaint of abdominal distention some vague symptomatology including that of possible little bit increased more somnolent than usual.  Patient is here today for assessment of the above-mentioned concerns. Patient is otherwise alert, oriented, appropriate no acute distress denies any other social concerns here today further evaluation treatment. She has a history of a bleed on back in the beginning December. Presents today with some abdominal distention and some vagueness of complaints. Patient has a trach in place. Patient seems mostly baseline may be a little off according to wife but nothing specific. Patient does have more abdominal distention done today. Again the previous mentioned plane from x-ray a few days ago according the family says ileus. REVIEW OF SYSTEMS     Review of Systems   Constitutional: Negative for chills, diaphoresis and fever. HENT: Negative for facial swelling, sinus pressure and sore throat. Eyes: Negative for visual disturbance. Respiratory: Negative for cough, chest tightness and shortness of breath. Cardiovascular: Negative for chest pain. Gastrointestinal: Negative for constipation, diarrhea, nausea and vomiting. Musculoskeletal: Negative for arthralgias and myalgias. Skin: Negative for color change and rash. Neurological: Negative for weakness and headaches. Psychiatric/Behavioral: Negative for agitation, hallucinations and self-injury.      PASTMEDICAL HISTORY     Past Medical History:   Diagnosis Date    [facility]     CAD (coronary artery disease)     H/O heart artery stent x 4    Hx of blood clots     8 clots removed from right leg    4. Tracheostomy tube as above. LABS: All lab results were reviewed by myself, and all abnormals are listed below. Labs Reviewed   CBC WITH AUTO DIFFERENTIAL - Abnormal; Notable for the following components:       Result Value    RDW 16.2 (*)     Seg Neutrophils 77 (*)     Lymphocytes 14 (*)     Monocytes 8 (*)     All other components within normal limits   BASIC METABOLIC PANEL W/ REFLEX TO MG FOR LOW K - Abnormal; Notable for the following components:    Glucose 194 (*)     BUN 61 (*)     CREATININE 1.38 (*)     Calcium 10.5 (*)     Sodium 131 (*)     Potassium 5.7 (*)     Chloride 87 (*)     GFR Non- 52 (*)     All other components within normal limits   HEPATIC FUNCTION PANEL - Abnormal; Notable for the following components:    Bilirubin, Direct 0.37 (*)     All other components within normal limits   AMYLASE - Abnormal; Notable for the following components:    Amylase 26 (*)     All other components within normal limits   TROPONIN - Abnormal; Notable for the following components:    Troponin, High Sensitivity 139 (*)     All other components within normal limits   BRAIN NATRIURETIC PEPTIDE - Abnormal; Notable for the following components:    Pro- (*)     All other components within normal limits   URINE RT REFLEX TO CULTURE - Abnormal; Notable for the following components:    Color, UA DARK YELLOW (*)     Turbidity UA CLOUDY (*)     Glucose, Ur TRACE (*)     Bilirubin Urine   (*)     Value: Presumptive positive. Unable to confirm due to unavailability of reagent.     Ketones, Urine TRACE (*)     Protein, UA 4+ (*)     All other components within normal limits   TROPONIN - Abnormal; Notable for the following components:    Troponin, High Sensitivity 135 (*)     All other components within normal limits   MICROSCOPIC URINALYSIS - Abnormal; Notable for the following components:    Crystals UA MANY (*)     Crystals UA CALCIUM OXALATE (*)     Bacteria, UA FEW (*)     All other

## 2020-01-12 NOTE — ED NOTES
Bed: 07A  Expected date:   Expected time:   Means of arrival:   Comments:     Rodrigo Crowley RN  01/12/20 9517

## 2020-01-13 ENCOUNTER — HOSPITAL ENCOUNTER (INPATIENT)
Age: 65
LOS: 3 days | Discharge: NURSING, REHAB OR LONG-TERM CARE | DRG: 389 | End: 2020-01-16
Attending: INTERNAL MEDICINE | Admitting: INTERNAL MEDICINE
Payer: MEDICARE

## 2020-01-13 VITALS
RESPIRATION RATE: 18 BRPM | OXYGEN SATURATION: 96 % | DIASTOLIC BLOOD PRESSURE: 88 MMHG | HEART RATE: 75 BPM | WEIGHT: 199 LBS | HEIGHT: 67 IN | TEMPERATURE: 98.7 F | BODY MASS INDEX: 31.23 KG/M2 | SYSTOLIC BLOOD PRESSURE: 133 MMHG

## 2020-01-13 LAB
ALBUMIN SERPL-MCNC: 3.3 G/DL (ref 3.5–5.2)
ALBUMIN/GLOBULIN RATIO: 1 (ref 1–2.5)
ALP BLD-CCNC: 69 U/L (ref 40–129)
ALT SERPL-CCNC: 18 U/L (ref 5–41)
ANION GAP SERPL CALCULATED.3IONS-SCNC: 12 MMOL/L (ref 9–17)
AST SERPL-CCNC: 18 U/L
BILIRUB SERPL-MCNC: 0.69 MG/DL (ref 0.3–1.2)
BILIRUBIN DIRECT: 0.27 MG/DL
BILIRUBIN, INDIRECT: 0.42 MG/DL (ref 0–1)
BUN BLDV-MCNC: 42 MG/DL (ref 8–23)
CALCIUM SERPL-MCNC: 9.6 MG/DL (ref 8.6–10.4)
CHLORIDE BLD-SCNC: 95 MMOL/L (ref 98–107)
CO2: 31 MMOL/L (ref 20–31)
CREAT SERPL-MCNC: 1.13 MG/DL (ref 0.7–1.2)
EKG ATRIAL RATE: 374 BPM
EKG P AXIS: -94 DEGREES
EKG Q-T INTERVAL: 454 MS
EKG QRS DURATION: 136 MS
EKG QTC CALCULATION (BAZETT): 506 MS
EKG R AXIS: 158 DEGREES
EKG T AXIS: 34 DEGREES
EKG VENTRICULAR RATE: 75 BPM
GFR AFRICAN AMERICAN: >60 ML/MIN
GFR NON-AFRICAN AMERICAN: >60 ML/MIN
GFR SERPL CREATININE-BSD FRML MDRD: ABNORMAL ML/MIN/{1.73_M2}
GFR SERPL CREATININE-BSD FRML MDRD: ABNORMAL ML/MIN/{1.73_M2}
GLUCOSE BLD-MCNC: 102 MG/DL (ref 70–99)
GLUCOSE BLD-MCNC: 109 MG/DL (ref 75–110)
GLUCOSE BLD-MCNC: 98 MG/DL (ref 75–110)
HCT VFR BLD CALC: 38.8 % (ref 40.7–50.3)
HEMOGLOBIN: 13.1 G/DL (ref 13–17)
LACTIC ACID, WHOLE BLOOD: 1 MMOL/L (ref 0.7–2.1)
LACTIC ACID: NORMAL MMOL/L
MCH RBC QN AUTO: 32.5 PG (ref 25.2–33.5)
MCHC RBC AUTO-ENTMCNC: 33.8 G/DL (ref 28.4–34.8)
MCV RBC AUTO: 96.3 FL (ref 82.6–102.9)
MYOGLOBIN: 126 NG/ML (ref 28–72)
NRBC AUTOMATED: 0 PER 100 WBC
PDW BLD-RTO: 15 % (ref 11.8–14.4)
PLATELET # BLD: 146 K/UL (ref 138–453)
PMV BLD AUTO: 10.4 FL (ref 8.1–13.5)
POTASSIUM SERPL-SCNC: 4.2 MMOL/L (ref 3.7–5.3)
RBC # BLD: 4.03 M/UL (ref 4.21–5.77)
SODIUM BLD-SCNC: 138 MMOL/L (ref 135–144)
TOTAL PROTEIN: 6.6 G/DL (ref 6.4–8.3)
TROPONIN INTERP: ABNORMAL
TROPONIN T: ABNORMAL NG/ML
TROPONIN, HIGH SENSITIVITY: 102 NG/L (ref 0–22)
WBC # BLD: 6.6 K/UL (ref 3.5–11.3)

## 2020-01-13 PROCEDURE — 83605 ASSAY OF LACTIC ACID: CPT

## 2020-01-13 PROCEDURE — 2580000003 HC RX 258: Performed by: NURSE PRACTITIONER

## 2020-01-13 PROCEDURE — 82947 ASSAY GLUCOSE BLOOD QUANT: CPT

## 2020-01-13 PROCEDURE — 2060000000 HC ICU INTERMEDIATE R&B

## 2020-01-13 PROCEDURE — 2580000003 HC RX 258: Performed by: EMERGENCY MEDICINE

## 2020-01-13 PROCEDURE — 2700000000 HC OXYGEN THERAPY PER DAY

## 2020-01-13 PROCEDURE — 85027 COMPLETE CBC AUTOMATED: CPT

## 2020-01-13 PROCEDURE — 94761 N-INVAS EAR/PLS OXIMETRY MLT: CPT

## 2020-01-13 PROCEDURE — 80053 COMPREHEN METABOLIC PANEL: CPT

## 2020-01-13 PROCEDURE — 93010 ELECTROCARDIOGRAM REPORT: CPT | Performed by: INTERNAL MEDICINE

## 2020-01-13 PROCEDURE — 82248 BILIRUBIN DIRECT: CPT

## 2020-01-13 PROCEDURE — 99223 1ST HOSP IP/OBS HIGH 75: CPT | Performed by: NURSE PRACTITIONER

## 2020-01-13 PROCEDURE — 84484 ASSAY OF TROPONIN QUANT: CPT

## 2020-01-13 PROCEDURE — 2500000003 HC RX 250 WO HCPCS: Performed by: NURSE PRACTITIONER

## 2020-01-13 PROCEDURE — 6360000002 HC RX W HCPCS: Performed by: EMERGENCY MEDICINE

## 2020-01-13 PROCEDURE — 36415 COLL VENOUS BLD VENIPUNCTURE: CPT

## 2020-01-13 PROCEDURE — 83874 ASSAY OF MYOGLOBIN: CPT

## 2020-01-13 RX ORDER — METOPROLOL TARTRATE 5 MG/5ML
5 INJECTION INTRAVENOUS EVERY 4 HOURS PRN
Status: DISCONTINUED | OUTPATIENT
Start: 2020-01-13 | End: 2020-01-16 | Stop reason: HOSPADM

## 2020-01-13 RX ORDER — FLUOXETINE HYDROCHLORIDE 20 MG/1
20 CAPSULE ORAL DAILY
Status: DISCONTINUED | OUTPATIENT
Start: 2020-01-13 | End: 2020-01-16 | Stop reason: HOSPADM

## 2020-01-13 RX ORDER — INSULIN GLARGINE 100 [IU]/ML
10 INJECTION, SOLUTION SUBCUTANEOUS DAILY
Status: DISCONTINUED | OUTPATIENT
Start: 2020-01-13 | End: 2020-01-16 | Stop reason: HOSPADM

## 2020-01-13 RX ORDER — SODIUM CHLORIDE 9 MG/ML
INJECTION, SOLUTION INTRAVENOUS CONTINUOUS
Status: DISCONTINUED | OUTPATIENT
Start: 2020-01-13 | End: 2020-01-14

## 2020-01-13 RX ORDER — DEXTROSE MONOHYDRATE 25 G/50ML
12.5 INJECTION, SOLUTION INTRAVENOUS PRN
Status: DISCONTINUED | OUTPATIENT
Start: 2020-01-13 | End: 2020-01-16 | Stop reason: HOSPADM

## 2020-01-13 RX ORDER — METOPROLOL TARTRATE 5 MG/5ML
5 INJECTION INTRAVENOUS EVERY 4 HOURS
Status: DISCONTINUED | OUTPATIENT
Start: 2020-01-13 | End: 2020-01-15

## 2020-01-13 RX ORDER — DEXTROSE AND SODIUM CHLORIDE 5; .45 G/100ML; G/100ML
INJECTION, SOLUTION INTRAVENOUS CONTINUOUS
Status: DISCONTINUED | OUTPATIENT
Start: 2020-01-13 | End: 2020-01-13 | Stop reason: HOSPADM

## 2020-01-13 RX ORDER — ONDANSETRON 2 MG/ML
4 INJECTION INTRAMUSCULAR; INTRAVENOUS EVERY 6 HOURS PRN
Status: DISCONTINUED | OUTPATIENT
Start: 2020-01-13 | End: 2020-01-16 | Stop reason: HOSPADM

## 2020-01-13 RX ORDER — GLYCOPYRROLATE 1 MG/1
1 TABLET ORAL 4 TIMES DAILY
COMMUNITY

## 2020-01-13 RX ORDER — ACETAMINOPHEN 325 MG/1
650 TABLET ORAL EVERY 4 HOURS PRN
Status: DISCONTINUED | OUTPATIENT
Start: 2020-01-13 | End: 2020-01-16 | Stop reason: HOSPADM

## 2020-01-13 RX ORDER — SIMETHICONE 20 MG/.3ML
40 EMULSION ORAL 4 TIMES DAILY
Status: DISCONTINUED | OUTPATIENT
Start: 2020-01-13 | End: 2020-01-16 | Stop reason: HOSPADM

## 2020-01-13 RX ORDER — DEXTROSE MONOHYDRATE 50 MG/ML
100 INJECTION, SOLUTION INTRAVENOUS PRN
Status: DISCONTINUED | OUTPATIENT
Start: 2020-01-13 | End: 2020-01-16 | Stop reason: HOSPADM

## 2020-01-13 RX ORDER — METOPROLOL TARTRATE 50 MG/1
100 TABLET, FILM COATED ORAL 2 TIMES DAILY
Status: DISCONTINUED | OUTPATIENT
Start: 2020-01-13 | End: 2020-01-16 | Stop reason: HOSPADM

## 2020-01-13 RX ORDER — SENNOSIDES 8.8 MG/5ML
5 LIQUID ORAL 2 TIMES DAILY PRN
Status: DISCONTINUED | OUTPATIENT
Start: 2020-01-13 | End: 2020-01-16 | Stop reason: HOSPADM

## 2020-01-13 RX ORDER — NICOTINE POLACRILEX 4 MG
15 LOZENGE BUCCAL PRN
Status: DISCONTINUED | OUTPATIENT
Start: 2020-01-13 | End: 2020-01-16 | Stop reason: HOSPADM

## 2020-01-13 RX ORDER — BUMETANIDE 1 MG/1
0.5 TABLET ORAL DAILY
Status: DISCONTINUED | OUTPATIENT
Start: 2020-01-13 | End: 2020-01-16 | Stop reason: HOSPADM

## 2020-01-13 RX ADMIN — SODIUM CHLORIDE: 9 INJECTION, SOLUTION INTRAVENOUS at 20:58

## 2020-01-13 RX ADMIN — METOPROLOL TARTRATE 5 MG: 1 INJECTION, SOLUTION INTRAVENOUS at 22:22

## 2020-01-13 RX ADMIN — DEXTROSE AND SODIUM CHLORIDE: 5; 450 INJECTION, SOLUTION INTRAVENOUS at 16:15

## 2020-01-13 RX ADMIN — FENTANYL CITRATE 25 MCG: 50 INJECTION, SOLUTION INTRAMUSCULAR; INTRAVENOUS at 14:46

## 2020-01-13 ASSESSMENT — PAIN SCALES - GENERAL: PAINLEVEL_OUTOF10: 0

## 2020-01-13 NOTE — ED NOTES
[facility]ed [facility] to for an update on a bed assignment and they said the patient is the first on the waiting list to receive a bed; however, they are still discharge dependent.      Kaveh Sy  01/12/20 1880

## 2020-01-13 NOTE — ED NOTES
Pt expelling thick, green sputum from trach. Pt suctioned by this RN but has resistance. RT at bedside and cleans inner cannula, suctioned, and placed on humidified air. Pt is eupneic and PWD.      Juan Antonio Monsivais RN  01/13/20 1308

## 2020-01-13 NOTE — ED PROVIDER NOTES
ADDENDUM:        Care of this patient was assumed from Dr. Yanique Brian at 7:15 am      .  The patient was seen for Emesis and Nausea  . The patient's initial evaluation and plan have been discussed with the prior provider who initially evaluated the patient. Nursing Notes, Past Medical Hx, Past Surgical Hx, Social Hx, Allergies, and Family Hx were all reviewed. 3:04 PM  I performed a repeat evaluation of the patient and reviewed tests completed so far. Patient doing well on blow by oxygen with his trach. I ordered IVF maintenance due to his SBO. His G tube is draining. He is nontoxic no distress. Family at bedside. ED Course     ED Course as of Jan 13 1608   Sun Jan 12, 2020   1343 Patient's laboratory data reveal normal CBC, hemoglobin hematocrit are stable platelet count is adequate at 202.  77% neutrophils no bandemia is noted. Lactic acid is normal at 1.6 Víctor light panel shows potassium elevated at 5.7  Creatinine elevated at 1.38 which is a bit above baseline which looks like about 1. Troponin is elevated at 139. She is significantly elevated. [TB]      ED Course User Index  [TB] Ant Byrne DO       The patient was given the following medications:  Orders Placed This Encounter   Medications    fentaNYL (SUBLIMAZE) injection 25 mcg    dextrose 5 % and 0.45 % sodium chloride infusion       RECENT VITALS:  BP: (!) 155/89, Temp: 98.8 °F (37.1 °C), Pulse: 75, Resp: 19     RADIOLOGY:All plain film, CT, MRI, and formal ultrasound images (except ED bedside ultrasound) are read by the radiologist and the images and interpretations are directly viewed by the emergency physician. CT Head WO Contrast   Final Result   1. Ongoing resolution of known left thalamic hemorrhage with surrounding   edema as discussed above. The continues to be mass effect on the left   lateral ventricle and 3rd ventricle. 2. Mucosal thickening of maxillary and ethmoid sinus with fluid in the   sphenoid sinus.

## 2020-01-13 NOTE — FLOWSHEET NOTE
Patient's wife Anita Miguel spoke about patient's medical history and the difficulties since Thanksgiving. She noted \"life has been rough. \" Writer prayed with them, which was comforting. 01/13/20 1453   Encounter Summary   Services provided to: Patient and family together   [facility]   Continue Visiting   (1-13-20)   Complexity of Encounter Moderate   Length of Encounter 15 minutes   Spiritual Assessment Completed Yes   Routine   Type Initial   Spiritual/Protestant   Type Spiritual support   Assessment Approachable; Anxious   Intervention Active listening;Explored feelings, thoughts, concerns;Prayer;Sustaining presence/ Ministry of presence; Discussed illness/injury and it's impact   Outcome Comfort;Expressed gratitude;Expressed feelings/needs/concerns;Receptive

## 2020-01-13 NOTE — ED NOTES
[facility]ed [facility] regarding bed assignment status. They said that @ [phone] there were no beds available and that [facility] is currently still discharge dependent. They were informed to keep us updated on the status.       Kaz Patel  01/12/20 9968

## 2020-01-13 NOTE — ED NOTES
Access center called at 1500 with a bed assignment for patient at [facility] Awaiting an ETA for transportation.       Franklyn Connelly  01/13/20 1503

## 2020-01-13 NOTE — ED NOTES
Access center called at [phone] to check on any updates with a bed at [facility]. They are still discharge dependent, so no bed is available at this time.      Duran Qureshi  01/13/20 0174

## 2020-01-13 NOTE — PLAN OF CARE
Problem: RESPIRATORY  Intervention: Tracheostomy tube care  Note:   Spare trach and supplies placed at patient bedside.   Intervention: Provide oxygen therapy  Note:   PROVIDE ADEQUATE OXYGENATION WITH ACCEPTABLE SP02/ABG'S    [x]  IDENTIFY APPROPRIATE OXYGEN THERAPY  [x]   MONITOR SP02/ABG'S AS NEEDED   [x]   PATIENT EDUCATION AS NEEDED

## 2020-01-13 NOTE — ED NOTES
Report called to [facility] [id number], [phone], received by Devin Marrero RN.      Adrian Greenwood RN  01/13/20 7853

## 2020-01-14 ENCOUNTER — APPOINTMENT (OUTPATIENT)
Dept: GENERAL RADIOLOGY | Age: 65
DRG: 389 | End: 2020-01-14
Attending: INTERNAL MEDICINE
Payer: MEDICARE

## 2020-01-14 LAB
GLUCOSE BLD-MCNC: 122 MG/DL (ref 75–110)
GLUCOSE BLD-MCNC: 136 MG/DL (ref 75–110)
GLUCOSE BLD-MCNC: 142 MG/DL (ref 75–110)
GLUCOSE BLD-MCNC: 148 MG/DL (ref 75–110)
MYOGLOBIN: 110 NG/ML (ref 28–72)
MYOGLOBIN: 112 NG/ML (ref 28–72)
TROPONIN INTERP: ABNORMAL
TROPONIN INTERP: ABNORMAL
TROPONIN T: ABNORMAL NG/ML
TROPONIN T: ABNORMAL NG/ML
TROPONIN, HIGH SENSITIVITY: 91 NG/L (ref 0–22)
TROPONIN, HIGH SENSITIVITY: 96 NG/L (ref 0–22)

## 2020-01-14 PROCEDURE — 83874 ASSAY OF MYOGLOBIN: CPT

## 2020-01-14 PROCEDURE — 51798 US URINE CAPACITY MEASURE: CPT

## 2020-01-14 PROCEDURE — 84484 ASSAY OF TROPONIN QUANT: CPT

## 2020-01-14 PROCEDURE — 99232 SBSQ HOSP IP/OBS MODERATE 35: CPT | Performed by: HOSPITALIST

## 2020-01-14 PROCEDURE — 6370000000 HC RX 637 (ALT 250 FOR IP): Performed by: INTERNAL MEDICINE

## 2020-01-14 PROCEDURE — 36415 COLL VENOUS BLD VENIPUNCTURE: CPT

## 2020-01-14 PROCEDURE — 2060000000 HC ICU INTERMEDIATE R&B

## 2020-01-14 PROCEDURE — 2500000003 HC RX 250 WO HCPCS: Performed by: NURSE PRACTITIONER

## 2020-01-14 PROCEDURE — 2700000000 HC OXYGEN THERAPY PER DAY

## 2020-01-14 PROCEDURE — 74018 RADEX ABDOMEN 1 VIEW: CPT

## 2020-01-14 PROCEDURE — 2580000003 HC RX 258: Performed by: NURSE PRACTITIONER

## 2020-01-14 PROCEDURE — 6370000000 HC RX 637 (ALT 250 FOR IP): Performed by: HOSPITALIST

## 2020-01-14 PROCEDURE — 6360000002 HC RX W HCPCS: Performed by: HOSPITALIST

## 2020-01-14 PROCEDURE — 82947 ASSAY GLUCOSE BLOOD QUANT: CPT

## 2020-01-14 PROCEDURE — 94640 AIRWAY INHALATION TREATMENT: CPT

## 2020-01-14 PROCEDURE — 94761 N-INVAS EAR/PLS OXIMETRY MLT: CPT

## 2020-01-14 PROCEDURE — 51701 INSERT BLADDER CATHETER: CPT

## 2020-01-14 RX ORDER — KETOROLAC TROMETHAMINE 15 MG/ML
15 INJECTION, SOLUTION INTRAMUSCULAR; INTRAVENOUS EVERY 6 HOURS PRN
Status: DISCONTINUED | OUTPATIENT
Start: 2020-01-14 | End: 2020-01-15

## 2020-01-14 RX ORDER — DEXTROSE AND SODIUM CHLORIDE 5; .9 G/100ML; G/100ML
INJECTION, SOLUTION INTRAVENOUS CONTINUOUS
Status: DISCONTINUED | OUTPATIENT
Start: 2020-01-14 | End: 2020-01-16 | Stop reason: HOSPADM

## 2020-01-14 RX ORDER — IPRATROPIUM BROMIDE AND ALBUTEROL SULFATE 2.5; .5 MG/3ML; MG/3ML
1 SOLUTION RESPIRATORY (INHALATION) EVERY 4 HOURS PRN
Status: DISCONTINUED | OUTPATIENT
Start: 2020-01-14 | End: 2020-01-16 | Stop reason: HOSPADM

## 2020-01-14 RX ORDER — HYDRALAZINE HYDROCHLORIDE 20 MG/ML
10 INJECTION INTRAMUSCULAR; INTRAVENOUS EVERY 6 HOURS PRN
Status: DISCONTINUED | OUTPATIENT
Start: 2020-01-14 | End: 2020-01-15

## 2020-01-14 RX ADMIN — KETOROLAC TROMETHAMINE 15 MG: 15 INJECTION, SOLUTION INTRAMUSCULAR; INTRAVENOUS at 13:54

## 2020-01-14 RX ADMIN — METOPROLOL TARTRATE 5 MG: 1 INJECTION, SOLUTION INTRAVENOUS at 11:42

## 2020-01-14 RX ADMIN — DEXTROSE AND SODIUM CHLORIDE: 5; 900 INJECTION, SOLUTION INTRAVENOUS at 02:37

## 2020-01-14 RX ADMIN — METOPROLOL TARTRATE 5 MG: 1 INJECTION, SOLUTION INTRAVENOUS at 08:26

## 2020-01-14 RX ADMIN — INSULIN LISPRO 1 UNITS: 100 INJECTION, SOLUTION INTRAVENOUS; SUBCUTANEOUS at 20:31

## 2020-01-14 RX ADMIN — METOPROLOL TARTRATE 5 MG: 1 INJECTION, SOLUTION INTRAVENOUS at 04:10

## 2020-01-14 RX ADMIN — METOPROLOL TARTRATE 5 MG: 1 INJECTION, SOLUTION INTRAVENOUS at 00:42

## 2020-01-14 RX ADMIN — HYDRALAZINE HYDROCHLORIDE 10 MG: 20 INJECTION INTRAMUSCULAR; INTRAVENOUS at 13:54

## 2020-01-14 RX ADMIN — IPRATROPIUM BROMIDE AND ALBUTEROL SULFATE 1 AMPULE: .5; 3 SOLUTION RESPIRATORY (INHALATION) at 15:39

## 2020-01-14 RX ADMIN — METOPROLOL TARTRATE 5 MG: 5 INJECTION, SOLUTION INTRAVENOUS at 13:11

## 2020-01-14 RX ADMIN — METOPROLOL TARTRATE 5 MG: 1 INJECTION, SOLUTION INTRAVENOUS at 20:31

## 2020-01-14 ASSESSMENT — PAIN SCALES - WONG BAKER
WONGBAKER_NUMERICALRESPONSE: 0

## 2020-01-14 ASSESSMENT — PAIN SCALES - GENERAL
PAINLEVEL_OUTOF10: 0
PAINLEVEL_OUTOF10: 6
PAINLEVEL_OUTOF10: 0

## 2020-01-14 NOTE — PROGRESS NOTES
[facility]  Documentation Note                Admission Dx: Bowel obstruction ([facility] [K56.609]    Past Medical History:   has a past medical history of Atrial fibrillation ([facility] [address] [address] CAD (coronary artery disease), Cerebral artery occlusion with cerebral infarction [facility]), H/O heart artery stent, Hx of blood clots, Hyperlipidemia, Hypertension, Hyperthyroidism, Kidney stones, MI, old, Microalbuminuria, Other complications due to other cardiac device, implant, and graft, Proteinuria, Renal cyst, Thyroid disease, Tracheostomy dependence ([facility] and Type II or unspecified type diabetes mellitus without mention of complication, not stated as uncontrolled. Previous Testing:     ECHO 12/16/19: EF 48%, mild LVH, advanced DD, mild MR/TR, RVSP is 30 mmHg. BiV ICD with AV NODE ABLATION 12/29/17: Done by Dr. Mimi Delgado. Previous office/hospital visit:   Tressa Munoz OF DYSRHYTHMIC FOCUS 12/29/2017    BiV ICD Implant with AV Node Ablation, [place] N/A 12/29/2017 Performed by Jacquelyn Jackson MD at [facility] 2012 BMS-RCA    CORONARY ANGIOPLASTY WITH STENT PLACEMENT 2006 SHIRLENE-RCA      1. Wound check. Device implanted 2017. Recent tenderness over pocket site. Since last office visit, has resolved. Negative CBC, CRP and mild elevated sed rate. 2. [employer] CRT D. primary prevention implanted 2017. Device check last visit  3. Permanent atrial fibrillation with complete heart block status post AV node ablation  4. Hypertension. 140/90.  5. CAD with prior stents. Heart catheterization 2016 with patent RCA. No anginal symptoms  6. Chronic anticoagulation with warfarin  7. Graves disease with visual disturbances. 8. NSVT. Primary prevention device. No therapies in past.  9. Hyperlipidemia. Managed by PCP. Dr. Debra Echeverria 12/29/19:   60-year-old male history of atrial fibrillation on Coumadin with left basal ganglion bleed in this admission.  Can stop all taking differently: Inject 15 Units into the skin 3 times daily (with meals) Per wife pt takes a sliding scale dose not a fixed unit amount 7/14/15   Codi Carias MD   Blood Pressure Monitor KIT CHECK BLOOD PRESSURE DAILY 4/6/15   Olimpia Lundberg MD   glyBURIDE (DIABETA) 5 MG tablet Take 5 mg by mouth 2 times daily (with meals).     Historical Provider, MD      Current Facility-Administered Medications: dextrose 5 % and 0.9 % sodium chloride infusion, , Intravenous, Continuous  [Held by provider] bumetanide (BUMEX) tablet 0.5 mg, 0.5 mg, Oral, Daily  [Held by provider] diltiazem (CARDIZEM) tablet 90 mg, 90 mg, Oral, 3 times per day  [Held by provider] FLUoxetine (PROZAC) capsule 20 mg, 20 mg, Oral, Daily  insulin glargine (LANTUS) injection vial 10 Units, 10 Units, Subcutaneous, Daily  [Held by provider] metoprolol tartrate (LOPRESSOR) tablet 100 mg, 100 mg, Oral, BID  [Held by provider] simethicone (MYLICON) 40 PV/8.8OX drops 40 mg, 40 mg, Per NG tube, 4x Daily  acetaminophen (TYLENOL) tablet 650 mg, 650 mg, Oral, Q4H PRN  senna (SENOKOT) 8.8 MG/5ML syrup 8.8 mg, 5 mL, Oral, BID PRN  ondansetron (ZOFRAN) injection 4 mg, 4 mg, Intravenous, Q6H PRN  insulin lispro (HUMALOG) injection vial 0-12 Units, 0-12 Units, Subcutaneous, TID WC  insulin lispro (HUMALOG) injection vial 0-6 Units, 0-6 Units, Subcutaneous, Nightly  glucose (GLUTOSE) 40 % oral gel 15 g, 15 g, Oral, PRN  dextrose 50 % IV solution, 12.5 g, Intravenous, PRN  glucagon (rDNA) injection 1 mg, 1 mg, Intramuscular, PRN  dextrose 5 % solution, 100 mL/hr, Intravenous, PRN  metoprolol (LOPRESSOR) injection 5 mg, 5 mg, Intravenous, Q4H  metoprolol (LOPRESSOR) injection 5 mg, 5 mg, Intravenous, Q4H PRN    Labs:     CBC:   Recent Labs     01/12/20  1215 01/13/20  2144   WBC 9.5 6.6   HGB 14.8 13.1   HCT 43.1 38.8*    146     BMP:   Recent Labs     01/12/20 1926 01/13/20 2144   * 138   K 5.0 4.2   CO2 34* 31   BUN 66* 42*   CREATININE 1.62* 1.13   LABGLOM 43* >60   GLUCOSE 148* 102*     PT/INR:   Recent Labs     01/12/20  1215   PROTIME 13.3   INR 1.0     APTT:  Recent Labs     01/12/20  1215   APTT 32.5     CARDIAC ENZYMES:  Recent Labs     01/13/20  1857 01/14/20  0007 01/14/20  0554   TROPHS 102* 96* 91*     FASTING LIPID PANEL:  Lab Results   Component Value Date    HDL 26 07/11/2015    TRIG 181 12/16/2019     LIVER PROFILE:  Recent Labs     01/12/20  1215 01/13/20  2144   AST 20 18   ALT 19 18   LABALBU 3.8 3.3*       Brooks Wells, [facility]   Pager: [phone]

## 2020-01-14 NOTE — PROGRESS NOTES
138  --   --    K 5.7*  --  5.0  --  4.2  --   --    CL 87*  --  85*  --  95*  --   --    CO2 29  --  34*  --  31  --   --    GLUCOSE 194*  --  148*  --  102*  --   --    BUN 61*  --  66*  --  42*  --   --    CREATININE 1.38*  --  1.62*  --  1.13  --   --    ANIONGAP 15  --  14  --  12  --   --    LABGLOM 52*  --  43*  --  >60  --   --    GFRAA >60  --  53*  --  >60  --   --    CALCIUM 10.5*  --  10.3  --  9.6  --   --    PROBNP 789*  --   --   --   --   --   --    TROPHS 139*   < > 135* 102*  --  96* 91*   MYOGLOBIN  --   --   --  126*  --  112* 110*   LACTACIDWB  --   --   --   --  1.0  --   --     < > = values in this interval not displayed. Recent Labs     01/12/20  1215 01/13/20  0953 01/13/20  1846 01/13/20  2144 01/14/20  0711 01/14/20  1141   PROT 7.7  --   --  6.6  --   --    LABALBU 3.8  --   --  3.3*  --   --    AST 20  --   --  18  --   --    ALT 19  --   --  18  --   --    ALKPHOS 76  --   --  69  --   --    BILITOT 0.92  --   --  0.69  --   --    BILIDIR 0.37*  --   --  0.27  --   --    AMYLASE 26*  --   --   --   --   --    LIPASE 17  --   --   --   --   --    POCGLU  --  98 109  --  122* 148*     ABG:  Lab Results   Component Value Date    POCPH 7.416 12/14/2019    POCPCO2 46.4 12/14/2019    POCPO2 96.2 12/14/2019    POCHCO3 29.8 12/14/2019    NBEA NOT REPORTED 12/14/2019    PBEA 4 12/14/2019    BWC9BWM 31 12/14/2019    GAIZ9GQA 98 12/14/2019    FIO2 45.0 12/14/2019     Lab Results   Component Value Date/Time    SPECIAL 10 ML RED 10 ML LAV RAC 01/12/2020 12:30 PM     Lab Results   Component Value Date/Time    CULTURE NO GROWTH 2 DAYS 01/12/2020 12:30 PM       Radiology:  Xr Abdomen (kub) (single Ap View)    Result Date: 1/14/2020  Previous gaseous distention of small bowel is inconspicuous currently; this may represent interval opacification with fluid versus return of normal diameter. Mild gaseous distention of transverse colon. Clinical follow-up is advised.      Xr Acute Abd Series Chest 1 Vw    Result Date: 1/12/2020  1. Findings above consistent with small bowel obstruction. 2. No acute focal airspace consolidation or pleural effusions. 3. Mild cardiomegaly. 4. Tracheostomy tube as above. Ct Head Wo Contrast    Result Date: 1/12/2020  1. Ongoing resolution of known left thalamic hemorrhage with surrounding edema as discussed above. The continues to be mass effect on the left lateral ventricle and 3rd ventricle. 2. Mucosal thickening of maxillary and ethmoid sinus with fluid in the sphenoid sinus. Probably related to recent intubation or could represent sinusitis. Please correlate clinically. Physical Examination:        General appearance:  cooperative and no distress. Mental Status:  Non-verbal due to prior CVA. Appears to understand what's going on. Follows simple command. Lungs:  clear to auscultation bilaterally, normal effort. Has tracheostomy. Heart:  regular rate and rhythm, no murmur  Abdomen:  soft, nontender, no masses, hepatomegaly, splenomegaly. Moderate distention noted. No guarding or rebound tenderness. Extremities:  +1 edema. No redness, tenderness in the calves  Skin:  no gross lesions, rashes, induration    Assessment:        Hospital Problems           Last Modified POA    * (Principal) Bowel obstruction ([address] 1/14/2020 Yes    MARILYN (acute kidney injury) ([address] 1/14/2020 Yes    Atrial fibrillation ([address] 1/14/2020 Yes    Uncontrolled hypertension 1/14/2020 Yes    CAD (coronary artery disease) 1/14/2020 Yes    Diabetes mellitus type 2 in obese ([address] 1/14/2020 Yes    Hyperlipidemia 1/14/2020 Yes    Hyperthyroidism 1/14/2020 Yes    Chronic combined systolic and diastolic congestive heart failure ([address] 1/14/2020 Yes    Right sided weakness 1/14/2020 Yes    Chronic respiratory failure with hypoxia and hypercapnia ([address] 1/14/2020 Yes          Plan:        1. Rule out SBO - Clinically not a true SBO. Appreciate surgery consult.   Will advance diet per surgery recommendation. 2. Acute kidney injury - likely pre-renal.  Improved. 3. Hyperkalemia - improved  4. Essential hypertension - BP uncontrolled. Patient was NPO and was unable to take oral meds. On IV Lopressor. 5. CAD - mild elevation of troponin. Likely due to decreased renal function. Appreciate cardiology consult. No further work up needed. 6. DM2 - sliding scale insulin and Lantus  7. Hyponatremia - stable. 8. Chronic respiratory failure - patient has trach. Stable. 9. Chronic combined systolic and diastolic heart failure - stable. Compensated. Continue current medications  10. Atrial fibrillation - rate well controlled. On IV lopressor. Missing Cardizem due to NPO status. No anticoagulation due to recent hemorrhagic stroke.     11. GI and DVT px.    Everette Ewing DO  1/14/2020  1:12 PM

## 2020-01-14 NOTE — H&P
H/O heart artery stent x 4    Hx of blood clots     8 clots removed from right leg    Hyperlipidemia     Hypertension     Hyperthyroidism     Kidney stones     MI, old     family states 3 MI history    Microalbuminuria     Other complications due to other cardiac device, implant, and graft     Proteinuria     Renal cyst     right side    Thyroid disease     Tracheostomy dependence ([address]     trach placed at [facility] on 12/19 due to CVA     Type II or unspecified type diabetes mellitus without mention of complication, not stated as uncontrolled         Past Surgical History:     Past Surgical History:   Procedure Laterality Date    CYSTOSCOPY      EYE SURGERY Left     s/p thyroidectomy double vision rec'd radiation 1yr ago    GASTROSTOMY N/A 12/14/2019    TRACHEOSTOMY, OPEN GASTROST[facility] [address] LITHOTRIPSY      PACEMAKER PLACEMENT      PT [id number], THIS SYSTEM IS MRI CONDITIONAL HOWEVER THE LEADS ARE FROM [place] AND THAT MAKES THIS SYSTEM NOT MRI CONDITIONAL AND PATIENT CANNOT HAVE AN MRI.  THYROIDECTOMY      URETEROSCOPY          Medications Prior to Admission:     Prior to Admission medications    Medication Sig Start Date End Date Taking?  Authorizing Provider   FLUoxetine (PROZAC) 20 MG capsule Take 1 capsule by mouth daily 1/4/20   Chelsi Oneil MD   insulin glargine (LANTUS) 100 UNIT/ML injection vial Inject 10 Units into the skin daily 1/3/20   Chelsi Oneil MD   hydrALAZINE (APRESOLINE) 100 MG tablet Take 1 tablet by mouth every 8 hours 1/3/20   Chelsi Oneil MD   diltiazem (CARDIZEM) 90 MG tablet Take 1 tablet by mouth every 8 hours 1/3/20   Chelsi Oneil MD   sennosides-docusate sodium (SENOKOT-S) 8.6-50 MG tablet Take 2 tablets by mouth daily 1/4/20   Chelsi Oneil MD   [facility]) 40 WR/4.1DP drops 0.6 mLs by Per NG tube route 4 times daily 1/3/20 Derrick Lester MD   chlorhexidine (PERIDEX) 0.12 % solution Take 15 mLs by mouth 2 times daily for 14 days 1/3/20 1/17/20  Derrick Lester MD   tetrahydrozoline 0.05 % ophthalmic solution Place 1 drop into the left eye 3 times daily 1/3/20   Derrick Lester MD   bumetanide (BUMEX) 0.5 MG tablet Take 0.5 mg by mouth daily    Historical Provider, MD   lisinopril (PRINIVIL;ZESTRIL) 40 MG tablet Take 1 tablet by mouth daily 7/14/15   Crystal Friedman MD   insulin lispro (HUMALOG) 100 UNIT/ML injection vial Inject 15 Units into the skin 3 times daily (with meals)  Patient taking differently: Inject 15 Units into the skin 3 times daily (with meals) Per wife pt takes a sliding scale dose not a fixed unit amount 7/14/15   Crystal Friedman MD   methimazole (TAPAZOLE) 10 MG tablet Take 10 mg by mouth every evening    Historical Provider, MD   Blood Pressure Monitor KIT CHECK BLOOD PRESSURE DAILY 4/6/15   Coleman Hahn MD   atorvastatin (LIPITOR) 40 MG tablet Take 40 mg by mouth daily. Historical Provider, MD   glyBURIDE (DIABETA) 5 MG tablet Take 5 mg by mouth 2 times daily (with meals). Historical Provider, MD   metformin (GLUCOPHAGE) 1000 MG tablet Take 1,000 mg by mouth 2 times daily (with meals). Historical Provider, MD   methimazole (TAPAZOLE) 10 MG tablet   Take 20 mg by mouth daily     Historical Provider, MD   metoprolol (LOPRESSOR) 100 MG tablet Take 100 mg by mouth 2 times daily. Historical Provider, MD        Allergies:     Patient has no known allergies. Social History:     Tobacco:    reports that he has quit smoking. His smoking use included cigarettes. He has a 47.00 pack-year smoking history. He has never used smokeless tobacco.  Alcohol:      reports no history of alcohol use. Drug Use:  reports no history of drug use.     Family History:     Family History   Problem Relation Age of Onset    Diabetes Mother     Emphysema Mother     Heart Disease Father     Diabetes Sister     Heart Disease the left side. Posterior tibial pulses are 1+ on the right side and 1+ on the left side. Heart sounds: Normal heart sounds. No murmur. Comments: Telemetry showing A. fib a flutter with ventricular pacing on demand  Pulmonary:      Effort: Pulmonary effort is normal. No respiratory distress. Breath sounds: Normal breath sounds. No stridor. No decreased breath sounds, wheezing, rhonchi or rales. Comments: 40% FiO2 7 L trach collar  Abdominal:      General: Abdomen is protuberant. A surgical scar is present. Bowel sounds are decreased. There is distension. Palpations: Abdomen is soft. There is no mass. Tenderness: There is no tenderness. There is no guarding or rebound. Negative signs include Quinones's sign, Rovsing's sign, McBurney's sign, psoas sign and obturator sign. Musculoskeletal:         General: No tenderness. Right lower le+ Edema present. Left lower le+ Edema present. Skin:     General: Skin is warm and dry. Findings: No erythema, lesion or rash. Comments: Venous stasis dermatitis bilateral lower extremities   Neurological:      Mental Status: He is alert. He is not disoriented. GCS: GCS eye subscore is 4. GCS verbal subscore is 1. GCS motor subscore is 6. Cranial Nerves: Facial asymmetry present. No cranial nerve deficit. Sensory: Sensory deficit present. Motor: Weakness and abnormal muscle tone present. Comments: Known deficit right-sided weakness upper and lower extremity   Psychiatric:         Speech: He is noncommunicative (Nods head yes or no). Behavior: Behavior is cooperative.          Investigations:      Laboratory Testing:  Recent Results (from the past 24 hour(s))   Troponin    Collection Time: 20  7:26 PM   Result Value Ref Range    Troponin, High Sensitivity 135 (HH) 0 - 22 ng/L    Troponin T NOT REPORTED <0.03 ng/mL    Troponin Interp NOT REPORTED    Basic Metabolic Panel w/ Reflex to MG    Collection Time: 01/12/20  7:26 PM   Result Value Ref Range    Glucose 148 (H) 70 - 99 mg/dL    BUN 66 (H) 8 - 23 mg/dL    CREATININE 1.62 (H) 0.70 - 1.20 mg/dL    Bun/Cre Ratio NOT REPORTED 9 - 20    Calcium 10.3 8.6 - 10.4 mg/dL    Sodium 133 (L) 135 - 144 mmol/L    Potassium 5.0 3.7 - 5.3 mmol/L    Chloride 85 (L) 98 - 107 mmol/L    CO2 34 (H) 20 - 31 mmol/L    Anion Gap 14 9 - 17 mmol/L    GFR Non-African American 43 (L) >60 mL/min    GFR  53 (L) >60 mL/min    GFR Comment          GFR Staging NOT REPORTED    POC Glucose Fingerstick    Collection Time: 01/13/20  9:53 AM   Result Value Ref Range    POC Glucose 98 75 - 110 mg/dL   POC Glucose Fingerstick    Collection Time: 01/13/20  6:46 PM   Result Value Ref Range    POC Glucose 109 75 - 110 mg/dL       Imaging/Diagnostics:  Xr Acute Abd Series Chest 1 Vw    Result Date: 1/12/2020  1. Findings above consistent with small bowel obstruction. 2. No acute focal airspace consolidation or pleural effusions. 3. Mild cardiomegaly. 4. Tracheostomy tube as above. Ct Head Wo Contrast    Result Date: 1/12/2020  1. Ongoing resolution of known left thalamic hemorrhage with surrounding edema as discussed above. The continues to be mass effect on the left lateral ventricle and 3rd ventricle. 2. Mucosal thickening of maxillary and ethmoid sinus with fluid in the sphenoid sinus. Probably related to recent intubation or could represent sinusitis. Please correlate clinically.        Assessment :      Hospital Problems           Last Modified POA    * (Principal) Bowel obstruction ([address] 1/14/2020 Yes    MARILYN (acute kidney injury) ([address] 1/14/2020 Yes    Atrial fibrillation ([address] 1/14/2020 Yes    Uncontrolled hypertension 1/14/2020 Yes    CAD (coronary artery disease) 1/14/2020 Yes    Diabetes mellitus type 2 in obese ([address] 1/14/2020 Yes    Hyperlipidemia 1/14/2020 Yes    Hyperthyroidism 1/14/2020 Yes    Chronic combined systolic and diastolic congestive heart failure ([facility] 1/14/2020 Yes    Right sided weakness 1/14/2020 Yes    Chronic respiratory failure with hypoxia and hypercapnia ([facility] 1/14/2020 Yes          Plan:     Patient status inpatient in the Progressive Unit/Step down    1. Small bowel obstruction-PEG tube  with low intermittent wall suction, consultation to general surgery. 2. Acute kidney injury-likely secondary to dehydration at this time I started IV fluids, will check bladder scan to assure that he is not retaining urine as they just removed bladder Gipson prior to arrival.  This was put in an emergency room at [facility]  3. Hyperkalemia-we will recheck electrolytes  4. Essential hypertension uncontrolled hypertension-we will change medications from oral to IV. 5. Coronary artery disease-continue with beta-blocker, no aspirin secondary to recent bleed. 6. Diabetes type 2-insulin sliding scale, long-acting insulin, per glycemia protocol, will switch to a dextrose containing solution once sodium is corrected to baseline   7. hyponatremia-once sodium is corrected, will check urine studies  8. Troponin elevation-continue to trend out troponin and watch as kidney function normalizes. In comparison to previous high sensitive troponin, his baseline was 34 with a normal creatinine of 1.2.    9. Chronic respiratory failure-status post trach, will have RT protocol in place  10. Chronic combined systolic diastolic heart failure-we will monitor IV fluids assess for fluid overload no acute symptoms of fluid overload at this time. 11. Atrial fibrillation-rate controlled we will use IV Lopressor for rate control may need Cardizem drip while n.p.o. nothing can be in his tube secondary to the obstruction. No anticoagulation at this time secondary to recent CVA/hemorrhage  12. GI Prophylaxis   13.  DVT prophylaxis-EPC cuffs  Consultations:   IP CONSULT TO CARDIOLOGY  IP CONSULT TO GENERAL SURGERY     Patient is admitted as inpatient status because of

## 2020-01-14 NOTE — CONSULTS
ophthalmic solution, Place 1 drop into the left eye 3 times daily  insulin lispro (HUMALOG) 100 UNIT/ML injection vial, Inject 15 Units into the skin 3 times daily (with meals) (Patient taking differently: Inject 15 Units into the skin 3 times daily (with meals) Per wife pt takes a sliding scale dose not a fixed unit amount)  [DISCONTINUED] lisinopril (PRINIVIL;ZESTRIL) 40 MG tablet, Take 1 tablet by mouth daily  Blood Pressure Monitor KIT, CHECK BLOOD PRESSURE DAILY  glyBURIDE (DIABETA) 5 MG tablet, Take 5 mg by mouth 2 times daily (with meals). Allergies:  Patient has no known allergies.     Social History:   Social History     Socioeconomic History    Marital status:      Spouse name: Felicitas Humphreys    Number of children: 3    Years of education: Not on file    Highest education level: Not on file   Occupational History    Not on file   Social Needs    Financial resource strain: Not on file    Food insecurity:     Worry: Not on file     Inability: Not on file    Transportation needs:     Medical: Not on file     Non-medical: Not on file   Tobacco Use    Smoking status: Former Smoker     Packs/day: 1.00     Years: 47.00     Pack years: 47.00     Types: Cigarettes    Smokeless tobacco: Never Used   Substance and Sexual Activity    Alcohol use: No    Drug use: Never    Sexual activity: Not on file   Lifestyle    Physical activity:     Days per week: Not on file     Minutes per session: Not on file    Stress: Not on file   Relationships    Social connections:     Talks on phone: Not on file     Gets together: Not on file     Attends Taoist service: Not on file     Active member of club or organization: Not on file     Attends meetings of clubs or organizations: Not on file     Relationship status: Not on file    Intimate partner violence:     Fear of current or ex partner: Not on file     Emotionally abused: Not on file     Physically abused: Not on file     Forced sexual activity: Not on file   Other Topics Concern    Not on file   Social History Narrative    Not on file       Family History:       Problem Relation Age of Onset    Diabetes Mother     Emphysema Mother     Heart Disease Father     Diabetes Sister     Heart Disease Brother     Diabetes Brother     Kidney Disease Brother        REVIEW OF SYSTEMS:    CONSTITUTIONAL: Denies recent weight loss, fatigue, fevers, chills  HEENT: Denies rhinorrhea  CARDIOVASCULAR: Denies history of MI, recent chest pain  RESPIRATORY: Denies recent history of shortness of breath or history of PE  GASTROINTESTINAL: Positive abdominal distention  GENITOURINARY: Denies increased frequency or dysuria  HEMATOLOGIC/LYMPHATIC: Denies history of anemia or DVTs  ENDOCRINE: Denies history of thyroid problems or diabetes  NEURO: Recent stroke  Review of systems negative unless listed above. PHYSICAL EXAM:    VITALS:  BP (!) 152/118   Pulse 75   Temp 98.9 °F (37.2 °C) (Temporal)   Resp 16   Ht 5' 7\" (1.702 m)   Wt 209 lb 10.5 oz (95.1 kg)   SpO2 94%   BMI 32.84 kg/m²   INTAKE/OUTPUT:     Intake/Output Summary (Last 24 hours) at 1/14/2020 0919  Last data filed at 1/14/2020 9647  Gross per 24 hour   Intake --   Output 904 ml   Net -904 ml       CONSTITUTIONAL:  awake, alert, not distressed  HEENT: Normocephalic/atraumatic, without obvious abnormality  NECK:  Supple, symmetrical, trachea midline , tracheostomy in place  CARDIOVASCULAR: Regular rate and rhythm without murmurs.   LUNGS: Unlabored respirations, on trach mask  ABDOMEN: Obese, softly distended, nontender to palpation, G tube in place to LIS, midline incision well healed   MUSCULOSKELETAL: Muscle strength intact in all extremities bilaterally  NEUROLOGIC: R sided hemiparesis, expressive aphasia  SKIN: No cyanosis, rashes, or edema noted    CBC with Differential:    Lab Results   Component Value Date    WBC 6.6 01/13/2020    RBC 4.03 01/13/2020    HGB 13.1 01/13/2020    HCT 38.8 01/13/2020    PLT afternoon  4. No acute surgical intervention at this time    Patient and plan discussed with Dr Sherwin Garcia, who is in agreement.       Electronically signed by Elham Rose MD  on 1/14/2020 at 9:19 AM

## 2020-01-14 NOTE — CONSULTS
[facility]    Consult                        Today's Date: 1/14/2020  Patient Name: Naomy Mcgrath  Date of admission: 1/13/2020  6:08 PM  Patient's age: 60 y.o., 1955  Admission Dx: Bowel obstruction ([facility] [address] [P28.365]    Reason for Consult:  Elevated troponin    Requesting Physician: Marina Jones MD    CHIEF COMPLAINT:  Intestinal obstruction    History Obtained From:  patient    HISTORY OF PRESENT ILLNESS:      The patient is a 60 y.o.  male who is admitted to the hospital for intestinal obstruction. The patient presented with intestinal obstruction. Cardiology consulted for elevated troponin, has previous history of CAD s/p PCI. Cath in 2016 showed patent stents. Has been followed by [facility]. Denies chest pain or SOB, no dizziness or syncope. Past Medical History:   has a past medical history of Atrial fibrillation ([facility] [address] CAD (coronary artery disease), Cerebral artery occlusion with cerebral infarction [facility]), H/O heart artery stent, Hx of blood clots, Hyperlipidemia, Hypertension, Hyperthyroidism, Kidney stones, MI, old, Microalbuminuria, Other complications due to other cardiac device, implant, and graft, Proteinuria, Renal cyst, Thyroid disease, Tracheostomy dependence ([facility] [address] and Type II or unspecified type diabetes mellitus without mention of complication, not stated as uncontrolled. Past Surgical History:   has a past surgical history that includes Kidney surgery; Lithotripsy; Cystoscopy; Ureteroscopy; pacemaker placement; eye surgery (Left); Thyroidectomy; and gastrostomy (N/A, 12/14/2019). Home Medications:    Prior to Admission medications    Medication Sig Start Date End Date Taking?  Authorizing Provider   hypromellose (ARTIFICIAL TEARS) 0.4 % SOLN ophthalmic solution Place 1 drop into both eyes 2 times daily   Yes Historical Provider, MD   glycopyrrolate (ROBINUL) 1 MG tablet 1 mg by PEG Tube route 4 times daily   Yes Historical Provider, MD tobacco. He reports that he does not drink alcohol or use drugs. Family History: family history includes Diabetes in his brother, mother, and sister; Emphysema in his mother; Heart Disease in his brother and father; Kidney Disease in his brother. No h/o sudden cardiac death. No for premature CAD    REVIEW OF SYSTEMS:    · Constitutional: there has been no unanticipated weight loss. There's been No change in energy level, No change in activity level. · Eyes: No visual changes or diplopia. No scleral icterus. · ENT: No Headaches, hearing loss or vertigo. No mouth sores or sore throat. · Cardiovascular: No chest pain, No dyspnea on exertion, No palpitations or No loss of consciousness. No cough, hemoptysis, No pleuritic pain, or phlebitis. · Respiratory: No cough or wheezing, no sputum production. No hematemesis. · Gastrointestinal: No abdominal pain, appetite loss, blood in stools. Yes change in bowel or bladder habits. · Genitourinary: No dysuria, trouble voiding, or hematuria. · Musculoskeletal:  No gait disturbance, No weakness or joint complaints. · Integumentary: No rash or pruritis. · Neurological: No headache, diplopia, change in muscle strength, numbness or tingling. No change in gait, balance, coordination, mood, affect, memory, mentation, behavior. · Psychiatric: No anxiety, or depression. · Endocrine: No temperature intolerance. No excessive thirst, fluid intake, or urination. No tremor. · Hematologic/Lymphatic: No abnormal bruising or bleeding, blood clots or swollen lymph nodes. · Allergic/Immunologic: No nasal congestion or hives. PHYSICAL EXAM:      BP (!) 155/95   Pulse 75   Temp 99.3 °F (37.4 °C) (Temporal)   Resp 16   Ht 5' 7\" (1.702 m)   Wt 209 lb 10.5 oz (95.1 kg)   SpO2 94%   BMI 32.84 kg/m²    Constitutional and General Appearance: alert, cooperative, no distress and appears stated age  HEENT: PERRL, no cervical lymphadenopathy. No masses palpable.  Normal oral history of atrial fibrillation on Coumadin with left basal ganglion bleed in this admission. Can stop all blood thinners. We can do outpatient work up for possible Watchman. Device. Please arrange outpatient follow-up with cardiology once discharged for further evaluation  Will evaluate outpatient for watchman device.       Labs:     CBC:   Recent Labs     01/12/20  1215 01/13/20  2144   WBC 9.5 6.6   HGB 14.8 13.1   HCT 43.1 38.8*    146     BMP:   Recent Labs     01/12/20  1926 01/13/20  2144   * 138   K 5.0 4.2   CO2 34* 31   BUN 66* 42*   CREATININE 1.62* 1.13   LABGLOM 43* >60   GLUCOSE 148* 102*     BNP: No results for input(s): BNP in the last 72 hours. PT/INR:   Recent Labs     01/12/20  1215   PROTIME 13.3   INR 1.0     APTT:  Recent Labs     01/12/20  1215   APTT 32.5     CARDIAC ENZYMES:No results for input(s): CKTOTAL, CKMB, CKMBINDEX, TROPONINI in the last 72 hours. FASTING LIPID PANEL:  Lab Results   Component Value Date    HDL 26 07/11/2015    TRIG 181 12/16/2019     LIVER PROFILE:  Recent Labs     01/12/20  1215 01/13/20  2144   AST 20 18   ALT 19 18   LABALBU 3.8 3.3*       IMPRESSION/RECOMMENDATIONS:  1. Mildly elevated troponin. Most likely type 2 from renal failure. Denies chest pain. No further cardiac work up needed. 2. H/P CAD s/p PCI. Cath in 2016 showed patent stents. Followed by [facility]. Continue current medications. 3. H/O Chronic Atrial fibrillation. Rate controlled. Off anticoagulation due to history of brain bleeding. 4. H/O Cardiomyopathy s/p CRT-D placement. 5. Intestinal obstruction. Treat per primary service. Discussed with patient and Nurse.     Abby Escobar MD  [facility]           [phone]

## 2020-01-15 ENCOUNTER — APPOINTMENT (OUTPATIENT)
Dept: GENERAL RADIOLOGY | Age: 65
DRG: 389 | End: 2020-01-15
Attending: INTERNAL MEDICINE
Payer: MEDICARE

## 2020-01-15 LAB
GLUCOSE BLD-MCNC: 108 MG/DL (ref 75–110)
GLUCOSE BLD-MCNC: 121 MG/DL (ref 75–110)
GLUCOSE BLD-MCNC: 125 MG/DL (ref 75–110)
GLUCOSE BLD-MCNC: 136 MG/DL (ref 75–110)
THYROXINE, FREE: 0.19 NG/DL (ref 0.93–1.7)
TSH SERPL DL<=0.05 MIU/L-ACNC: 17.53 MIU/L (ref 0.3–5)

## 2020-01-15 PROCEDURE — 6370000000 HC RX 637 (ALT 250 FOR IP): Performed by: STUDENT IN AN ORGANIZED HEALTH CARE EDUCATION/TRAINING PROGRAM

## 2020-01-15 PROCEDURE — 99232 SBSQ HOSP IP/OBS MODERATE 35: CPT | Performed by: PHYSICIAN ASSISTANT

## 2020-01-15 PROCEDURE — 51798 US URINE CAPACITY MEASURE: CPT

## 2020-01-15 PROCEDURE — 36415 COLL VENOUS BLD VENIPUNCTURE: CPT

## 2020-01-15 PROCEDURE — 2060000000 HC ICU INTERMEDIATE R&B

## 2020-01-15 PROCEDURE — 94761 N-INVAS EAR/PLS OXIMETRY MLT: CPT

## 2020-01-15 PROCEDURE — 97530 THERAPEUTIC ACTIVITIES: CPT

## 2020-01-15 PROCEDURE — 2700000000 HC OXYGEN THERAPY PER DAY

## 2020-01-15 PROCEDURE — 6370000000 HC RX 637 (ALT 250 FOR IP): Performed by: INTERNAL MEDICINE

## 2020-01-15 PROCEDURE — 84443 ASSAY THYROID STIM HORMONE: CPT

## 2020-01-15 PROCEDURE — 97535 SELF CARE MNGMENT TRAINING: CPT

## 2020-01-15 PROCEDURE — 97167 OT EVAL HIGH COMPLEX 60 MIN: CPT

## 2020-01-15 PROCEDURE — 82947 ASSAY GLUCOSE BLOOD QUANT: CPT

## 2020-01-15 PROCEDURE — 2500000003 HC RX 250 WO HCPCS: Performed by: NURSE PRACTITIONER

## 2020-01-15 PROCEDURE — 74018 RADEX ABDOMEN 1 VIEW: CPT

## 2020-01-15 PROCEDURE — 84439 ASSAY OF FREE THYROXINE: CPT

## 2020-01-15 PROCEDURE — 6370000000 HC RX 637 (ALT 250 FOR IP): Performed by: PHYSICIAN ASSISTANT

## 2020-01-15 PROCEDURE — 6360000002 HC RX W HCPCS: Performed by: PHYSICIAN ASSISTANT

## 2020-01-15 PROCEDURE — 6360000002 HC RX W HCPCS: Performed by: HOSPITALIST

## 2020-01-15 PROCEDURE — 97162 PT EVAL MOD COMPLEX 30 MIN: CPT

## 2020-01-15 RX ORDER — HYDRALAZINE HYDROCHLORIDE 20 MG/ML
10 INJECTION INTRAMUSCULAR; INTRAVENOUS EVERY 4 HOURS PRN
Status: DISCONTINUED | OUTPATIENT
Start: 2020-01-15 | End: 2020-01-16 | Stop reason: HOSPADM

## 2020-01-15 RX ORDER — LISINOPRIL 5 MG/1
5 TABLET ORAL DAILY
Status: DISCONTINUED | OUTPATIENT
Start: 2020-01-15 | End: 2020-01-16 | Stop reason: HOSPADM

## 2020-01-15 RX ORDER — POLYETHYLENE GLYCOL 3350 17 G/17G
17 POWDER, FOR SOLUTION ORAL DAILY
Status: DISCONTINUED | OUTPATIENT
Start: 2020-01-16 | End: 2020-01-16 | Stop reason: HOSPADM

## 2020-01-15 RX ORDER — LEVOTHYROXINE SODIUM 0.05 MG/1
50 TABLET ORAL DAILY
Status: DISCONTINUED | OUTPATIENT
Start: 2020-01-15 | End: 2020-01-16 | Stop reason: HOSPADM

## 2020-01-15 RX ADMIN — HYDRALAZINE HYDROCHLORIDE 10 MG: 20 INJECTION INTRAMUSCULAR; INTRAVENOUS at 12:43

## 2020-01-15 RX ADMIN — SIMETHICONE 40 MG: 20 SUSPENSION/ DROPS ORAL at 21:04

## 2020-01-15 RX ADMIN — DILTIAZEM HYDROCHLORIDE 90 MG: 60 TABLET, FILM COATED ORAL at 21:03

## 2020-01-15 RX ADMIN — SIMETHICONE 40 MG: 20 SUSPENSION/ DROPS ORAL at 15:20

## 2020-01-15 RX ADMIN — METOPROLOL TARTRATE 5 MG: 1 INJECTION, SOLUTION INTRAVENOUS at 08:02

## 2020-01-15 RX ADMIN — KETOROLAC TROMETHAMINE 15 MG: 15 INJECTION, SOLUTION INTRAMUSCULAR; INTRAVENOUS at 12:42

## 2020-01-15 RX ADMIN — HYDRALAZINE HYDROCHLORIDE 10 MG: 20 INJECTION INTRAMUSCULAR; INTRAVENOUS at 15:34

## 2020-01-15 RX ADMIN — METOPROLOL TARTRATE 100 MG: 50 TABLET, FILM COATED ORAL at 21:03

## 2020-01-15 RX ADMIN — LISINOPRIL 5 MG: 5 TABLET ORAL at 17:22

## 2020-01-15 RX ADMIN — KETOROLAC TROMETHAMINE 15 MG: 15 INJECTION, SOLUTION INTRAMUSCULAR; INTRAVENOUS at 04:52

## 2020-01-15 RX ADMIN — METOPROLOL TARTRATE 5 MG: 1 INJECTION, SOLUTION INTRAVENOUS at 04:52

## 2020-01-15 RX ADMIN — DOCUSATE SODIUM 100 MG: 50 LIQUID ORAL at 21:04

## 2020-01-15 RX ADMIN — INSULIN GLARGINE 10 UNITS: 100 INJECTION, SOLUTION SUBCUTANEOUS at 11:38

## 2020-01-15 RX ADMIN — METOPROLOL TARTRATE 5 MG: 1 INJECTION, SOLUTION INTRAVENOUS at 01:06

## 2020-01-15 RX ADMIN — LEVOTHYROXINE SODIUM 50 MCG: 50 TABLET ORAL at 17:26

## 2020-01-15 RX ADMIN — METOPROLOL TARTRATE 5 MG: 1 INJECTION, SOLUTION INTRAVENOUS at 11:38

## 2020-01-15 ASSESSMENT — PAIN SCALES - WONG BAKER

## 2020-01-15 ASSESSMENT — PAIN SCALES - GENERAL
PAINLEVEL_OUTOF10: 0
PAINLEVEL_OUTOF10: 0
PAINLEVEL_OUTOF10: 6
PAINLEVEL_OUTOF10: 4
PAINLEVEL_OUTOF10: 0

## 2020-01-15 NOTE — PROGRESS NOTES
Normotonic  Coordination  Movements Are Fluid And Coordinated: No  Coordination and Movement description: Gross motor impairments;Right UE;Fine motor impairments  Quality of Movement Other  Comment: pt with possible trace movement to R UE but no purposeful or meaningful movement      Bed mobility  Rolling to Left: Maximum assistance;2 Person assistance  Rolling to Right: Maximum assistance;2 Person assistance  Comment: sitting not attempted due to inconsistently following commands      Cognition  Overall Cognitive Status: Exceptions  Following Commands: Follows one step commands with repetition  Attention Span: Difficulty attending to directions; Difficulty dividing attention  Initiation: Requires cues for all  Sequencing: Requires cues for all  Cognition Comment: pt followed ~75% of 1 step commands throughout session, unable to follow 2+ step commands.  Pt able to communicate by shaking head yes/no      Sensation  Overall Sensation Status: Impaired  Additional Comments: pt reported unable to feel R UE when touched but reported able to feel therpist touching R LE       LUE AROM : WFL  Left Hand AROM: WFL    RUE PROM: WFL  RUE AROM : Exceptions  RUE General AROM: no active movement observed-flaccid   Right Hand PROM: Exceptions  Right Hand General PROM: pt with full extension of all digits but only able to complete 75% of flexion possible due to edema   Right Hand AROM: Exceptions  Right Hand General AROM: no active movement observed-flaccid     LUE Strength  Gross LUE Strength: [facility]  L Hand General: 4+/5  RUE Strength  Gross RUE Strength: Exceptions to [facility]  R Hand General: 1/5     RUE Edema - Circumference (cm)  RUE Edema Present?: Yes(writer completed retrograde massage to pt R hand due to edema and elevated R UE at end of session )      Plan   Plan  Times per week: 3-5x/wk    AM-PAC Score      AM-[facility] Inpatient Daily Activity Raw Score: 14 (01/15/20 2077)  AM-PAC Inpatient ADL T-Scale Score : 33.39 (01/15/20

## 2020-01-15 NOTE — PROGRESS NOTES
[facility]    Progress Note    1/15/2020    4:17 PM    Name:   Nicola Coleman  MRN:     [id number]     Acct:      [de-identified]   Room:   [address] [place] [id number] Day:  2  Admit Date:  1/13/2020  6:08 PM    PCP:   Sher Isabel MD  Code Status:  Full Code    Subjective:     C/C: Abdominal pain, distention  Interval History Status: improved. Patient lying in bed upon evaluation. He is alert. He is nonverbal; however, he does respond appropriately with shaking his head yes and no to my questions. Denies any pain. Has been passing gas. Had small bowel movement after enema last evening. Tube feeds resumed at slow rate. Remains afebrile and hemodynamically stable. Labs reviewed. Case discussed with nursing at bedside. Brief History: This is a 73-year-old male with a significant medical history of atrial fib CHF with an ICD coronary artery disease DVT hypertension diabetes who presented to outlying emergency room at [facility] for the complaint of abdominal distention, and increased somnolence from skilled nursing facility [facility]. Information is obtained from the chart as patient is nonverbal,  Patient is admitted for management of possible SBO. Review of Systems:     Unable to assess. Patient is non-verbal at baseline. Medications:      Allergies:  No Known Allergies    Current Meds:   Scheduled Meds:    levothyroxine  50 mcg Oral Daily    bumetanide  0.5 mg Oral Daily    diltiazem  90 mg Oral 3 times per day    FLUoxetine  20 mg Oral Daily    insulin glargine  10 Units Subcutaneous Daily    metoprolol  100 mg Oral BID    simethicone  40 mg Per NG tube 4x Daily    insulin lispro  0-12 Units Subcutaneous TID WC    insulin lispro  0-6 Units Subcutaneous Nightly     Continuous Infusions:    dextrose 5 % and 0.9 % NaCl 50 mL/hr at 01/14/20 0237    dextrose       PRN Meds: hydrALAZINE, ketorolac, ipratropium-albuterol, acetaminophen, senna, ondansetron, glucose, dextrose, glucagon (rDNA), dextrose, metoprolol    Data:     Past Medical History:   has a past medical history of Atrial fibrillation ([facility] [address] CAD (coronary artery disease), Cerebral artery occlusion with cerebral infarction ([facility] [address] H/O heart artery stent, Hx of blood clots, Hyperlipidemia, Hypertension, Hyperthyroidism, Kidney stones, MI, old, Microalbuminuria, Other complications due to other cardiac device, implant, and graft, Proteinuria, Renal cyst, Thyroid disease, Tracheostomy dependence ([facility] [address] and Type II or unspecified type diabetes mellitus without mention of complication, not stated as uncontrolled. Social History:   reports that he has quit smoking. His smoking use included cigarettes. He has a 47.00 pack-year smoking history. He has never used smokeless tobacco. He reports that he does not drink alcohol or use drugs. Family History:   Family History   Problem Relation Age of Onset    Diabetes Mother     Emphysema Mother     Heart Disease Father     Diabetes Sister     Heart Disease Brother     Diabetes Brother     Kidney Disease Brother        Vitals:  BP (!) 147/93   Pulse 75   Temp 98 °F (36.7 °C) (Oral)   Resp 19   Ht 5' 7\" (1.702 m)   Wt 212 lb 1.3 oz (96.2 kg)   SpO2 96%   BMI 33.22 kg/m²   Temp (24hrs), Av.2 °F (36.8 °C), Min:98 °F (36.7 °C), Max:98.5 °F (36.9 °C)    Recent Labs     20  1613 01/14/20  2003 01/15/20  0623 01/15/20  1134   POCGLU 136* 142* 108 136*       I/O (24Hr):     Intake/Output Summary (Last 24 hours) at 1/15/2020 1617  Last data filed at 1/15/2020 0501  Gross per 24 hour   Intake 1878 ml   Output --   Net 1878 ml       Labs:  Hematology:  Recent Labs     20  2144   WBC 6.6   RBC 4.03*   HGB 13.1   HCT 38.8*   MCV 96.3   MCH 32.5   MCHC 33.8   RDW 15.0*      MPV 10.4     Chemistry:  Recent Labs     20  1926 20  1857 20  2144 20  0007 01/14/20  0554   *  --  138  --   --    K 5.0  --  4.2  --   --    CL 85*  --  95*  --   --    CO2 34*  --  31  --   --    GLUCOSE 148*  --  102*  --   --    BUN 66*  --  42*  --   --    CREATININE 1.62*  --  1.13  --   --    ANIONGAP 14  --  12  --   --    LABGLOM 43*  --  >60  --   --    GFRAA 53*  --  >60  --   --    CALCIUM 10.3  --  9.6  --   --    TROPHS 135* 102*  --  96* 91*   MYOGLOBIN  --  126*  --  112* 110*   LACTACIDWB  --   --  1.0  --   --      Recent Labs     01/13/20  2144 01/14/20  0711 01/14/20  1141 01/14/20  1613 01/14/20  2003 01/15/20  0623 01/15/20  0924 01/15/20  1134   PROT 6.6  --   --   --   --   --   --   --    LABALBU 3.3*  --   --   --   --   --   --   --    TSH  --   --   --   --   --   --  17.53*  --    AST 18  --   --   --   --   --   --   --    ALT 18  --   --   --   --   --   --   --    ALKPHOS 69  --   --   --   --   --   --   --    BILITOT 0.69  --   --   --   --   --   --   --    BILIDIR 0.27  --   --   --   --   --   --   --    POCGLU  --  122* 148* 136* 142* 108  --  136*     ABG:  Lab Results   Component Value Date    POCPH 7.416 12/14/2019    POCPCO2 46.4 12/14/2019    POCPO2 96.2 12/14/2019    POCHCO3 29.8 12/14/2019    NBEA NOT REPORTED 12/14/2019    PBEA 4 12/14/2019    GKM2JNP 31 12/14/2019    YCII6GVE 98 12/14/2019    FIO2 45.0 12/14/2019     Lab Results   Component Value Date/Time    SPECIAL 10 ML RED 10 ML LAV RAC 01/12/2020 12:30 PM     Lab Results   Component Value Date/Time    CULTURE NO GROWTH 3 DAYS 01/12/2020 12:30 PM       Radiology:  Xr Abdomen (kub) (single Ap View)    Result Date: 1/14/2020  Previous gaseous distention of small bowel is inconspicuous currently; this may represent interval opacification with fluid versus return of normal diameter. Mild gaseous distention of transverse colon. Clinical follow-up is advised. Xr Acute Abd Series Chest 1 Vw    Result Date: 1/12/2020  1. Findings above consistent with small bowel obstruction.  2. No acute BP uncontrolled. Resume oral medications through PEG tube. Hydralazine frequency increased to every 4 hours as needed. Need to establish better blood pressure control in the setting of intracerebral hemorrhage. We will add lisinopril 5 mg daily. 4. CAD - mild elevation of troponin. Likely due to decreased renal function. Appreciate cardiology consult. No further work up needed. 5. DM2 - sliding scale insulin and Lantus  6. Chronic respiratory failure - patient has trach. Stable. 7. Chronic combined systolic and diastolic heart failure - stable. Compensated. Continue current medications  8. Atrial fibrillation - rate well controlled. Resume oral medications through PEG tube. No anticoagulation due to recent hemorrhagic stroke. 9. Iatrogenic hypothyroidism: Patient's TSH is elevated above 17 with low T4. Records indicate that he had complete thyroidectomy. Will start levothyroxine and recheck levels in 1 month as outpatient. 10. GI and DVT px.     Radha Napier PA-C  1/15/2020  4:17 PM

## 2020-01-15 NOTE — PLAN OF CARE
BRONCHOSPASM/BRONCHOCONSTRICTION     [x]         IMPROVE AERATION/BREATH SOUNDS  [x]   ADMINISTER BRONCHODILATOR THERAPY AS APPROPRIATE  [x]   ASSESS BREATH SOUNDS  [x]   IMPLEMENT AEROSOL/MDI PROTOCOL AS ORDERED  [x]   PATIENT EDUCATION AS NEEDED

## 2020-01-15 NOTE — PLAN OF CARE
Problem: Falls - Risk of:  Goal: Will remain free from falls  Description  Will remain free from falls  Outcome: Met This Shift   Pt assessed as a fall risk this shift. Pt remains free from falls at this time. Fall precautions in place, including falling star sign on door and fall risk band on pt. Floor free from obstacles, and bed is locked and in lowest position. Adequate lighting provided. Call light within reach; pt encouraged to call before getting OOB for any need. Bed alarm activated. Will continue to monitor needs during hourly rounding.

## 2020-01-15 NOTE — PLAN OF CARE
Problem: Falls - Risk of:  Goal: Will remain free from falls  Description  Will remain free from falls  Outcome: Met This Shift   Pt assessed as a fall risk this shift. Pt remains free from falls at this time. Fall precautions in place. Floor free from obstacles, and bed is locked and in lowest position. Adequate lighting provided. Call light within reach; pt encouraged to call before getting OOB for any need. Bed alarm activated. Will continue to monitor needs during hourly rounding. Problem: Risk for Impaired Skin Integrity  Goal: Tissue integrity - skin and mucous membranes  Description  Structural intactness and normal physiological function of skin and  mucous membranes. Outcome: Met This Shift   Full skin assessment completed this shift. No new skin breakdown at this time. Pt repositioned q2h and prn. Pt kept clean and dry. Gel mattress in place on bed, heels floated. Will continue to monitor.     Electronically signed by Ventura De Jesus RN on 1/15/2020 at 3:19 AM

## 2020-01-15 NOTE — PROGRESS NOTES
Physical Therapy    Facility/Department: [facility] 4B STEPDOWN  Initial Assessment    NAME: Patricia Simpson  : 1955  MRN: [id number]    Date of Service: 1/15/2020    Discharge Recommendations: Further therapy recommended at discharge. PT Equipment Recommendations  Equipment Needed: No    Assessment   Body structures, Functions, Activity limitations: Decreased functional mobility ; Decreased endurance;Decreased balance;Decreased strength  Assessment: The pt required Max A x2 to complete rolling; unsafe to attempt sitting EOB this date due to pt's cognition. Recommend continued skilled physical therapy to progress mobility as appropriate. Prognosis: Good  Decision Making: Medium Complexity  PT Education: Goals;PT Role;Plan of Care;General Safety  REQUIRES PT FOLLOW UP: Yes  Activity Tolerance  Activity Tolerance: Patient limited by endurance; Patient limited by cognitive status       Patient Diagnosis(es): There were no encounter diagnoses. has a past medical history of Atrial fibrillation ([facility] [address] CAD (coronary artery disease), Cerebral artery occlusion with cerebral infarction [facility]), H/O heart artery stent, Hx of blood clots, Hyperlipidemia, Hypertension, Hyperthyroidism, Kidney stones, MI, old, Microalbuminuria, Other complications due to other cardiac device, implant, and graft, Proteinuria, Renal cyst, Thyroid disease, Tracheostomy dependence ([address] and Type II or unspecified type diabetes mellitus without mention of complication, not stated as uncontrolled. has a past surgical history that includes Kidney surgery; Lithotripsy; Cystoscopy; Ureteroscopy; pacemaker placement; eye surgery (Left); Thyroidectomy; and gastrostomy (N/A, 2019). Restrictions  Restrictions/Precautions  Restrictions/Precautions: Fall Risk  Required Braces or Orthoses?: No  Position Activity Restriction  Other position/activity restrictions: ambulate pt.  hx of CVA with R side deficits   Vision/Hearing  Vision: (KELLY due to pt intermittently participating in AAROM of LLE  Strength RUE  Comment: grossly 0/5  Strength LUE  Strength LUE: WFL  Tone RLE  RLE Tone: Hypotonic  Tone LLE  LLE Tone: Normotonic  Sensation  Overall Sensation Status: Impaired  Additional Comments: pt reported unable to feel R UE when touched but reported able to feel therpist touching R LE   Bed mobility  Rolling to Left: Maximum assistance;2 Person assistance  Rolling to Right: Maximum assistance;2 Person assistance  Comment: Sitting not attempted due to pt inconsistently following commands and limited participation with rolling  Transfers  Comment: unsafe to attempt this date  Ambulation  Ambulation?: No  Stairs/Curb  Stairs?: No     Balance  Comments: Unable to assess balance and posture due to pt's cognition. FDS donned to RLE, wear schedule posted. PROM RLE and AAROM LLE 10x at all joints in all planes.     Plan   Plan  Times per week: 5x/wk  Current Treatment Recommendations: Strengthening, Balance Training, Functional Mobility Training, Transfer Training, Safety Education & Training, Home Exercise Program, Patient/Caregiver Education & Training, ROM, Wheelchair Mobility Training, Endurance Training, Neuromuscular Re-education  Safety Devices  Type of devices: Nurse notified, Call light within reach, Left in bed, Bed alarm in place  Restraints  Initially in place: No    AM-PAC Score  AM-PAC Inpatient Mobility Raw Score : 7 (01/15/20 1559)  AM-PAC Inpatient T-Scale Score : 26.42 (01/15/20 1559)  Mobility Inpatient CMS 0-100% Score: 92.36 (01/15/20 1559)  Mobility Inpatient CMS G-Code Modifier : CM (01/15/20 1559)          Goals  Short term goals  Time Frame for Short term goals: 14 visits  Short term goal 1: PROM and stretching to RUE and RLE to prevent contractures  Short term goal 2: AAROM/AROM and ther ex to LUE and LLE to promote strengthening  Short term goal 3: Perform bed mobility with Mod A  Short term goal 4: Demonstrate Fair- unsupported sitting balance   Short term goal 5: Propel wheelchair 50ft with LUE/LLE and Min A       Therapy Time   Individual Concurrent Group Co-treatment   Time In 1433         Time Out 1457         Minutes 24         Timed Code Treatment Minutes: 8 Minutes       Luz Elena Sawyer PT

## 2020-01-15 NOTE — PROGRESS NOTES
01/13/2020    LABALBU 3.3 01/13/2020    CREATININE 1.13 01/13/2020    CALCIUM 9.6 01/13/2020    GFRAA >60 01/13/2020    LABGLOM >60 01/13/2020    GLUCOSE 102 01/13/2020     Hepatic Function Panel:    Lab Results   Component Value Date    ALKPHOS 69 01/13/2020    ALT 18 01/13/2020    AST 18 01/13/2020    PROT 6.6 01/13/2020    BILITOT 0.69 01/13/2020    BILIDIR 0.27 01/13/2020    IBILI 0.42 01/13/2020    LABALBU 3.3 01/13/2020       ASSESSMENT:  Active Hospital Problems    Diagnosis Date Noted    MARILYN (acute kidney injury) ([facility] [N17.9] 12/23/2019     Priority: High    Bowel obstruction ([facility] [K56.609] 01/12/2020    Chronic respiratory failure with hypoxia and hypercapnia (HCC) [J96.11, J96.12]     Right sided weakness [R53.1]     Chronic combined systolic and diastolic congestive heart failure (HCC) [I50.42]     Atrial fibrillation (HCC) [I48.91]     Uncontrolled hypertension [I10]     CAD (coronary artery disease) [I25.10]     Diabetes mellitus type 2 in obese ([facility] [E11.69, E66.9]     Hyperlipidemia [E78.5]     Hyperthyroidism [E05.90]        60 y.o. male with history of stroke s/p trach and open G tube who presented to the ED with abdominal distension and concern for SBO. Plan:  1. Continue medical management per primary. 2. Follow up on KUB. 3. Pt improving, tube feed starts today. 4. No acute surgical intervention needed at this time. Electronically signed by Sebas Claros  on 1/15/2020 at 5:42 AM     General Surgery Resident Statement/Note:  I have discussed the case, including pertinent history and exam findings with the above medical student have personally seen the patient.      Pt seen and examined at bedside. I performed both history and physical exam.       SUBJECTIVE  Patient seen and examined at bedside. Afebrile. No acute events. Large BM yesterday after enema. Continues passing flatus.     ROS  CONSTITUTIONAL: Negative for fever, chills  HEENT: Negative for headache, congestion  CARDIOVASCULAR: Negative for chest pain or palpitations  RESPIRATORY: Negative for shortness of breath  HEME/ONC: Negative hx of malignancy or bleeding disorders  GI: Negative for abdominal pain  : Negative for hematuria, dysuria  MSK: Negative for myalgia, arthralgia  NEURO: Negative for dysesthesia, numbness  PSYCH: Negative for depression, anxiety    OBJECTIVE  BP (!) 148/88   Pulse 76   Temp 98.5 °F (36.9 °C) (Axillary)   Resp 15   Ht 5' 7\" (1.702 m)   Wt 212 lb 1.3 oz (96.2 kg)   SpO2 95%   BMI 33.22 kg/m²     I/O last 3 completed shifts:  In: -   Out: 0095 [HHLIN:1363; Emesis/NG output:100]  I/O this shift:  In: 1878 [I.V.:1878]  Out: -       Physical Exam  CONSTITUTIONAL: Awake, alert and oriented  CARDIOVASCULAR: Regular rate and rhythm, pulses intact  RESPIRATORY: Unlabored respirations, trach mask  ABDOMEN: Obese, soft, nontender to palpation, G tube in place  EXTREMITIES: No pedal edema, Moves all extremities spontaneously  SKIN: No rashes or other abnormalities    ASSESSMENT & PLAN    1. Continue medical management per primary  2. Follow up AM KUB  3. Recommend trickle feeds today  4.  No evidence of bowel obstruction, passing flatus and stool    Jessica Maldonado MD  General Surgery PGY2  Pager Number: [phone]

## 2020-01-16 VITALS
WEIGHT: 216.05 LBS | HEART RATE: 75 BPM | HEIGHT: 67 IN | TEMPERATURE: 99 F | OXYGEN SATURATION: 95 % | SYSTOLIC BLOOD PRESSURE: 148 MMHG | DIASTOLIC BLOOD PRESSURE: 85 MMHG | BODY MASS INDEX: 33.91 KG/M2 | RESPIRATION RATE: 18 BRPM

## 2020-01-16 PROBLEM — E03.2 IATROGENIC HYPOTHYROIDISM: Status: ACTIVE | Noted: 2020-01-16

## 2020-01-16 PROBLEM — K56.609 BOWEL OBSTRUCTION (HCC): Status: RESOLVED | Noted: 2020-01-12 | Resolved: 2020-01-16

## 2020-01-16 PROBLEM — N17.9 AKI (ACUTE KIDNEY INJURY) (HCC): Status: RESOLVED | Noted: 2019-12-23 | Resolved: 2020-01-16

## 2020-01-16 LAB
ANION GAP SERPL CALCULATED.3IONS-SCNC: 13 MMOL/L (ref 9–17)
BUN BLDV-MCNC: 21 MG/DL (ref 8–23)
BUN/CREAT BLD: ABNORMAL (ref 9–20)
CALCIUM SERPL-MCNC: 9.2 MG/DL (ref 8.6–10.4)
CHLORIDE BLD-SCNC: 100 MMOL/L (ref 98–107)
CO2: 26 MMOL/L (ref 20–31)
CREAT SERPL-MCNC: 0.81 MG/DL (ref 0.7–1.2)
GFR AFRICAN AMERICAN: >60 ML/MIN
GFR NON-AFRICAN AMERICAN: >60 ML/MIN
GFR SERPL CREATININE-BSD FRML MDRD: ABNORMAL ML/MIN/{1.73_M2}
GFR SERPL CREATININE-BSD FRML MDRD: ABNORMAL ML/MIN/{1.73_M2}
GLUCOSE BLD-MCNC: 116 MG/DL (ref 75–110)
GLUCOSE BLD-MCNC: 179 MG/DL (ref 75–110)
GLUCOSE BLD-MCNC: 191 MG/DL (ref 70–99)
GLUCOSE BLD-MCNC: 203 MG/DL (ref 75–110)
HCT VFR BLD CALC: 37.2 % (ref 40.7–50.3)
HCT VFR BLD CALC: 37.9 % (ref 40.7–50.3)
HEMOGLOBIN: 12.5 G/DL (ref 13–17)
HEMOGLOBIN: 12.6 G/DL (ref 13–17)
MCH RBC QN AUTO: 32.1 PG (ref 25.2–33.5)
MCH RBC QN AUTO: 32.2 PG (ref 25.2–33.5)
MCHC RBC AUTO-ENTMCNC: 33.2 G/DL (ref 28.4–34.8)
MCHC RBC AUTO-ENTMCNC: 33.6 G/DL (ref 28.4–34.8)
MCV RBC AUTO: 95.9 FL (ref 82.6–102.9)
MCV RBC AUTO: 96.7 FL (ref 82.6–102.9)
NRBC AUTOMATED: 0 PER 100 WBC
NRBC AUTOMATED: 0 PER 100 WBC
PDW BLD-RTO: 14.8 % (ref 11.8–14.4)
PDW BLD-RTO: 15 % (ref 11.8–14.4)
PLATELET # BLD: 149 K/UL (ref 138–453)
PLATELET # BLD: 161 K/UL (ref 138–453)
PMV BLD AUTO: 10.4 FL (ref 8.1–13.5)
PMV BLD AUTO: 9.5 FL (ref 8.1–13.5)
POTASSIUM SERPL-SCNC: 3.8 MMOL/L (ref 3.7–5.3)
RBC # BLD: 3.88 M/UL (ref 4.21–5.77)
RBC # BLD: 3.92 M/UL (ref 4.21–5.77)
SODIUM BLD-SCNC: 139 MMOL/L (ref 135–144)
WBC # BLD: 6.1 K/UL (ref 3.5–11.3)
WBC # BLD: 7.2 K/UL (ref 3.5–11.3)

## 2020-01-16 PROCEDURE — 99239 HOSP IP/OBS DSCHRG MGMT >30: CPT | Performed by: PHYSICIAN ASSISTANT

## 2020-01-16 PROCEDURE — 36415 COLL VENOUS BLD VENIPUNCTURE: CPT

## 2020-01-16 PROCEDURE — 97535 SELF CARE MNGMENT TRAINING: CPT

## 2020-01-16 PROCEDURE — 2700000000 HC OXYGEN THERAPY PER DAY

## 2020-01-16 PROCEDURE — 97530 THERAPEUTIC ACTIVITIES: CPT

## 2020-01-16 PROCEDURE — 51798 US URINE CAPACITY MEASURE: CPT

## 2020-01-16 PROCEDURE — 82947 ASSAY GLUCOSE BLOOD QUANT: CPT

## 2020-01-16 PROCEDURE — 80048 BASIC METABOLIC PNL TOTAL CA: CPT

## 2020-01-16 PROCEDURE — 94640 AIRWAY INHALATION TREATMENT: CPT

## 2020-01-16 PROCEDURE — 85027 COMPLETE CBC AUTOMATED: CPT

## 2020-01-16 PROCEDURE — 6370000000 HC RX 637 (ALT 250 FOR IP): Performed by: HOSPITALIST

## 2020-01-16 PROCEDURE — 6370000000 HC RX 637 (ALT 250 FOR IP): Performed by: PHYSICIAN ASSISTANT

## 2020-01-16 PROCEDURE — 6370000000 HC RX 637 (ALT 250 FOR IP): Performed by: STUDENT IN AN ORGANIZED HEALTH CARE EDUCATION/TRAINING PROGRAM

## 2020-01-16 PROCEDURE — 51701 INSERT BLADDER CATHETER: CPT

## 2020-01-16 PROCEDURE — 6370000000 HC RX 637 (ALT 250 FOR IP): Performed by: INTERNAL MEDICINE

## 2020-01-16 RX ORDER — POLYETHYLENE GLYCOL 3350 17 G/17G
17 POWDER, FOR SOLUTION ORAL DAILY PRN
Qty: 527 G | Refills: 1 | DISCHARGE
Start: 2020-01-16 | End: 2020-02-15

## 2020-01-16 RX ORDER — LISINOPRIL 10 MG/1
10 TABLET ORAL DAILY
DISCHARGE
Start: 2020-01-17

## 2020-01-16 RX ORDER — DILTIAZEM HCL 90 MG
90 TABLET ORAL EVERY 8 HOURS SCHEDULED
Qty: 120 TABLET | Refills: 3 | DISCHARGE
Start: 2020-01-16

## 2020-01-16 RX ORDER — LEVOTHYROXINE SODIUM 0.05 MG/1
50 TABLET ORAL DAILY
Qty: 30 TABLET | Refills: 3 | DISCHARGE
Start: 2020-01-17

## 2020-01-16 RX ADMIN — FLUOXETINE HYDROCHLORIDE 20 MG: 20 CAPSULE ORAL at 09:20

## 2020-01-16 RX ADMIN — SIMETHICONE 40 MG: 20 SUSPENSION/ DROPS ORAL at 09:21

## 2020-01-16 RX ADMIN — INSULIN LISPRO 2 UNITS: 100 INJECTION, SOLUTION INTRAVENOUS; SUBCUTANEOUS at 09:21

## 2020-01-16 RX ADMIN — IPRATROPIUM BROMIDE AND ALBUTEROL SULFATE 1 AMPULE: .5; 3 SOLUTION RESPIRATORY (INHALATION) at 11:20

## 2020-01-16 RX ADMIN — INSULIN GLARGINE 10 UNITS: 100 INJECTION, SOLUTION SUBCUTANEOUS at 09:20

## 2020-01-16 RX ADMIN — METOPROLOL TARTRATE 100 MG: 50 TABLET, FILM COATED ORAL at 09:20

## 2020-01-16 RX ADMIN — DOCUSATE SODIUM 100 MG: 50 LIQUID ORAL at 09:20

## 2020-01-16 RX ADMIN — POLYETHYLENE GLYCOL 3350 17 G: 17 POWDER, FOR SOLUTION ORAL at 09:20

## 2020-01-16 RX ADMIN — BUMETANIDE 0.5 MG: 1 TABLET ORAL at 09:21

## 2020-01-16 RX ADMIN — DILTIAZEM HYDROCHLORIDE 90 MG: 60 TABLET, FILM COATED ORAL at 05:57

## 2020-01-16 RX ADMIN — SENNOSIDES 8.8 MG: 8.8 LIQUID ORAL at 09:21

## 2020-01-16 RX ADMIN — LISINOPRIL 5 MG: 5 TABLET ORAL at 09:21

## 2020-01-16 RX ADMIN — LEVOTHYROXINE SODIUM 50 MCG: 50 TABLET ORAL at 05:57

## 2020-01-16 RX ADMIN — DILTIAZEM HYDROCHLORIDE 90 MG: 60 TABLET, FILM COATED ORAL at 12:48

## 2020-01-16 RX ADMIN — SIMETHICONE 40 MG: 20 SUSPENSION/ DROPS ORAL at 12:49

## 2020-01-16 RX ADMIN — INSULIN LISPRO 4 UNITS: 100 INJECTION, SOLUTION INTRAVENOUS; SUBCUTANEOUS at 12:48

## 2020-01-16 ASSESSMENT — PAIN SCALES - WONG BAKER
WONGBAKER_NUMERICALRESPONSE: 0

## 2020-01-16 ASSESSMENT — PAIN SCALES - GENERAL: PAINLEVEL_OUTOF10: 0

## 2020-01-16 NOTE — PROGRESS NOTES
[facility]    Progress Note    1/16/2020    6:07 PM    Name:   Aleksey Elias  MRN:     [id number]     Acct:      [de-identified]   Room:   [address] [place] [address] Day:  3  Admit Date:  1/13/2020  6:08 PM    PCP:   Addie Markham MD  Code Status:  Full Code    Subjective:     C/C: Abdominal pain, distention  Interval History Status: improved. Patient lying in bed upon evaluation. He is alert and shakes his head yes and no appropriately to my questions. He is responsive to some verbal commands. Denying pain. Continues to pass gas. Tube feeds successfully advanced to 60 mL's per hour. Hemoglobin stable. Labs reviewed. Afebrile and hemodynamically stable. Brief History: This is a 71-year-old male with a significant medical history of atrial fib CHF with an ICD coronary artery disease DVT hypertension diabetes who presented to outlying emergency room at [facility] for the complaint of abdominal distention, and increased somnolence from skilled nursing [facility]. Information is obtained from the chart as patient is nonverbal,  Patient is admitted for management of possible SBO. Review of Systems:     Unable to assess. Patient is non-verbal at baseline. Medications:      Allergies:  No Known Allergies    Current Meds:   Scheduled Meds:    levothyroxine  50 mcg Oral Daily    lisinopril  5 mg Oral Daily    docusate  100 mg Oral Daily    polyethylene glycol  17 g Oral Daily    bumetanide  0.5 mg Oral Daily    diltiazem  90 mg Oral 3 times per day    FLUoxetine  20 mg Oral Daily    insulin glargine  10 Units Subcutaneous Daily    metoprolol  100 mg Oral BID    simethicone  40 mg Per NG tube 4x Daily    insulin lispro  0-12 Units Subcutaneous TID WC    insulin lispro  0-6 Units Subcutaneous Nightly     Continuous Infusions:    dextrose 5 % and 0.9 % NaCl 50 mL/hr at 01/16/20 0500    dextrose PRN Meds: hydrALAZINE, ipratropium-albuterol, acetaminophen, senna, ondansetron, glucose, dextrose, glucagon (rDNA), dextrose, metoprolol    Data:     Past Medical History:   has a past medical history of Atrial fibrillation ([facility] CAD (coronary artery disease), Cerebral artery occlusion with cerebral infarction ([facility] H/O heart artery stent, Hx of blood clots, Hyperlipidemia, Hypertension, Hyperthyroidism, Kidney stones, MI, old, Microalbuminuria, Other complications due to other cardiac device, implant, and graft, Proteinuria, Renal cyst, Thyroid disease, Tracheostomy dependence ([facility] and Type II or unspecified type diabetes mellitus without mention of complication, not stated as uncontrolled. Social History:   reports that he has quit smoking. His smoking use included cigarettes. He has a 47.00 pack-year smoking history. He has never used smokeless tobacco. He reports that he does not drink alcohol or use drugs. Family History:   Family History   Problem Relation Age of Onset    Diabetes Mother     Emphysema Mother     Heart Disease Father     Diabetes Sister     Heart Disease Brother     Diabetes Brother     Kidney Disease Brother        Vitals:  BP (!) 148/85   Pulse 75   Temp 99 °F (37.2 °C) (Temporal)   Resp 18   Ht 5' 7\" (1.702 m)   Wt 216 lb 0.8 oz (98 kg)   SpO2 95%   BMI 33.84 kg/m²   Temp (24hrs), Av.5 °F (36.9 °C), Min:98 °F (36.7 °C), Max:99 °F (37.2 °C)    Recent Labs     01/15/20  2059 01/16/20  0731 20  1210 20  1634   POCGLU 125* 179* 203* 116*       I/O (24Hr):     Intake/Output Summary (Last 24 hours) at 2020 1807  Last data filed at 2020 1302  Gross per 24 hour   Intake 1058 ml   Output 1050 ml   Net 8 ml       Labs:  Hematology:  Recent Labs     20  2144 20  0644 20  1447   WBC 6.6 6.1 7.2   RBC 4.03* 3.92* 3.88*   HGB 13.1 12.6* 12.5*   HCT 38.8* 37.9* 37.2*   MCV 96.3 96.7 95.9   MCH 32.5 32.1 32.2   MCHC 33.8 33.2 33.6 fluid versus return of normal diameter. Mild gaseous distention of transverse colon. Clinical follow-up is advised. Xr Acute Abd Series Chest 1 Vw    Result Date: 1/12/2020  1. Findings above consistent with small bowel obstruction. 2. No acute focal airspace consolidation or pleural effusions. 3. Mild cardiomegaly. 4. Tracheostomy tube as above. Ct Head Wo Contrast    Result Date: 1/12/2020  1. Ongoing resolution of known left thalamic hemorrhage with surrounding edema as discussed above. The continues to be mass effect on the left lateral ventricle and 3rd ventricle. 2. Mucosal thickening of maxillary and ethmoid sinus with fluid in the sphenoid sinus. Probably related to recent intubation or could represent sinusitis. Please correlate clinically. Physical Examination:        General appearance:  cooperative and no distress. Mental Status:  Non-verbal due to prior CVA. Appears to understand what's going on. Follows simple command. Lungs:  clear to auscultation bilaterally, normal effort. Has tracheostomy. Heart:  regular rate and rhythm, no murmur  Abdomen:  soft, nontender, no masses, hepatomegaly, splenomegaly. Moderate distention noted. No guarding or rebound tenderness. Extremities:  +1 edema. No redness, tenderness in the calves  Skin:  no gross lesions, rashes, induration    Assessment:        Hospital Problems           Last Modified POA    Atrial fibrillation ([address] 1/16/2020 Yes    Uncontrolled hypertension 1/16/2020 Yes    CAD (coronary artery disease) 1/16/2020 Yes    Diabetes mellitus type 2 in obese ([address] 1/16/2020 Yes    Hyperlipidemia 1/16/2020 Yes    Chronic combined systolic and diastolic congestive heart failure ([address] 1/16/2020 Yes    Right sided weakness 1/16/2020 Yes    Chronic respiratory failure with hypoxia and hypercapnia ([address] 1/16/2020 Yes    Iatrogenic hypothyroidism 1/16/2020 Yes                Plan:        1. Ileus: Passing gas. Small bowel movement. Tube feeds successfully advanced to 60 mL's per hour. Continue bowel regimen. 2. Acute kidney injury -resolved. 3. Essential hypertension - BP uncontrolled. Resume oral medications through PEG tube. Hydralazine frequency increased to every 4 hours as needed. Need to establish better blood pressure control in the setting of intracerebral hemorrhage. Lisinopril increased to 10 mg daily at the time of discharge. 4. CAD - mild elevation of troponin. Likely due to decreased renal function. Appreciate cardiology consult. No further work up needed. 5. DM2 - sliding scale insulin and Lantus  6. Chronic respiratory failure - patient has trach. Stable. 7. Chronic combined systolic and diastolic heart failure - stable. Compensated. Continue current medications  8. Atrial fibrillation - rate well controlled. Resume oral medications through PEG tube. No anticoagulation due to recent hemorrhagic stroke. 9. Iatrogenic hypothyroidism: Patient's TSH is elevated above 17 with low T4. Records indicate that he had complete thyroidectomy. Will start levothyroxine and recheck levels in 1 month as outpatient. 10. GI and DVT px.     Henery Seip, PA-C  1/16/2020  6:07 PM

## 2020-01-16 NOTE — PLAN OF CARE
Problem: Falls - Risk of:  Goal: Will remain free from falls  Description  Will remain free from falls  1/16/2020 1423 by Kiya Vidales RN  Outcome: Not Met This Shift  Note:   Bed locked in lowest position, call light and personal belongings within reach, non-skid footwear on, side rails x2, adequate lighting maintained, floor kept clear of clutter, and hourly rounding continued. 1/16/2020 0534 by Arianne Upton RN  Outcome: Met This Shift  1/16/2020 0534 by Arianne Upton RN  Outcome: Ongoing  Goal: Absence of physical injury  Description  Absence of physical injury  1/16/2020 1423 by Kiya Vidales RN  Outcome: Not Met This Shift  1/16/2020 0534 by Arianne Upton RN  Outcome: Met This Shift  1/16/2020 0534 by Arianne Upton RN  Outcome: Ongoing     Problem: Risk for Impaired Skin Integrity  Goal: Tissue integrity - skin and mucous membranes  Description  Structural intactness and normal physiological function of skin and  mucous membranes. 1/16/2020 1423 by Kiya Vidales RN  Outcome: Not Met This Shift  Note:   Q2 turn maintained, barrier cream applied, adequate nutrition maintained,  heels elevated, boot on R foot.    1/16/2020 0534 by Arianne Upton RN  Outcome: Met This Shift  1/16/2020 0534 by Arianne Upton RN  Outcome: Ongoing     Problem: Nutrition  Goal: Optimal nutrition therapy  1/16/2020 1423 by Kiya Vidales RN  Outcome: Not Met This Shift  1/16/2020 0534 by Arianne Upton RN  Outcome: Met This Shift  1/16/2020 0534 by Arianne Upton RN  Outcome: Ongoing

## 2020-01-16 NOTE — PROGRESS NOTES
Physical Therapy  Facility/Department: [facility] 4B STEPDOWN  Daily Treatment Note  NAME: Lorena Tidwell  : 1955  MRN: [id number]    Date of Service: 2020    Discharge Recommendations:  Patient would benefit from continued therapy after discharge   PT Equipment Recommendations  Equipment Needed: No    Assessment   Body structures, Functions, Activity limitations: Decreased functional mobility ; Decreased endurance;Decreased balance;Decreased strength  Assessment: The pt required Max A x2 to perform bed mob, pt able to dangle EOB x 8mins with grossly Germán, R side flaccid from previous CVA. Recommend continued skilled physical therapy to progress mobility as appropriate. Co-treat with OT for mobility   Prognosis: Good  Decision Making: Medium Complexity  PT Education: Functional Mobility Training  REQUIRES PT FOLLOW UP: Yes  Activity Tolerance  Activity Tolerance: Treatment limited secondary to medical complications (free text); Patient limited by endurance     Patient Diagnosis(es): There were no encounter diagnoses. has a past medical history of Atrial fibrillation ([facility] CAD (coronary artery disease), Cerebral artery occlusion with cerebral infarction [facility]), H/O heart artery stent, Hx of blood clots, Hyperlipidemia, Hypertension, Hyperthyroidism, Kidney stones, MI, old, Microalbuminuria, Other complications due to other cardiac device, implant, and graft, Proteinuria, Renal cyst, Thyroid disease, Tracheostomy dependence ([address] and Type II or unspecified type diabetes mellitus without mention of complication, not stated as uncontrolled. has a past surgical history that includes Kidney surgery; Lithotripsy; Cystoscopy; Ureteroscopy; pacemaker placement; eye surgery (Left); Thyroidectomy; and gastrostomy (N/A, 2019).     Restrictions  Restrictions/Precautions  Restrictions/Precautions: Fall Risk  Required Braces or Orthoses?: No  Position Activity Restriction  Other position/activity restrictions: hx of CVA with R side deficits  Subjective   General  Response To Previous Treatment: Patient with no complaints from previous session.   Family / Caregiver Present: No  Subjective  Subjective: RN and pt agreed to PT, pt awake in bed upon arrival and denies pain, Pt on O2 via trach mask  Pain Screening  Patient Currently in Pain: Denies(Shakes his head no)  Vital Signs  Patient Currently in Pain: Denies(Shakes his head no)       Orientation  Orientation  Overall Orientation Status: Impaired  Orientation Level: Unable to assess (Non verbal d/t trach)       Objective   Bed mobility  Supine to Sit: 2 Person assistance;Maximum assistance  Sit to Supine: 2 Person assistance;Maximum assistance  Scooting: Maximal assistance;2 Person assistance  Transfers  Comment: unsafe to attempt this date        Balance  Posture: Fair  Sitting - Static: Fair;-  Sitting - Dynamic: Poor  Comments: Pt dangled EOB x 8 mins grossly Germán with LUE support  Exercises  Comments: PROM RUE/LE x 10 in all joints and planes, reapplied R FDS   Other exercises  Other exercises?: Yes  Other exercises 1: R shoulder approximation 20\"x  3 maxA  Other exercises 2: Anterior/posterior wt shifts x 10 with assistance  Other exercises 3: Lateral wt shifts x 10 with assistance                      Goals  Short term goals  Time Frame for Short term goals: 14 visits  Short term goal 1: PROM and stretching to RUE and RLE to prevent contractures  Short term goal 2: AAROM/AROM and ther ex to LUE and LLE to promote strengthening  Short term goal 3: Perform bed mobility with Mod A  Short term goal 4: Demonstrate Fair- unsupported sitting balance   Short term goal 5: Propel wheelchair 50ft with LUE/LLE and Min A    Plan    Plan  Times per week: 5x/wk  Current Treatment Recommendations: Strengthening, Balance Training, Functional Mobility Training, Transfer Training, Safety Education & Training, Home Exercise Program, Patient/Caregiver Education & Training, ROM, Wheelchair Mobility Training, Endurance Training, Neuromuscular Re-education  Safety Devices  Type of devices: Left in bed(Left in bed with SALAZAR)  Restraints  Initially in place: No     Therapy Time   Individual Concurrent Group Co-treatment   Time In 1003         Time Out 1025         Minutes 1370 [facility]

## 2020-01-16 NOTE — PROGRESS NOTES
seated at EOB. Pt supine at session end with call light in reach and bed alarm on.    Plan   Plan  Times per week: 3-5x/wk   Cont POC    Goals  Short term goals  Time Frame for Short term goals: pt will, by discharge  Short term goal 1: complete simple grooming tasks with SBA, set up and 2-3 verbal cue for initiation and sequencing   Short term goal 2: follow 75% of 1 step commands during session with 1-2 verbal cues for sequencing and initiation  Short term goal 3: maintain attention to task for ~5 minutes with 1-2 verbal cues for redirection  Short term goal 4: dem mod A during bed mobility in order to increase independence   Short term goal 5: complete UB ADLs with min A, set up and modified tech        Therapy Time   Individual Concurrent Group Co-treatment   Time In 1004         Time Out 1036         Minutes 29 MARIE Bradshaw/MICHELE

## 2020-01-16 NOTE — PLAN OF CARE
Problem: Falls - Risk of:  Goal: Will remain free from falls  Description  Will remain free from falls  1/16/2020 0534 by Nash Carrillo RN  Outcome: Ongoing  1/15/2020 1756 by Carmelita Muñoz RN  Outcome: Met This Shift  Pt assessed as a fall risk this shift. Pt remains free from falls at this time. Fall precautions in place, including falling star sign on door and fall risk band on pt. Floor free from obstacles, and bed is locked and in lowest position. Adequate lighting provided. Call light within reach; pt encouraged to call before getting OOB for any need. Bed alarm activated. Will continue to monitor needs during hourly rounding. Problem: Risk for Impaired Skin Integrity  Goal: Tissue integrity - skin and mucous membranes  Description  Structural intactness and normal physiological function of skin and  mucous membranes. 1/16/2020 0534 by Nash Carrillo RN  Outcome: Met This Shift  1/16/2020 0534 by Nash Carrillo RN  Outcome: Ongoing   Full skin assessment completed this shift. No new skin breakdown at this time. Pt repositioned q2h and prn. Pt kept clean and dry. Gel mattress in place on bed, heels floated. Will continue to monitor.   Electronically signed by Nash Carrillo RN on 1/16/2020 at 5:35 AM

## 2020-01-16 NOTE — CARE COORDINATION
Discharge [address] Case Management Department  Written by: Tee Hinojosa RN    Patient Name: Nicola Coleman  Attending Provider: Vianey Fajardo DO  Admit Date: 2020  6:08 PM  MRN: [id number]  Account: [de-identified]                     : 1955  Discharge Date: 2020      Disposition: SNF- to [facility]. Wife notified. LACP to transport.     Tee Hinojosa RN

## 2020-01-16 NOTE — DISCHARGE SUMMARY
HGBUR NEGATIVE 01/12/2020    PHUR 6.5 01/12/2020    PROTEINU 4+ 01/12/2020    GLUCOSEU TRACE 01/12/2020    KETUA TRACE 01/12/2020    BILIRUBINUR  01/12/2020     Presumptive positive. Unable to confirm due to unavailability of reagent. UROBILINOGEN Normal 01/12/2020    NITRU NEGATIVE 01/12/2020    LEUKOCYTESUR NEGATIVE 01/12/2020     TSH:    Lab Results   Component Value Date    TSH 17.53 01/15/2020     Radiology:  Xr Abdomen (kub) (single Ap View)    Result Date: 1/15/2020  There is a single loop of distended colon within the right lower quadrant. The bowel gas pattern is nonspecific     Xr Abdomen (kub) (single Ap View)    Result Date: 1/14/2020  Previous gaseous distention of small bowel is inconspicuous currently; this may represent interval opacification with fluid versus return of normal diameter. Mild gaseous distention of transverse colon. Clinical follow-up is advised. Xr Acute Abd Series Chest 1 Vw    Result Date: 1/12/2020  1. Findings above consistent with small bowel obstruction. 2. No acute focal airspace consolidation or pleural effusions. 3. Mild cardiomegaly. 4. Tracheostomy tube as above. Ct Head Wo Contrast    Result Date: 1/12/2020  1. Ongoing resolution of known left thalamic hemorrhage with surrounding edema as discussed above. The continues to be mass effect on the left lateral ventricle and 3rd ventricle. 2. Mucosal thickening of maxillary and ethmoid sinus with fluid in the sphenoid sinus. Probably related to recent intubation or could represent sinusitis. Please correlate clinically. Consultations:    Consults:     Final Specialist Recommendations/Findings:   IP CONSULT TO CARDIOLOGY  IP CONSULT TO GENERAL SURGERY  IP CONSULT TO DIETITIAN      The patient was seen and examined on day of discharge and this discharge summary is in conjunction with any daily progress note from day of discharge. Discharge plan:     Disposition:  To a non-[facility] facility    Physician Follow medication(s) that are the same as other medications prescribed for you. Read the directions carefully, and ask your doctor or other care provider to review them with you. CONTINUE taking these medications    ARTIFICIAL TEARS 0.4 % Soln ophthalmic solution  Generic drug:  hypromellose     atorvastatin 40 MG tablet  Commonly known as:  LIPITOR     Blood Pressure Monitor Kit  CHECK BLOOD PRESSURE DAILY     bumetanide 0.5 MG tablet  Commonly known as:  BUMEX     chlorhexidine 0.12 % solution  Commonly known as:  PERIDEX  Take 15 mLs by mouth 2 times daily for 14 days     glycopyrrolate 1 MG tablet  Commonly known as:  ROBINUL     insulin glargine 100 UNIT/ML injection vial  Commonly known as:  LANTUS  Inject 10 Units into the skin daily     metFORMIN 1000 MG tablet  Commonly known as:  GLUCOPHAGE     metoprolol 100 MG tablet  Commonly known as:  LOPRESSOR        STOP taking these medications    glyBURIDE 5 MG tablet  Commonly known as:  DIABETA     methIMAzole 10 MG tablet  Commonly known as:  TAPAZOLE           Where to Get Your Medications      Information about where to get these medications is not yet available    Ask your nurse or doctor about these medications  · diltiazem 90 MG tablet  · insulin lispro 100 UNIT/ML injection vial  · insulin lispro 100 UNIT/ML injection vial  · levothyroxine 50 MCG tablet  · lisinopril 10 MG tablet  · polyethylene glycol packet         Time Spent on discharge is  35 mins in patient examination, evaluation, counseling as well as medication reconciliation, prescriptions for required medications, discharge plan and follow up. Electronically signed by   Mariano Bocanegra PA-C  1/16/2020  6:10 PM      Thank you Dr. Kathie Webb MD for the opportunity to be involved in this patient's care.

## 2020-01-16 NOTE — DISCHARGE INSTR - COC
Continuity of Care Form    Patient Name: Sj Owen   :    MRN:  [id number]    [facility] date:  2020  Discharge date:  ***    Code Status Order: Full Code   Advance Directives:     Admitting Physician:  Leandro Ospina MD  PCP: Parviz Leon MD    Discharging Nurse: [facility] Unit/Room#: 9476/2030-38  Discharging Unit Phone Number: ***    Emergency Contact:   Extended Emergency Contact Information  Primary Emergency Contact: [facility]  Address: [address], [address]  Home Phone: [phone]  Relation: Spouse  Secondary Emergency Contact: Michelle Pump  Home Phone: [phone]  Relation: Child    Past Surgical History:  Past Surgical History:   Procedure Laterality Date    CYSTOSCOPY      EYE SURGERY Left     s/p thyroidectomy double vision rec'd radiation 1yr ago    GASTROSTOMY N/A 2019    TRACHEOSTOMY, OPEN [facility] [address] LITHOTRIPSY      PACEMAKER PLACEMENT      PT [address], THIS SYSTEM IS MRI CONDITIONAL HOWEVER THE LEADS ARE FROM [facility] AND THAT MAKES THIS SYSTEM NOT MRI CONDITIONAL AND PATIENT CANNOT HAVE AN MRI.  THYROIDECTOMY      URETEROSCOPY         Immunization History: There is no immunization history on file for this patient.     Active Problems:  Patient Active Problem List   Diagnosis Code    Proteinuria R80.9    Atrial fibrillation (HCC) I48.91    Thyroid disease E07.9    Uncontrolled hypertension I10    CAD (coronary artery disease) I25.10    Diabetes mellitus type 2 in obese (HCC) E11.69, E66.9    Hyperlipidemia I60.2    Other complications due to other cardiac device, implant, and graft T82.897A    Renal cyst N28.1    Microalbuminuria R80.9    Diplopia H53.2    Headache R51    Hypertensive urgency I16.0    Intractable headache R51    Headache R51    Nontraumatic cortical Dressing/Treatment Moisture barrier 1/16/2020  4:00 AM   Wound Cleansed Soap and water 1/15/2020 10:00 AM   Wound Assessment Non-blanchable erythema;Purple 1/16/2020  4:00 AM   Drainage Amount None 1/16/2020  4:00 AM   Aleena-wound Assessment Dry; Intact 1/16/2020  4:00 AM   Number of days: 2        Elimination:  Continence:   · Bowel: {YES / US:95026}  · Bladder: {YES / BV:98056}  Urinary Catheter: None   Colostomy/Ileostomy/Ileal Conduit: {YES / VZ:97661}       Date of Last BM: ***    Intake/Output Summary (Last 24 hours) at 1/16/2020 0933  Last data filed at 1/16/2020 0500  Gross per 24 hour   Intake 3517.46 ml   Output 1200 ml   Net 2317.46 ml     I/O last 3 completed shifts: In: 3517.5 [I.V.:3083.5; NG/GT:434]  Out: 1200 [Urine:1200]    Safety Concerns:      At Risk for Falls and Aspiration Risk    Impairments/Disabilities:      Speech and Paralysis - R side Flaccid    Nutrition Therapy:  Current Nutrition Therapy:   - Tube Feedings:  Diabetic 60ml/hr    Routes of Feeding: Gastrostomy Tube  Liquids: {Slp liquid thickness:46490}  Daily Fluid Restriction: no  Last Modified Barium Swallow with Video (Video Swallowing Test): not done    Treatments at the Time of Hospital Discharge:   Respiratory Treatments: ***  Oxygen Therapy:  6L trach mask  Ventilator:    - No ventilator support    Rehab Therapies: {THERAPEUTIC INTERVENTION:7733571270}  Weight Bearing Status/Restrictions: No weight bearing restirctions  Other Medical Equipment (for information only, NOT a DME order):  {EQUIPMENT:723900047}  Other Treatments: ***    Patient's personal belongings (please select all that are sent with patient):  None    RN SIGNATURE:  Electronically signed by Ramon Hinson RN on 1/16/20 at 2:40 PM    CASE MANAGEMENT/SOCIAL WORK SECTION    Inpatient Status Date: ***    Readmission Risk Assessment Score:  Readmission Risk              Risk of Unplanned Readmission:        22           Discharging to Facility/ Agency   Name:   [facility] at [address], [address] [address]       Phone: [phone]       Fax: [phone]          Dialysis Facility (if applicable)   · Name:  · Address:  · Dialysis Schedule:  · Phone:  · Fax:    / signature: Electronically signed by Tee Hinojosa RN on 1/16/20 at 9:52 AM    PHYSICIAN SECTION    Prognosis: Guarded    Condition at Discharge: Stable    Rehab Potential (if transferring to Rehab): Guarded    Recommended Labs or Other Treatments After Discharge: Repeat BMP to assess renal function in one week after restarting lisinopril. Repeat CBC within 3 days post discharge to assure stabilization of hemoglobin. - Follow-up as scheduled with neurosurgery  - Follow-up as scheduled with cardiology  - Needs to be further discussion regarding resuming aspirin and NOAC given hx of CAD and atrial fibrillation. Decision will come at the discretion of neurosurgery and cardiology    Physician Certification: I certify the above information and transfer of Nicola Coleman  is necessary for the continuing treatment of the diagnosis listed and that he requires [facility] for greater 30 days.      Update Admission H&P: Changes in H&P as follows - please refer to discharge summary    PHYSICIAN SIGNATURE:  Electronically signed by Vianey Fajardo DO on 1/16/20 at 9:37 AM

## 2020-01-18 LAB
CULTURE: NORMAL
CULTURE: NORMAL
Lab: NORMAL
Lab: NORMAL
SPECIMEN DESCRIPTION: NORMAL
SPECIMEN DESCRIPTION: NORMAL

## 2020-01-23 ENCOUNTER — APPOINTMENT (OUTPATIENT)
Dept: CT IMAGING | Age: 65
End: 2020-01-23
Payer: MEDICARE

## 2020-01-23 ENCOUNTER — HOSPITAL ENCOUNTER (EMERGENCY)
Age: 65
Discharge: HOME | End: 2020-01-24
Attending: EMERGENCY MEDICINE
Payer: MEDICARE

## 2020-01-23 ENCOUNTER — APPOINTMENT (OUTPATIENT)
Dept: GENERAL RADIOLOGY | Age: 65
End: 2020-01-23
Payer: MEDICARE

## 2020-01-23 LAB
ABSOLUTE EOS #: 0 K/UL (ref 0–0.4)
ABSOLUTE IMMATURE GRANULOCYTE: ABNORMAL K/UL (ref 0–0.3)
ABSOLUTE LYMPH #: 1.4 K/UL (ref 1–4.8)
ABSOLUTE MONO #: 0.3 K/UL (ref 0.1–1.3)
ALBUMIN SERPL-MCNC: 3.9 G/DL (ref 3.5–5.2)
ALBUMIN/GLOBULIN RATIO: ABNORMAL (ref 1–2.5)
ALP BLD-CCNC: 86 U/L (ref 40–129)
ALT SERPL-CCNC: 21 U/L (ref 5–41)
ANION GAP SERPL CALCULATED.3IONS-SCNC: 13 MMOL/L (ref 9–17)
AST SERPL-CCNC: 19 U/L
BASOPHILS # BLD: 1 % (ref 0–2)
BASOPHILS ABSOLUTE: 0.1 K/UL (ref 0–0.2)
BILIRUB SERPL-MCNC: 0.51 MG/DL (ref 0.3–1.2)
BUN BLDV-MCNC: 23 MG/DL (ref 8–23)
BUN/CREAT BLD: ABNORMAL (ref 9–20)
CALCIUM SERPL-MCNC: 10.4 MG/DL (ref 8.6–10.4)
CHLORIDE BLD-SCNC: 91 MMOL/L (ref 98–107)
CO2: 30 MMOL/L (ref 20–31)
CREAT SERPL-MCNC: 0.84 MG/DL (ref 0.7–1.2)
DIFFERENTIAL TYPE: ABNORMAL
EOSINOPHILS RELATIVE PERCENT: 0 % (ref 0–4)
GFR AFRICAN AMERICAN: >60 ML/MIN
GFR NON-AFRICAN AMERICAN: >60 ML/MIN
GFR SERPL CREATININE-BSD FRML MDRD: ABNORMAL ML/MIN/{1.73_M2}
GFR SERPL CREATININE-BSD FRML MDRD: ABNORMAL ML/MIN/{1.73_M2}
GLUCOSE BLD-MCNC: 190 MG/DL (ref 70–99)
HCT VFR BLD CALC: 42.7 % (ref 41–53)
HEMOGLOBIN: 14.5 G/DL (ref 13.5–17.5)
IMMATURE GRANULOCYTES: ABNORMAL %
INR BLD: 1
LACTIC ACID, WHOLE BLOOD: NORMAL MMOL/L (ref 0.7–2.1)
LACTIC ACID: 1.5 MMOL/L (ref 0.5–2.2)
LIPASE: 21 U/L (ref 13–60)
LYMPHOCYTES # BLD: 18 % (ref 24–44)
MCH RBC QN AUTO: 32.2 PG (ref 26–34)
MCHC RBC AUTO-ENTMCNC: 33.9 G/DL (ref 31–37)
MCV RBC AUTO: 94.8 FL (ref 80–100)
MONOCYTES # BLD: 4 % (ref 1–7)
NRBC AUTOMATED: ABNORMAL PER 100 WBC
PARTIAL THROMBOPLASTIN TIME: 32.8 SEC (ref 24–36)
PDW BLD-RTO: 16.5 % (ref 11.5–14.9)
PLATELET # BLD: 180 K/UL (ref 150–450)
PLATELET ESTIMATE: ABNORMAL
PMV BLD AUTO: 7.5 FL (ref 6–12)
POTASSIUM SERPL-SCNC: 4.2 MMOL/L (ref 3.7–5.3)
PROTHROMBIN TIME: 12.8 SEC (ref 11.8–14.6)
RBC # BLD: 4.51 M/UL (ref 4.5–5.9)
RBC # BLD: ABNORMAL 10*6/UL
SEG NEUTROPHILS: 77 % (ref 36–66)
SEGMENTED NEUTROPHILS ABSOLUTE COUNT: 6 K/UL (ref 1.3–9.1)
SODIUM BLD-SCNC: 134 MMOL/L (ref 135–144)
TOTAL PROTEIN: 7.3 G/DL (ref 6.4–8.3)
WBC # BLD: 7.8 K/UL (ref 3.5–11)
WBC # BLD: ABNORMAL 10*3/UL

## 2020-01-23 PROCEDURE — 80053 COMPREHEN METABOLIC PANEL: CPT

## 2020-01-23 PROCEDURE — 83690 ASSAY OF LIPASE: CPT

## 2020-01-23 PROCEDURE — 72125 CT NECK SPINE W/O DYE: CPT

## 2020-01-23 PROCEDURE — 71045 X-RAY EXAM CHEST 1 VIEW: CPT

## 2020-01-23 PROCEDURE — 85025 COMPLETE CBC W/AUTO DIFF WBC: CPT

## 2020-01-23 PROCEDURE — 99284 EMERGENCY DEPT VISIT MOD MDM: CPT

## 2020-01-23 PROCEDURE — 6360000002 HC RX W HCPCS: Performed by: EMERGENCY MEDICINE

## 2020-01-23 PROCEDURE — 85730 THROMBOPLASTIN TIME PARTIAL: CPT

## 2020-01-23 PROCEDURE — 74176 CT ABD & PELVIS W/O CONTRAST: CPT

## 2020-01-23 PROCEDURE — 83605 ASSAY OF LACTIC ACID: CPT

## 2020-01-23 PROCEDURE — 2580000003 HC RX 258: Performed by: EMERGENCY MEDICINE

## 2020-01-23 PROCEDURE — 70450 CT HEAD/BRAIN W/O DYE: CPT

## 2020-01-23 PROCEDURE — 96375 TX/PRO/DX INJ NEW DRUG ADDON: CPT

## 2020-01-23 PROCEDURE — 85610 PROTHROMBIN TIME: CPT

## 2020-01-23 PROCEDURE — 36415 COLL VENOUS BLD VENIPUNCTURE: CPT

## 2020-01-23 PROCEDURE — 96374 THER/PROPH/DIAG INJ IV PUSH: CPT

## 2020-01-23 RX ORDER — FENTANYL CITRATE 50 UG/ML
50 INJECTION, SOLUTION INTRAMUSCULAR; INTRAVENOUS ONCE
Status: DISCONTINUED | OUTPATIENT
Start: 2020-01-23 | End: 2020-01-23

## 2020-01-23 RX ORDER — 0.9 % SODIUM CHLORIDE 0.9 %
1000 INTRAVENOUS SOLUTION INTRAVENOUS ONCE
Status: COMPLETED | OUTPATIENT
Start: 2020-01-23 | End: 2020-01-24

## 2020-01-23 RX ORDER — FENTANYL CITRATE 50 UG/ML
100 INJECTION, SOLUTION INTRAMUSCULAR; INTRAVENOUS ONCE
Status: COMPLETED | OUTPATIENT
Start: 2020-01-23 | End: 2020-01-23

## 2020-01-23 RX ORDER — ONDANSETRON 2 MG/ML
4 INJECTION INTRAMUSCULAR; INTRAVENOUS ONCE
Status: COMPLETED | OUTPATIENT
Start: 2020-01-23 | End: 2020-01-23

## 2020-01-23 RX ADMIN — ONDANSETRON 4 MG: 2 INJECTION INTRAMUSCULAR; INTRAVENOUS at 22:44

## 2020-01-23 RX ADMIN — FENTANYL CITRATE 100 MCG: 50 INJECTION, SOLUTION INTRAMUSCULAR; INTRAVENOUS at 22:43

## 2020-01-23 RX ADMIN — SODIUM CHLORIDE 1000 ML: 9 INJECTION, SOLUTION INTRAVENOUS at 22:34

## 2020-01-23 ASSESSMENT — PAIN SCALES - GENERAL: PAINLEVEL_OUTOF10: 5

## 2020-01-24 VITALS
HEIGHT: 67 IN | TEMPERATURE: 98 F | WEIGHT: 216 LBS | HEART RATE: 79 BPM | OXYGEN SATURATION: 97 % | RESPIRATION RATE: 11 BRPM | BODY MASS INDEX: 33.9 KG/M2 | DIASTOLIC BLOOD PRESSURE: 98 MMHG | SYSTOLIC BLOOD PRESSURE: 144 MMHG

## 2020-01-24 NOTE — ED NOTES
Report was given to [id number] [place] [address]. Pt transferred to [facility] and transported back to [facility] [place].       Neno Orozco RN  01/24/20 6365

## 2020-01-24 NOTE — ED PROVIDER NOTES
[address] ED  eMERGENCY dEPARTMENT eNCOUnter    Pt Name: Donny Saucedo  MRN: [id number]  [place]: 1955  Date of evaluation:1/23/20  PCP: Mirza Jc MD    [address] [place] [address]       Chief Complaint   Patient presents with    Nausea    Emesis    Fall       HISTORY OF PRESENT ILLNESS    Donny Saucedo is a 60 y.o. male who presents with a chief complaint of a fall, nausea and vomiting. Patient fell out of bed yesterday. Unclear how it happened. He was found on the ground at his nursing home. He does have a significant past medical history of a recent hemorrhagic stroke. Also had some nausea and vomiting yesterday. Otherwise is asymptomatic. He does have a tracheostomy and is unable to talk to me however he is answering questions with nodding his head. Denies any pain right now. No fevers at the nursing home that they know of. Symptoms are acute. Was are moderate. Nothing make symptoms better or worse. No other complaints at this time. REVIEW OF SYSTEMS       Review of Systems   Unable to perform ROS: Patient nonverbal       PAST MEDICAL HISTORY     Past Medical History:   Diagnosis Date    Atrial fibrillation ([address]     CAD (coronary artery disease)     Cerebral artery occlusion with cerebral infarction ([address]     H/O heart artery stent x 4    Hx of blood clots     8 clots removed from right leg    Hyperlipidemia     Hypertension     Hyperthyroidism     Kidney stones     MI, old     family states 3 MI history    Microalbuminuria     Other complications due to other cardiac device, implant, and graft     Proteinuria     Renal cyst     right side    Thyroid disease     Tracheostomy dependence ([address]     [facility] on 12/[facility] due to CVA     Type II or unspecified type diabetes mellitus without mention of complication, not stated as uncontrolled          SURGICAL HISTORY      has a past surgical history that includes Kidney surgery; Lithotripsy;  Cystoscopy; Ureteroscopy; Currently is afebrile, nontoxic, normal vital signs. Not in acute distress. Per his family member he is acting at his mental baseline. He is following commands. On my exam abdomen is obese, soft, possibly distended with decreased bowel sounds. Does not seem to have any tenderness anywhere in his abdomen. We will get head CT, cervical spine CT due to the fall yesterday has history of recent intracranial hemorrhage. I am going get a CT scan of his abdomen as well as I am concerned about an obstruction. He is fed totally through the G-tube. We will check blood work, give IV fluids, reassess. DIAGNOSTIC RESULTS     EKG: All EKG's are interpreted by the Emergency Department Physician who either signs or Co-signs this chart in the absence of a cardiologist.        RADIOLOGY:   I directly visualized the following  images and reviewed the radiologist interpretations:  CT ABDOMEN PELVIS WO CONTRAST Additional Contrast? None   Preliminary Result   1. No acute findings within the abdomen or pelvis. No evidence of   obstructive uropathy or appendicitis. Gastrostomy tube within the gastric   body. 2. Punctate nonobstructive middle pole right renal calculus. 3. Cardiomegaly. Atherosclerotic calcification in the aorta and coronary   circulation. No aneurysm. CT Cervical Spine WO Contrast   Final Result   No acute abnormality of the cervical spine. CT Head WO Contrast   Final Result   No acute intracranial abnormality. Continued evolution of left basal ganglia/thalamic hemorrhage with decrease   in mass effect. Chronic small vessel white matter ischemic changes. XR CHEST PORTABLE   Final Result   No acute cardiopulmonary disease. Cardiomegaly without overt failure.                  ED BEDSIDE ULTRASOUND:      LABS:  Labs Reviewed   CBC WITH AUTO DIFFERENTIAL - Abnormal; Notable for the following components:       Result Value    RDW 16.5 (*)     Seg Neutrophils 77 (*) medications on file       (Please note that portions ofthis note were completed with a voice recognition program.  Efforts were made to edit the dictations but occasionally words are mis-transcribed.)    Asia Loredo DO  Attending Emergency Physician          Asia Loredo DO  01/23/20 2167

## 2020-01-28 PROCEDURE — 87070 CULTURE OTHR SPECIMN AEROBIC: CPT

## 2020-01-28 PROCEDURE — 87205 SMEAR GRAM STAIN: CPT

## 2020-01-29 ENCOUNTER — HOSPITAL ENCOUNTER (OUTPATIENT)
Age: 65
Setting detail: SPECIMEN
Discharge: HOME | End: 2020-01-29
Payer: MEDICARE

## 2020-01-30 LAB
CULTURE: ABNORMAL
DIRECT EXAM: ABNORMAL
Lab: ABNORMAL
SPECIMEN DESCRIPTION: ABNORMAL

## 2023-01-10 NOTE — PROGRESS NOTES
Daily Progress Note  Neuro Critical Care    Patient Name: Carmen Quispe  Patient : 1955  Room/Bed: 0523/0523-01  Code Status: full  Allergies: No Known Allergies    CHIEF COMPLAINT:      Slurred speech left-sided facial droop unable to move right arm       INTERVAL HISTORY    Initial Presentation     The patient is a 61 y. o. male presented with past medical history of type 2 diabetes thyroid disease hypothyroidism, hypertension hyperlipidemia coronary artery disease status post stent A. fib on coumadin came as a transfer from [facility] with last well-known oh 6 AM this morning when wife was with him and the patient had coffee he then went to lie on bed and around 8 AM to take his medication sitting on the couch and the wife noticed that he was mumbling unable to move his right arm and had a left facial droop and she called 911. At [address] his blood pressure was 160/97 and blood glucose 297.  His stroke scale was 10 and INR was 2 he had a CT head done which showed acute intraparenchymal hematoma in the left basal ganglia measuring 2.1x 2.3 x 2.1 cm, parenchymal volume loss and moderate chronic microvascular ischemic changes as well as old infarct.  Plan will was to transfer him to [facility] and start him on [address] for reversal.  Started on Cardene drip with blood pressure goal of less than 140 and received 1500 units of Kcentra for INR reversal.      ICH score: 0     12/8  TSH, T3, T4 normal, head CT showing worsening basal ganglia hemorrhage as well as some IVH involvement.  CTA neg. Neurosurgery aware. Carly Degree showing severe diastolic dysfunction as well as EF of 48%. Upon return from CT scan patient had bilateral crackles as well as pulmonary edema on x-ray.  Lasix given and patient put on BiPAP.  Worsening respiratory status with crackles in all lung fields.  Patient begins to desaturate into the 80s.   Decision made to intubate.  Art line placed.  Patient switch from Cleviprex to Dr Bautista would prefer clinic visit prior to DSA, attempted to contact pt but unable to reach her or LVM   Cardene.  Sedation with propofol.  Fentanyl as needed pain q1 hours.  Additional 80 mg Lasix given.  Restarted on home antihypertensives.  Required elevated PEEP.  INR 1.1.  10 mg IV vitamin K given.   Repeat CT head this a.m. stable with no interval change in the left thalamic hematoma or intraventricular hemorrhage as well as degree of midline shift being unchanged.  Patient received hydralazine 10 mg x1 overnight for blood pressure reading of 159/89.  Patient afebrile no leukocytosis seen.  Tube feeds started-diabetic diet, heparin 5000 subcu every 8 started for DVT prophylaxis           12/9:  Patient had T-max of 100.4, UA sent negative for infection.  Sputum culture sent prior showed no growth.  Patient off Cardene, on Precedex.  Tolerating tube feed.  Intubated, INR 0.9, no vitamin K given.  Patient on Lasix 20mg twice daily with a goal of being in 1 to 1.5 L negative balance.  Input 1241 L output 1465.           12/10:  No acute events overnight. UA negative for any growth blood cultures negative sputum cultures negative. Tolerated  only 40 minutes of CPAP, glipizide and metformin thousand milligrams started, pressure better controlled today. No bowel movement since admission patient placed on Colace    12/11  Sputum positive for strep pneumonia started on ceftriaxone and azithomycin temp of 101.7 cxr showed worsenig  of infiltrate on right lung base.mag citrate given since no bowel movement since admission.     Last 24:       CURRENT MEDICATIONS:  SCHEDULED MEDICATIONS:   azithromycin  500 mg Intravenous Q24H    cefTRIAXone (ROCEPHIN) IV  1 g Intravenous Q24H    insulin lispro  0-18 Units Subcutaneous 4 times per day    famotidine (PEPCID) injection  20 mg Intravenous BID    sennosides-docusate sodium  2 tablet Oral Daily    modafinil  200 mg Per NG tube Daily    tetrahydrozoline  1 drop Left Eye TID    metFORMIN  1,000 mg Per NG tube BID WC    insulin glargine  60 Units Subcutaneous Nightly    enoxaparin  40 mg Subcutaneous Daily    docusate  100 mg Per NG tube Daily    glyBURIDE  5 mg Per NG tube BID WC    vitamin B-1  100 mg Oral Daily    amLODIPine  5 mg Per NG tube Daily    albuterol  2.5 mg Nebulization 4x daily    bumetanide  0.5 mg Oral Daily    chlorhexidine  15 mL Mouth/Throat BID    lisinopril  40 mg Oral Daily    simethicone  40 mg Per NG tube 4x Daily    atorvastatin  40 mg Oral Daily    sodium chloride flush  10 mL Intravenous 2 times per day    methIMAzole  20 mg Oral Daily    methIMAzole  10 mg Oral QPM    metoprolol tartrate  100 mg Oral BID    diltiazem  60 mg Oral 4 times per day     CONTINUOUS INFUSIONS:   dexmedetomidine (PRECEDEX) IV infusion 0.5 mcg/kg/hr (19)    dextrose       PRN MEDICATIONS:   REFRESH LACRI-LUBE, potassium chloride, fentanNYL, glucose, dextrose, glucagon (rDNA), dextrose, acetaminophen **OR** acetaminophen, sodium chloride flush, ondansetron, metoprolol, hydrALAZINE    VITALS:  Temperature Range: Temp: 98.4 °F (36.9 °C) Temp  Av.1 °F (37.3 °C)  Min: 98.1 °F (36.7 °C)  Max: 101.1 °F (38.4 °C)  BP Range: Systolic (56YSG), OXR:499 , Min:91 , CWL:108     Diastolic (70VRD), KRISTINE:04, Min:45, Max:86    Pulse Range: Pulse  Av.3  Min: 75  Max: 79  Respiration Range: Resp  Av.2  Min: 13  Max: 29  Current Pulse Ox: SpO2: 95 %  24HR Pulse Ox Range: SpO2  Av.5 %  Min: 93 %  Max: 100 %  Patient Vitals for the past 12 hrs:   BP Temp Temp src Pulse Resp SpO2   19 0703 (!) 92/49 -- -- 75 21 95 %   19 0603 (!) 108/53 -- -- 75 22 95 %   19 0504 124/73 -- -- 75 16 95 %   19 0500 -- -- -- 75 21 96 %   19 0402 (!) 146/78 98.4 °F (36.9 °C) Axillary 75 21 97 %   19 0318 -- -- -- 75 22 98 %   19 0303 123/65 -- -- 75 20 96 %   19 0202 138/72 -- -- 75 21 --   19 0105 122/72 -- -- 76 20 100 %   19 0006 -- -- -- 75 19 94 %   19 0002 (!) 140/74 98.4 °F (36.9 °C) Axillary 75 20 94 % pollo   PEERLA-     Motor Exam:    Drift:  unable to assess  Tone:  normal     Motor exam :withdraws to pain   Sensory:    Touch:    Slight Withdraws to pain on right side  Normal on the left upper and lower extremity     Plantar Response:  Right:  downgoing  Left:  downgoing     Clonus:  absent  Monteiro's:  absent     Coordination/Dysmetria:  Unable to assess     Gait: Unable to assess             DRAINS:  [x] There are no drains for Neuro Critical Care to monitor at this time. ASSESSMENT AND PLAN:       This is a 61 y. o. male with past medical history of type 2 diabetes thyroid disease hypothyroidism, hypertension hyperlipidemia coronary artery disease status post stent A. fib on Warfarin, came as a [facility] with [facility] head showing left basal ganglia intraparenchymal hematoma. LWKN 0600, ICH score -0 NIHSS-15    Interval history of pneumonia, being treated with azithromycin after strep pneumonia grew on sputum cultures.   White cell count has trended down.     PLAN/MEDICAL DECISION MAKING:     NEUROLOGIC:  -CT head on admission shows acute intraparenchymal hematoma in the left basal ganglia measuring 2.1x 2.3 x 2.1 cm, parenchymal volume loss and moderate chronic microvascular ischemic changes as well as old infarct  -Patient received 1500 units of Kcentra of as he is on Warfarin for reversal of INR 2.9  -Repeat head CT showing basal ganglia bleed increased in size approximately 2 cm to 2.8.  Some IVH involvement.  Repeat CT head nest day after admission was stable one  -Patient not woken up off sedation since intubation will repeat ct head today  -Keep systolic blood pressure less than 160 -weaned offCardene drip  -On Precedex/fentanyl for sedation  -Neuro checks per protocol     CARDIOVASCULAR:  - systolic blood pressure goal of less than 160-weaned off Cardene  -Patient on Lopressor 100 mg twice daily, Cardizem 60 mg every 6h, Lisinopril 40 qd , Norvasc 5 mg added today for blood pressure monitoring  -Echocardiogram showing EF of 48% and severe diastolic dysfunction with normal valves. - Trops stable 23, 27, 27  - Continue telemetry monitoring      PULMONARY:  -Patient had worsening respiratory status and trialed on BiPAP with little effect Intubated for airway protection on 12/7  -Patient was given a weaning trial however did not tolerated will continue to monitor daily with weaning trial and assess patient   -Chest x-ray shows  -stable monitor output wanted patient to be -1 to 1.5 L,  -On Bumex 0.5 mg daily   -Follow-up ABGs daily  Awaiting trach tomorrow     RENAL/FLUID/ELECTROLYTE:  - BUN 34 / Creatinine1.43  -uop 0.4 ml/kg/hr  -Monitor electrolytes and replaced as needed     GI/NUTRITION:  - Diet: Tube feeds started-diabetic diet  - GI Prophylaxis: Pepcid  - Bowel Regimen: MoM   -no bowel movement since adm-mg citrate given  Awaiting PEG    ID/HEME:  - Tmax-  - WBC 8.2 today   - monitor For fevers  -H&H 12.4 platelet 140  -INR 8.0 LI arrival patient received 1500 units of Kcentra x1  - sputum culture shows strep pneumonia-on  Azithromycin, rocephin- blood culture and UA negative     ENDOCRINE:  - monitor blood glucose  -Placed on high dose insulin sliding scale-  on glipizide 5 mg twice daily and metformin thousand mg  lantus 35 U BID from 60 qd  -Thyroid studies negative  -On thiamine 100 mg daily     OTHER:  - PT/OT eval      DVT PROPHYLAXIS:  - SCD sleeves - Thigh High   - ZORA stockings - Thigh High  -On Lovenox 40mg subq     DISPOSITION:  [x] To remain ICU:   [] OK for out of ICU from Neuro Critical Care standpoint    We will continue to follow along. For any changes in exam or patient status please contact Neuro Critical Care.       Germaine Vergara MD  Neuro Critical Care  Pager [phone]  12/12/2019     8:13 AM

## 2025-03-13 NOTE — PROGRESS NOTES
Physical Therapy  DATE: 2019    NAME: Yaa Pretty  MRN: [id number]   : 1955    Patient not seen this date for Physical Therapy due to:  [] Blood transfusion in progress  [] Hemodialysis  []  Patient Declined  [] Spine Precautions   [] Strict Bedrest  [] Surgery/ Procedure  [] Testing      [x] Other - pt intubated/sedated and restrained on this date. PT to check back         [] PT being discontinued at this time. Patient independent. No further needs. [] PT being discontinued at this time as the patient has been transferred to palliative care. No further needs. Laura Gonzalez, This treatment/evaluation completed by signing SPT. Signing PT agrees with treatment and documentation. Pt picked up Janeeva kit

## (undated) DEVICE — Z DISCONTINUED BY MEDLINE USE 2280062 TUBE TRACH SZ 8 L79MM OD12.2MM ID7.6MM CUF DISP INNR CANN

## (undated) DEVICE — MITT PREP W575XL775IN POVIDONE IOD HAIR REMV

## (undated) DEVICE — TOWEL,OR,DSP,ST,NATURAL,DLX,4/PK,20PK/CS: Brand: MEDLINE

## (undated) DEVICE — SUTURE PERMA-HAND SZ 0 L30IN NONABSORBABLE BLK MH L26MM 1/2 K844H

## (undated) DEVICE — Device

## (undated) DEVICE — Z DISCONTINUED APPLICATOR SURG PREP 0.35OZ 2% CHG 70% ISO ALC W/ HI LT

## (undated) DEVICE — HOLDER TUBE TRACH LG COTTON STRP HK LOOP CLOSURE BRTRC FOAM

## (undated) DEVICE — PACK PROCEDURE SURG GEN MIN SVMMC

## (undated) DEVICE — GLOVE SURG SZ 65 THK91MIL LTX FREE SYN POLYISOPRENE

## (undated) DEVICE — SUTURE VCRL SZ 2-0 L27IN ABSRB UD L26MM SH 1/2 CIR J417H

## (undated) DEVICE — GOWN,AURORA,NONREINFORCED,LARGE: Brand: MEDLINE

## (undated) DEVICE — PLUG,CATHETER,DRAINAGE PROTECTOR,TUBE: Brand: MEDLINE

## (undated) DEVICE — MARKER,SKIN,WI/RULER AND LABELS: Brand: MEDLINE

## (undated) DEVICE — DRESSING TRNSPAR W5XL4.5IN FLM SHT SEMIPERMEABLE WIND

## (undated) DEVICE — COUNTER NDL 10 COUNT HLD 20 FOAM BLK SGL MAG

## (undated) DEVICE — CATHETER F BLLN 5CC 20FR INF CTRL 2 W SIL ALLY AND HYDRGEL

## (undated) DEVICE — STRIP,CLOSURE,WOUND,MEDI-STRIP,1/2X4: Brand: MEDLINE

## (undated) DEVICE — SUTURE PROL SZ 1 L30IN NONABSORBABLE BLU L36MM CT-1 1/2 CIR 8425H

## (undated) DEVICE — PAD,NON-ADHERENT,3X8,STERILE,LF,1/PK: Brand: MEDLINE

## (undated) DEVICE — PAD,ABDOMINAL,5"X9",ST,LF,25/BX: Brand: MEDLINE INDUSTRIES, INC.

## (undated) DEVICE — SPONGE,PEANUT,XRAY,ST,SM,3/8",5/CARD: Brand: MEDLINE INDUSTRIES, INC.

## (undated) DEVICE — GLOVE ORANGE PI 7   MSG9070

## (undated) DEVICE — SOLUTION SCRB 4OZ 4% CHG H2O AIDED FOR PREOPERATIVE SKIN

## (undated) DEVICE — SYRINGE MED 10ML LUERLOCK TIP W/O SFTY DISP

## (undated) DEVICE — GLOVE ORANGE PI 7 1/2   MSG9075

## (undated) DEVICE — INTENDED FOR TISSUE SEPARATION, AND OTHER PROCEDURES THAT REQUIRE A SHARP SURGICAL BLADE TO PUNCTURE OR CUT.: Brand: BARD-PARKER ® CARBON RIB-BACK BLADES

## (undated) DEVICE — SUTURE ETHLN SZ 3-0 L18IN NONABSORBABLE BLK L24MM PS-1 3/8 1663G

## (undated) DEVICE — SUTURE PROL SZ 2-0 L30IN NONABSORBABLE BLU L26MM CT-2 1/2 8411H

## (undated) DEVICE — Z DISCONTINUED BY MEDLINE USE 2711682 TRAY SKIN PREP DRY W/ PREM GLV

## (undated) DEVICE — BLADE CLIPPER GEN PURP NS

## (undated) DEVICE — GAUZE,SPONGE,FLUFF,6"X6.75",STRL,5/TRAY: Brand: MEDLINE

## (undated) DEVICE — SPONGE DRN W4XL4IN RAYON/POLYESTER 6 PLY NONWOVEN PRECUT